# Patient Record
Sex: FEMALE | Race: WHITE | NOT HISPANIC OR LATINO | Employment: FULL TIME | URBAN - METROPOLITAN AREA
[De-identification: names, ages, dates, MRNs, and addresses within clinical notes are randomized per-mention and may not be internally consistent; named-entity substitution may affect disease eponyms.]

---

## 2019-08-13 PROBLEM — I10 HTN (HYPERTENSION): Status: ACTIVE | Noted: 2019-08-13

## 2022-03-31 ENCOUNTER — TELEPHONE (OUTPATIENT)
Dept: ENDOSCOPY | Facility: HOSPITAL | Age: 62
End: 2022-03-31
Payer: COMMERCIAL

## 2022-03-31 DIAGNOSIS — Z12.11 SCREEN FOR COLON CANCER: Primary | ICD-10-CM

## 2022-06-30 ENCOUNTER — OFFICE VISIT (OUTPATIENT)
Dept: OTOLARYNGOLOGY | Facility: CLINIC | Age: 62
End: 2022-06-30
Payer: COMMERCIAL

## 2022-06-30 VITALS — BODY MASS INDEX: 19.6 KG/M2 | TEMPERATURE: 98 F | WEIGHT: 107.13 LBS

## 2022-06-30 DIAGNOSIS — J38.1 REINKE'S EDEMA OF VOCAL FOLDS: ICD-10-CM

## 2022-06-30 DIAGNOSIS — R13.10 DYSPHAGIA, UNSPECIFIED TYPE: Primary | ICD-10-CM

## 2022-06-30 DIAGNOSIS — R49.0 DYSPHONIA: ICD-10-CM

## 2022-06-30 PROCEDURE — 1159F MED LIST DOCD IN RCRD: CPT | Mod: CPTII,S$GLB,, | Performed by: OTOLARYNGOLOGY

## 2022-06-30 PROCEDURE — 31575 PR LARYNGOSCOPY, FLEXIBLE; DIAGNOSTIC: ICD-10-PCS | Mod: S$GLB,,, | Performed by: OTOLARYNGOLOGY

## 2022-06-30 PROCEDURE — 3008F BODY MASS INDEX DOCD: CPT | Mod: CPTII,S$GLB,, | Performed by: OTOLARYNGOLOGY

## 2022-06-30 PROCEDURE — 4010F PR ACE/ARB THEARPY RXD/TAKEN: ICD-10-PCS | Mod: CPTII,S$GLB,, | Performed by: OTOLARYNGOLOGY

## 2022-06-30 PROCEDURE — 99203 PR OFFICE/OUTPT VISIT, NEW, LEVL III, 30-44 MIN: ICD-10-PCS | Mod: 25,S$GLB,, | Performed by: OTOLARYNGOLOGY

## 2022-06-30 PROCEDURE — 1159F PR MEDICATION LIST DOCUMENTED IN MEDICAL RECORD: ICD-10-PCS | Mod: CPTII,S$GLB,, | Performed by: OTOLARYNGOLOGY

## 2022-06-30 PROCEDURE — 3008F PR BODY MASS INDEX (BMI) DOCUMENTED: ICD-10-PCS | Mod: CPTII,S$GLB,, | Performed by: OTOLARYNGOLOGY

## 2022-06-30 PROCEDURE — 99999 PR PBB SHADOW E&M-EST. PATIENT-LVL III: ICD-10-PCS | Mod: PBBFAC,,, | Performed by: OTOLARYNGOLOGY

## 2022-06-30 PROCEDURE — 99999 PR PBB SHADOW E&M-EST. PATIENT-LVL III: CPT | Mod: PBBFAC,,, | Performed by: OTOLARYNGOLOGY

## 2022-06-30 PROCEDURE — 99203 OFFICE O/P NEW LOW 30 MIN: CPT | Mod: 25,S$GLB,, | Performed by: OTOLARYNGOLOGY

## 2022-06-30 PROCEDURE — 4010F ACE/ARB THERAPY RXD/TAKEN: CPT | Mod: CPTII,S$GLB,, | Performed by: OTOLARYNGOLOGY

## 2022-06-30 PROCEDURE — 31575 DIAGNOSTIC LARYNGOSCOPY: CPT | Mod: S$GLB,,, | Performed by: OTOLARYNGOLOGY

## 2022-06-30 NOTE — PROGRESS NOTES
Referring Provider:    Willrefmary Self  No address on file  Subjective:   Patient: Lakisha Vance [id number], :1960   Visit date:2022 10:46 AM    Chief Complaint:  Dysphagia    HPI:    Prior notes reviewed by myself.  Clinical documentation obtained by nursing staff reviewed.     62-year-old female with 40 pack year smoking history presents with complaints of dysphagia.  She reports dysphagia to certain solid foods, mostly meat and bread.  She feels as if these frequently become stuck after she starts her swallow and she has to regurgitate in order to clear her throat again.  She denies any issues with liquids.  She denies any aspiration symptoms.  She denies any night sweats, new lumps or bumps, unintentional weight loss.  She has not had any swallowing testing and she has not seen GI.      Objective:     Physical Exam:  Vitals:  Temp 98.1 °F (36.7 °C) (Temporal)   Wt 48.6 kg (107 lb 2.3 oz)   BMI 19.60 kg/m²   General appearance:  Well developed, well nourished    Ears:  Otoscopy of external auditory canals and tympanic membranes was normal, clinical speech reception thresholds grossly intact, no mass/lesion of auricle.    Nose:  No masses/lesions of external nose, nasal mucosa, septum, and turbinates were within normal limits.    Mouth:  No mass/lesion of lips, teeth, gums, hard/soft palate, tongue, tonsils, or oropharynx.    Neck & Lymphatics:  No cervical lymphadenopathy, no neck mass/crepitus/ asymmetry, trachea is midline, no thyroid enlargement/tenderness/mass.        [x]  Data Reviewed:    Lab Results   Component Value Date    WBC 12.6 (H) 2019    HGB 14.0 2019    HCT 41.6 2019    MCV 96 2019    EOSINOPHIL 3 2019         [x]  Independent interpretation of test:  Due to indication in patient's history, presentation or risk factors,  a fiber optic exam was performed.    SEPARATE PROCEDURE NOTE:    ANESTHESIA:  Topical xylocaine with eric-synephrine  FINDINGS:  Normal  exam      PROCEDURE:  After verbal consent was obtained, the flexible scope was passed through the patient's nasal cavity without difficulty.  The nasopharynx (adenoid pad) and eustachian tube orifices were first visualized and were found to be normal, without masses or irregularity.  The posterior pharyngeal wall and base of tongue were then examined and no mass or irregular tissue was seen.  The scope was then advanced to the larynx, and the epiglottis, valleculae, and piriform sinuses were normal, without masses or mucosal irregularity.  The false vocal folds and true vocal folds were then examined and were found to have normal mobility (full abduction and adduction) and no masses or mucosal irregularity was seen.  The arytenoids and the interarytenoid area were normal.            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  She does not have any other obvious pathology that would explain her swallowing difficulties.  I recommended a GI referral for EGD and further evaluation.  We discussed the option of reflux medication both prescription and OTC, but she adamantly denies any reflux symptoms whatsoever.  Therefore, we agreed to hold off on that for now.

## 2022-07-01 DIAGNOSIS — Z12.11 COLON CANCER SCREENING: Primary | ICD-10-CM

## 2023-05-24 DIAGNOSIS — Z12.31 OTHER SCREENING MAMMOGRAM: ICD-10-CM

## 2023-07-06 PROBLEM — Z66 DNR (DO NOT RESUSCITATE): Status: ACTIVE | Noted: 2023-06-10

## 2023-07-06 PROBLEM — F17.210 MODERATE CIGARETTE SMOKER (10-19 PER DAY): Status: ACTIVE | Noted: 2022-03-29

## 2023-07-06 PROBLEM — R60.0 BILATERAL LOWER EXTREMITY EDEMA: Status: ACTIVE | Noted: 2023-06-10

## 2023-07-06 PROBLEM — D53.9 MACROCYTIC ANEMIA: Status: ACTIVE | Noted: 2023-06-10

## 2023-07-06 PROBLEM — F10.10 ALCOHOL ABUSE: Status: ACTIVE | Noted: 2019-11-17

## 2023-07-10 PROBLEM — K80.20 GALLSTONES: Status: ACTIVE | Noted: 2023-07-10

## 2023-07-26 PROBLEM — M10.9 GOUT: Status: ACTIVE | Noted: 2023-07-26

## 2023-08-21 ENCOUNTER — APPOINTMENT (OUTPATIENT)
Dept: RADIOLOGY | Facility: HOSPITAL | Age: 63
End: 2023-08-21
Attending: NURSE PRACTITIONER
Payer: COMMERCIAL

## 2023-08-21 DIAGNOSIS — R10.13 EPIGASTRIC PAIN: ICD-10-CM

## 2023-08-21 DIAGNOSIS — K80.20 GALLSTONES: ICD-10-CM

## 2023-08-21 PROCEDURE — 76700 US EXAM ABDOM COMPLETE: CPT | Mod: TC,PO

## 2023-08-21 PROCEDURE — 76700 US ABDOMEN COMPLETE: ICD-10-PCS | Mod: 26,,, | Performed by: STUDENT IN AN ORGANIZED HEALTH CARE EDUCATION/TRAINING PROGRAM

## 2023-08-21 PROCEDURE — 76700 US EXAM ABDOM COMPLETE: CPT | Mod: 26,,, | Performed by: STUDENT IN AN ORGANIZED HEALTH CARE EDUCATION/TRAINING PROGRAM

## 2023-08-23 ENCOUNTER — OFFICE VISIT (OUTPATIENT)
Dept: HEPATOLOGY | Facility: CLINIC | Age: 63
End: 2023-08-23
Payer: COMMERCIAL

## 2023-08-23 VITALS
HEIGHT: 62 IN | DIASTOLIC BLOOD PRESSURE: 78 MMHG | HEART RATE: 80 BPM | WEIGHT: 112.75 LBS | BODY MASS INDEX: 20.75 KG/M2 | SYSTOLIC BLOOD PRESSURE: 132 MMHG

## 2023-08-23 DIAGNOSIS — K80.20 GALLSTONES: ICD-10-CM

## 2023-08-23 DIAGNOSIS — R18.8 OTHER ASCITES: ICD-10-CM

## 2023-08-23 DIAGNOSIS — K76.0 FATTY LIVER: ICD-10-CM

## 2023-08-23 DIAGNOSIS — R10.10 PAIN OF UPPER ABDOMEN: ICD-10-CM

## 2023-08-23 DIAGNOSIS — R18.8 ASCITES OF LIVER: ICD-10-CM

## 2023-08-23 DIAGNOSIS — R74.8 ELEVATED LIVER ENZYMES: ICD-10-CM

## 2023-08-23 DIAGNOSIS — K74.00 LIVER FIBROSIS: ICD-10-CM

## 2023-08-23 DIAGNOSIS — F10.10 ALCOHOL ABUSE: ICD-10-CM

## 2023-08-23 DIAGNOSIS — K76.9 HEPATIC DISEASE: Primary | ICD-10-CM

## 2023-08-23 PROCEDURE — 1159F PR MEDICATION LIST DOCUMENTED IN MEDICAL RECORD: ICD-10-PCS | Mod: CPTII,S$GLB,, | Performed by: INTERNAL MEDICINE

## 2023-08-23 PROCEDURE — 99999 PR PBB SHADOW E&M-EST. PATIENT-LVL V: ICD-10-PCS | Mod: PBBFAC,,, | Performed by: INTERNAL MEDICINE

## 2023-08-23 PROCEDURE — 4010F ACE/ARB THERAPY RXD/TAKEN: CPT | Mod: CPTII,S$GLB,, | Performed by: INTERNAL MEDICINE

## 2023-08-23 PROCEDURE — 1160F PR REVIEW ALL MEDS BY PRESCRIBER/CLIN PHARMACIST DOCUMENTED: ICD-10-PCS | Mod: CPTII,S$GLB,, | Performed by: INTERNAL MEDICINE

## 2023-08-23 PROCEDURE — 1159F MED LIST DOCD IN RCRD: CPT | Mod: CPTII,S$GLB,, | Performed by: INTERNAL MEDICINE

## 2023-08-23 PROCEDURE — 4010F PR ACE/ARB THEARPY RXD/TAKEN: ICD-10-PCS | Mod: CPTII,S$GLB,, | Performed by: INTERNAL MEDICINE

## 2023-08-23 PROCEDURE — 3008F BODY MASS INDEX DOCD: CPT | Mod: CPTII,S$GLB,, | Performed by: INTERNAL MEDICINE

## 2023-08-23 PROCEDURE — 3008F PR BODY MASS INDEX (BMI) DOCUMENTED: ICD-10-PCS | Mod: CPTII,S$GLB,, | Performed by: INTERNAL MEDICINE

## 2023-08-23 PROCEDURE — 3075F SYST BP GE 130 - 139MM HG: CPT | Mod: CPTII,S$GLB,, | Performed by: INTERNAL MEDICINE

## 2023-08-23 PROCEDURE — 1160F RVW MEDS BY RX/DR IN RCRD: CPT | Mod: CPTII,S$GLB,, | Performed by: INTERNAL MEDICINE

## 2023-08-23 PROCEDURE — 99205 OFFICE O/P NEW HI 60 MIN: CPT | Mod: S$GLB,,, | Performed by: INTERNAL MEDICINE

## 2023-08-23 PROCEDURE — 3078F PR MOST RECENT DIASTOLIC BLOOD PRESSURE < 80 MM HG: ICD-10-PCS | Mod: CPTII,S$GLB,, | Performed by: INTERNAL MEDICINE

## 2023-08-23 PROCEDURE — 99999 PR PBB SHADOW E&M-EST. PATIENT-LVL V: CPT | Mod: PBBFAC,,, | Performed by: INTERNAL MEDICINE

## 2023-08-23 PROCEDURE — 3075F PR MOST RECENT SYSTOLIC BLOOD PRESS GE 130-139MM HG: ICD-10-PCS | Mod: CPTII,S$GLB,, | Performed by: INTERNAL MEDICINE

## 2023-08-23 PROCEDURE — 99205 PR OFFICE/OUTPT VISIT, NEW, LEVL V, 60-74 MIN: ICD-10-PCS | Mod: S$GLB,,, | Performed by: INTERNAL MEDICINE

## 2023-08-23 PROCEDURE — 3078F DIAST BP <80 MM HG: CPT | Mod: CPTII,S$GLB,, | Performed by: INTERNAL MEDICINE

## 2023-08-23 RX ORDER — PANTOPRAZOLE SODIUM 40 MG/1
40 TABLET, DELAYED RELEASE ORAL DAILY
Qty: 30 TABLET | Refills: 11 | Status: ON HOLD | OUTPATIENT
Start: 2023-08-23 | End: 2023-09-01 | Stop reason: SDUPTHER

## 2023-08-23 RX ORDER — SPIRONOLACTONE 50 MG/1
50 TABLET, FILM COATED ORAL DAILY
Qty: 30 TABLET | Refills: 11 | Status: SHIPPED | OUTPATIENT
Start: 2023-08-23 | End: 2023-10-11 | Stop reason: SDUPTHER

## 2023-08-23 NOTE — H&P (VIEW-ONLY)
Hepatology Note    PATIENT: Lakisha Vance  MRN: [id number]  DATE: 8/23/2023    Provider: Hepatologist - Dr Weaver  Urgency of review: non-urgent  Referring provider: Idalia Fonseca    Diagnosis:   1. Hepatic disease    2. Gallstones    3. Fatty liver    4. Liver fibrosis    5. Other ascites    6. Alcohol abuse    7. Ascites of liver    8. Elevated liver enzymes    9. Pain of upper abdomen        Chief complaint:   Chief Complaint   Patient presents with    Ascites     Of liver     Alcohol Intoxication    Fatty Liver    Elevated Hepatic Enzymes       Subjective:    Initial History: Lakisha Vance is a 63 y.o. female who was referred to Hepatology Clinic for consultation of chronic liver disease with fluid overload      Patient had US for evaluation of abdominal pain which showed mild ascites with ? Scarring of liver though imaging showed fatty liver. There is also GB stone without acute cholecystitis. Patient has leg swelling since 3 months. She tried diuretics with some benefit but stopped due to electrolyte imbalance  Also complains of intermittent nausea . Abdominal pain in epigastric, aggravated with food but poor appetite. Patient has gained weight likely from fluid.     Patient does not have any new complains from liver standpoint. Liver enzymes are elevated. Patient has significant history of alcohol abuse    She denies recent hematemesis, coffee ground emesis, melena, hematochezia, jaundice, confusion, LE edema, abdominal distension.      Prior Relevant History:    She  denies hepatotoxic medication but taking NSAIDS for pain    Review of systems:  A review of 12+ systems was conducted with pertinent positive and negative findings documented in HPI with all other systems reviewed and negative.      PFSH:  Past medical, family, and social history reviewed as documented in chart with pertinent positive medical, family, and social history detailed in HPI.    Past Medical History:   Past Medical History:    Diagnosis Date    Hypertension        Past Surgical HIstory:   Past Surgical History:   Procedure Laterality Date    HYSTERECTOMY         Family History: History reviewed. No pertinent family history.  She has no known family history of liver disease.     Social History:  reports that she has been smoking cigarettes. She has never used smokeless tobacco. She reports that she does not currently use alcohol.    She has significant history of Alcohol     She denies history of IV drug use/Tatto  She  denies high-risk sexual contacts, no raw seafood, no sick contacts      Allergies:  Review of patient's allergies indicates:   Allergen Reactions    Baclofen Other (See Comments)     Vomiting, confusion, shaking       Medications:  Current Outpatient Medications   Medication Sig Dispense Refill    folic acid (FOLVITE) 1 MG tablet Take 1 tablet by mouth once daily.      indomethacin (INDOCIN) 50 MG capsule       ondansetron (ZOFRAN-ODT) 4 MG TbDL Take 1 tablet (4 mg total) by mouth every 8 (eight) hours as needed (nausea/vomiting). 12 tablet 1    vitamin D (VITAMIN D3) 1000 units Tab Take 1 tablet by mouth every morning.      cyanocobalamin (VITAMIN B-12) 100 MCG tablet Take 100 mcg by mouth once daily.      EScitalopram oxalate (LEXAPRO) 10 MG tablet Take 1 tablet (10 mg total) by mouth once daily. 30 tablet 0    furosemide (LASIX) 20 MG tablet TAKE 1 TABLET(20 MG) BY MOUTH DAILY AS NEEDED FOR SWELLING (Patient not taking: Reported on 8/15/2023) 30 tablet 1    pantoprazole (PROTONIX) 40 MG tablet Take 1 tablet (40 mg total) by mouth once daily. 30 tablet 11    spironolactone (ALDACTONE) 50 MG tablet Take 1 tablet (50 mg total) by mouth once daily. 30 tablet 11     No current facility-administered medications for this visit.       Review of Systems   Constitutional:  Negative for fatigue, fever and unexpected weight change.   HENT:  Negative for ear pain, nosebleeds and trouble swallowing.    Eyes:  Negative for discharge  "and redness.   Respiratory:  Negative for cough and shortness of breath.    Cardiovascular:  Negative for palpitations and leg swelling.   Gastrointestinal:  Negative for abdominal distention, abdominal pain, diarrhea and vomiting.   Endocrine: Negative for cold intolerance and polyuria.   Genitourinary:  Negative for flank pain and hematuria.   Musculoskeletal:  Negative for back pain.   Skin:  Negative for pallor.   Neurological:  Negative for seizures and headaches.   Hematological:  Does not bruise/bleed easily.   Psychiatric/Behavioral:  Negative for confusion and hallucinations.            Objective:      Vitals:   Vitals:    08/23/23 1019   BP: 132/78   Pulse: 80   Weight: 51.1 kg (112 lb 12.2 oz)   Height: 5' 2" (1.575 m)       Physical Exam  Constitutional:       Appearance: Normal appearance.   HENT:      Head: Normocephalic and atraumatic.      Right Ear: External ear normal.      Left Ear: External ear normal.      Mouth/Throat:      Mouth: Mucous membranes are moist.   Eyes:      Extraocular Movements: Extraocular movements intact.      Pupils: Pupils are equal, round, and reactive to light.   Cardiovascular:      Rate and Rhythm: Normal rate and regular rhythm.      Pulses: Normal pulses.      Heart sounds: Normal heart sounds.   Pulmonary:      Effort: Pulmonary effort is normal.      Breath sounds: Normal breath sounds.   Abdominal:      General: Bowel sounds are normal. There is no distension.      Palpations: Abdomen is soft. There is no mass.      Tenderness: There is abdominal tenderness.   Musculoskeletal:         General: Normal range of motion.      Cervical back: Normal range of motion and neck supple.   Neurological:      General: No focal deficit present.      Mental Status: She is alert and oriented to person, place, and time.         Laboratory Data:  No visits with results within 1 Week(s) from this visit.   Latest known visit with results is:   Office Visit on 07/06/2023   Component " Date Value Ref Range Status    ALFREDA Direct 07/07/2023 Negative  Negative Final    Sed Rate 07/07/2023 25  0 - 40 mm/hr Final    CRP 07/07/2023 43 (H)  0 - 10 mg/L Final    WBC 07/07/2023 10.6  3.4 - 10.8 x10E3/uL Final    RBC 07/07/2023 3.08 (L)  3.77 - 5.28 x10E6/uL Final    Hemoglobin 07/07/2023 10.7 (L)  11.1 - 15.9 g/dL Final    Hematocrit 07/07/2023 31.9 (L)  34.0 - 46.6 % Final    MCV 07/07/2023 104 (H)  79 - 97 fL Final    MCH 07/07/2023 34.7 (H)  26.6 - 33.0 pg Final    MCHC 07/07/2023 33.5  31.5 - 35.7 g/dL Final    RDW 07/07/2023 13.2  11.7 - 15.4 % Final    Platelets 07/07/2023 279  150 - 450 x10E3/uL Final    Neutrophils 07/07/2023 77  Not Estab. % Final    Lymphs 07/07/2023 17  Not Estab. % Final    Monocytes 07/07/2023 6  Not Estab. % Final    Eos 07/07/2023 0  Not Estab. % Final    Basos 07/07/2023 0  Not Estab. % Final    Neutrophils (Absolute) 07/07/2023 8.1 (H)  1.4 - 7.0 x10E3/uL Final    Lymphs (Absolute) 07/07/2023 1.8  0.7 - 3.1 x10E3/uL Final    Monocytes(Absolute) 07/07/2023 0.6  0.1 - 0.9 x10E3/uL Final    Eos (Absolute) 07/07/2023 0.0  0.0 - 0.4 x10E3/uL Final    Baso (Absolute) 07/07/2023 0.0  0.0 - 0.2 x10E3/uL Final    Immature Granulocytes 07/07/2023 0  Not Estab. % Final    Immature Grans (Abs) 07/07/2023 0.0  0.0 - 0.1 x10E3/uL Final    Glucose 07/07/2023 106 (H)  70 - 99 mg/dL Final    BUN 07/07/2023 10  8 - 27 mg/dL Final    Creatinine 07/07/2023 0.64  0.57 - 1.00 mg/dL Final    eGFR 07/07/2023 99  >59 mL/min/1.73 Final    BUN/Creatinine Ratio 07/07/2023 16  12 - 28 Final    Sodium 07/07/2023 140  134 - 144 mmol/L Final    Potassium 07/07/2023 4.0  3.5 - 5.2 mmol/L Final    Chloride 07/07/2023 102  96 - 106 mmol/L Final    CO2 07/07/2023 24  20 - 29 mmol/L Final    Calcium 07/07/2023 8.5 (L)  8.7 - 10.3 mg/dL Final    Protein, Total 07/07/2023 6.8  6.0 - 8.5 g/dL Final    Albumin 07/07/2023 2.6 (L)  3.8 - 4.8 g/dL Final    Comment:                **Effective July 10, 2023 Albumin  "reference interval**                   will be changing to:                              Age                  Male          Female                             0 -   7 days       3.6 - 4.9      3.6 - 4.9                             8 -  30 days       3.5 - 4.6      3.5 - 4.6                             1 -   6 months     3.7 - 4.8      3.7 - 4.8                      7 months -   2 years      4.0 - 5.0      4.0 - 5.0                             3 -   5 years      4.1 - 5.0      4.1 - 5.0                             6 -  12 years      4.2 - 5.0      4.2 - 5.0                            13 -  30 years      4.3 - 5.2      4.0 - 5.0                            31 -  50 years      4.1 - 5.1      3.9 - 4.9                            51 -  60 years      3.8 - 4.9      3.8 - 4.9                            61 -  70 years      3.9 - 4.9      3.9 - 4.9                            71 -  80 years      3.8 - 4.8      3.8 - 4.8                            8                           1 -  89 years      3.7 - 4.7      3.7 - 4.7                            90 - 199 years      3.6 - 4.6      3.6 - 4.6      Globulin, Total 07/07/2023 4.2  1.5 - 4.5 g/dL Final    Albumin/Globulin Ratio 07/07/2023 0.6 (L)  1.2 - 2.2 Final    Total Bilirubin 07/07/2023 0.4  0.0 - 1.2 mg/dL Final    Alkaline Phosphatase 07/07/2023 226 (H)  44 - 121 IU/L Final    AST 07/07/2023 77 (H)  0 - 40 IU/L Final    ALT 07/07/2023 14  0 - 32 IU/L Final       Lab Results   Component Value Date    INR 1.1 03/15/2010       No results found for: "SMOOTHMUSCAB", "MITOAB"  Lab Results   Component Value Date    FERRITIN 533 (H) 07/08/2022     No results found for: "HAV", "HEPAIGM", "HEPBIGM", "HEPBCAB", "HBEAG", "HEPCAB"  Lab Results   Component Value Date    TSH 1.264 06/10/2023     No results found for: "ALFREDA"    No results found for: "ABORH"        Lab Results   Component Value Date    HGBA1C 4.8 04/08/2022     Lab Results   Component Value Date    CHOL 127 06/10/2023    CHOL " "108 05/25/2023    CHOL 135 04/08/2022     Lab Results   Component Value Date    HDL 24 (L) 06/10/2023    HDL 32 (L) 05/25/2023    HDL 45 04/08/2022     Lab Results   Component Value Date    LDLCALC 69 06/10/2023    LDLCALC 56 05/25/2023    LDLCALC 71 04/08/2022     Lab Results   Component Value Date    TRIG 170 (H) 06/10/2023    TRIG 107 05/25/2023    TRIG 103 04/08/2022     No results found for: "CHOLHDL"      I personally reviewed imaging studies and outside records..      Assessment:       1. Hepatic disease    2. Gallstones    3. Fatty liver    4. Liver fibrosis    5. Other ascites    6. Alcohol abuse    7. Ascites of liver    8. Elevated liver enzymes    9. Pain of upper abdomen                 Plan:     Lakisha was seen today for ascites, alcohol intoxication, fatty liver and elevated hepatic enzymes.    Diagnoses and all orders for this visit:    Hepatic disease  -     AFP Tumor Marker; Future; Expected date: 08/23/2023  -     Alpha-1-Antitrypsin; Future; Expected date: 08/23/2023  -     ALFREDA Screen w/Reflex; Future; Expected date: 08/23/2023  -     Antimitochondrial Antibody; Future; Expected date: 08/23/2023  -     Anti-Smooth Muscle Antibody; Future; Expected date: 08/23/2023  -     CBC Auto Differential; Future; Expected date: 08/23/2023  -     Ceruloplasmin; Future; Expected date: 08/23/2023  -     Comprehensive Metabolic Panel; Future; Expected date: 08/23/2023  -     Phosphatidylethanol (PETH); Future; Expected date: 08/23/2023  -     Protime-INR; Future; Expected date: 08/23/2023  -     Tissue Transglutaminase, IgA; Future; Expected date: 08/23/2023  -     Hepatitis B Surface Antigen; Future; Expected date: 08/23/2023  -     Hepatitis B Core Antibody, Total; Future; Expected date: 08/23/2023  -     Hepatitis C Antibody; Future; Expected date: 08/23/2023  -     Hepatitis A antibody, IgG; Future; Expected date: 08/23/2023  -     IgG; Future; Expected date: 08/23/2023    Gallstones    Fatty liver  -     " Ambulatory referral/consult to Hepatology    Liver fibrosis    Other ascites  -     pantoprazole (PROTONIX) 40 MG tablet; Take 1 tablet (40 mg total) by mouth once daily.  -     spironolactone (ALDACTONE) 50 MG tablet; Take 1 tablet (50 mg total) by mouth once daily.  -     Ambulatory referral/consult to Endo Procedure ; Future; Expected date: 08/24/2023  -     US Lower Extremity Veins Bilateral; Future; Expected date: 08/23/2023    Alcohol abuse  -     Ambulatory referral/consult to Hepatology    Ascites of liver  -     Ambulatory referral/consult to Hepatology    Elevated liver enzymes  -     Ambulatory referral/consult to Hepatology    Pain of upper abdomen      Abdominal Pain  PPI  Avoid NSAIDS  Plan EGD    Chronic liver disease  --Plan checking serologies to rule out other causes of chronic liver diseases for the sake of thoroughness in work up.       Alcohol related liver disease  Sobriety  Fibro scan    Ascites  Aldactone  started. Check labs if stable can add low dose lasix with close  Salt restriction  If fluid overload persist need Echo       Fluid overload  Doppler LL  Diuretics  Follow up PCP      Alcohol abuse  Education and counseling  AA    Nutritional failure  Consult dietician      Return to clinic in 6 months.    I have sent communication to the referring physician and/or primary care provider.      Time Statement  A total time spent includes time preparing to see patient, reviewing  diagnostic studies and records, direct face-to-face visit, completing orders, medications , reconciliation, prescription management, and care coordination    We discussed in depth the nature of the patient's disease, the management plan in details. I have provided the patient with an opportunity to ask questions and have all questions answered to his satisfaction.     Discussed with patient that it is likely that she will see results before Myself or my nurse are able to view them and report results due to the  Cures Act passed 4/1/21. Results will be sent immediately to the patient who are enrolled in the patient portal. If results come through after business hours or on weekend, we will not see them until the next business day that we are in the office. If resulted during the business day, we will likely not be able to review them until after completing all patient visits in office that day.   Patient was seen in the liver transplant department at [facility] Mack Weaver MD  Transplant Hepatologist and Gastroenterologist  [facility] [place]

## 2023-08-23 NOTE — PROGRESS NOTES
Hepatology Note    PATIENT: Lakisha Vance  MRN: [id number]  DATE: 8/23/2023    Provider: Hepatologist - Dr Weaver  Urgency of review: non-urgent  Referring provider: Idalia Fonseca    Diagnosis:   1. Hepatic disease    2. Gallstones    3. Fatty liver    4. Liver fibrosis    5. Other ascites    6. Alcohol abuse    7. Ascites of liver    8. Elevated liver enzymes    9. Pain of upper abdomen        Chief complaint:   Chief Complaint   Patient presents with    Ascites     Of liver     Alcohol Intoxication    Fatty Liver    Elevated Hepatic Enzymes       Subjective:    Initial History: Lakisha Vance is a 63 y.o. female who was referred to Hepatology Clinic for consultation of chronic liver disease with fluid overload      Patient had US for evaluation of abdominal pain which showed mild ascites with ? Scarring of liver though imaging showed fatty liver. There is also GB stone without acute cholecystitis. Patient has leg swelling since 3 months. She tried diuretics with some benefit but stopped due to electrolyte imbalance  Also complains of intermittent nausea . Abdominal pain in epigastric, aggravated with food but poor appetite. Patient has gained weight likely from fluid.     Patient does not have any new complains from liver standpoint. Liver enzymes are elevated. Patient has significant history of alcohol abuse    She denies recent hematemesis, coffee ground emesis, melena, hematochezia, jaundice, confusion, LE edema, abdominal distension.      Prior Relevant History:    She  denies hepatotoxic medication but taking NSAIDS for pain    Review of systems:  A review of 12+ systems was conducted with pertinent positive and negative findings documented in HPI with all other systems reviewed and negative.      PFSH:  Past medical, family, and social history reviewed as documented in chart with pertinent positive medical, family, and social history detailed in HPI.    Past Medical History:   Past Medical History:    Diagnosis Date    Hypertension        Past Surgical HIstory:   Past Surgical History:   Procedure Laterality Date    HYSTERECTOMY         Family History: History reviewed. No pertinent family history.  She has no known family history of liver disease.     Social History:  reports that she has been smoking cigarettes. She has never used smokeless tobacco. She reports that she does not currently use alcohol.    She has significant history of Alcohol     She denies history of IV drug use/Tatto  She  denies high-risk sexual contacts, no raw seafood, no sick contacts      Allergies:  Review of patient's allergies indicates:   Allergen Reactions    Baclofen Other (See Comments)     Vomiting, confusion, shaking       Medications:  Current Outpatient Medications   Medication Sig Dispense Refill    folic acid (FOLVITE) 1 MG tablet Take 1 tablet by mouth once daily.      indomethacin (INDOCIN) 50 MG capsule       ondansetron (ZOFRAN-ODT) 4 MG TbDL Take 1 tablet (4 mg total) by mouth every 8 (eight) hours as needed (nausea/vomiting). 12 tablet 1    vitamin D (VITAMIN D3) 1000 units Tab Take 1 tablet by mouth every morning.      cyanocobalamin (VITAMIN B-12) 100 MCG tablet Take 100 mcg by mouth once daily.      EScitalopram oxalate (LEXAPRO) 10 MG tablet Take 1 tablet (10 mg total) by mouth once daily. 30 tablet 0    furosemide (LASIX) 20 MG tablet TAKE 1 TABLET(20 MG) BY MOUTH DAILY AS NEEDED FOR SWELLING (Patient not taking: Reported on 8/15/2023) 30 tablet 1    pantoprazole (PROTONIX) 40 MG tablet Take 1 tablet (40 mg total) by mouth once daily. 30 tablet 11    spironolactone (ALDACTONE) 50 MG tablet Take 1 tablet (50 mg total) by mouth once daily. 30 tablet 11     No current facility-administered medications for this visit.       Review of Systems   Constitutional:  Negative for fatigue, fever and unexpected weight change.   HENT:  Negative for ear pain, nosebleeds and trouble swallowing.    Eyes:  Negative for discharge  "and redness.   Respiratory:  Negative for cough and shortness of breath.    Cardiovascular:  Negative for palpitations and leg swelling.   Gastrointestinal:  Negative for abdominal distention, abdominal pain, diarrhea and vomiting.   Endocrine: Negative for cold intolerance and polyuria.   Genitourinary:  Negative for flank pain and hematuria.   Musculoskeletal:  Negative for back pain.   Skin:  Negative for pallor.   Neurological:  Negative for seizures and headaches.   Hematological:  Does not bruise/bleed easily.   Psychiatric/Behavioral:  Negative for confusion and hallucinations.            Objective:      Vitals:   Vitals:    08/23/23 1019   BP: 132/78   Pulse: 80   Weight: 51.1 kg (112 lb 12.2 oz)   Height: 5' 2" (1.575 m)       Physical Exam  Constitutional:       Appearance: Normal appearance.   HENT:      Head: Normocephalic and atraumatic.      Right Ear: External ear normal.      Left Ear: External ear normal.      Mouth/Throat:      Mouth: Mucous membranes are moist.   Eyes:      Extraocular Movements: Extraocular movements intact.      Pupils: Pupils are equal, round, and reactive to light.   Cardiovascular:      Rate and Rhythm: Normal rate and regular rhythm.      Pulses: Normal pulses.      Heart sounds: Normal heart sounds.   Pulmonary:      Effort: Pulmonary effort is normal.      Breath sounds: Normal breath sounds.   Abdominal:      General: Bowel sounds are normal. There is no distension.      Palpations: Abdomen is soft. There is no mass.      Tenderness: There is abdominal tenderness.   Musculoskeletal:         General: Normal range of motion.      Cervical back: Normal range of motion and neck supple.   Neurological:      General: No focal deficit present.      Mental Status: She is alert and oriented to person, place, and time.         Laboratory Data:  No visits with results within 1 Week(s) from this visit.   Latest known visit with results is:   Office Visit on 07/06/2023   Component " Date Value Ref Range Status    ALFREDA Direct 07/07/2023 Negative  Negative Final    Sed Rate 07/07/2023 25  0 - 40 mm/hr Final    CRP 07/07/2023 43 (H)  0 - 10 mg/L Final    WBC 07/07/2023 10.6  3.4 - 10.8 x10E3/uL Final    RBC 07/07/2023 3.08 (L)  3.77 - 5.28 x10E6/uL Final    Hemoglobin 07/07/2023 10.7 (L)  11.1 - 15.9 g/dL Final    Hematocrit 07/07/2023 31.9 (L)  34.0 - 46.6 % Final    MCV 07/07/2023 104 (H)  79 - 97 fL Final    MCH 07/07/2023 34.7 (H)  26.6 - 33.0 pg Final    MCHC 07/07/2023 33.5  31.5 - 35.7 g/dL Final    RDW 07/07/2023 13.2  11.7 - 15.4 % Final    Platelets 07/07/2023 279  150 - 450 x10E3/uL Final    Neutrophils 07/07/2023 77  Not Estab. % Final    Lymphs 07/07/2023 17  Not Estab. % Final    Monocytes 07/07/2023 6  Not Estab. % Final    Eos 07/07/2023 0  Not Estab. % Final    Basos 07/07/2023 0  Not Estab. % Final    Neutrophils (Absolute) 07/07/2023 8.1 (H)  1.4 - 7.0 x10E3/uL Final    Lymphs (Absolute) 07/07/2023 1.8  0.7 - 3.1 x10E3/uL Final    Monocytes(Absolute) 07/07/2023 0.6  0.1 - 0.9 x10E3/uL Final    Eos (Absolute) 07/07/2023 0.0  0.0 - 0.4 x10E3/uL Final    Baso (Absolute) 07/07/2023 0.0  0.0 - 0.2 x10E3/uL Final    Immature Granulocytes 07/07/2023 0  Not Estab. % Final    Immature Grans (Abs) 07/07/2023 0.0  0.0 - 0.1 x10E3/uL Final    Glucose 07/07/2023 106 (H)  70 - 99 mg/dL Final    BUN 07/07/2023 10  8 - 27 mg/dL Final    Creatinine 07/07/2023 0.64  0.57 - 1.00 mg/dL Final    eGFR 07/07/2023 99  >59 mL/min/1.73 Final    BUN/Creatinine Ratio 07/07/2023 16  12 - 28 Final    Sodium 07/07/2023 140  134 - 144 mmol/L Final    Potassium 07/07/2023 4.0  3.5 - 5.2 mmol/L Final    Chloride 07/07/2023 102  96 - 106 mmol/L Final    CO2 07/07/2023 24  20 - 29 mmol/L Final    Calcium 07/07/2023 8.5 (L)  8.7 - 10.3 mg/dL Final    Protein, Total 07/07/2023 6.8  6.0 - 8.5 g/dL Final    Albumin 07/07/2023 2.6 (L)  3.8 - 4.8 g/dL Final    Comment:                **Effective July 10, 2023 Albumin  "reference interval**                   will be changing to:                              Age                  Male          Female                             0 -   7 days       3.6 - 4.9      3.6 - 4.9                             8 -  30 days       3.5 - 4.6      3.5 - 4.6                             1 -   6 months     3.7 - 4.8      3.7 - 4.8                      7 months -   2 years      4.0 - 5.0      4.0 - 5.0                             3 -   5 years      4.1 - 5.0      4.1 - 5.0                             6 -  12 years      4.2 - 5.0      4.2 - 5.0                            13 -  30 years      4.3 - 5.2      4.0 - 5.0                            31 -  50 years      4.1 - 5.1      3.9 - 4.9                            51 -  60 years      3.8 - 4.9      3.8 - 4.9                            61 -  70 years      3.9 - 4.9      3.9 - 4.9                            71 -  80 years      3.8 - 4.8      3.8 - 4.8                            8                           1 -  89 years      3.7 - 4.7      3.7 - 4.7                            90 - 199 years      3.6 - 4.6      3.6 - 4.6      Globulin, Total 07/07/2023 4.2  1.5 - 4.5 g/dL Final    Albumin/Globulin Ratio 07/07/2023 0.6 (L)  1.2 - 2.2 Final    Total Bilirubin 07/07/2023 0.4  0.0 - 1.2 mg/dL Final    Alkaline Phosphatase 07/07/2023 226 (H)  44 - 121 IU/L Final    AST 07/07/2023 77 (H)  0 - 40 IU/L Final    ALT 07/07/2023 14  0 - 32 IU/L Final       Lab Results   Component Value Date    INR 1.1 03/15/2010       No results found for: "SMOOTHMUSCAB", "MITOAB"  Lab Results   Component Value Date    FERRITIN 533 (H) 07/08/2022     No results found for: "HAV", "HEPAIGM", "HEPBIGM", "HEPBCAB", "HBEAG", "HEPCAB"  Lab Results   Component Value Date    TSH 1.264 06/10/2023     No results found for: "ALFREDA"    No results found for: "ABORH"        Lab Results   Component Value Date    HGBA1C 4.8 04/08/2022     Lab Results   Component Value Date    CHOL 127 06/10/2023    CHOL " "108 05/25/2023    CHOL 135 04/08/2022     Lab Results   Component Value Date    HDL 24 (L) 06/10/2023    HDL 32 (L) 05/25/2023    HDL 45 04/08/2022     Lab Results   Component Value Date    LDLCALC 69 06/10/2023    LDLCALC 56 05/25/2023    LDLCALC 71 04/08/2022     Lab Results   Component Value Date    TRIG 170 (H) 06/10/2023    TRIG 107 05/25/2023    TRIG 103 04/08/2022     No results found for: "CHOLHDL"      I personally reviewed imaging studies and outside records..      Assessment:       1. Hepatic disease    2. Gallstones    3. Fatty liver    4. Liver fibrosis    5. Other ascites    6. Alcohol abuse    7. Ascites of liver    8. Elevated liver enzymes    9. Pain of upper abdomen                 Plan:     Lakisha was seen today for ascites, alcohol intoxication, fatty liver and elevated hepatic enzymes.    Diagnoses and all orders for this visit:    Hepatic disease  -     AFP Tumor Marker; Future; Expected date: 08/23/2023  -     Alpha-1-Antitrypsin; Future; Expected date: 08/23/2023  -     ALFREDA Screen w/Reflex; Future; Expected date: 08/23/2023  -     Antimitochondrial Antibody; Future; Expected date: 08/23/2023  -     Anti-Smooth Muscle Antibody; Future; Expected date: 08/23/2023  -     CBC Auto Differential; Future; Expected date: 08/23/2023  -     Ceruloplasmin; Future; Expected date: 08/23/2023  -     Comprehensive Metabolic Panel; Future; Expected date: 08/23/2023  -     Phosphatidylethanol (PETH); Future; Expected date: 08/23/2023  -     Protime-INR; Future; Expected date: 08/23/2023  -     Tissue Transglutaminase, IgA; Future; Expected date: 08/23/2023  -     Hepatitis B Surface Antigen; Future; Expected date: 08/23/2023  -     Hepatitis B Core Antibody, Total; Future; Expected date: 08/23/2023  -     Hepatitis C Antibody; Future; Expected date: 08/23/2023  -     Hepatitis A antibody, IgG; Future; Expected date: 08/23/2023  -     IgG; Future; Expected date: 08/23/2023    Gallstones    Fatty liver  -     " Ambulatory referral/consult to Hepatology    Liver fibrosis    Other ascites  -     pantoprazole (PROTONIX) 40 MG tablet; Take 1 tablet (40 mg total) by mouth once daily.  -     spironolactone (ALDACTONE) 50 MG tablet; Take 1 tablet (50 mg total) by mouth once daily.  -     Ambulatory referral/consult to Endo Procedure ; Future; Expected date: 08/24/2023  -     US Lower Extremity Veins Bilateral; Future; Expected date: 08/23/2023    Alcohol abuse  -     Ambulatory referral/consult to Hepatology    Ascites of liver  -     Ambulatory referral/consult to Hepatology    Elevated liver enzymes  -     Ambulatory referral/consult to Hepatology    Pain of upper abdomen      Abdominal Pain  PPI  Avoid NSAIDS  Plan EGD    Chronic liver disease  --Plan checking serologies to rule out other causes of chronic liver diseases for the sake of thoroughness in work up.       Alcohol related liver disease  Sobriety  Fibro scan    Ascites  Aldactone  started. Check labs if stable can add low dose lasix with close  Salt restriction  If fluid overload persist need Echo       Fluid overload  Doppler LL  Diuretics  Follow up PCP      Alcohol abuse  Education and counseling  AA    Nutritional failure  Consult dietician      Return to clinic in 6 months.    I have sent communication to the referring physician and/or primary care provider.      Time Statement  A total time spent includes time preparing to see patient, reviewing  diagnostic studies and records, direct face-to-face visit, completing orders, medications , reconciliation, prescription management, and care coordination    We discussed in depth the nature of the patient's disease, the management plan in details. I have provided the patient with an opportunity to ask questions and have all questions answered to his satisfaction.     Discussed with patient that it is likely that she will see results before Myself or my nurse are able to view them and report results due to the  Cures Act passed 4/1/21. Results will be sent immediately to the patient who are enrolled in the patient portal. If results come through after business hours or on weekend, we will not see them until the next business day that we are in the office. If resulted during the business day, we will likely not be able to review them until after completing all patient visits in office that day.   Patient was seen in the liver transplant department at [facility] Mack Weaver MD  Transplant Hepatologist and Gastroenterologist  [facility] [place]

## 2023-08-24 ENCOUNTER — HOSPITAL ENCOUNTER (OUTPATIENT)
Dept: PREADMISSION TESTING | Facility: HOSPITAL | Age: 63
Discharge: HOME | End: 2023-08-24
Attending: INTERNAL MEDICINE
Payer: COMMERCIAL

## 2023-08-24 DIAGNOSIS — R18.8 OTHER ASCITES: ICD-10-CM

## 2023-08-28 ENCOUNTER — HOSPITAL ENCOUNTER (OUTPATIENT)
Dept: PREADMISSION TESTING | Facility: HOSPITAL | Age: 63
Discharge: HOME | End: 2023-08-28
Attending: INTERNAL MEDICINE
Payer: COMMERCIAL

## 2023-08-31 PROBLEM — R13.14 PHARYNGOESOPHAGEAL DYSPHAGIA: Status: ACTIVE | Noted: 2023-08-31

## 2023-09-01 ENCOUNTER — HOSPITAL ENCOUNTER (OUTPATIENT)
Facility: HOSPITAL | Age: 63
Discharge: HOME | End: 2023-09-01
Attending: INTERNAL MEDICINE | Admitting: INTERNAL MEDICINE
Payer: COMMERCIAL

## 2023-09-01 ENCOUNTER — ANESTHESIA EVENT (OUTPATIENT)
Dept: ENDOSCOPY | Facility: HOSPITAL | Age: 63
End: 2023-09-01
Payer: COMMERCIAL

## 2023-09-01 ENCOUNTER — ANESTHESIA (OUTPATIENT)
Dept: ENDOSCOPY | Facility: HOSPITAL | Age: 63
End: 2023-09-01
Payer: COMMERCIAL

## 2023-09-01 VITALS
HEIGHT: 62 IN | TEMPERATURE: 98 F | BODY MASS INDEX: 19.32 KG/M2 | HEART RATE: 86 BPM | SYSTOLIC BLOOD PRESSURE: 99 MMHG | RESPIRATION RATE: 16 BRPM | DIASTOLIC BLOOD PRESSURE: 62 MMHG | WEIGHT: 105 LBS | OXYGEN SATURATION: 94 %

## 2023-09-01 DIAGNOSIS — R13.14 PHARYNGOESOPHAGEAL DYSPHAGIA: ICD-10-CM

## 2023-09-01 DIAGNOSIS — R18.8 OTHER ASCITES: ICD-10-CM

## 2023-09-01 LAB
ALBUMIN SERPL BCP-MCNC: 1.4 G/DL (ref 3.5–5.2)
ALP SERPL-CCNC: 203 U/L (ref 55–135)
ALT SERPL W/O P-5'-P-CCNC: 13 U/L (ref 10–44)
ANION GAP SERPL CALC-SCNC: 12 MMOL/L (ref 8–16)
AST SERPL-CCNC: 45 U/L (ref 10–40)
BASOPHILS # BLD AUTO: 0.09 K/UL (ref 0–0.2)
BASOPHILS NFR BLD: 0.9 % (ref 0–1.9)
BILIRUB SERPL-MCNC: 0.9 MG/DL (ref 0.1–1)
BUN SERPL-MCNC: 8 MG/DL (ref 8–23)
CALCIUM SERPL-MCNC: 7.7 MG/DL (ref 8.7–10.5)
CHLORIDE SERPL-SCNC: 106 MMOL/L (ref 95–110)
CO2 SERPL-SCNC: 21 MMOL/L (ref 23–29)
CREAT SERPL-MCNC: 0.7 MG/DL (ref 0.5–1.4)
DIFFERENTIAL METHOD: ABNORMAL
EOSINOPHIL # BLD AUTO: 0 K/UL (ref 0–0.5)
EOSINOPHIL NFR BLD: 0.4 % (ref 0–8)
ERYTHROCYTE [DISTWIDTH] IN BLOOD BY AUTOMATED COUNT: 14.1 % (ref 11.5–14.5)
EST. GFR  (NO RACE VARIABLE): >60 ML/MIN/1.73 M^2
GLUCOSE SERPL-MCNC: 79 MG/DL (ref 70–110)
HCT VFR BLD AUTO: 31 % (ref 37–48.5)
HGB BLD-MCNC: 9.9 G/DL (ref 12–16)
IMM GRANULOCYTES # BLD AUTO: 0.06 K/UL (ref 0–0.04)
IMM GRANULOCYTES NFR BLD AUTO: 0.6 % (ref 0–0.5)
INR PPP: 1.2 (ref 0.8–1.2)
LYMPHOCYTES # BLD AUTO: 1.9 K/UL (ref 1–4.8)
LYMPHOCYTES NFR BLD: 19.6 % (ref 18–48)
MCH RBC QN AUTO: 31 PG (ref 27–31)
MCHC RBC AUTO-ENTMCNC: 31.9 G/DL (ref 32–36)
MCV RBC AUTO: 97 FL (ref 82–98)
MONOCYTES # BLD AUTO: 0.7 K/UL (ref 0.3–1)
MONOCYTES NFR BLD: 7.5 % (ref 4–15)
NEUTROPHILS # BLD AUTO: 6.8 K/UL (ref 1.8–7.7)
NEUTROPHILS NFR BLD: 71 % (ref 38–73)
NRBC BLD-RTO: 0 /100 WBC
PLATELET # BLD AUTO: 374 K/UL (ref 150–450)
PMV BLD AUTO: 9.4 FL (ref 9.2–12.9)
POTASSIUM SERPL-SCNC: 3.2 MMOL/L (ref 3.5–5.1)
PROT SERPL-MCNC: 6.1 G/DL (ref 6–8.4)
PROTHROMBIN TIME: 12.8 SEC (ref 9–12.5)
RBC # BLD AUTO: 3.19 M/UL (ref 4–5.4)
SODIUM SERPL-SCNC: 139 MMOL/L (ref 136–145)
WBC # BLD AUTO: 9.63 K/UL (ref 3.9–12.7)

## 2023-09-01 PROCEDURE — 37000009 HC ANESTHESIA EA ADD 15 MINS: Performed by: INTERNAL MEDICINE

## 2023-09-01 PROCEDURE — 88305 TISSUE EXAM BY PATHOLOGIST: CPT | Mod: 26,,, | Performed by: PATHOLOGY

## 2023-09-01 PROCEDURE — 88305 TISSUE EXAM BY PATHOLOGIST: CPT | Performed by: PATHOLOGY

## 2023-09-01 PROCEDURE — 88342 IMHCHEM/IMCYTCHM 1ST ANTB: CPT | Mod: 26,,, | Performed by: PATHOLOGY

## 2023-09-01 PROCEDURE — 43239 EGD BIOPSY SINGLE/MULTIPLE: CPT | Mod: ,,, | Performed by: INTERNAL MEDICINE

## 2023-09-01 PROCEDURE — 88342 CHG IMMUNOCYTOCHEMISTRY: ICD-10-PCS | Mod: 26,,, | Performed by: PATHOLOGY

## 2023-09-01 PROCEDURE — 27201012 HC FORCEPS, HOT/COLD, DISP: Performed by: INTERNAL MEDICINE

## 2023-09-01 PROCEDURE — 88305 TISSUE EXAM BY PATHOLOGIST: ICD-10-PCS | Mod: 26,,, | Performed by: PATHOLOGY

## 2023-09-01 PROCEDURE — 85610 PROTHROMBIN TIME: CPT | Performed by: INTERNAL MEDICINE

## 2023-09-01 PROCEDURE — 88342 IMHCHEM/IMCYTCHM 1ST ANTB: CPT | Performed by: PATHOLOGY

## 2023-09-01 PROCEDURE — 25000242 PHARM REV CODE 250 ALT 637 W/ HCPCS: Performed by: NURSE ANESTHETIST, CERTIFIED REGISTERED

## 2023-09-01 PROCEDURE — 25000003 PHARM REV CODE 250: Performed by: NURSE ANESTHETIST, CERTIFIED REGISTERED

## 2023-09-01 PROCEDURE — 63600175 PHARM REV CODE 636 W HCPCS: Performed by: NURSE ANESTHETIST, CERTIFIED REGISTERED

## 2023-09-01 PROCEDURE — 85025 COMPLETE CBC W/AUTO DIFF WBC: CPT | Performed by: INTERNAL MEDICINE

## 2023-09-01 PROCEDURE — 43239 PR EGD, FLEX, W/BIOPSY, SGL/MULTI: ICD-10-PCS | Mod: ,,, | Performed by: INTERNAL MEDICINE

## 2023-09-01 PROCEDURE — 43239 EGD BIOPSY SINGLE/MULTIPLE: CPT | Performed by: INTERNAL MEDICINE

## 2023-09-01 PROCEDURE — 80053 COMPREHEN METABOLIC PANEL: CPT | Performed by: INTERNAL MEDICINE

## 2023-09-01 PROCEDURE — 37000008 HC ANESTHESIA 1ST 15 MINUTES: Performed by: INTERNAL MEDICINE

## 2023-09-01 RX ORDER — PANTOPRAZOLE SODIUM 40 MG/1
40 TABLET, DELAYED RELEASE ORAL 2 TIMES DAILY
Qty: 60 TABLET | Refills: 11 | Status: SHIPPED | OUTPATIENT
Start: 2023-09-01 | End: 2023-10-11 | Stop reason: SDUPTHER

## 2023-09-01 RX ORDER — SODIUM CHLORIDE, SODIUM LACTATE, POTASSIUM CHLORIDE, CALCIUM CHLORIDE 600; 310; 30; 20 MG/100ML; MG/100ML; MG/100ML; MG/100ML
INJECTION, SOLUTION INTRAVENOUS CONTINUOUS PRN
Status: DISCONTINUED | OUTPATIENT
Start: 2023-09-01 | End: 2023-09-01

## 2023-09-01 RX ORDER — PROPOFOL 10 MG/ML
VIAL (ML) INTRAVENOUS
Status: DISCONTINUED | OUTPATIENT
Start: 2023-09-01 | End: 2023-09-01

## 2023-09-01 RX ORDER — ALBUTEROL SULFATE 90 UG/1
AEROSOL, METERED RESPIRATORY (INHALATION)
Status: DISCONTINUED | OUTPATIENT
Start: 2023-09-01 | End: 2023-09-01

## 2023-09-01 RX ORDER — LIDOCAINE HYDROCHLORIDE 10 MG/ML
INJECTION, SOLUTION EPIDURAL; INFILTRATION; INTRACAUDAL; PERINEURAL
Status: DISCONTINUED | OUTPATIENT
Start: 2023-09-01 | End: 2023-09-01

## 2023-09-01 RX ADMIN — LIDOCAINE HYDROCHLORIDE 30 MG: 10 SOLUTION INTRAVENOUS at 09:09

## 2023-09-01 RX ADMIN — ALBUTEROL SULFATE 2 PUFF: 90 AEROSOL, METERED RESPIRATORY (INHALATION) at 09:09

## 2023-09-01 RX ADMIN — SODIUM CHLORIDE, SODIUM LACTATE, POTASSIUM CHLORIDE, AND CALCIUM CHLORIDE: .6; .31; .03; .02 INJECTION, SOLUTION INTRAVENOUS at 09:09

## 2023-09-01 RX ADMIN — PROPOFOL 100 MG: 10 INJECTION, EMULSION INTRAVENOUS at 09:09

## 2023-09-01 RX ADMIN — LIDOCAINE HYDROCHLORIDE 50 MG: 10 SOLUTION INTRAVENOUS at 09:09

## 2023-09-01 RX ADMIN — PROPOFOL 50 MG: 10 INJECTION, EMULSION INTRAVENOUS at 09:09

## 2023-09-01 NOTE — ANESTHESIA POSTPROCEDURE EVALUATION
Anesthesia Post Evaluation    Patient: Lakisha Vance    Procedure(s) Performed: Procedure(s) (LRB):  ESOPHAGOGASTRODUODENOSCOPY (EGD) (N/A)    Final Anesthesia Type: MAC      Patient location during evaluation: GI PACU  Patient participation: Yes- Able to Participate  Level of consciousness: awake and alert  Post-procedure vital signs: reviewed and stable  Pain management: adequate  Airway patency: patent    PONV status at discharge: No PONV  Anesthetic complications: no      Cardiovascular status: blood pressure returned to baseline and hemodynamically stable  Respiratory status: unassisted, spontaneous ventilation and room air  Hydration status: euvolemic  Follow-up not needed.          Vitals Value Taken Time   BP 99/62 09/01/23 1030   Temp 36.4 °C (97.6 °F) 09/01/23 1030   Pulse 86 09/01/23 1030   Resp 16 09/01/23 1030   SpO2 94 % 09/01/23 1030         Event Time   Out of Recovery 10:42:00         Pain/Ata Score: Ata Score: 10 (9/1/2023 10:30 AM)

## 2023-09-01 NOTE — TRANSFER OF CARE
"Anesthesia Transfer of Care Note    Patient: Lakisha Vance    Procedure(s) Performed: Procedure(s) (LRB):  ESOPHAGOGASTRODUODENOSCOPY (EGD) (N/A)    Patient location: GI    Anesthesia Type: MAC    Transport from OR: Transported from OR on room air with adequate spontaneous ventilation    Post pain: adequate analgesia    Post assessment: no apparent anesthetic complications    Post vital signs: stable    Level of consciousness: awake    Nausea/Vomiting: no nausea/vomiting    Complications: none    Transfer of care protocol was followed      Last vitals:   Visit Vitals  /85 (BP Location: Left arm, Patient Position: Lying)   Pulse 85   Temp 36.8 °C (98.2 °F) (Temporal)   Resp 18   Ht 5' 2" (1.575 m)   Wt 47.6 kg (105 lb)   SpO2 100%   Breastfeeding No   BMI 19.20 kg/m²     "

## 2023-09-01 NOTE — PROVATION PATIENT INSTRUCTIONS
Discharge Summary/Instructions after an Endoscopic Procedure  Patient Name: Lakisha Vance  Patient MRN: [id number]  Patient YOB: 1960  Friday, September 1, 2023 Nerissa Ash MD  Dear patient,  As a result of recent federal legislation (The Federal Cures Act), you may   receive lab or pathology results from your procedure in your [facility]   account before your physician is able to contact you. Your physician or   their representative will relay the results to you with their   recommendations at their soonest availability.  Thank you,  RESTRICTIONS:  During your procedure today, you received medications for sedation.  These   medications may affect your judgment, balance and coordination.  Therefore,   for 24 hours, you have the following restrictions:   - DO NOT drive a car, operate machinery, make legal/financial decisions,   sign important papers or drink alcohol.    ACTIVITY:  Today: no heavy lifting, straining or running due to procedural   sedation/anesthesia.  The following day: return to full activity including work.  DIET:  Eat and drink normally unless instructed otherwise.     TREATMENT FOR COMMON SIDE EFFECTS:  - Mild abdominal pain, nausea, belching, bloating or excessive gas:  rest,   eat lightly and use a heating pad.  - Sore Throat: treat with throat lozenges and/or gargle with warm salt   water.  - Because air was used during the procedure, expelling large amounts of air   from your rectum or belching is normal.  - If a bowel prep was taken, you may not have a bowel movement for 1-3 days.    This is normal.  SYMPTOMS TO WATCH FOR AND REPORT TO YOUR PHYSICIAN:  1. Abdominal pain or bloating, other than gas cramps.  2. Chest pain.  3. Back pain.  4. Signs of infection such as: chills or fever occurring within 24 hours   after the procedure.  5. Rectal bleeding, which would show as bright red, maroon, or black stools.   (A tablespoon of blood from the rectum is not serious, especially if    hemorrhoids are present.)  6. Vomiting.  7. Weakness or dizziness.  GO DIRECTLY TO THE NEAREST EMERGENCY ROOM IF YOU HAVE ANY OF THE FOLLOWING:      Difficulty breathing              Chills and/or fever over 101 F   Persistent vomiting and/or vomiting blood   Severe abdominal pain   Severe chest pain   Black, tarry stools   Bleeding- more than one tablespoon   Any other symptom or condition that you feel may need urgent attention  Your doctor recommends these additional instructions:  If any biopsies were taken, your doctors clinic will contact you in 1 to 2   weeks with any results.  - Discharge patient to home (with escort).   - Low sodium diet.   - Use Protonix (pantoprazole) 40 mg PO BID.   - Alcohol cessation.  - Tobacco cessation.  - Await pathology results.   - Return to liver clinic with Dr. Weaver of Gastroenterology and   Hepatology.  - Perform a colonoscopy. You will be contacted to schedule this study by our   GI department.  For questions, problems or results please call your physician Nerissa Ash MD at Work:  [phone]  If you have any questions about the above instructions, call the GI   department at [phone] or call the endoscopy unit at [phone]   from 7am until 3 pm.  [facility], EMERGENCY ROOM PHONE NUMBER:   [phone]  IF A COMPLICATION OR EMERGENCY SITUATION ARISES AND YOU ARE UNABLE TO REACH   YOUR PHYSICIAN - GO DIRECTLY TO THE EMERGENCY ROOM.  I have read or have had read to me these discharge instructions for my   procedure and have received a written copy.  I understand these   instructions and will follow-up with my physician if I have any questions.     __________________________________       _____________________________________  Nurse Signature                                          Patient/Designated   Responsible Party Signature  MD Nerissa Tierney MD  9/1/2023 10:14:14 AM  This report has been verified and signed  electronically.  Dear patient,  As a result of recent federal legislation (The Federal Cures Act), you may   receive lab or pathology results from your procedure in your [facility]   account before your physician is able to contact you. Your physician or   their representative will relay the results to you with their   recommendations at their soonest availability.  Thank you,  PROVATION

## 2023-09-01 NOTE — ANESTHESIA PREPROCEDURE EVALUATION
09/01/2023  Lakisha Vance is a 63 y.o., female.      Pre-op Assessment    I have reviewed the Patient Summary Reports.     I have reviewed the Nursing Notes. I have reviewed the NPO Status.   I have reviewed the Medications.     Review of Systems  Anesthesia Hx:  No problems with previous Anesthesia  Denies Family Hx of Anesthesia complications.   Denies Personal Hx of Anesthesia complications.   Social:  Alcohol Use, Smoker    Hematology/Oncology:         -- Anemia:   Cardiovascular:   Hypertension ECG has been reviewed. LE edema on lasix  Denies heart problems, denies BISWAS   Pulmonary:   Denies lung disease or SOB   Hepatic/GI:   Dysphagia  Weight loss  Elevated liver enzymes  Enlarged liver  Mild ascites  gallstones       Physical Exam  General: Cooperative, Alert and Oriented    Airway:  Mallampati: I   Mouth Opening: Normal  Tongue: Normal  Neck ROM: Normal ROM    Dental:Missing teeth, denies loose, dentures removed  Chest/Lungs:  Clear to auscultation, Normal Respiratory Rate    Heart:  Rate: Normal  Rhythm: Regular Rhythm    Abdomen:  Distention, Ascites        Anesthesia Plan  Type of Anesthesia, risks & benefits discussed:    Anesthesia Type: MAC  Intra-op Monitoring Plan: Standard ASA Monitors  Induction:  IV  Informed Consent: Informed consent signed with the Patient and all parties understand the risks and agree with anesthesia plan.  All questions answered.   ASA Score: 3  Day of Surgery Review of History & Physical: H&P Update referred to the surgeon/provider.I have interviewed and examined the patient. I have reviewed the patient's H&P dated: There are no significant changes.   Anesthesia Plan Notes: Dr. Ash requesting previously ordered labs be completed prior to procedure    Ready For Surgery From Anesthesia Perspective.     .

## 2023-09-01 NOTE — INTERVAL H&P NOTE
The patient has been examined and the H&P has been reviewed:    I concur with the findings and changes have been noted since the H&P was written: last drank Saturday. Drank Teodoro. Saw Dr. Weaver for etoh liver disease. Started on Protonix and Aldactone. Recent US shows mild ascites. Complains of abdominal pain. INR 1..2, plts 374. No previous EGD. Denies hematemesis.     Anesthesia/Surgery risks, benefits and alternative options discussed and understood by patient/family.    The risks, benefits and alternatives of the procedure were discussed with the patient in detail. This discussion was had in the presence of anesthesia CRNA. The risks include, risks of adverse reaction to sedation requiring the use of reversal agents, bleeding requiring blood transfusion, perforation requiring surgical intervention and technical failure. Other risks include aspiration leading to respiratory distress and respiratory failure resulting in endotracheal intubation and mechanical ventilation including death. If anesthesia is being utilized for this procedure, it is up to the anesthesiologist to determine airway safety including elective endotracheal intubation. Questions were answered, they agree to proceed. There was no language barriers.        Active Hospital Problems    Diagnosis  POA    *Pharyngoesophageal dysphagia [R13.14]  Yes      Resolved Hospital Problems   No resolved problems to display.

## 2023-09-06 ENCOUNTER — LAB VISIT (OUTPATIENT)
Dept: LAB | Facility: HOSPITAL | Age: 63
End: 2023-09-06
Attending: INTERNAL MEDICINE
Payer: COMMERCIAL

## 2023-09-06 DIAGNOSIS — K76.9 HEPATIC DISEASE: ICD-10-CM

## 2023-09-06 LAB
ALBUMIN SERPL BCP-MCNC: 1.3 G/DL (ref 3.5–5.2)
ALP SERPL-CCNC: 177 U/L (ref 55–135)
ALT SERPL W/O P-5'-P-CCNC: 12 U/L (ref 10–44)
ANION GAP SERPL CALC-SCNC: 10 MMOL/L (ref 8–16)
AST SERPL-CCNC: 44 U/L (ref 10–40)
BASOPHILS # BLD AUTO: 0.11 K/UL (ref 0–0.2)
BASOPHILS NFR BLD: 0.9 % (ref 0–1.9)
BILIRUB SERPL-MCNC: 0.5 MG/DL (ref 0.1–1)
BUN SERPL-MCNC: 12 MG/DL (ref 8–23)
CALCIUM SERPL-MCNC: 7.3 MG/DL (ref 8.7–10.5)
CHLORIDE SERPL-SCNC: 106 MMOL/L (ref 95–110)
CO2 SERPL-SCNC: 22 MMOL/L (ref 23–29)
CREAT SERPL-MCNC: 0.7 MG/DL (ref 0.5–1.4)
DIFFERENTIAL METHOD: ABNORMAL
EOSINOPHIL # BLD AUTO: 0.1 K/UL (ref 0–0.5)
EOSINOPHIL NFR BLD: 1 % (ref 0–8)
ERYTHROCYTE [DISTWIDTH] IN BLOOD BY AUTOMATED COUNT: 13.7 % (ref 11.5–14.5)
EST. GFR  (NO RACE VARIABLE): >60 ML/MIN/1.73 M^2
GLUCOSE SERPL-MCNC: 74 MG/DL (ref 70–110)
HCT VFR BLD AUTO: 26.6 % (ref 37–48.5)
HGB BLD-MCNC: 8.1 G/DL (ref 12–16)
IGG SERPL-MCNC: 2059 MG/DL (ref 650–1600)
IMM GRANULOCYTES # BLD AUTO: 0.05 K/UL (ref 0–0.04)
IMM GRANULOCYTES NFR BLD AUTO: 0.4 % (ref 0–0.5)
INR PPP: 1.2 (ref 0.8–1.2)
LYMPHOCYTES # BLD AUTO: 4 K/UL (ref 1–4.8)
LYMPHOCYTES NFR BLD: 31.7 % (ref 18–48)
MCH RBC QN AUTO: 30.1 PG (ref 27–31)
MCHC RBC AUTO-ENTMCNC: 30.5 G/DL (ref 32–36)
MCV RBC AUTO: 99 FL (ref 82–98)
MONOCYTES # BLD AUTO: 0.7 K/UL (ref 0.3–1)
MONOCYTES NFR BLD: 5.8 % (ref 4–15)
NEUTROPHILS # BLD AUTO: 7.5 K/UL (ref 1.8–7.7)
NEUTROPHILS NFR BLD: 60.2 % (ref 38–73)
NRBC BLD-RTO: 0 /100 WBC
PLATELET # BLD AUTO: 348 K/UL (ref 150–450)
PMV BLD AUTO: 11.1 FL (ref 9.2–12.9)
POTASSIUM SERPL-SCNC: 3.3 MMOL/L (ref 3.5–5.1)
PROT SERPL-MCNC: 5.5 G/DL (ref 6–8.4)
PROTHROMBIN TIME: 12.8 SEC (ref 9–12.5)
RBC # BLD AUTO: 2.69 M/UL (ref 4–5.4)
SODIUM SERPL-SCNC: 138 MMOL/L (ref 136–145)
WBC # BLD AUTO: 12.46 K/UL (ref 3.9–12.7)

## 2023-09-06 PROCEDURE — 80321 ALCOHOLS BIOMARKERS 1OR 2: CPT | Performed by: INTERNAL MEDICINE

## 2023-09-06 PROCEDURE — 82390 ASSAY OF CERULOPLASMIN: CPT | Performed by: INTERNAL MEDICINE

## 2023-09-06 PROCEDURE — 86381 MITOCHONDRIAL ANTIBODY EACH: CPT | Performed by: INTERNAL MEDICINE

## 2023-09-06 PROCEDURE — 86015 ACTIN ANTIBODY EACH: CPT | Performed by: INTERNAL MEDICINE

## 2023-09-06 PROCEDURE — 87340 HEPATITIS B SURFACE AG IA: CPT | Performed by: INTERNAL MEDICINE

## 2023-09-06 PROCEDURE — 86803 HEPATITIS C AB TEST: CPT | Performed by: INTERNAL MEDICINE

## 2023-09-06 PROCEDURE — 82105 ALPHA-FETOPROTEIN SERUM: CPT | Performed by: INTERNAL MEDICINE

## 2023-09-06 PROCEDURE — 86704 HEP B CORE ANTIBODY TOTAL: CPT | Performed by: INTERNAL MEDICINE

## 2023-09-06 PROCEDURE — 80053 COMPREHEN METABOLIC PANEL: CPT | Performed by: INTERNAL MEDICINE

## 2023-09-06 PROCEDURE — 85025 COMPLETE CBC W/AUTO DIFF WBC: CPT | Performed by: INTERNAL MEDICINE

## 2023-09-06 PROCEDURE — 82103 ALPHA-1-ANTITRYPSIN TOTAL: CPT | Performed by: INTERNAL MEDICINE

## 2023-09-06 PROCEDURE — 86038 ANTINUCLEAR ANTIBODIES: CPT | Performed by: INTERNAL MEDICINE

## 2023-09-06 PROCEDURE — 86790 VIRUS ANTIBODY NOS: CPT | Performed by: INTERNAL MEDICINE

## 2023-09-06 PROCEDURE — 85610 PROTHROMBIN TIME: CPT | Performed by: INTERNAL MEDICINE

## 2023-09-06 PROCEDURE — 86364 TISS TRNSGLTMNASE EA IG CLAS: CPT | Performed by: INTERNAL MEDICINE

## 2023-09-06 PROCEDURE — 36415 COLL VENOUS BLD VENIPUNCTURE: CPT | Mod: PO | Performed by: INTERNAL MEDICINE

## 2023-09-06 PROCEDURE — 82784 ASSAY IGA/IGD/IGG/IGM EACH: CPT | Performed by: INTERNAL MEDICINE

## 2023-09-07 LAB
A1AT SERPL-MCNC: 169 MG/DL (ref 100–190)
AFP SERPL-MCNC: 2.6 NG/ML (ref 0–8.4)
ANA SER QL IF: NORMAL
CERULOPLASMIN SERPL-MCNC: 20 MG/DL (ref 15–45)
HAV IGG SER QL IA: NORMAL
HBV CORE AB SERPL QL IA: NORMAL
HBV SURFACE AG SERPL QL IA: NORMAL
HCV AB SERPL QL IA: NORMAL

## 2023-09-08 LAB
FINAL PATHOLOGIC DIAGNOSIS: NORMAL
GROSS: NORMAL
Lab: NORMAL
MICROSCOPIC EXAM: NORMAL
MITOCHONDRIA AB TITR SER IF: NORMAL {TITER}

## 2023-09-09 NOTE — PROGRESS NOTES
AN staff: please contact patient since she does not have patient portal. Inform of the following. The biopsies of your stomach show no signs of infection. The gastritis (inflammation) seen during your procedure is likely related to the alcohol you are ingesting. Please take the Protonix (acid blocker) and avoid tobacco and alcohol. You are overdue for your colonoscopy. A colonoscopy is to screen for colon cancer. Our office will assist you in scheduling this procedure.

## 2023-09-11 ENCOUNTER — TELEPHONE (OUTPATIENT)
Dept: HEPATOLOGY | Facility: CLINIC | Age: 63
End: 2023-09-11
Payer: COMMERCIAL

## 2023-09-11 NOTE — TELEPHONE ENCOUNTER
----- Message from Joshua Gaffney sent at 9/11/2023  1:28 PM CDT -----  Contact: Lakisha  Patient is calling to speak with the nurse needing results from lab that was done on 09/06. Please  give patient a call at .[phone].

## 2023-09-11 NOTE — TELEPHONE ENCOUNTER
Spoke with patient and made her aware that some of her results are still pending/in process.     Informed her that once all the labs are back, we will reach out to discuss.     Patient verbalized understanding with no concerns at this time.

## 2023-09-12 LAB
CLINICAL BIOCHEMIST REVIEW: NORMAL
PLPETH BLD-MCNC: 165 NG/ML
POPETH BLD-MCNC: 291 NG/ML
SMOOTH MUSCLE AB TITR SER IF: ABNORMAL {TITER}
TTG IGA SER-ACNC: 1.9 U/ML

## 2023-09-18 ENCOUNTER — TELEPHONE (OUTPATIENT)
Dept: HEPATOLOGY | Facility: CLINIC | Age: 63
End: 2023-09-18
Payer: COMMERCIAL

## 2023-09-18 NOTE — TELEPHONE ENCOUNTER
----- Message from Kacey Rawls MA sent at 9/18/2023  3:47 PM CDT -----  Contact: [phone]  Patient called              In regards to speak with provider to discuss blood work results.              Call back [phone]

## 2023-09-19 ENCOUNTER — TELEPHONE (OUTPATIENT)
Dept: HEPATOLOGY | Facility: CLINIC | Age: 63
End: 2023-09-19
Payer: COMMERCIAL

## 2023-09-19 NOTE — TELEPHONE ENCOUNTER
----- Message from Tanja Alicia sent at 9/19/2023  3:16 PM CDT -----  Name of Who is Calling:Patient           What is the request in detail:Patient requesting a call regarding blood work and a question for nurse.           Can the clinic reply by [facility]:no           What Number to Call Back if not in [facility]: [phone]

## 2023-09-20 DIAGNOSIS — K76.9 HEPATIC DISEASE: Primary | ICD-10-CM

## 2023-09-26 ENCOUNTER — TELEPHONE (OUTPATIENT)
Dept: HEPATOLOGY | Facility: CLINIC | Age: 63
End: 2023-09-26
Payer: COMMERCIAL

## 2023-09-26 NOTE — TELEPHONE ENCOUNTER
Spoke with patient to inform of MELD score. Patient voices understanding.     ----- Message from Lew Weaver MD sent at 9/25/2023  8:11 AM CDT -----  We have reviewed her results and I have told the staff to inform about her MELD as 12 last week  Please confirm      ----- Message -----  From: Saba Bundy MA  Sent: 9/22/2023  12:57 PM CDT  To: Lew Weaver MD    Good afternoon, this patient is calling states she had been waiting for 16 days for results from her labs completed and now she is anxious because she did not hear from anyone yet. Please let me know what the results are so I can relay the message to the patient.    ----- Message -----  From: Yasmeen Louie  Sent: 9/22/2023  11:39 AM CDT  To: Meg Hackett V Staff    Who Called: Pt    What is the request in detail: Requesting call back to discuss results from recent blood work. Please advise    Can the clinic reply by [facility]? No    Best Call Back Number: [phone]      Additional Information:

## 2023-09-26 NOTE — TELEPHONE ENCOUNTER
Patient call back due to Fluid concern  Patient otherwise felling better  Increase Aldactone  Nutrition to continue

## 2023-10-11 DIAGNOSIS — R18.8 OTHER ASCITES: ICD-10-CM

## 2023-10-11 NOTE — TELEPHONE ENCOUNTER
----- Message from Mahnaz Preciado sent at 10/11/2023  3:33 PM CDT -----  Contact: Lakisha Banuelos would like a call back at [phone] in regards to needing to speak with nurse about her medication and procedure she needs.  Thanks   Am

## 2023-10-11 NOTE — TELEPHONE ENCOUNTER
Returned patient's call, she's requesting a refill on aldactone and pantoprazole to which I sent it to Dr. Weaver for approval.

## 2023-10-12 RX ORDER — SPIRONOLACTONE 50 MG/1
50 TABLET, FILM COATED ORAL 2 TIMES DAILY
Qty: 60 TABLET | Refills: 11 | Status: SHIPPED | OUTPATIENT
Start: 2023-10-12 | End: 2023-11-20

## 2023-10-12 RX ORDER — PANTOPRAZOLE SODIUM 40 MG/1
40 TABLET, DELAYED RELEASE ORAL 2 TIMES DAILY
Qty: 60 TABLET | Refills: 11 | Status: SHIPPED | OUTPATIENT
Start: 2023-10-12 | End: 2023-11-15

## 2023-10-16 ENCOUNTER — TELEPHONE (OUTPATIENT)
Dept: GASTROENTEROLOGY | Facility: CLINIC | Age: 63
End: 2023-10-16
Payer: COMMERCIAL

## 2023-10-16 NOTE — TELEPHONE ENCOUNTER
----- Message from China Hi sent at 10/16/2023  3:56 PM CDT -----  Contact: Lakisha Antony is calling in regards to the status of the paperwork that the pharmacy sent over for pantoprazole (PROTONIX) 40 MG tablet.Due to them having trouble feeling over 90 tablets a year.please call back at .[phone]           [id number]   [address] [address]  Phone: [phone] Fax: [phone]      Thanks  SEVERIANO

## 2023-10-26 ENCOUNTER — HOSPITAL ENCOUNTER (INPATIENT)
Facility: HOSPITAL | Age: 63
LOS: 3 days | Discharge: HOME | DRG: 417 | End: 2023-11-02
Attending: FAMILY MEDICINE | Admitting: FAMILY MEDICINE
Payer: COMMERCIAL

## 2023-10-26 DIAGNOSIS — R07.9 CHEST PAIN: ICD-10-CM

## 2023-10-26 DIAGNOSIS — K81.0 ACUTE GANGRENOUS CHOLECYSTITIS: ICD-10-CM

## 2023-10-26 DIAGNOSIS — R10.9 ABDOMINAL PAIN: ICD-10-CM

## 2023-10-26 DIAGNOSIS — K81.0 ACUTE CHOLECYSTITIS: Primary | ICD-10-CM

## 2023-10-26 DIAGNOSIS — K80.00 CALCULUS OF GALLBLADDER WITH ACUTE CHOLECYSTITIS WITHOUT OBSTRUCTION: ICD-10-CM

## 2023-10-26 DIAGNOSIS — R11.0 NAUSEA: ICD-10-CM

## 2023-10-26 PROBLEM — K70.31 ALCOHOLIC CIRRHOSIS OF LIVER WITH ASCITES: Status: ACTIVE | Noted: 2023-10-26

## 2023-10-26 PROBLEM — R10.10 PAIN OF UPPER ABDOMEN: Status: ACTIVE | Noted: 2023-10-26

## 2023-10-26 PROCEDURE — 25000003 PHARM REV CODE 250: Performed by: EMERGENCY MEDICINE

## 2023-10-26 PROCEDURE — G0378 HOSPITAL OBSERVATION PER HR: HCPCS

## 2023-10-26 PROCEDURE — 96372 THER/PROPH/DIAG INJ SC/IM: CPT | Performed by: EMERGENCY MEDICINE

## 2023-10-26 PROCEDURE — C9113 INJ PANTOPRAZOLE SODIUM, VIA: HCPCS | Performed by: EMERGENCY MEDICINE

## 2023-10-26 PROCEDURE — 63600175 PHARM REV CODE 636 W HCPCS: Performed by: EMERGENCY MEDICINE

## 2023-10-26 PROCEDURE — G0379 DIRECT REFER HOSPITAL OBSERV: HCPCS

## 2023-10-26 PROCEDURE — A4216 STERILE WATER/SALINE, 10 ML: HCPCS | Performed by: EMERGENCY MEDICINE

## 2023-10-26 RX ORDER — NALOXONE HCL 0.4 MG/ML
0.02 VIAL (ML) INJECTION
Status: DISCONTINUED | OUTPATIENT
Start: 2023-10-26 | End: 2023-11-02 | Stop reason: HOSPADM

## 2023-10-26 RX ORDER — SODIUM CHLORIDE, SODIUM LACTATE, POTASSIUM CHLORIDE, CALCIUM CHLORIDE 600; 310; 30; 20 MG/100ML; MG/100ML; MG/100ML; MG/100ML
INJECTION, SOLUTION INTRAVENOUS CONTINUOUS
Status: DISCONTINUED | OUTPATIENT
Start: 2023-10-26 | End: 2023-10-28

## 2023-10-26 RX ORDER — AMOXICILLIN 250 MG
1 CAPSULE ORAL 2 TIMES DAILY PRN
Status: DISCONTINUED | OUTPATIENT
Start: 2023-10-26 | End: 2023-11-01

## 2023-10-26 RX ORDER — MORPHINE SULFATE 2 MG/ML
2 INJECTION, SOLUTION INTRAMUSCULAR; INTRAVENOUS EVERY 4 HOURS PRN
Status: DISCONTINUED | OUTPATIENT
Start: 2023-10-26 | End: 2023-11-02 | Stop reason: HOSPADM

## 2023-10-26 RX ORDER — ENOXAPARIN SODIUM 100 MG/ML
30 INJECTION SUBCUTANEOUS EVERY 24 HOURS
Status: DISCONTINUED | OUTPATIENT
Start: 2023-10-26 | End: 2023-11-02 | Stop reason: HOSPADM

## 2023-10-26 RX ORDER — TALC
6 POWDER (GRAM) TOPICAL NIGHTLY PRN
Status: DISCONTINUED | OUTPATIENT
Start: 2023-10-26 | End: 2023-11-02 | Stop reason: HOSPADM

## 2023-10-26 RX ORDER — POLYETHYLENE GLYCOL 3350 17 G/17G
17 POWDER, FOR SOLUTION ORAL DAILY PRN
Status: DISCONTINUED | OUTPATIENT
Start: 2023-10-26 | End: 2023-11-02 | Stop reason: HOSPADM

## 2023-10-26 RX ORDER — PANTOPRAZOLE SODIUM 40 MG/10ML
40 INJECTION, POWDER, LYOPHILIZED, FOR SOLUTION INTRAVENOUS DAILY
Status: DISCONTINUED | OUTPATIENT
Start: 2023-10-26 | End: 2023-10-31

## 2023-10-26 RX ORDER — ENOXAPARIN SODIUM 100 MG/ML
40 INJECTION SUBCUTANEOUS EVERY 24 HOURS
Status: DISCONTINUED | OUTPATIENT
Start: 2023-10-26 | End: 2023-10-26 | Stop reason: DRUGHIGH

## 2023-10-26 RX ORDER — SODIUM CHLORIDE 0.9 % (FLUSH) 0.9 %
10 SYRINGE (ML) INJECTION EVERY 8 HOURS
Status: DISCONTINUED | OUTPATIENT
Start: 2023-10-26 | End: 2023-11-02 | Stop reason: HOSPADM

## 2023-10-26 RX ORDER — ONDANSETRON 2 MG/ML
4 INJECTION INTRAMUSCULAR; INTRAVENOUS EVERY 8 HOURS PRN
Status: DISCONTINUED | OUTPATIENT
Start: 2023-10-26 | End: 2023-11-02 | Stop reason: HOSPADM

## 2023-10-26 RX ADMIN — Medication 10 ML: at 10:10

## 2023-10-26 RX ADMIN — PANTOPRAZOLE SODIUM 40 MG: 40 INJECTION, POWDER, FOR SOLUTION INTRAVENOUS at 10:10

## 2023-10-26 RX ADMIN — SODIUM CHLORIDE, POTASSIUM CHLORIDE, SODIUM LACTATE AND CALCIUM CHLORIDE: 600; 310; 30; 20 INJECTION, SOLUTION INTRAVENOUS at 05:10

## 2023-10-26 RX ADMIN — MORPHINE SULFATE 2 MG: 2 INJECTION, SOLUTION INTRAMUSCULAR; INTRAVENOUS at 05:10

## 2023-10-26 RX ADMIN — ONDANSETRON 4 MG: 2 INJECTION INTRAMUSCULAR; INTRAVENOUS at 05:10

## 2023-10-26 RX ADMIN — ENOXAPARIN SODIUM 30 MG: 30 INJECTION SUBCUTANEOUS at 05:10

## 2023-10-26 RX ADMIN — PIPERACILLIN SODIUM AND TAZOBACTAM SODIUM 4.5 G: 4; .5 INJECTION, POWDER, FOR SOLUTION INTRAVENOUS at 10:10

## 2023-10-26 NOTE — PROVIDER TRANSFER
(Physician in Lead of Transfers)  Outside Transfer Acceptance Note / Regional Referral Center      Upon patient arrival, please contact Hospital Medicine on call.    [facility]   Referring provider: KAMALJIT COLES  Accepting facility: [facility]  Accepting provider: MARQUES UPENTE  Admitting provider: LATASHA WALLIS  Reason for transfer: Established Provider   Transfer diagnosis: abdominal pain  Transfer specialty requested: Gastroenterology/General Surgery  Transfer specialty notified: Yes  Transfer level: NUMBER 1-5: 2  Bed type requested: med-surg  Isolation status: No active isolations   Admission class or status: OP- Observation      Narrative     63-year-old female with a history of hypertension, headaches, chronic liver disease, alcohol use, ascites, EGD in September 2023 with reflux esophagitis/gastritis/portal hypertensive gastropathy, and cholelithiasis presented to [facility] Emergency Department on October 26 with epigastric abdominal pain that began the night prior to presentation.  She had emesis that no diarrhea.  No fever and no hematemesis noted.  No recent alcohol use.  She was placed on Protonix and September but is not taking it due to insurance coverage issues.  Findings were concerning for cholecystitis.  Requesting transfer to Hospital Medicine at [facility] for General Surgery and Gastroenterology evaluation. In the emergency department she received famotidine, morphine, Zofran, and Zosyn.    White blood cells 17.4, hemoglobin 12, hematocrit 35.4, platelets 423, lipase 13, sodium 136, potassium 4.5, chloride 104, CO2 24, BUN 11, creatinine 0.61, glucose 129, total bilirubin 0.6, AST 30, ALT 11    CT abdomen and pelvis showed distended gallbladder with sludge.  Mild ascites.  Edematous gastric wall.  Gastritis should be considered.  Mild hiatal hernia.  Appears to be wall thickening of the  ascending colon suspicious for colitis.  Prominent diverticulosis of the lower colon with no strong evidence for diverticulitis.    Abdominal ultrasound showed hepatomegaly and mildly cirrhotic liver configuration.  Very small perihepatic ascites.  Gallbladder sludge and cholelithiasis.  Hydropic gallbladder with mild wall thickening/edema.    Objective     Vitals:  Temperature 97.5°, pulse 99, respirations 17, blood pressure 148/96, O2 sats 96%    Instructions    Admit to Hospital Medicine    JAZLYN Fisher MD  Hospital Medicine Staff  Cell: [phone]

## 2023-10-26 NOTE — PLAN OF CARE
POC reviewed with pt. Pt verbalizes understanding of POC. No questions at this time.  AAOx4. NADN.  No tele monitor orders.   Pt remains free of falls. Standby assist with ambulation.   IVF infusing at 25mL/hr.   PRN pain and nausea meds given.   No complaints at this time.  Safety measures in place. Will continue to monitor.  Informed pt to call for assistance before getting up. Pt verbalizes understanding.   Hourly rounding and chart check complete.

## 2023-10-27 PROBLEM — K81.0 ACUTE CHOLECYSTITIS: Status: ACTIVE | Noted: 2023-07-10

## 2023-10-27 PROBLEM — K80.00 CALCULUS OF GALLBLADDER WITH ACUTE CHOLECYSTITIS WITHOUT OBSTRUCTION: Status: ACTIVE | Noted: 2023-10-26

## 2023-10-27 LAB
ABO + RH BLD: NORMAL
ALBUMIN SERPL BCP-MCNC: 1.8 G/DL (ref 3.5–5.2)
ALP SERPL-CCNC: 104 U/L (ref 55–135)
ALT SERPL W/O P-5'-P-CCNC: 10 U/L (ref 10–44)
ANION GAP SERPL CALC-SCNC: 9 MMOL/L (ref 8–16)
AST SERPL-CCNC: 14 U/L (ref 10–40)
BASOPHILS # BLD AUTO: 0.08 K/UL (ref 0–0.2)
BASOPHILS NFR BLD: 0.4 % (ref 0–1.9)
BILIRUB SERPL-MCNC: 0.5 MG/DL (ref 0.1–1)
BLD GP AB SCN CELLS X3 SERPL QL: NORMAL
BUN SERPL-MCNC: 13 MG/DL (ref 8–23)
CALCIUM SERPL-MCNC: 8.3 MG/DL (ref 8.7–10.5)
CHLORIDE SERPL-SCNC: 103 MMOL/L (ref 95–110)
CO2 SERPL-SCNC: 23 MMOL/L (ref 23–29)
CREAT SERPL-MCNC: 0.7 MG/DL (ref 0.5–1.4)
DIFFERENTIAL METHOD: ABNORMAL
EOSINOPHIL # BLD AUTO: 0 K/UL (ref 0–0.5)
EOSINOPHIL NFR BLD: 0.2 % (ref 0–8)
ERYTHROCYTE [DISTWIDTH] IN BLOOD BY AUTOMATED COUNT: 13.7 % (ref 11.5–14.5)
EST. GFR  (NO RACE VARIABLE): >60 ML/MIN/1.73 M^2
GLUCOSE SERPL-MCNC: 102 MG/DL (ref 70–110)
HCT VFR BLD AUTO: 34.1 % (ref 37–48.5)
HGB BLD-MCNC: 10.9 G/DL (ref 12–16)
IMM GRANULOCYTES # BLD AUTO: 0.17 K/UL (ref 0–0.04)
IMM GRANULOCYTES NFR BLD AUTO: 0.8 % (ref 0–0.5)
INR PPP: 1.2 (ref 0.8–1.2)
LYMPHOCYTES # BLD AUTO: 1.8 K/UL (ref 1–4.8)
LYMPHOCYTES NFR BLD: 8.1 % (ref 18–48)
MAGNESIUM SERPL-MCNC: 1.6 MG/DL (ref 1.6–2.6)
MCH RBC QN AUTO: 29.7 PG (ref 27–31)
MCHC RBC AUTO-ENTMCNC: 32 G/DL (ref 32–36)
MCV RBC AUTO: 93 FL (ref 82–98)
MONOCYTES # BLD AUTO: 1.7 K/UL (ref 0.3–1)
MONOCYTES NFR BLD: 7.9 % (ref 4–15)
NEUTROPHILS # BLD AUTO: 18.2 K/UL (ref 1.8–7.7)
NEUTROPHILS NFR BLD: 82.6 % (ref 38–73)
NRBC BLD-RTO: 0 /100 WBC
PHOSPHATE SERPL-MCNC: 4.7 MG/DL (ref 2.7–4.5)
PLATELET # BLD AUTO: 399 K/UL (ref 150–450)
PMV BLD AUTO: 9.5 FL (ref 9.2–12.9)
POTASSIUM SERPL-SCNC: 4.5 MMOL/L (ref 3.5–5.1)
PROT SERPL-MCNC: 6.8 G/DL (ref 6–8.4)
PROTHROMBIN TIME: 12.5 SEC (ref 9–12.5)
RBC # BLD AUTO: 3.67 M/UL (ref 4–5.4)
SODIUM SERPL-SCNC: 135 MMOL/L (ref 136–145)
SPECIMEN OUTDATE: NORMAL
WBC # BLD AUTO: 22.02 K/UL (ref 3.9–12.7)

## 2023-10-27 PROCEDURE — 25000003 PHARM REV CODE 250: Performed by: EMERGENCY MEDICINE

## 2023-10-27 PROCEDURE — 96372 THER/PROPH/DIAG INJ SC/IM: CPT | Performed by: EMERGENCY MEDICINE

## 2023-10-27 PROCEDURE — C9113 INJ PANTOPRAZOLE SODIUM, VIA: HCPCS | Performed by: EMERGENCY MEDICINE

## 2023-10-27 PROCEDURE — 97162 PT EVAL MOD COMPLEX 30 MIN: CPT

## 2023-10-27 PROCEDURE — 99223 PR INITIAL HOSPITAL CARE,LEVL III: ICD-10-PCS | Mod: ,,, | Performed by: INTERNAL MEDICINE

## 2023-10-27 PROCEDURE — 99222 PR INITIAL HOSPITAL CARE,LEVL II: ICD-10-PCS | Mod: ,,, | Performed by: INTERNAL MEDICINE

## 2023-10-27 PROCEDURE — 83735 ASSAY OF MAGNESIUM: CPT | Performed by: EMERGENCY MEDICINE

## 2023-10-27 PROCEDURE — 63600175 PHARM REV CODE 636 W HCPCS: Performed by: EMERGENCY MEDICINE

## 2023-10-27 PROCEDURE — A4216 STERILE WATER/SALINE, 10 ML: HCPCS | Performed by: EMERGENCY MEDICINE

## 2023-10-27 PROCEDURE — 99223 1ST HOSP IP/OBS HIGH 75: CPT | Mod: ,,, | Performed by: INTERNAL MEDICINE

## 2023-10-27 PROCEDURE — 99222 1ST HOSP IP/OBS MODERATE 55: CPT | Mod: ,,, | Performed by: INTERNAL MEDICINE

## 2023-10-27 PROCEDURE — 84100 ASSAY OF PHOSPHORUS: CPT | Performed by: EMERGENCY MEDICINE

## 2023-10-27 PROCEDURE — 36415 COLL VENOUS BLD VENIPUNCTURE: CPT | Performed by: EMERGENCY MEDICINE

## 2023-10-27 PROCEDURE — 97165 OT EVAL LOW COMPLEX 30 MIN: CPT

## 2023-10-27 PROCEDURE — G0378 HOSPITAL OBSERVATION PER HR: HCPCS

## 2023-10-27 PROCEDURE — 99223 PR INITIAL HOSPITAL CARE,LEVL III: ICD-10-PCS | Mod: ,,, | Performed by: SURGERY

## 2023-10-27 PROCEDURE — 85025 COMPLETE CBC W/AUTO DIFF WBC: CPT | Performed by: EMERGENCY MEDICINE

## 2023-10-27 PROCEDURE — 85610 PROTHROMBIN TIME: CPT | Performed by: INTERNAL MEDICINE

## 2023-10-27 PROCEDURE — 86850 RBC ANTIBODY SCREEN: CPT | Performed by: EMERGENCY MEDICINE

## 2023-10-27 PROCEDURE — 80053 COMPREHEN METABOLIC PANEL: CPT | Performed by: EMERGENCY MEDICINE

## 2023-10-27 PROCEDURE — 99223 1ST HOSP IP/OBS HIGH 75: CPT | Mod: ,,, | Performed by: SURGERY

## 2023-10-27 RX ORDER — LEVALBUTEROL INHALATION SOLUTION 0.63 MG/3ML
1.25 SOLUTION RESPIRATORY (INHALATION) EVERY 8 HOURS
Status: DISCONTINUED | OUTPATIENT
Start: 2023-10-27 | End: 2023-11-02 | Stop reason: HOSPADM

## 2023-10-27 RX ORDER — THIAMINE HCL 100 MG
100 TABLET ORAL DAILY
Status: DISCONTINUED | OUTPATIENT
Start: 2023-10-27 | End: 2023-11-02 | Stop reason: HOSPADM

## 2023-10-27 RX ORDER — CYANOCOBALAMIN 1000 UG/ML
1000 INJECTION, SOLUTION INTRAMUSCULAR; SUBCUTANEOUS DAILY
Status: COMPLETED | OUTPATIENT
Start: 2023-10-27 | End: 2023-10-28

## 2023-10-27 RX ADMIN — PIPERACILLIN SODIUM AND TAZOBACTAM SODIUM 4.5 G: 4; .5 INJECTION, POWDER, FOR SOLUTION INTRAVENOUS at 10:10

## 2023-10-27 RX ADMIN — PANTOPRAZOLE SODIUM 40 MG: 40 INJECTION, POWDER, FOR SOLUTION INTRAVENOUS at 08:10

## 2023-10-27 RX ADMIN — THERA TABS 1 TABLET: TAB at 06:10

## 2023-10-27 RX ADMIN — MORPHINE SULFATE 2 MG: 2 INJECTION, SOLUTION INTRAMUSCULAR; INTRAVENOUS at 01:10

## 2023-10-27 RX ADMIN — ONDANSETRON 4 MG: 2 INJECTION INTRAMUSCULAR; INTRAVENOUS at 01:10

## 2023-10-27 RX ADMIN — Medication 10 ML: at 05:10

## 2023-10-27 RX ADMIN — Medication 10 ML: at 02:10

## 2023-10-27 RX ADMIN — MORPHINE SULFATE 2 MG: 2 INJECTION, SOLUTION INTRAMUSCULAR; INTRAVENOUS at 04:10

## 2023-10-27 RX ADMIN — ONDANSETRON 4 MG: 2 INJECTION INTRAMUSCULAR; INTRAVENOUS at 12:10

## 2023-10-27 RX ADMIN — Medication 1 TABLET: at 06:10

## 2023-10-27 RX ADMIN — PIPERACILLIN SODIUM AND TAZOBACTAM SODIUM 4.5 G: 4; .5 INJECTION, POWDER, FOR SOLUTION INTRAVENOUS at 02:10

## 2023-10-27 RX ADMIN — PIPERACILLIN SODIUM AND TAZOBACTAM SODIUM 4.5 G: 4; .5 INJECTION, POWDER, FOR SOLUTION INTRAVENOUS at 05:10

## 2023-10-27 RX ADMIN — THIAMINE HCL TAB 100 MG 100 MG: 100 TAB at 06:10

## 2023-10-27 RX ADMIN — MORPHINE SULFATE 2 MG: 2 INJECTION, SOLUTION INTRAMUSCULAR; INTRAVENOUS at 12:10

## 2023-10-27 RX ADMIN — MORPHINE SULFATE 2 MG: 2 INJECTION, SOLUTION INTRAMUSCULAR; INTRAVENOUS at 06:10

## 2023-10-27 RX ADMIN — CYANOCOBALAMIN 1000 MCG: 1000 INJECTION INTRAMUSCULAR; SUBCUTANEOUS at 06:10

## 2023-10-27 RX ADMIN — Medication 10 ML: at 10:10

## 2023-10-27 NOTE — PROGRESS NOTES
[facility] Hospital Medicine  Progress Note    Patient Name: Lakisha Vance  MRN: [id number]  Patient Class: OP- Observation   Admission Date: 10/26/2023  Length of Stay: 0 days  Attending Physician: Sandeep Martinez MD  Primary Care Provider: Pranav Gonzalez MD        Subjective:     Principal Problem:Calculus of gallbladder with acute cholecystitis without obstruction        HPI:  63-year-old female with hx  of HTN, headaches, chronic liver disease, alcohol use, ascites, EGD in September 2023 with reflux esophagitis/gastritis/portal hypertensive gastropathy, and cholelithiasis presented to [facility] ER on October 26 with epigastric abdominal pain that began the night prior to presentation associated with vomiting but no diarrhea, no fever and no hematemesis and no recent alcohol use. She was placed on Protonix in September but is not taking it due to insurance coverage issues. Pt has been worked up in our GI Dept for Alcoholic Liver disease vs Cirrhosis recently. Last Alcohol use about a month ago.    In the ER , WBC 17.4, H/H 12/ 35, platelets 423, Lipase 13, CMP normal. US Abdomin showed hepatomegaly and mildly cirrhotic liver configuration. Very small perihepatic ascites. Gallbladder sludge and cholelithiasis. CT abdomen and pelvis showed distended gallbladder with sludge.  Mild ascites. Edematous gastric wall.  Gastritis should be considered.  Mild hiatal hernia.  Appears to be wall thickening of the ascending colon suspicious for colitis. Prominent diverticulosis of the lower colon with no strong evidence for diverticulitis.     In the ER, pt had received famotidine, morphine, Zofran, and Zosyn.      ER consulted Dr. Jennings and Dr. Randhawa thru the Transfer Center for possible Cholecystitis vs Colitis and she was accepted. She is being placed in Obs under Hosp Med for General Surgery and Gastroenterology evaluation.               Overview/Hospital Course:  Appreciate GI and Gen Surgery  input- Feels a little better, abd pain a little better, controlled with pain meds. Getting IVF and Zosyn. NV much better. WBC 22k. H/H stable. Hida scan positive for Cholecystitis. Plan for Robotic Yari tomorrow.       Interval History: Appreciate GI and Gen Surgery input- Feels a little better, abd pain a little better, controlled with pain meds. Getting IVF and Zosyn. NV much better. WBC 22k. H/H stable. Hida scan positive for Cholecystitis. Plan for Robotic Yari tomorrow.     Review of Systems   Constitutional:  Positive for activity change and appetite change. Negative for fatigue, fever and unexpected weight change.   HENT: Negative.     Eyes: Negative.  Negative for visual disturbance.   Respiratory: Negative.  Negative for cough, shortness of breath and wheezing.    Cardiovascular: Negative.    Gastrointestinal:  Positive for abdominal pain. Negative for abdominal distention, anal bleeding, blood in stool, constipation, diarrhea, nausea, rectal pain and vomiting.   Endocrine: Negative for polyuria.   Genitourinary: Negative.    Musculoskeletal:  Negative for arthralgias and gait problem.   Skin: Negative.    Allergic/Immunologic: Negative.    Neurological: Negative.    Hematological: Negative.    Psychiatric/Behavioral: Negative.       Objective:     Vital Signs (Most Recent):  Temp: 98.4 °F (36.9 °C) (10/27/23 1526)  Pulse: 87 (10/27/23 1526)  Resp: 18 (10/27/23 1645)  BP: 137/72 (10/27/23 1526)  SpO2: (!) 93 % (10/27/23 1526) Vital Signs (24h Range):  Temp:  [98 °F (36.7 °C)-98.9 °F (37.2 °C)] 98.4 °F (36.9 °C)  Pulse:  [] 87  Resp:  [16-19] 18  SpO2:  [93 %-97 %] 93 %  BP: (121-137)/(66-84) 137/72     Weight: 46.7 kg (103 lb)  Body mass index is 18.84 kg/m².    Intake/Output Summary (Last 24 hours) at 10/27/2023 1717  Last data filed at 10/27/2023 1502  Gross per 24 hour   Intake 27.98 ml   Output --   Net 27.98 ml         Physical Exam  Vitals and nursing note reviewed. Exam conducted with a  chaperone present.   Constitutional:       Appearance: She is underweight. She is ill-appearing.   Cardiovascular:      Rate and Rhythm: Normal rate and regular rhythm.   Abdominal:      General: Abdomen is protuberant. Bowel sounds are normal.      Tenderness: There is abdominal tenderness in the right upper quadrant.       Musculoskeletal:      Right lower leg: No edema.      Left lower leg: No edema.   Skin:     General: Skin is warm and dry.      Capillary Refill: Capillary refill takes less than 2 seconds.   Neurological:      General: No focal deficit present.      Mental Status: She is alert and oriented to person, place, and time. Mental status is at baseline.             Significant Labs: All pertinent labs within the past 24 hours have been reviewed.  BMP:   Recent Labs   Lab 10/27/23  0443      *   K 4.5      CO2 23   BUN 13   CREATININE 0.7   CALCIUM 8.3*   MG 1.6     CBC:   Recent Labs   Lab 10/27/23  0443   WBC 22.02*   HGB 10.9*   HCT 34.1*        CMP:   Recent Labs   Lab 10/27/23  0443   *   K 4.5      CO2 23      BUN 13   CREATININE 0.7   CALCIUM 8.3*   PROT 6.8   ALBUMIN 1.8*   BILITOT 0.5   ALKPHOS 104   AST 14   ALT 10   ANIONGAP 9     Coagulation:   Recent Labs   Lab 10/27/23  1545   INR 1.2     Magnesium:   Recent Labs   Lab 10/27/23  0443   MG 1.6        Significant Imaging: I have reviewed all pertinent imaging results/findings within the past 24 hours.      Assessment/Plan:      * Calculus of gallbladder with acute cholecystitis without obstruction  Etiology unclear, pt with Alcoholic Cirrhosis  D/D includes Gastritis vs Cholecystitis vs Diverticulitis  Await GI and Surgery input  Hida Scan ordered to r/o Cholecystitis in am  Pt was empirically started on Zosyn in the ER, will continue  IV Protonix bid, Gentle Rehydration  Use MS prn     HID pos  Surgery planned for tmrw  Keep NPO after MN      Alcoholic cirrhosis of liver with ascites  As seen on  US and CT Abdomen and recent EGD with Portal Hypertensive Gastropathy  Await further input from GI    Appears compensated at present        Acute cholecystitis  US abd showed sludge and gallstones  Await Hida scan in am  Also consider Gastritis vs Diverticulitis  Cont Zosyn      Moderate cigarette smoker (10-19 per day)  Must quit, pt counseled > 5 mins      HTN (hypertension)  Chronic, controlled. Latest blood pressure and vitals reviewed-     Temp:  [98 °F (36.7 °C)-98.9 °F (37.2 °C)]   Pulse:  []   Resp:  [16-19]   BP: (121-137)/(66-84)   SpO2:  [93 %-97 %] .   Home meds for hypertension were reviewed and noted below.   Hypertension Medications             furosemide (LASIX) 20 MG tablet Take 20 mg by mouth.    spironolactone (ALDACTONE) 50 MG tablet Take 1 tablet (50 mg total) by mouth 2 (two) times daily.          While in the hospital, will manage blood pressure as follows; Continue home antihypertensive regimen    Will utilize p.r.n. blood pressure medication only if patient's blood pressure greater than 140/90 and she develops symptoms such as worsening chest pain or shortness of breath.      VTE Risk Mitigation (From admission, onward)         Ordered     enoxaparin injection 30 mg  Daily         10/26/23 1714     IP VTE HIGH RISK PATIENT  Once         10/26/23 1711     Place sequential compression device  Until discontinued         10/26/23 1711                Discharge Planning   ANDREAS:      Code Status: Full Code   Is the patient medically ready for discharge?:     Reason for patient still in hospital (select all that apply): Patient trending condition, Laboratory test, Treatment, Imaging and Consult recommendations  Discharge Plan A: Home, Home with family            Sandeep Martinez MD  Department of Hospital Medicine   [facility] ([facility])

## 2023-10-27 NOTE — ASSESSMENT & PLAN NOTE
As seen on US and CT Abdomen and recent EGD with Portal Hypertensive Gastropathy  Await further input from GI

## 2023-10-27 NOTE — SUBJECTIVE & OBJECTIVE
Interval History: Appreciate GI and Gen Surgery input- Feels a little better, abd pain a little better, controlled with pain meds. Getting IVF and Zosyn. NV much better. WBC 22k. H/H stable. Hida scan positive for Cholecystitis. Plan for Robotic Yari tomorrow.     Review of Systems   Constitutional:  Positive for activity change and appetite change. Negative for fatigue, fever and unexpected weight change.   HENT: Negative.     Eyes: Negative.  Negative for visual disturbance.   Respiratory: Negative.  Negative for cough, shortness of breath and wheezing.    Cardiovascular: Negative.    Gastrointestinal:  Positive for abdominal pain. Negative for abdominal distention, anal bleeding, blood in stool, constipation, diarrhea, nausea, rectal pain and vomiting.   Endocrine: Negative for polyuria.   Genitourinary: Negative.    Musculoskeletal:  Negative for arthralgias and gait problem.   Skin: Negative.    Allergic/Immunologic: Negative.    Neurological: Negative.    Hematological: Negative.    Psychiatric/Behavioral: Negative.       Objective:     Vital Signs (Most Recent):  Temp: 98.4 °F (36.9 °C) (10/27/23 1526)  Pulse: 87 (10/27/23 1526)  Resp: 18 (10/27/23 1645)  BP: 137/72 (10/27/23 1526)  SpO2: (!) 93 % (10/27/23 1526) Vital Signs (24h Range):  Temp:  [98 °F (36.7 °C)-98.9 °F (37.2 °C)] 98.4 °F (36.9 °C)  Pulse:  [] 87  Resp:  [16-19] 18  SpO2:  [93 %-97 %] 93 %  BP: (121-137)/(66-84) 137/72     Weight: 46.7 kg (103 lb)  Body mass index is 18.84 kg/m².    Intake/Output Summary (Last 24 hours) at 10/27/2023 1717  Last data filed at 10/27/2023 1502  Gross per 24 hour   Intake 27.98 ml   Output --   Net 27.98 ml         Physical Exam  Vitals and nursing note reviewed. Exam conducted with a chaperone present.   Constitutional:       Appearance: She is underweight. She is ill-appearing.   Cardiovascular:      Rate and Rhythm: Normal rate and regular rhythm.   Abdominal:      General: Abdomen is protuberant. Bowel  sounds are normal.      Tenderness: There is abdominal tenderness in the right upper quadrant.       Musculoskeletal:      Right lower leg: No edema.      Left lower leg: No edema.   Skin:     General: Skin is warm and dry.      Capillary Refill: Capillary refill takes less than 2 seconds.   Neurological:      General: No focal deficit present.      Mental Status: She is alert and oriented to person, place, and time. Mental status is at baseline.             Significant Labs: All pertinent labs within the past 24 hours have been reviewed.  BMP:   Recent Labs   Lab 10/27/23  0443      *   K 4.5      CO2 23   BUN 13   CREATININE 0.7   CALCIUM 8.3*   MG 1.6     CBC:   Recent Labs   Lab 10/27/23  0443   WBC 22.02*   HGB 10.9*   HCT 34.1*        CMP:   Recent Labs   Lab 10/27/23  0443   *   K 4.5      CO2 23      BUN 13   CREATININE 0.7   CALCIUM 8.3*   PROT 6.8   ALBUMIN 1.8*   BILITOT 0.5   ALKPHOS 104   AST 14   ALT 10   ANIONGAP 9     Coagulation:   Recent Labs   Lab 10/27/23  1545   INR 1.2     Magnesium:   Recent Labs   Lab 10/27/23  0443   MG 1.6        Significant Imaging: I have reviewed all pertinent imaging results/findings within the past 24 hours.

## 2023-10-27 NOTE — ASSESSMENT & PLAN NOTE
As seen on US and CT Abdomen and recent EGD with Portal Hypertensive Gastropathy  Await further input from GI    Appears compensated at present

## 2023-10-27 NOTE — NURSING
Assumed care of patient. Introduced to patient and family at bedside. No c/o at the moment, patient found resting in bed.

## 2023-10-27 NOTE — H&P (VIEW-ONLY)
[facility] Tele[facility] ([facility])  General Surgery  Consult Note    Inpatient consult to General Surgery  Consult performed by: Valentina De La Rosa MD  Consult ordered by: Sandeep Martinez MD  Reason for consult: Acute cholecystitis        Subjective:     Chief Complaint/Reason for Admission: Abdominal pain    History of Present Illness:   Lakisha Vance is a 63 y.o. female w/ a hx EtOH abuse (sober 63 days), cirrhosis (MELD-Na= 8), and COPD (current every day smoker) who presented to [facility] yesterday ER with epigastric and RUQ pain.  She states the pain started 2 days ago and is exacerbated by eating and palpation.  RUQ US in ER showed a nodular liver, with a hydropic gallbladder with sludge and small stones.  CBD was normal.  CT Scan showed distended gallbladder with sludge, mild ascites, and an edematous gastric wall, with some inflammation of the ascending colon concerning for colitis.  She was transferred to [facility] for a higher level of care.      Upon arrival here labs showed elevated white count of 22k, hemoglobin of 10.9 consistent with her chronic anemia, and normal platelets.  Her BMP was notable for hyponatremia with a sodium of 135, and albumin of 1.8.  Her bilirubin in her LFTs are all within normal limits.  She had a HIDA scan was performed today which shows appropriate liver clearance, with normal activity in the common bile duct and small bowel.  However the gallbladder is not seen consistent with cystic duct occlusion and acute cholecystitis.    This afternoon she complains of persistent right upper quadrant and epigastric pain, improved with morphine and not eating.  Of note she did have an EGD last month for epigastric pain which did show grade a esophagitis, gastritis, and portal gastropathy.  She was prescribed Protonix however had been unable to take it due to insurance issues.  She denies any previous episodes similar to this pain that she is having currently.  She was also seen in August  by hepatology in [place] and is on spironolactone.    No current facility-administered medications on file prior to encounter.     Current Outpatient Medications on File Prior to Encounter   Medication Sig    cyanocobalamin (VITAMIN B-12) 100 MCG tablet Take 100 mcg by mouth once daily.    folic acid (FOLVITE) 1 MG tablet Take 1 tablet by mouth once daily.    furosemide (LASIX) 20 MG tablet Take 20 mg by mouth.    ondansetron (ZOFRAN-ODT) 4 MG TbDL Take 1 tablet (4 mg total) by mouth every 8 (eight) hours as needed (nausea/vomiting).    pantoprazole (PROTONIX) 40 MG tablet Take 1 tablet (40 mg total) by mouth 2 (two) times daily.    spironolactone (ALDACTONE) 50 MG tablet Take 1 tablet (50 mg total) by mouth 2 (two) times daily.       Review of patient's allergies indicates:   Allergen Reactions    Baclofen Other (See Comments)     Vomiting, confusion, shaking       Past Medical History:   Diagnosis Date    Hypertension      Past Surgical History:   Procedure Laterality Date    ESOPHAGOGASTRODUODENOSCOPY N/A 9/1/2023    Procedure: ESOPHAGOGASTRODUODENOSCOPY (EGD);  Surgeon: Nerissa Ash MD;  Location: [facility];  Service: Endoscopy;  Laterality: N/A;    HYSTERECTOMY       Family History    None       Tobacco Use    Smoking status: Every Day     Current packs/day: 0.50     Types: Cigarettes    Smokeless tobacco: Never   Substance and Sexual Activity    Alcohol use: Yes     Comment: social    Drug use: Not on file    Sexual activity: Not on file     Review of Systems   Constitutional:  Positive for activity change, appetite change and fatigue. Negative for chills and fever.   Respiratory:  Negative for cough, chest tightness and shortness of breath.    Cardiovascular:  Positive for leg swelling. Negative for chest pain and palpitations.   Gastrointestinal:  Positive for abdominal pain, nausea and vomiting. Negative for abdominal distention and diarrhea.   Psychiatric/Behavioral:  The patient is  nervous/anxious.    All other systems reviewed and are negative.    Objective:     Vital Signs (Most Recent):  Temp: 98 °F (36.7 °C) (10/27/23 1209)  Pulse: 92 (10/27/23 1209)  Resp: 19 (10/27/23 1209)  BP: 130/84 (10/27/23 1209)  SpO2: 95 % (10/27/23 1209) Vital Signs (24h Range):  Temp:  [98 °F (36.7 °C)-98.9 °F (37.2 °C)] 98 °F (36.7 °C)  Pulse:  [] 92  Resp:  [18-19] 19  SpO2:  [94 %-97 %] 95 %  BP: (121-132)/(66-84) 130/84     Weight: 46.7 kg (103 lb)  Body mass index is 18.84 kg/m².      Intake/Output Summary (Last 24 hours) at 10/27/2023 1518  Last data filed at 10/27/2023 1502  Gross per 24 hour   Intake 27.98 ml   Output --   Net 27.98 ml       Physical Exam  Vitals reviewed.   Constitutional:       Comments: Thin and chronically ill appearing   HENT:      Head: Normocephalic and atraumatic.      Mouth/Throat:      Mouth: Mucous membranes are moist.   Eyes:      Extraocular Movements: Extraocular movements intact.      Conjunctiva/sclera: Conjunctivae normal.   Cardiovascular:      Rate and Rhythm: Normal rate and regular rhythm.   Pulmonary:      Effort: Pulmonary effort is normal.   Abdominal:      General: Abdomen is flat. There is no distension.      Palpations: Abdomen is soft.      Tenderness: There is abdominal tenderness. There is guarding.   Skin:     General: Skin is warm and dry.      Coloration: Skin is pale. Skin is not jaundiced.   Neurological:      General: No focal deficit present.      Mental Status: She is alert. Mental status is at baseline.   Psychiatric:      Comments: Anxious          Significant Labs:  CBC:   Recent Labs   Lab 10/27/23  0443   WBC 22.02*   RBC 3.67*   HGB 10.9*   HCT 34.1*      MCV 93   MCH 29.7   MCHC 32.0     CMP:   Recent Labs   Lab 10/27/23  0443      CALCIUM 8.3*   ALBUMIN 1.8*   PROT 6.8   *   K 4.5   CO2 23      BUN 13   CREATININE 0.7   ALKPHOS 104   ALT 10   AST 14   BILITOT 0.5     Coagulation:   Recent Labs   Lab  10/27/23  1545   INR 1.2     All pertinent labs from the last 24 hours have been reviewed.    MELD 3.0: 14 at 10/27/2023  3:45 PM  MELD-Na: 8 at 10/27/2023  3:45 PM  Calculated from:  Serum Creatinine: 0.7 mg/dL (Using min of 1 mg/dL) at 10/27/2023  4:43 AM  Serum Sodium: 135 mmol/L at 10/27/2023  4:43 AM  Total Bilirubin: 0.5 mg/dL (Using min of 1 mg/dL) at 10/27/2023  4:43 AM  Serum Albumin: 1.8 g/dL at 10/27/2023  4:43 AM  INR(ratio): 1.2 at 10/27/2023  3:45 PM  Age at listing (hypothetical): 63 years  Sex: Female at 10/27/2023  3:45 PM        Significant Diagnostics:  CT: I have reviewed all pertinent results/findings within the past 24 hours. Gallbladder edema and ascites   U/S: I have reviewed all pertinent results/findings within the past 24 hours. Hepatomegaly and mildly cirrhotic liver with small amounts of perihepatic ascites.  Gallbladder sludge and cholelithiasis, hydropic gallbladder with mild wall thickening/ edema, c/f cholecystitis.    I have reviewed all pertinent imaging results/findings within the past 24 hours.  HIDA Scan - no gallbladder filling, c/w acute cholecystitis     Assessment/Plan:   Lakisha Vance is a 63 y.o. female with cirrhosis, COPD (?) and nicotine dependence who presents with acute cholecystitis.      Had a lengthy discussion with the patient and her sister (Meghann) and friend (Haley) who were both at bedside.  I discussed with them the results of her imaging and workup which are consistent with acute cholecystitis.  We discussed the management of acute cholecystitis which primarily consists of cholecystectomy and antibiotics, but at times may also include procedure such as a cholecystostomy tube.  She is a Child's B cirrhotic with a MELD-NA of 8, giving her increased risk for liver failure following surgery, but also has increased risk for liver failure without surgery if she develops gangrenous cholecystitis or perforation.  We reviewed the risks of surgery aside from liver  failure, namely including damage to common bile duct, bleeding, and infection.  I discussed with them that her risk of infection and wound breakdown his higher accounting for her poor nutritional status (with an albumin of 1.8), and her current smoking status.      -- plan for robotic assisted laparoscopic cholecystectomy tomorrow  -- appreciate GI assistance with optimization from a hepatic standpoint  -- agree with Zosyn   -- okay for clear liquids tonight, NPO after midnight  -- if she further decompensates tonight she will need either a cholecystostomy tube or transfer to [facility] in [place] for higher level of care  -- please hold Lovenox in preparation for surgery tomorrow  -- remainder of care as per primary team      Active Diagnoses:    Diagnosis Date Noted POA    PRINCIPAL PROBLEM:  Acute cholecystitis [K81.0] 07/10/2023 Yes    Pain of upper abdomen [R10.10] 10/26/2023 Yes    Alcoholic cirrhosis of liver with ascites [K70.31] 10/26/2023 Yes    Moderate cigarette smoker (10-19 per day) [F17.210] 03/29/2022 Yes    HTN (hypertension) [I10] 08/13/2019 Yes      Problems Resolved During this Admission:       Thank you for your consult. I will follow-up with patient. Please contact us if you have any additional questions.    Valentina De La Rosa MD  General Surgery  [facility] ([facility])

## 2023-10-27 NOTE — ASSESSMENT & PLAN NOTE
US abd showed sludge and gallstones  Await Hida scan in am  Also consider Gastritis vs Diverticulitis  Cont Zosyn

## 2023-10-27 NOTE — H&P
[facility] Medicine  History & Physical    Patient Name: Lakisha Vance  MRN: [id number]  Patient Class: OP- Observation  Admission Date: 10/26/2023  Attending Physician: Sandeep Martinez MD   Primary Care Provider: Pranav Gonzalez MD         Patient information was obtained from patient, relative(s), past medical records, ER records and primary team.     Subjective:     Principal Problem:Pain of upper abdomen    Chief Complaint:   Chief Complaint   Patient presents with    Abdominal Pain        HPI: 63-year-old female with hx  of HTN, headaches, chronic liver disease, alcohol use, ascites, EGD in September 2023 with reflux esophagitis/gastritis/portal hypertensive gastropathy, and cholelithiasis presented to [facility] ER on October 26 with epigastric abdominal pain that began the night prior, associated with vomiting but no diarrhea, no fever and no hematemesis and no recent alcohol use. She was placed on Protonix in September but is not taking it due to insurance coverage issues. Pt has been worked up in our GI Dept for Alcoholic Liver disease vs Cirrhosis recently. Last Alcohol use about a month ago.    In the ER , WBC 17.4, H/H 12/ 35, platelets 423, Lipase 13, CMP normal. US Abdomin showed hepatomegaly and mildly cirrhotic liver configuration. Very small perihepatic ascites. Gallbladder sludge and cholelithiasis. CT abdomen and pelvis showed distended gallbladder with sludge.  Mild ascites. Edematous gastric wall.  Gastritis should be considered.  Mild hiatal hernia.  Appears to be wall thickening of the ascending colon suspicious for colitis. Prominent diverticulosis of the lower colon with no strong evidence for diverticulitis.     In the ER, pt had received famotidine, morphine, Zofran, and Zosyn.      ER consulted Dr. Jennings and Dr. Randhawa thru [facility] for possible Cholecystitis vs Colitis and she was accepted. She is being placed in Obs under [facility]osp [facility] for General  Surgery and Gastroenterology evaluation.               Past Medical History:   Diagnosis Date    Hypertension        Past Surgical History:   Procedure Laterality Date    ESOPHAGOGASTRODUODENOSCOPY N/A 9/1/2023    Procedure: ESOPHAGOGASTRODUODENOSCOPY (EGD);  Surgeon: Nerissa Ash MD;  Location: [facility];  Service: Endoscopy;  Laterality: N/A;    HYSTERECTOMY         Review of patient's allergies indicates:   Allergen Reactions    Baclofen Other (See Comments)     Vomiting, confusion, shaking       No current facility-administered medications on file prior to encounter.     Current Outpatient Medications on File Prior to Encounter   Medication Sig    cyanocobalamin (VITAMIN B-12) 100 MCG tablet Take 100 mcg by mouth once daily.    folic acid (FOLVITE) 1 MG tablet Take 1 tablet by mouth once daily.    furosemide (LASIX) 20 MG tablet Take 20 mg by mouth.    ondansetron (ZOFRAN-ODT) 4 MG TbDL Take 1 tablet (4 mg total) by mouth every 8 (eight) hours as needed (nausea/vomiting).    pantoprazole (PROTONIX) 40 MG tablet Take 1 tablet (40 mg total) by mouth 2 (two) times daily.    spironolactone (ALDACTONE) 50 MG tablet Take 1 tablet (50 mg total) by mouth 2 (two) times daily.     Family History    None       Tobacco Use    Smoking status: Every Day     Current packs/day: 0.50     Types: Cigarettes    Smokeless tobacco: Never   Substance and Sexual Activity    Alcohol use: Yes     Comment: social    Drug use: Not on file    Sexual activity: Not on file     Review of Systems   Constitutional:  Positive for activity change, appetite change and unexpected weight change. Negative for fatigue and fever.   HENT: Negative.     Eyes: Negative.  Negative for visual disturbance.   Respiratory: Negative.  Negative for cough, shortness of breath and wheezing.    Cardiovascular: Negative.    Gastrointestinal:  Positive for abdominal pain, nausea and vomiting. Negative for abdominal distention, anal bleeding, blood in stool,  constipation, diarrhea and rectal pain.   Endocrine: Positive for polyuria.   Genitourinary: Negative.    Musculoskeletal:  Negative for arthralgias and gait problem.   Skin: Negative.    Allergic/Immunologic: Negative.    Neurological: Negative.    Hematological: Negative.    Psychiatric/Behavioral: Negative.       Objective:     Vital Signs (Most Recent):  Temp: 98.9 °F (37.2 °C) (10/26/23 1952)  Pulse: 106 (10/26/23 1952)  Resp: 18 (10/26/23 1952)  BP: 132/78 (10/26/23 1952)  SpO2: (!) 94 % (10/26/23 1952) Vital Signs (24h Range):  Temp:  [98.9 °F (37.2 °C)-99.4 °F (37.4 °C)] 98.9 °F (37.2 °C)  Pulse:  [] 106  Resp:  [18-19] 18  SpO2:  [94 %-98 %] 94 %  BP: (132-135)/(76-78) 132/78     Weight: 46.7 kg (103 lb)  Body mass index is 18.84 kg/m².     Physical Exam  Vitals and nursing note reviewed.   Constitutional:       General: She is not in acute distress.     Appearance: Normal appearance. She is obese. She is ill-appearing. She is not toxic-appearing.   HENT:      Head: Normocephalic and atraumatic.      Right Ear: External ear normal.      Left Ear: External ear normal.      Nose: Nose normal.      Mouth/Throat:      Mouth: Mucous membranes are moist.      Pharynx: Oropharynx is clear.   Eyes:      General: No scleral icterus.     Extraocular Movements: Extraocular movements intact.      Conjunctiva/sclera: Conjunctivae normal.      Pupils: Pupils are equal, round, and reactive to light.   Cardiovascular:      Rate and Rhythm: Normal rate and regular rhythm.      Pulses: Normal pulses.      Heart sounds: Normal heart sounds. No murmur heard.     No friction rub.   Pulmonary:      Effort: Pulmonary effort is normal. No respiratory distress.      Breath sounds: Normal breath sounds. No wheezing.   Abdominal:      General: Abdomen is flat. Bowel sounds are normal. There is no distension.      Palpations: Abdomen is soft. There is no mass.      Tenderness: There is abdominal tenderness. There is no right CVA  tenderness, left CVA tenderness, guarding or rebound.      Hernia: No hernia is present.   Musculoskeletal:         General: No swelling, tenderness, deformity or signs of injury. Normal range of motion.      Cervical back: Normal range of motion and neck supple. No rigidity or tenderness.      Right lower leg: No edema.      Left lower leg: No edema.   Skin:     General: Skin is warm and dry.      Capillary Refill: Capillary refill takes less than 2 seconds.      Coloration: Skin is not pale.      Findings: No erythema or rash.   Neurological:      General: No focal deficit present.      Mental Status: She is alert and oriented to person, place, and time.   Psychiatric:         Mood and Affect: Mood normal.         Behavior: Behavior normal.          CRANIAL NERVES     CN III, IV, VI   Pupils are equal, round, and reactive to light.     Significant Labs: All pertinent labs within the past 24 hours have been reviewed.    White blood cells 17.4, hemoglobin 12, hematocrit 35.4, platelets 423, lipase 13, sodium 136, potassium 4.5, chloride 104, CO2 24, BUN 11, creatinine 0.61, glucose 129, total bilirubin 0.6, AST 30, ALT 11     CT abdomen and pelvis showed distended gallbladder with sludge.  Mild ascites.  Edematous gastric wall.  Gastritis should be considered.  Mild hiatal hernia.  Appears to be wall thickening of the ascending colon suspicious for colitis.  Prominent diverticulosis of the lower colon with no strong evidence for diverticulitis.     Abdominal ultrasound showed hepatomegaly and mildly cirrhotic liver configuration.  Very small perihepatic ascites.  Gallbladder sludge and cholelithiasis.  Hydropic gallbladder with mild wall thickening/edema.    Significant Imaging: as reported from [facility] ER.    Assessment/Plan:     * Pain of upper abdomen  Etiology unclear, pt with Alcoholic Cirrhosis  D/D includes Gastritis vs Cholecystitis vs Diverticulitis  Await GI and Surgery input  Hida Scan ordered to r/o  Cholecystitis in am  Pt was empirically started on Zosyn in the ER, will continue  IV Protonix bid, Gentle Rehydration  Use MS prn       Alcoholic cirrhosis of liver with ascites  As seen on US and CT Abdomen and recent EGD with Portal Hypertensive Gastropathy  Await further input from GI        Gallstones  US abd showed sludge and gallstones  Await Hida scan in am  Also consider Gastritis vs Diverticulitis  Cont Zosyn        VTE Risk Mitigation (From admission, onward)           Ordered     enoxaparin injection 30 mg  Daily         10/26/23 1714     IP VTE HIGH RISK PATIENT  Once         10/26/23 1711     Place sequential compression device  Until discontinued         10/26/23 1711                       On 10/26/2023, patient should be placed in hospital observation services under my care.            Sandeep Martinez MD  Department of Hospital Medicine  [facility] - Telemetry ([facility])    N/A  Family history non-contributory to current problem   Pertinent information:

## 2023-10-27 NOTE — ASSESSMENT & PLAN NOTE
Chronic, controlled. Latest blood pressure and vitals reviewed-     Temp:  [98 °F (36.7 °C)-98.9 °F (37.2 °C)]   Pulse:  []   Resp:  [16-19]   BP: (121-137)/(66-84)   SpO2:  [93 %-97 %] .   Home meds for hypertension were reviewed and noted below.   Hypertension Medications             furosemide (LASIX) 20 MG tablet Take 20 mg by mouth.    spironolactone (ALDACTONE) 50 MG tablet Take 1 tablet (50 mg total) by mouth 2 (two) times daily.          While in the hospital, will manage blood pressure as follows; Continue home antihypertensive regimen    Will utilize p.r.n. blood pressure medication only if patient's blood pressure greater than 140/90 and she develops symptoms such as worsening chest pain or shortness of breath.

## 2023-10-27 NOTE — NURSING
"Patient noted with c/o pain morphine 2 mg given. Patient sister requested phenergan with pain medication was informed patient needed third IV. Patient refused IV and stated "if she become nauseated after taken medication she will get third IV.  "

## 2023-10-27 NOTE — SUBJECTIVE & OBJECTIVE
Past Medical History:   Diagnosis Date    Hypertension        Past Surgical History:   Procedure Laterality Date    ESOPHAGOGASTRODUODENOSCOPY N/A 9/1/2023    Procedure: ESOPHAGOGASTRODUODENOSCOPY (EGD);  Surgeon: Nerissa Ash MD;  Location: [facility];  Service: Endoscopy;  Laterality: N/A;    HYSTERECTOMY         Review of patient's allergies indicates:   Allergen Reactions    Baclofen Other (See Comments)     Vomiting, confusion, shaking     Family History    None       Tobacco Use    Smoking status: Every Day     Current packs/day: 0.50     Types: Cigarettes    Smokeless tobacco: Never   Substance and Sexual Activity    Alcohol use: Yes     Comment: social    Drug use: Not on file    Sexual activity: Not on file     Review of Systems   Gastrointestinal:  Positive for abdominal pain.   All other systems reviewed and are negative.    Objective:     Vital Signs (Most Recent):  Temp: 98 °F (36.7 °C) (10/27/23 1209)  Pulse: 92 (10/27/23 1209)  Resp: 19 (10/27/23 1209)  BP: 130/84 (10/27/23 1209)  SpO2: 95 % (10/27/23 1209) Vital Signs (24h Range):  Temp:  [98 °F (36.7 °C)-99.4 °F (37.4 °C)] 98 °F (36.7 °C)  Pulse:  [] 92  Resp:  [18-19] 19  SpO2:  [94 %-98 %] 95 %  BP: (121-135)/(66-84) 130/84     Weight: 46.7 kg (103 lb) (10/26/23 1513)  Body mass index is 18.84 kg/m².      Intake/Output Summary (Last 24 hours) at 10/27/2023 1506  Last data filed at 10/27/2023 1502  Gross per 24 hour   Intake 27.98 ml   Output --   Net 27.98 ml       Lines/Drains/Airways       Peripheral Intravenous Line  Duration                  Peripheral IV - Single Lumen 10/26/23 1721 22 G Anterior;Left;Proximal Forearm <1 day         Peripheral IV - Single Lumen 10/27/23 0500 20 G Anterior;Left Forearm <1 day                     Physical Exam  Vitals and nursing note reviewed. Exam conducted with a chaperone present.   Constitutional:       Appearance: She is underweight. She is ill-appearing.   Cardiovascular:      Rate and  "Rhythm: Normal rate and regular rhythm.   Abdominal:      General: Abdomen is protuberant. Bowel sounds are normal.      Tenderness: There is abdominal tenderness in the right upper quadrant.       Musculoskeletal:      Right lower leg: No edema.      Left lower leg: No edema.   Skin:     General: Skin is warm and dry.   Neurological:      General: No focal deficit present.      Mental Status: She is alert and oriented to person, place, and time. Mental status is at baseline.          Significant Labs:  CBC:   Recent Labs   Lab 10/27/23  0443   WBC 22.02*   HGB 10.9*   HCT 34.1*        CMP:   Recent Labs   Lab 10/27/23  0443      CALCIUM 8.3*   ALBUMIN 1.8*   PROT 6.8   *   K 4.5   CO2 23      BUN 13   CREATININE 0.7   ALKPHOS 104   ALT 10   AST 14   BILITOT 0.5     Coagulation: No results for input(s): "PT", "INR", "APTT" in the last 48 hours.    Significant Imaging:  Imaging results within the past 24 hours have been reviewed.  "

## 2023-10-27 NOTE — PLAN OF CARE
Patient and family updated on plan of care. Assumed care of patient, I agree with the previous nurses assessment. Patient educated on clear liquid today and NPO at midnight for planned procedure 10/28. Pain and nausea addressed with PRN medications. IV antibiotics as ordered, lines CDI. No cardiac monitor orders. Safety and fall precautions in place.     Problem: Adult Inpatient Plan of Care  Goal: Plan of Care Review  Outcome: Ongoing, Progressing

## 2023-10-27 NOTE — PLAN OF CARE
Problem: Adult Inpatient Plan of Care  Goal: Plan of Care Review  Outcome: Ongoing, Progressing  Goal: Patient-Specific Goal (Individualized)  Outcome: Ongoing, Progressing  Goal: Absence of Hospital-Acquired Illness or Injury  Outcome: Ongoing, Progressing  Goal: Optimal Comfort and Wellbeing  Outcome: Ongoing, Progressing  Goal: Readiness for Transition of Care  Outcome: Ongoing, Progressing     Problem: Nausea and Vomiting  Goal: Fluid and Electrolyte Balance  Outcome: Adequate for Care Transition

## 2023-10-27 NOTE — PLAN OF CARE
PT EVAL complete. Required SBA for STS, ambulated 10ft SBA, no AD. Recommending low intensity PT upon d/c.

## 2023-10-27 NOTE — ASSESSMENT & PLAN NOTE
Etiology unclear, pt with Alcoholic Cirrhosis  D/D includes Gastritis vs Cholecystitis vs Diverticulitis  Await GI and Surgery input  Hida Scan ordered to r/o Cholecystitis in am  Pt was empirically started on Zosyn in the ER, will continue  IV Protonix bid, Gentle Rehydration  Use MS prn

## 2023-10-27 NOTE — ASSESSMENT & PLAN NOTE
Etiology unclear, pt with Alcoholic Cirrhosis  D/D includes Gastritis vs Cholecystitis vs Diverticulitis  Await GI and Surgery input  Hida Scan ordered to r/o Cholecystitis in am  Pt was empirically started on Zosyn in the ER, will continue  IV Protonix bid, Gentle Rehydration  Use MS prn     HID pos  Surgery planned for tmrw  Keep NPO after MN

## 2023-10-27 NOTE — HOSPITAL COURSE
Appreciate GI and Gen Surgery input- Feels a little better, abd pain a little better, controlled with pain meds. Getting IVF and Zosyn. NV much better. WBC 22k. H/H stable. Hida scan positive for Cholecystitis. Plan for Robotic Yari tomorrow.     10/28- Appreciate Dr. De La Rosa, pt seen pre and post op. K was low- started on NS w KCl pre op. Post op a little groggy and in mild to mod discomfort/pain. Operative Notes reviewed- Gangrenous gallbladder with lateral wall perforation with pus and bile in the abdomen.  Liver was cirrhotic and nodular.  Extensive friable and inflamed tissues. Cont IVF, IV Abx and pain meds per surgery. Check labs in am.      10/29- POD 1, looks and feels much better, skin color and turgor have improved, appears more relaxed, has been to the BR 3 times without any dizziness or weakness. VSS, Afeb, serosanguinous drain output, about 740 cc so far. Getting Zosyn, WBC down to 13.7, H/H 10/30, electrolytes better. LFTs normal, Albumin 1.3. pt tolerated CLD well. Ok to adv as tolerated.     10/30- looks better, POD 2, pain under control, VSS, Afeb, HUMAIRA drain present, she walked in the hallways and ate a little plus drank a protein smoothie. Labs improving. LFTs normal Surgery and hepatology following. Check labs in am. Cont Abx, IS, ambulation.     10/31- gradually improving, lying in bed but walked in the halls earlier. Still has large HUMAIRA drain, about 650 cc. Getting Zosyn. New Picc line placed. CXR clear. WBC normal. Alb 1.2- dietary consulted, getting boost plus Jose David w each meal, pt encouraged to eat, also started on Remeron to improve appetite.     11/1- looks and feels well, sitting up eating lunch, had walked in the hallways twice earlier. Abd pain much better control. Still has significant serous drainage from the HUMAIRA drain, about 1100 cc yesterday- likely Ascitic Fluid plus sec to severe Hypoalbuminemia- her albumin is 1.2, LFTs normal. Dr. Weaver recommended starting Lasix and Aldactone. Pt  also encouraged to eat more. Change diet to low salt with fluid restriction. May switch to oral Abx soon.     11/2- appears very well, walking around on RA, abd pain minimal. Dr. De La Rosa took out her Drain and placed a stitch over the area as she was draining mostly Ascitic fluid. Pt will be continued on Lasix and Aldactone as per Hepatology. Both Surg and hepatology have cleared her for discharge. She will be on oral Abx for another few days and will f/u with surgery next week. She is eating drinking well, walking around well. She was seen and examined and deemed stable for discharge home today.

## 2023-10-27 NOTE — ASSESSMENT & PLAN NOTE
MELD 3.0: 13 at 9/6/2023  3:00 PM  MELD-Na: 8 at 9/6/2023  3:00 PM  Calculated from:  Serum Creatinine: 0.7 mg/dL (Using min of 1 mg/dL) at 9/6/2023  3:00 PM  Serum Sodium: 138 mmol/L (Using max of 137 mmol/L) at 9/6/2023  3:00 PM  Total Bilirubin: 0.5 mg/dL (Using min of 1 mg/dL) at 9/6/2023  3:00 PM  Serum Albumin: 1.3 g/dL (Using min of 1.5 g/dL) at 9/6/2023  3:00 PM  INR(ratio): 1.2 at 9/6/2023  3:00 PM  Age at listing (hypothetical): 63 years  Sex: Female at 9/6/2023  3:00 PM    Awaiting INR today to calculate new MELD  Sober 63 days.

## 2023-10-27 NOTE — PT/OT/SLP EVAL
Occupational Therapy   Evaluation    Name: Lakisha Vance  MRN: [id number]  Admitting Diagnosis: Pain of upper abdomen  Recent Surgery: * No surgery found *      Recommendations:     Discharge Recommendations: Low Intensity Therapy  Discharge Equipment Recommendations:  none  Barriers to discharge:       Assessment:     Lakisha Vance is a 63 y.o. female with a medical diagnosis of Pain of upper abdomen.   Performance deficits affecting function: weakness, gait instability, decreased upper extremity function, impaired endurance, impaired balance, decreased lower extremity function, decreased safety awareness, pain, impaired self care skills, impaired functional mobility.      Rehab Prognosis: Good; patient would benefit from acute skilled OT services to address these deficits and reach maximum level of function.       Plan:     Patient to be seen   to address the above listed problems via self-care/home management, therapeutic activities, therapeutic exercises  Plan of Care Expires: 11/10/23  Plan of Care Reviewed with: patient, family    Subjective     Chief Complaint: Abdominal pain  Patient/Family Comments/goals: return to PLOF    Occupational Profile:  Living Environment: Lives with family in one story home, 2 steps to enter  Previous level of function: Independent with ADLs and functional mobility  Roles and Routines: Drives, works FT as  at a school  Equipment Used at Home: none  Assistance upon Discharge: family    Pain/Comfort:  Pain Rating 1: 4/10  Location - Side 1: Bilateral  Location - Orientation 1: upper  Location 1: abdomen  Pain Addressed 1: Reposition, Distraction, Cessation of Activity  Pain Rating Post-Intervention 1: 4/10    Patients cultural, spiritual, Muslim conflicts given the current situation:      Objective:     Communicated with: Nurse prior to session.  Patient found supine with peripheral IV, telemetry upon OT entry to room.    General Precautions: Standard, fall  Orthopedic  Precautions: N/A  Braces: N/A  Respiratory Status: Room air    Occupational Performance:    Bed Mobility:    Patient completed Rolling/Turning to Left with  contact guard assistance  Patient completed Rolling/Turning to Right with contact guard assistance  Patient completed Scooting/Bridging with contact guard assistance  Patient completed Supine to Sit with contact guard assistance    Functional Mobility/Transfers:  Patient completed Sit <> Stand Transfer with contact guard assistance  with  no assistive device   Patient completed Bed <> Chair Transfer using Stand Pivot technique with contact guard assistance with no assistive device  Functional Mobility: x10 feet with no AD CGA    Cognitive/Visual Perceptual:  Cognitive/Psychosocial Skills:     -       Oriented to: Person, Place, and Situation   -       Follows Commands/attention:Easily distracted  -       Memory: No Deficits noted  -       Safety awareness/insight to disability: impaired     Physical Exam:  Balance:    -       good-  Upper Extremity Range of Motion:     -       Right Upper Extremity: WFL  -       Left Upper Extremity: WFL  Upper Extremity Strength:    -       Right Upper Extremity: grossly 3+/5  -       Left Upper Extremity: grossly 3+/5    AMPAC 6 Click ADL:  AMPAC Total Score: 21    Treatment & Education:  Pt participated in initial OT evaluation to assess current level of function. Pt will benefit from skilled OT services to increase functional independence and safety.    Patient left  with staff member in transport  with all lines intact and staff member present    GOALS:   Multidisciplinary Problems       Occupational Therapy Goals          Problem: Occupational Therapy    Goal Priority Disciplines Outcome Interventions   Occupational Therapy Goal     OT, PT/OT     Description: Goals to be met by: 11/10/23     Patient will increase functional independence with ADLs by performing:    Toileting from toilet with Supervision for hygiene and  clothing management.   Toilet transfer to toilet with Supervision.  Upper extremity exercise program x10 reps per handout, with supervision.                         History:     Past Medical History:   Diagnosis Date    Hypertension          Past Surgical History:   Procedure Laterality Date    ESOPHAGOGASTRODUODENOSCOPY N/A 9/1/2023    Procedure: ESOPHAGOGASTRODUODENOSCOPY (EGD);  Surgeon: Nerissa Ash MD;  Location: [facility];  Service: Endoscopy;  Laterality: N/A;    HYSTERECTOMY         Time Tracking:     OT Date of Treatment: 10/27/23  OT Start Time: 1035  OT Stop Time: 1050  OT Total Time (min): 15 min    Billable Minutes:Evaluation 15    JIGNESH Banks  10/27/2023

## 2023-10-27 NOTE — HPI
63-year-old female with hx  of HTN, headaches, chronic liver disease, alcohol use, ascites, EGD in September 2023 with reflux esophagitis/gastritis/portal hypertensive gastropathy, and cholelithiasis presented to [facility] ER on October 26 with epigastric abdominal pain that began the night prior to presentation associated with vomiting but no diarrhea, no fever and no hematemesis and no recent alcohol use. She was placed on Protonix in September but is not taking it due to insurance coverage issues. Pt has been worked up in our GI Dept for Alcoholic Liver disease vs Cirrhosis recently. Last Alcohol use about a month ago.    In the ER , WBC 17.4, H/H 12/ 35, platelets 423, Lipase 13, CMP normal. US Abdomin showed hepatomegaly and mildly cirrhotic liver configuration. Very small perihepatic ascites. Gallbladder sludge and cholelithiasis. CT abdomen and pelvis showed distended gallbladder with sludge.  Mild ascites. Edematous gastric wall.  Gastritis should be considered.  Mild hiatal hernia.  Appears to be wall thickening of the ascending colon suspicious for colitis. Prominent diverticulosis of the lower colon with no strong evidence for diverticulitis.     In the ER, pt had received famotidine, morphine, Zofran, and Zosyn.      ER consulted Dr. Jennings and Dr. Randhawa thru the [facility] for possible Cholecystitis vs Colitis and she was accepted. She is being placed in Obs under [facility] for General Surgery and Gastroenterology evaluation.

## 2023-10-27 NOTE — PT/OT/SLP EVAL
Physical Therapy Evaluation and Treatment    Patient Name: Lakisha Vance   MRN: [id number]  Recent Surgery: * No surgery found *      Recommendations:     Discharge Recommendations: Low Intensity Therapy   Discharge Equipment Recommendations: none   Barriers to discharge: None    Assessment:     Lakisha Vance is a 63 y.o. female admitted with a medical diagnosis of Pain of upper abdomen. She presents with the following impairments/functional limitations: weakness, impaired endurance, impaired functional mobility, gait instability, impaired balance, pain, decreased safety awareness, decreased lower extremity function, decreased coordination.    Rehab Prognosis: Good; patient would benefit from acute PT services to address these deficits and reach maximum level of function.    Plan:     During this hospitalization, patient to be seen 3 x/week to address the above listed problems via gait training, therapeutic activities, therapeutic exercises    Plan of Care Expires: 11/10/23    Subjective     Chief Complaint: Pt is motivated to participate  Patient Comments/Goals: none stated  Pain/Comfort:  Pain Rating 1: 4/10  Location - Side 1: Bilateral  Location - Orientation 1: upper  Location 1: abdomen  Pain Addressed 1: Reposition, Distraction, Cessation of Activity, Pre-medicate for activity  Pain Rating Post-Intervention 1: 4/10    Social History:  Hx obtained from family in room.  Living Environment: Patient lives with their family  in a single story home with number of outside stair(s): 2  Prior Level of Function: Prior to admission, patient was independent, driving and working as a  at a school, and ambulated household and community distances using no AD  Equipment Used at Home: none  DME owned (not currently used): none  Assistance Upon Discharge: family    Objective:     Communicated with nurse and epic chart review prior to session. Patient found sitting edge of bed with peripheral IV, telemetry upon PT entry  "to room.    General Precautions: Standard, fall   Orthopedic Precautions: N/A   Braces: N/A    Respiratory Status: Room air    Exams:  Cognition: Patient is oriented to Person, Place, Time, Situation  RLE ROM: WFL  RLE Strength:  Grossly 3+/5  LLE ROM: WFL  LLE Strength:  Grossly 3+/5  Sensation:    -       Intact  Skin Integrity/Edema:     -       Skin integrity: Visible skin intact    Functional Mobility:  Bed Mobility  Seated in chair at start of session and returned to chair  Transfers  Sit to Stand: stand by assistance with no AD  Bed to Chair: stand by assistance with no AD using Step Transfer  Gait  Patient ambulated 10ft with no AD and stand by assistance. Patient demonstrates occasional unsteady gait. No c/o dizziness or SOB, no gross LOB. All lines remained intact throughout ambulation trail.  Balance  Sitting: stand by assistance  Standing: stand by assistance  Assessment limited due to pt getting ready to go for imaging    Therapeutic Activities and Exercises:   Pt educated on role of PT in acute care and POC. Educated on importance of OOB activities, activity pacing, and HEP (marching/hip flex, hip abd, heel slides/LAQ, quad sets, ankle pumps) in order to maintain/regain strength. Encouraged to sit up in chair for all meals. Educated on proper use of RW for safety and to reduce risk of falling. Educated on "call don't fall" policy and increased risk of falling due to weakness, instructed to utilize call bell for assistance with all transfers. Pt agreeable to all requests.    AM-PAC 6 CLICK MOBILITY  Total Score:12    Patient left up in chair with all lines intact, family present, pt with staff getting going down for imaging.    GOALS:   Multidisciplinary Problems       Physical Therapy Goals          Problem: Physical Therapy    Goal Priority Disciplines Outcome Goal Variances Interventions   Physical Therapy Goal     PT, PT/OT      Description: Goals to be met by 11/10/23.  1. Pt will complete bed " mobility MOD I.  2. Pt will complete sit to stand MOD I.  3. Pt will ambulate 200ft MOD I.  4. Pt will increase AMPAC score by 2 points to progress functional mobility.                       History:     Past Medical History:   Diagnosis Date    Hypertension        Past Surgical History:   Procedure Laterality Date    ESOPHAGOGASTRODUODENOSCOPY N/A 9/1/2023    Procedure: ESOPHAGOGASTRODUODENOSCOPY (EGD);  Surgeon: Nerissa Ash MD;  Location: [facility];  Service: Endoscopy;  Laterality: N/A;    HYSTERECTOMY         Time Tracking:     PT Received On: 10/27/23  PT Start Time: 1046  PT Stop Time: 1101  PT Total Time (min): 15 min     Billable Minutes: Evaluation 15min    10/27/2023

## 2023-10-27 NOTE — PLAN OF CARE
Pt ambulated from EOB to transport chair for procedure. Ambulated x10 feet with SBA   Rec low intensity therapy at [facility]

## 2023-10-27 NOTE — NURSING
Returned to patient room to reassess patient for N/V with pain medication. Patient had no C/O N/V.

## 2023-10-27 NOTE — HPI
63 y.o female with alcohol liver cirrhosi sober 63 days  was transferred from [facility] for abdominal pain. Patient reports symptoms began 2 days ago. Pain is localized in the RUQ and non-radiating. There was associated nausea and emesis but that has since resolved with anti-emetics and being NPO. Denies hematemesis. In the ED, US performed showed nodular liver, gallbladder is hydropic with sludge and small dependent echogenic calculi. The gallbladder wall is mildly thickened and edematous. No sonographic Robledo sign. The common bile duct measured 3.4 mm, normal. She was transferred to [facility] for higher level of care.    Patient was admitted overnight and pain managed with analgesics. Ct scan showed distended gallbladder and mild ascites. She is seen this afternoon with sister Meghann and friend Haley at bedside. States pain is still present. HIDA scan performed today shows gallbladder is not visualized consistent with cystic duct occlusion which is consistent with acute cholecystitis. Patient has NPO since admission. Previous EGD last month for epigastric pain showed grade A esophagitis, gastritis and portal gastropathy. Biopsies were negative for H. Pylori but did show focal active gastritis. She was re-prescribed Protonix 40mg BID by Dr. Weaver earlier this month.

## 2023-10-27 NOTE — CONSULTS
[facility] ([facility])  General Surgery  Consult Note    Inpatient consult to General Surgery  Consult performed by: Valentina De La Rosa MD  Consult ordered by: Sandeep Martinez MD  Reason for consult: Acute cholecystitis        Subjective:     Chief Complaint/Reason for Admission: Abdominal pain    History of Present Illness:   Lakisha Vance is a 63 y.o. female w/ a hx EtOH abuse (sober 63 days), cirrhosis (MELD-Na= 8), and COPD (current every day smoker) who presented to [facility] yesterday ER with epigastric and RUQ pain.  She states the pain started 2 days ago and is exacerbated by eating and palpation.  RUQ US in ER showed a nodular liver, with a hydropic gallbladder with sludge and small stones.  CBD was normal.  CT Scan showed distended gallbladder with sludge, mild ascites, and an edematous gastric wall, with some inflammation of the ascending colon concerning for colitis.  She was transferred to [facility] for a higher level of care.      Upon arrival here labs showed elevated white count of 22k, hemoglobin of 10.9 consistent with her chronic anemia, and normal platelets.  Her BMP was notable for hyponatremia with a sodium of 135, and albumin of 1.8.  Her bilirubin in her LFTs are all within normal limits.  She had a HIDA scan was performed today which shows appropriate liver clearance, with normal activity in the common bile duct and small bowel.  However the gallbladder is not seen consistent with cystic duct occlusion and acute cholecystitis.    This afternoon she complains of persistent right upper quadrant and epigastric pain, improved with morphine and not eating.  Of note she did have an EGD last month for epigastric pain which did show grade a esophagitis, gastritis, and portal gastropathy.  She was prescribed Protonix however had been unable to take it due to insurance issues.  She denies any previous episodes similar to this pain that she is having currently.  She was also seen in August  by hepatology in [place] and is on spironolactone.    No current facility-administered medications on file prior to encounter.     Current Outpatient Medications on File Prior to Encounter   Medication Sig    cyanocobalamin (VITAMIN B-12) 100 MCG tablet Take 100 mcg by mouth once daily.    folic acid (FOLVITE) 1 MG tablet Take 1 tablet by mouth once daily.    furosemide (LASIX) 20 MG tablet Take 20 mg by mouth.    ondansetron (ZOFRAN-ODT) 4 MG TbDL Take 1 tablet (4 mg total) by mouth every 8 (eight) hours as needed (nausea/vomiting).    pantoprazole (PROTONIX) 40 MG tablet Take 1 tablet (40 mg total) by mouth 2 (two) times daily.    spironolactone (ALDACTONE) 50 MG tablet Take 1 tablet (50 mg total) by mouth 2 (two) times daily.       Review of patient's allergies indicates:   Allergen Reactions    Baclofen Other (See Comments)     Vomiting, confusion, shaking       Past Medical History:   Diagnosis Date    Hypertension      Past Surgical History:   Procedure Laterality Date    ESOPHAGOGASTRODUODENOSCOPY N/A 9/1/2023    Procedure: ESOPHAGOGASTRODUODENOSCOPY (EGD);  Surgeon: Nerissa Ash MD;  Location: [facility];  Service: Endoscopy;  Laterality: N/A;    HYSTERECTOMY       Family History    None       Tobacco Use    Smoking status: Every Day     Current packs/day: 0.50     Types: Cigarettes    Smokeless tobacco: Never   Substance and Sexual Activity    Alcohol use: Yes     Comment: social    Drug use: Not on file    Sexual activity: Not on file     Review of Systems   Constitutional:  Positive for activity change, appetite change and fatigue. Negative for chills and fever.   Respiratory:  Negative for cough, chest tightness and shortness of breath.    Cardiovascular:  Positive for leg swelling. Negative for chest pain and palpitations.   Gastrointestinal:  Positive for abdominal pain, nausea and vomiting. Negative for abdominal distention and diarrhea.   Psychiatric/Behavioral:  The patient is  nervous/anxious.    All other systems reviewed and are negative.    Objective:     Vital Signs (Most Recent):  Temp: 98 °F (36.7 °C) (10/27/23 1209)  Pulse: 92 (10/27/23 1209)  Resp: 19 (10/27/23 1209)  BP: 130/84 (10/27/23 1209)  SpO2: 95 % (10/27/23 1209) Vital Signs (24h Range):  Temp:  [98 °F (36.7 °C)-98.9 °F (37.2 °C)] 98 °F (36.7 °C)  Pulse:  [] 92  Resp:  [18-19] 19  SpO2:  [94 %-97 %] 95 %  BP: (121-132)/(66-84) 130/84     Weight: 46.7 kg (103 lb)  Body mass index is 18.84 kg/m².      Intake/Output Summary (Last 24 hours) at 10/27/2023 1518  Last data filed at 10/27/2023 1502  Gross per 24 hour   Intake 27.98 ml   Output --   Net 27.98 ml       Physical Exam  Vitals reviewed.   Constitutional:       Comments: Thin and chronically ill appearing   HENT:      Head: Normocephalic and atraumatic.      Mouth/Throat:      Mouth: Mucous membranes are moist.   Eyes:      Extraocular Movements: Extraocular movements intact.      Conjunctiva/sclera: Conjunctivae normal.   Cardiovascular:      Rate and Rhythm: Normal rate and regular rhythm.   Pulmonary:      Effort: Pulmonary effort is normal.   Abdominal:      General: Abdomen is flat. There is no distension.      Palpations: Abdomen is soft.      Tenderness: There is abdominal tenderness. There is guarding.   Skin:     General: Skin is warm and dry.      Coloration: Skin is pale. Skin is not jaundiced.   Neurological:      General: No focal deficit present.      Mental Status: She is alert. Mental status is at baseline.   Psychiatric:      Comments: Anxious          Significant Labs:  CBC:   Recent Labs   Lab 10/27/23  0443   WBC 22.02*   RBC 3.67*   HGB 10.9*   HCT 34.1*      MCV 93   MCH 29.7   MCHC 32.0     CMP:   Recent Labs   Lab 10/27/23  0443      CALCIUM 8.3*   ALBUMIN 1.8*   PROT 6.8   *   K 4.5   CO2 23      BUN 13   CREATININE 0.7   ALKPHOS 104   ALT 10   AST 14   BILITOT 0.5     Coagulation:   Recent Labs   Lab  10/27/23  1545   INR 1.2     All pertinent labs from the last 24 hours have been reviewed.    MELD 3.0: 14 at 10/27/2023  3:45 PM  MELD-Na: 8 at 10/27/2023  3:45 PM  Calculated from:  Serum Creatinine: 0.7 mg/dL (Using min of 1 mg/dL) at 10/27/2023  4:43 AM  Serum Sodium: 135 mmol/L at 10/27/2023  4:43 AM  Total Bilirubin: 0.5 mg/dL (Using min of 1 mg/dL) at 10/27/2023  4:43 AM  Serum Albumin: 1.8 g/dL at 10/27/2023  4:43 AM  INR(ratio): 1.2 at 10/27/2023  3:45 PM  Age at listing (hypothetical): 63 years  Sex: Female at 10/27/2023  3:45 PM        Significant Diagnostics:  CT: I have reviewed all pertinent results/findings within the past 24 hours. Gallbladder edema and ascites   U/S: I have reviewed all pertinent results/findings within the past 24 hours. Hepatomegaly and mildly cirrhotic liver with small amounts of perihepatic ascites.  Gallbladder sludge and cholelithiasis, hydropic gallbladder with mild wall thickening/ edema, c/f cholecystitis.    I have reviewed all pertinent imaging results/findings within the past 24 hours.  HIDA Scan - no gallbladder filling, c/w acute cholecystitis     Assessment/Plan:   Lakisha Vance is a 63 y.o. female with cirrhosis, COPD (?) and nicotine dependence who presents with acute cholecystitis.      Had a lengthy discussion with the patient and her sister (Meghann) and friend (Haley) who were both at bedside.  I discussed with them the results of her imaging and workup which are consistent with acute cholecystitis.  We discussed the management of acute cholecystitis which primarily consists of cholecystectomy and antibiotics, but at times may also include procedure such as a cholecystostomy tube.  She is a Child's B cirrhotic with a MELD-NA of 8, giving her increased risk for liver failure following surgery, but also has increased risk for liver failure without surgery if she develops gangrenous cholecystitis or perforation.  We reviewed the risks of surgery aside from liver  failure, namely including damage to common bile duct, bleeding, and infection.  I discussed with them that her risk of infection and wound breakdown his higher accounting for her poor nutritional status (with an albumin of 1.8), and her current smoking status.      -- plan for robotic assisted laparoscopic cholecystectomy tomorrow  -- appreciate GI assistance with optimization from a hepatic standpoint  -- agree with Zosyn   -- okay for clear liquids tonight, NPO after midnight  -- if she further decompensates tonight she will need either a cholecystostomy tube or transfer to [facility] in [place] for higher level of care  -- please hold Lovenox in preparation for surgery tomorrow  -- remainder of care as per primary team      Active Diagnoses:    Diagnosis Date Noted POA    PRINCIPAL PROBLEM:  Acute cholecystitis [K81.0] 07/10/2023 Yes    Pain of upper abdomen [R10.10] 10/26/2023 Yes    Alcoholic cirrhosis of liver with ascites [K70.31] 10/26/2023 Yes    Moderate cigarette smoker (10-19 per day) [F17.210] 03/29/2022 Yes    HTN (hypertension) [I10] 08/13/2019 Yes      Problems Resolved During this Admission:       Thank you for your consult. I will follow-up with patient. Please contact us if you have any additional questions.    Valentina De La Rosa MD  General Surgery  [facility] ([facility])

## 2023-10-27 NOTE — SUBJECTIVE & OBJECTIVE
Past Medical History:   Diagnosis Date    Hypertension        Past Surgical History:   Procedure Laterality Date    ESOPHAGOGASTRODUODENOSCOPY N/A 9/1/2023    Procedure: ESOPHAGOGASTRODUODENOSCOPY (EGD);  Surgeon: Nerissa Ash MD;  Location: [facility];  Service: Endoscopy;  Laterality: N/A;    HYSTERECTOMY         Review of patient's allergies indicates:   Allergen Reactions    Baclofen Other (See Comments)     Vomiting, confusion, shaking       No current facility-administered medications on file prior to encounter.     Current Outpatient Medications on File Prior to Encounter   Medication Sig    cyanocobalamin (VITAMIN B-12) 100 MCG tablet Take 100 mcg by mouth once daily.    folic acid (FOLVITE) 1 MG tablet Take 1 tablet by mouth once daily.    furosemide (LASIX) 20 MG tablet Take 20 mg by mouth.    ondansetron (ZOFRAN-ODT) 4 MG TbDL Take 1 tablet (4 mg total) by mouth every 8 (eight) hours as needed (nausea/vomiting).    pantoprazole (PROTONIX) 40 MG tablet Take 1 tablet (40 mg total) by mouth 2 (two) times daily.    spironolactone (ALDACTONE) 50 MG tablet Take 1 tablet (50 mg total) by mouth 2 (two) times daily.     Family History    None       Tobacco Use    Smoking status: Every Day     Current packs/day: 0.50     Types: Cigarettes    Smokeless tobacco: Never   Substance and Sexual Activity    Alcohol use: Yes     Comment: social    Drug use: Not on file    Sexual activity: Not on file     Review of Systems   Constitutional:  Positive for activity change, appetite change and unexpected weight change. Negative for fatigue and fever.   HENT: Negative.     Eyes: Negative.  Negative for visual disturbance.   Respiratory: Negative.  Negative for cough, shortness of breath and wheezing.    Cardiovascular: Negative.    Gastrointestinal:  Positive for abdominal pain, nausea and vomiting. Negative for abdominal distention, anal bleeding, blood in stool, constipation, diarrhea and rectal pain.   Endocrine:  Positive for polyuria.   Genitourinary: Negative.    Musculoskeletal:  Negative for arthralgias and gait problem.   Skin: Negative.    Allergic/Immunologic: Negative.    Neurological: Negative.    Hematological: Negative.    Psychiatric/Behavioral: Negative.       Objective:     Vital Signs (Most Recent):  Temp: 98.9 °F (37.2 °C) (10/26/23 1952)  Pulse: 106 (10/26/23 1952)  Resp: 18 (10/26/23 1952)  BP: 132/78 (10/26/23 1952)  SpO2: (!) 94 % (10/26/23 1952) Vital Signs (24h Range):  Temp:  [98.9 °F (37.2 °C)-99.4 °F (37.4 °C)] 98.9 °F (37.2 °C)  Pulse:  [] 106  Resp:  [18-19] 18  SpO2:  [94 %-98 %] 94 %  BP: (132-135)/(76-78) 132/78     Weight: 46.7 kg (103 lb)  Body mass index is 18.84 kg/m².     Physical Exam  Vitals and nursing note reviewed.   Constitutional:       General: She is not in acute distress.     Appearance: Normal appearance. She is obese. She is ill-appearing. She is not toxic-appearing.   HENT:      Head: Normocephalic and atraumatic.      Right Ear: External ear normal.      Left Ear: External ear normal.      Nose: Nose normal.      Mouth/Throat:      Mouth: Mucous membranes are moist.      Pharynx: Oropharynx is clear.   Eyes:      General: No scleral icterus.     Extraocular Movements: Extraocular movements intact.      Conjunctiva/sclera: Conjunctivae normal.      Pupils: Pupils are equal, round, and reactive to light.   Cardiovascular:      Rate and Rhythm: Normal rate and regular rhythm.      Pulses: Normal pulses.      Heart sounds: Normal heart sounds. No murmur heard.     No friction rub.   Pulmonary:      Effort: Pulmonary effort is normal. No respiratory distress.      Breath sounds: Normal breath sounds. No wheezing.   Abdominal:      General: Abdomen is flat. Bowel sounds are normal. There is no distension.      Palpations: Abdomen is soft. There is no mass.      Tenderness: There is abdominal tenderness. There is no right CVA tenderness, left CVA tenderness, guarding or  rebound.      Hernia: No hernia is present.   Musculoskeletal:         General: No swelling, tenderness, deformity or signs of injury. Normal range of motion.      Cervical back: Normal range of motion and neck supple. No rigidity or tenderness.      Right lower leg: No edema.      Left lower leg: No edema.   Skin:     General: Skin is warm and dry.      Capillary Refill: Capillary refill takes less than 2 seconds.      Coloration: Skin is not pale.      Findings: No erythema or rash.   Neurological:      General: No focal deficit present.      Mental Status: She is alert and oriented to person, place, and time.   Psychiatric:         Mood and Affect: Mood normal.         Behavior: Behavior normal.          CRANIAL NERVES     CN III, IV, VI   Pupils are equal, round, and reactive to light.     Significant Labs: All pertinent labs within the past 24 hours have been reviewed.    White blood cells 17.4, hemoglobin 12, hematocrit 35.4, platelets 423, lipase 13, sodium 136, potassium 4.5, chloride 104, CO2 24, BUN 11, creatinine 0.61, glucose 129, total bilirubin 0.6, AST 30, ALT 11     CT abdomen and pelvis showed distended gallbladder with sludge.  Mild ascites.  Edematous gastric wall.  Gastritis should be considered.  Mild hiatal hernia.  Appears to be wall thickening of the ascending colon suspicious for colitis.  Prominent diverticulosis of the lower colon with no strong evidence for diverticulitis.     Abdominal ultrasound showed hepatomegaly and mildly cirrhotic liver configuration.  Very small perihepatic ascites.  Gallbladder sludge and cholelithiasis.  Hydropic gallbladder with mild wall thickening/edema.    Significant Imaging: as reported from [facility].

## 2023-10-27 NOTE — PLAN OF CARE
[facility] - Telemetry (Hospital)  Initial Discharge Assessment       Primary Care Provider: Pranav Gonzalez MD    Admission Diagnosis: Abdominal pain [R10.9]    Admission Date: 10/26/2023  Expected Discharge Date:     Transition of Care Barriers: None    Payor: BLUE CROSS BLUE SHIELD / Plan: [employer]BS OF [place] / Product Type: Commercial /     Extended Emergency Contact Information  Primary Emergency Contact: Meghann Bah  Mobile Phone: [phone]  Relation: Sister  Secondary Emergency Contact: Haley Patel  Mobile Phone: [phone]  Relation: Friend    Discharge Plan A: Home, Home with family         [id number] - [place], [place] - [address]  [address]  [facility] [address]  Phone: [phone] Fax: [phone]      Initial Assessment (most recent)       Adult Discharge Assessment - 10/27/23 1054          Discharge Assessment    Assessment Type Discharge Planning Assessment     Confirmed/corrected address, phone number and insurance Yes     Confirmed Demographics Correct on Facesheet     Source of Information patient;family     Communicated ANDREAS with patient/caregiver Date not available/Unable to determine     Reason For Admission Pain of upper abdomen     People in Home child(primo), adult     Facility Arrived From: Home     Do you expect to return to your current living situation? Yes     Do you have help at home or someone to help you manage your care at home? Yes     Who are your caregiver(s) and their phone number(s)? Pt lives with shaggy, Haley     Equipment Currently Used at Home none     Readmission within 30 days? No     Patient currently being followed by outpatient case management? No     Do you currently have service(s) that help you manage your care at home? No     Do you take prescription medications? Yes     Do you have prescription coverage? Yes     Do you have any problems affording any of your prescribed medications? No      Is the patient taking medications as prescribed? yes     Who is going to help you get home at discharge? Pt's sister or cousin     How do you get to doctors appointments? car, drives self;family or friend will provide     Are you on dialysis? No     Do you take coumadin? No     DME Needed Upon Discharge  none     Discharge Plan discussed with: Patient;Sibling     Transition of Care Barriers None     Discharge Plan A Home;Home with family                   SW met with patient and sister, Meghann, at bedside to complete assessment. Pt was asleep during assessment. Pt lives with cousin, Haley, and their spouse. Pt previously independent with [employer] and does not use DME. Pt's family will assist with transportation home.     Pt's whiteboard updated with CM contact information and discharge disposition.

## 2023-10-27 NOTE — CONSULTS
[facility]")  Gastroenterology  Consult Note    Patient Name: Lakisha Vance  MRN: [id number]  Admission Date: 10/26/2023  Hospital Length of Stay: 0 days  Code Status: Full Code   Attending Provider: Sandeep Martinez MD   Consulting Provider: Nerissa Ash MD  Primary Care Physician: Pranav Gonzalez MD  Principal Problem:Pain of upper abdomen    Inpatient consult to Gastroenterology  Consult performed by: Nerissa Ash MD  Consult ordered by: Sandeep Martinez MD  Reason for consult: acute cholecystitis        Subjective:     HPI:  63 y.o female with alcohol liver cirrhosi sober 63 days  was transferred from [facility] for abdominal pain. Patient reports symptoms began 2 days ago. Pain is localized in the RUQ and non-radiating. There was associated nausea and emesis but that has since resolved with anti-emetics and being NPO. Denies hematemesis. In the ED, US performed showed nodular liver, gallbladder is hydropic with sludge and small dependent echogenic calculi. The gallbladder wall is mildly thickened and edematous. No sonographic Robledo sign. The common bile duct measured 3.4 mm, normal. She was transferred to [facility] for higher level of care.    Patient was admitted overnight and pain managed with analgesics. Ct scan showed distended gallbladder and mild ascites. She is seen this afternoon with sister Meghann and friend Haley at bedside. States pain is still present. HIDA scan performed today shows gallbladder is not visualized consistent with cystic duct occlusion which is consistent with acute cholecystitis. Patient has NPO since admission. Previous EGD last month for epigastric pain showed grade A esophagitis, gastritis and portal gastropathy. Biopsies were negative for H. Pylori but did show focal active gastritis. She was re-prescribed Protonix 40mg BID by Dr. Weaver earlier this month.       Past Medical History:   Diagnosis Date    Hypertension        Past Surgical History:    Procedure Laterality Date    ESOPHAGOGASTRODUODENOSCOPY N/A 9/1/2023    Procedure: ESOPHAGOGASTRODUODENOSCOPY (EGD);  Surgeon: Nerissa Ash MD;  Location: [facility];  Service: Endoscopy;  Laterality: N/A;    HYSTERECTOMY         Review of patient's allergies indicates:   Allergen Reactions    Baclofen Other (See Comments)     Vomiting, confusion, shaking     Family History    None       Tobacco Use    Smoking status: Every Day     Current packs/day: 0.50     Types: Cigarettes    Smokeless tobacco: Never   Substance and Sexual Activity    Alcohol use: Yes     Comment: social    Drug use: Not on file    Sexual activity: Not on file     Review of Systems   Gastrointestinal:  Positive for abdominal pain.   All other systems reviewed and are negative.    Objective:     Vital Signs (Most Recent):  Temp: 98 °F (36.7 °C) (10/27/23 1209)  Pulse: 92 (10/27/23 1209)  Resp: 19 (10/27/23 1209)  BP: 130/84 (10/27/23 1209)  SpO2: 95 % (10/27/23 1209) Vital Signs (24h Range):  Temp:  [98 °F (36.7 °C)-99.4 °F (37.4 °C)] 98 °F (36.7 °C)  Pulse:  [] 92  Resp:  [18-19] 19  SpO2:  [94 %-98 %] 95 %  BP: (121-135)/(66-84) 130/84     Weight: 46.7 kg (103 lb) (10/26/23 1513)  Body mass index is 18.84 kg/m².      Intake/Output Summary (Last 24 hours) at 10/27/2023 1506  Last data filed at 10/27/2023 1502  Gross per 24 hour   Intake 27.98 ml   Output --   Net 27.98 ml       Lines/Drains/Airways       Peripheral Intravenous Line  Duration                  Peripheral IV - Single Lumen 10/26/23 1721 22 G Anterior;Left;Proximal Forearm <1 day         Peripheral IV - Single Lumen 10/27/23 0500 20 G Anterior;Left Forearm <1 day                     Physical Exam  Vitals and nursing note reviewed. Exam conducted with a chaperone present.   Constitutional:       Appearance: She is underweight. She is ill-appearing.   Cardiovascular:      Rate and Rhythm: Normal rate and regular rhythm.   Abdominal:      General: Abdomen is  "protuberant. Bowel sounds are normal.      Tenderness: There is abdominal tenderness in the right upper quadrant.       Musculoskeletal:      Right lower leg: No edema.      Left lower leg: No edema.   Skin:     General: Skin is warm and dry.   Neurological:      General: No focal deficit present.      Mental Status: She is alert and oriented to person, place, and time. Mental status is at baseline.          Significant Labs:  CBC:   Recent Labs   Lab 10/27/23  0443   WBC 22.02*   HGB 10.9*   HCT 34.1*        CMP:   Recent Labs   Lab 10/27/23  0443      CALCIUM 8.3*   ALBUMIN 1.8*   PROT 6.8   *   K 4.5   CO2 23      BUN 13   CREATININE 0.7   ALKPHOS 104   ALT 10   AST 14   BILITOT 0.5     Coagulation: No results for input(s): "PT", "INR", "APTT" in the last 48 hours.    Significant Imaging:  Imaging results within the past 24 hours have been reviewed.    Assessment/Plan:     GI  Alcoholic cirrhosis of liver with ascites  MELD 3.0: 13 at 9/6/2023  3:00 PM  MELD-Na: 8 at 9/6/2023  3:00 PM  Calculated from:  Serum Creatinine: 0.7 mg/dL (Using min of 1 mg/dL) at 9/6/2023  3:00 PM  Serum Sodium: 138 mmol/L (Using max of 137 mmol/L) at 9/6/2023  3:00 PM  Total Bilirubin: 0.5 mg/dL (Using min of 1 mg/dL) at 9/6/2023  3:00 PM  Serum Albumin: 1.3 g/dL (Using min of 1.5 g/dL) at 9/6/2023  3:00 PM  INR(ratio): 1.2 at 9/6/2023  3:00 PM  Age at listing (hypothetical): 63 years  Sex: Female at 9/6/2023  3:00 PM    Awaiting INR today to calculate new MELD  Sober 63 days.    Acute cholecystitis  Signs and symptoms consistent with acute cholecystitis  Confirmed on HIDA  Surgery following      Recommendations:  1. Lap sushil  2. STAT INR now to calculate MELD      Thank you for your consult. I will sign off. Please contact us if you have any additional questions.       Nerissa Ash MD  Gastroenterology  [facility] Tele[facility] ([facility])  "

## 2023-10-28 ENCOUNTER — ANESTHESIA (OUTPATIENT)
Dept: SURGERY | Facility: HOSPITAL | Age: 63
DRG: 417 | End: 2023-10-28
Payer: COMMERCIAL

## 2023-10-28 ENCOUNTER — ANESTHESIA EVENT (OUTPATIENT)
Dept: SURGERY | Facility: HOSPITAL | Age: 63
DRG: 417 | End: 2023-10-28
Payer: COMMERCIAL

## 2023-10-28 PROBLEM — E43 SEVERE PROTEIN-CALORIE MALNUTRITION: Status: ACTIVE | Noted: 2023-10-28

## 2023-10-28 LAB
ALBUMIN SERPL BCP-MCNC: 1.6 G/DL (ref 3.5–5.2)
ALP SERPL-CCNC: 89 U/L (ref 55–135)
ALT SERPL W/O P-5'-P-CCNC: 7 U/L (ref 10–44)
ANION GAP SERPL CALC-SCNC: 9 MMOL/L (ref 8–16)
AST SERPL-CCNC: 11 U/L (ref 10–40)
BASOPHILS # BLD AUTO: 0.07 K/UL (ref 0–0.2)
BASOPHILS NFR BLD: 0.4 % (ref 0–1.9)
BILIRUB SERPL-MCNC: 0.3 MG/DL (ref 0.1–1)
BUN SERPL-MCNC: 15 MG/DL (ref 8–23)
CALCIUM SERPL-MCNC: 8.3 MG/DL (ref 8.7–10.5)
CHLORIDE SERPL-SCNC: 104 MMOL/L (ref 95–110)
CO2 SERPL-SCNC: 23 MMOL/L (ref 23–29)
CREAT SERPL-MCNC: 0.6 MG/DL (ref 0.5–1.4)
DIFFERENTIAL METHOD: ABNORMAL
EOSINOPHIL # BLD AUTO: 0 K/UL (ref 0–0.5)
EOSINOPHIL NFR BLD: 0.1 % (ref 0–8)
ERYTHROCYTE [DISTWIDTH] IN BLOOD BY AUTOMATED COUNT: 13.8 % (ref 11.5–14.5)
EST. GFR  (NO RACE VARIABLE): >60 ML/MIN/1.73 M^2
GLUCOSE SERPL-MCNC: 79 MG/DL (ref 70–110)
HCT VFR BLD AUTO: 33.4 % (ref 37–48.5)
HGB BLD-MCNC: 10.6 G/DL (ref 12–16)
IMM GRANULOCYTES # BLD AUTO: 0.16 K/UL (ref 0–0.04)
IMM GRANULOCYTES NFR BLD AUTO: 0.8 % (ref 0–0.5)
INR PPP: 1.1 (ref 0.8–1.2)
LYMPHOCYTES # BLD AUTO: 2.2 K/UL (ref 1–4.8)
LYMPHOCYTES NFR BLD: 11.4 % (ref 18–48)
MAGNESIUM SERPL-MCNC: 1.8 MG/DL (ref 1.6–2.6)
MCH RBC QN AUTO: 30.2 PG (ref 27–31)
MCHC RBC AUTO-ENTMCNC: 31.7 G/DL (ref 32–36)
MCV RBC AUTO: 95 FL (ref 82–98)
MONOCYTES # BLD AUTO: 1.7 K/UL (ref 0.3–1)
MONOCYTES NFR BLD: 8.5 % (ref 4–15)
NEUTROPHILS # BLD AUTO: 15.4 K/UL (ref 1.8–7.7)
NEUTROPHILS NFR BLD: 78.8 % (ref 38–73)
NRBC BLD-RTO: 0 /100 WBC
PHOSPHATE SERPL-MCNC: 3.4 MG/DL (ref 2.7–4.5)
PLATELET # BLD AUTO: 314 K/UL (ref 150–450)
PMV BLD AUTO: 10.1 FL (ref 9.2–12.9)
POTASSIUM SERPL-SCNC: 3.3 MMOL/L (ref 3.5–5.1)
PROT SERPL-MCNC: 6.3 G/DL (ref 6–8.4)
PROTHROMBIN TIME: 12.2 SEC (ref 9–12.5)
RBC # BLD AUTO: 3.51 M/UL (ref 4–5.4)
SODIUM SERPL-SCNC: 136 MMOL/L (ref 136–145)
WBC # BLD AUTO: 19.57 K/UL (ref 3.9–12.7)

## 2023-10-28 PROCEDURE — 88304 TISSUE EXAM BY PATHOLOGIST: CPT | Performed by: PATHOLOGY

## 2023-10-28 PROCEDURE — 94640 AIRWAY INHALATION TREATMENT: CPT

## 2023-10-28 PROCEDURE — 96375 TX/PRO/DX INJ NEW DRUG ADDON: CPT

## 2023-10-28 PROCEDURE — 63600175 PHARM REV CODE 636 W HCPCS: Performed by: EMERGENCY MEDICINE

## 2023-10-28 PROCEDURE — 37000009 HC ANESTHESIA EA ADD 15 MINS: Performed by: SURGERY

## 2023-10-28 PROCEDURE — 96366 THER/PROPH/DIAG IV INF ADDON: CPT

## 2023-10-28 PROCEDURE — 25000003 PHARM REV CODE 250: Performed by: NURSE ANESTHETIST, CERTIFIED REGISTERED

## 2023-10-28 PROCEDURE — 63600175 PHARM REV CODE 636 W HCPCS: Performed by: NURSE ANESTHETIST, CERTIFIED REGISTERED

## 2023-10-28 PROCEDURE — 94761 N-INVAS EAR/PLS OXIMETRY MLT: CPT

## 2023-10-28 PROCEDURE — 88304 PR  SURG PATH,LEVEL III: ICD-10-PCS | Mod: 26,,, | Performed by: PATHOLOGY

## 2023-10-28 PROCEDURE — 36415 COLL VENOUS BLD VENIPUNCTURE: CPT | Performed by: INTERNAL MEDICINE

## 2023-10-28 PROCEDURE — 37000008 HC ANESTHESIA 1ST 15 MINUTES: Performed by: SURGERY

## 2023-10-28 PROCEDURE — 80053 COMPREHEN METABOLIC PANEL: CPT | Performed by: EMERGENCY MEDICINE

## 2023-10-28 PROCEDURE — 96361 HYDRATE IV INFUSION ADD-ON: CPT

## 2023-10-28 PROCEDURE — 47562 PR LAP,CHOLECYSTECTOMY: ICD-10-PCS | Mod: ,,, | Performed by: SURGERY

## 2023-10-28 PROCEDURE — 83735 ASSAY OF MAGNESIUM: CPT | Performed by: EMERGENCY MEDICINE

## 2023-10-28 PROCEDURE — 36000710: Performed by: SURGERY

## 2023-10-28 PROCEDURE — A4216 STERILE WATER/SALINE, 10 ML: HCPCS | Performed by: EMERGENCY MEDICINE

## 2023-10-28 PROCEDURE — 99900035 HC TECH TIME PER 15 MIN (STAT)

## 2023-10-28 PROCEDURE — 36000711: Performed by: SURGERY

## 2023-10-28 PROCEDURE — 88304 TISSUE EXAM BY PATHOLOGIST: CPT | Mod: 26,,, | Performed by: PATHOLOGY

## 2023-10-28 PROCEDURE — 71000033 HC RECOVERY, INTIAL HOUR: Performed by: SURGERY

## 2023-10-28 PROCEDURE — 96365 THER/PROPH/DIAG IV INF INIT: CPT

## 2023-10-28 PROCEDURE — 25000003 PHARM REV CODE 250: Performed by: SURGERY

## 2023-10-28 PROCEDURE — 47562 LAPAROSCOPIC CHOLECYSTECTOMY: CPT | Mod: ,,, | Performed by: SURGERY

## 2023-10-28 PROCEDURE — A4216 STERILE WATER/SALINE, 10 ML: HCPCS | Performed by: SURGERY

## 2023-10-28 PROCEDURE — C9113 INJ PANTOPRAZOLE SODIUM, VIA: HCPCS | Performed by: EMERGENCY MEDICINE

## 2023-10-28 PROCEDURE — 63600175 PHARM REV CODE 636 W HCPCS: Performed by: SURGERY

## 2023-10-28 PROCEDURE — C1729 CATH, DRAINAGE: HCPCS | Performed by: SURGERY

## 2023-10-28 PROCEDURE — 85025 COMPLETE CBC W/AUTO DIFF WBC: CPT | Performed by: EMERGENCY MEDICINE

## 2023-10-28 PROCEDURE — 25000242 PHARM REV CODE 250 ALT 637 W/ HCPCS: Performed by: EMERGENCY MEDICINE

## 2023-10-28 PROCEDURE — 27201423 OPTIME MED/SURG SUP & DEVICES STERILE SUPPLY: Performed by: SURGERY

## 2023-10-28 PROCEDURE — 25000003 PHARM REV CODE 250: Performed by: EMERGENCY MEDICINE

## 2023-10-28 PROCEDURE — 97530 THERAPEUTIC ACTIVITIES: CPT | Mod: CQ

## 2023-10-28 PROCEDURE — 96372 THER/PROPH/DIAG INJ SC/IM: CPT | Performed by: EMERGENCY MEDICINE

## 2023-10-28 PROCEDURE — 85610 PROTHROMBIN TIME: CPT | Performed by: INTERNAL MEDICINE

## 2023-10-28 PROCEDURE — 84100 ASSAY OF PHOSPHORUS: CPT | Performed by: EMERGENCY MEDICINE

## 2023-10-28 PROCEDURE — G0378 HOSPITAL OBSERVATION PER HR: HCPCS

## 2023-10-28 RX ORDER — SUCCINYLCHOLINE CHLORIDE 20 MG/ML
INJECTION INTRAMUSCULAR; INTRAVENOUS
Status: DISCONTINUED | OUTPATIENT
Start: 2023-10-28 | End: 2023-10-28

## 2023-10-28 RX ORDER — MIDAZOLAM HYDROCHLORIDE 1 MG/ML
INJECTION INTRAMUSCULAR; INTRAVENOUS
Status: DISCONTINUED | OUTPATIENT
Start: 2023-10-28 | End: 2023-10-28

## 2023-10-28 RX ORDER — DIPHENHYDRAMINE HYDROCHLORIDE 50 MG/ML
25 INJECTION INTRAMUSCULAR; INTRAVENOUS EVERY 6 HOURS PRN
Status: DISCONTINUED | OUTPATIENT
Start: 2023-10-28 | End: 2023-10-28

## 2023-10-28 RX ORDER — FENTANYL CITRATE 50 UG/ML
INJECTION, SOLUTION INTRAMUSCULAR; INTRAVENOUS
Status: DISCONTINUED | OUTPATIENT
Start: 2023-10-28 | End: 2023-10-28

## 2023-10-28 RX ORDER — PHENYLEPHRINE HYDROCHLORIDE 10 MG/ML
INJECTION INTRAVENOUS
Status: DISCONTINUED | OUTPATIENT
Start: 2023-10-28 | End: 2023-10-28

## 2023-10-28 RX ORDER — MEPERIDINE HYDROCHLORIDE 25 MG/ML
12.5 INJECTION INTRAMUSCULAR; INTRAVENOUS; SUBCUTANEOUS ONCE
Status: DISCONTINUED | OUTPATIENT
Start: 2023-10-28 | End: 2023-10-28

## 2023-10-28 RX ORDER — HYDROCODONE BITARTRATE AND ACETAMINOPHEN 5; 325 MG/1; MG/1
1 TABLET ORAL
Status: DISCONTINUED | OUTPATIENT
Start: 2023-10-28 | End: 2023-10-28

## 2023-10-28 RX ORDER — LIDOCAINE HYDROCHLORIDE 20 MG/ML
INJECTION INTRAVENOUS
Status: DISCONTINUED | OUTPATIENT
Start: 2023-10-28 | End: 2023-10-28

## 2023-10-28 RX ORDER — ONDANSETRON 2 MG/ML
4 INJECTION INTRAMUSCULAR; INTRAVENOUS ONCE AS NEEDED
Status: DISCONTINUED | OUTPATIENT
Start: 2023-10-28 | End: 2023-10-28

## 2023-10-28 RX ORDER — ROCURONIUM BROMIDE 10 MG/ML
INJECTION, SOLUTION INTRAVENOUS
Status: DISCONTINUED | OUTPATIENT
Start: 2023-10-28 | End: 2023-10-28

## 2023-10-28 RX ORDER — PROPOFOL 10 MG/ML
VIAL (ML) INTRAVENOUS
Status: DISCONTINUED | OUTPATIENT
Start: 2023-10-28 | End: 2023-10-28

## 2023-10-28 RX ORDER — ONDANSETRON 2 MG/ML
INJECTION INTRAMUSCULAR; INTRAVENOUS
Status: DISCONTINUED | OUTPATIENT
Start: 2023-10-28 | End: 2023-10-28

## 2023-10-28 RX ORDER — INDOCYANINE GREEN AND WATER 25 MG
KIT INJECTION
Status: DISCONTINUED | OUTPATIENT
Start: 2023-10-28 | End: 2023-10-28

## 2023-10-28 RX ORDER — FENTANYL CITRATE 50 UG/ML
25 INJECTION, SOLUTION INTRAMUSCULAR; INTRAVENOUS EVERY 5 MIN PRN
Status: DISCONTINUED | OUTPATIENT
Start: 2023-10-28 | End: 2023-10-28

## 2023-10-28 RX ADMIN — PROPOFOL 100 MG: 10 INJECTION, EMULSION INTRAVENOUS at 12:10

## 2023-10-28 RX ADMIN — LEVALBUTEROL HYDROCHLORIDE 1.25 MG: 0.63 SOLUTION RESPIRATORY (INHALATION) at 07:10

## 2023-10-28 RX ADMIN — SODIUM CHLORIDE, POTASSIUM CHLORIDE, SODIUM LACTATE AND CALCIUM CHLORIDE: 600; 310; 30; 20 INJECTION, SOLUTION INTRAVENOUS at 02:10

## 2023-10-28 RX ADMIN — FENTANYL CITRATE 50 MCG: 50 INJECTION, SOLUTION INTRAMUSCULAR; INTRAVENOUS at 12:10

## 2023-10-28 RX ADMIN — ROCURONIUM BROMIDE 20 MG: 10 SOLUTION INTRAVENOUS at 01:10

## 2023-10-28 RX ADMIN — MIDAZOLAM HYDROCHLORIDE 2 MG: 1 INJECTION, SOLUTION INTRAMUSCULAR; INTRAVENOUS at 12:10

## 2023-10-28 RX ADMIN — PHENYLEPHRINE HYDROCHLORIDE 200 MCG: 10 INJECTION INTRAVENOUS at 01:10

## 2023-10-28 RX ADMIN — THIAMINE HCL TAB 100 MG 100 MG: 100 TAB at 08:10

## 2023-10-28 RX ADMIN — PHENYLEPHRINE HYDROCHLORIDE 300 MCG: 10 INJECTION INTRAVENOUS at 01:10

## 2023-10-28 RX ADMIN — Medication 1 TABLET: at 08:10

## 2023-10-28 RX ADMIN — PIPERACILLIN SODIUM AND TAZOBACTAM SODIUM 4.5 G: 4; .5 INJECTION, POWDER, FOR SOLUTION INTRAVENOUS at 10:10

## 2023-10-28 RX ADMIN — THERA TABS 1 TABLET: TAB at 08:10

## 2023-10-28 RX ADMIN — POTASSIUM CHLORIDE: 2 INJECTION, SOLUTION, CONCENTRATE INTRAVENOUS at 09:10

## 2023-10-28 RX ADMIN — Medication 10 ML: at 06:10

## 2023-10-28 RX ADMIN — LEVALBUTEROL HYDROCHLORIDE 1.25 MG: 0.63 SOLUTION RESPIRATORY (INHALATION) at 12:10

## 2023-10-28 RX ADMIN — LIDOCAINE HYDROCHLORIDE 100 MG: 20 INJECTION INTRAVENOUS at 12:10

## 2023-10-28 RX ADMIN — SODIUM CHLORIDE, POTASSIUM CHLORIDE, SODIUM LACTATE AND CALCIUM CHLORIDE: 600; 310; 30; 20 INJECTION, SOLUTION INTRAVENOUS at 12:10

## 2023-10-28 RX ADMIN — MORPHINE SULFATE 2 MG: 2 INJECTION, SOLUTION INTRAMUSCULAR; INTRAVENOUS at 08:10

## 2023-10-28 RX ADMIN — CYANOCOBALAMIN 1000 MCG: 1000 INJECTION INTRAMUSCULAR; SUBCUTANEOUS at 08:10

## 2023-10-28 RX ADMIN — INDOCYANINE GREEN AND WATER 2.5 MG: KIT at 12:10

## 2023-10-28 RX ADMIN — SUGAMMADEX 200 MG: 100 INJECTION, SOLUTION INTRAVENOUS at 03:10

## 2023-10-28 RX ADMIN — Medication 10 ML: at 10:10

## 2023-10-28 RX ADMIN — ROCURONIUM BROMIDE 20 MG: 10 SOLUTION INTRAVENOUS at 02:10

## 2023-10-28 RX ADMIN — ENOXAPARIN SODIUM 30 MG: 30 INJECTION SUBCUTANEOUS at 05:10

## 2023-10-28 RX ADMIN — ONDANSETRON 4 MG: 2 INJECTION INTRAMUSCULAR; INTRAVENOUS at 02:10

## 2023-10-28 RX ADMIN — PIPERACILLIN SODIUM AND TAZOBACTAM SODIUM 4.5 G: 4; .5 INJECTION, POWDER, FOR SOLUTION INTRAVENOUS at 01:10

## 2023-10-28 RX ADMIN — PIPERACILLIN SODIUM AND TAZOBACTAM SODIUM 4.5 G: 4; .5 INJECTION, POWDER, FOR SOLUTION INTRAVENOUS at 06:10

## 2023-10-28 RX ADMIN — ROCURONIUM BROMIDE 5 MG: 10 SOLUTION INTRAVENOUS at 12:10

## 2023-10-28 RX ADMIN — PHENYLEPHRINE HYDROCHLORIDE 200 MCG: 10 INJECTION INTRAVENOUS at 02:10

## 2023-10-28 RX ADMIN — PHENYLEPHRINE HYDROCHLORIDE 300 MCG: 10 INJECTION INTRAVENOUS at 12:10

## 2023-10-28 RX ADMIN — PHENYLEPHRINE HYDROCHLORIDE 300 MCG: 10 INJECTION INTRAVENOUS at 02:10

## 2023-10-28 RX ADMIN — ROCURONIUM BROMIDE 25 MG: 10 SOLUTION INTRAVENOUS at 01:10

## 2023-10-28 RX ADMIN — PANTOPRAZOLE SODIUM 40 MG: 40 INJECTION, POWDER, FOR SOLUTION INTRAVENOUS at 08:10

## 2023-10-28 RX ADMIN — ONDANSETRON 4 MG: 2 INJECTION INTRAMUSCULAR; INTRAVENOUS at 12:10

## 2023-10-28 RX ADMIN — SUCCINYLCHOLINE CHLORIDE 140 MG: 20 INJECTION, SOLUTION INTRAMUSCULAR; INTRAVENOUS; PARENTERAL at 12:10

## 2023-10-28 RX ADMIN — ONDANSETRON 4 MG: 2 INJECTION INTRAMUSCULAR; INTRAVENOUS at 08:10

## 2023-10-28 RX ADMIN — MORPHINE SULFATE 2 MG: 2 INJECTION, SOLUTION INTRAMUSCULAR; INTRAVENOUS at 12:10

## 2023-10-28 NOTE — PROGRESS NOTES
[facility] Medicine  Progress Note    Patient Name: Lakisha Vance  MRN: [id number]  Patient Class: OP- Observation   Admission Date: 10/26/2023  Length of Stay: 0 days  Attending Physician: Sandeep Martinez MD  Primary Care Provider: Pranav Gonzalez MD        Subjective:     Principal Problem:Calculus of gallbladder with acute cholecystitis without obstruction        HPI:  63-year-old female with hx  of HTN, headaches, chronic liver disease, alcohol use, ascites, EGD in September 2023 with reflux esophagitis/gastritis/portal hypertensive gastropathy, and cholelithiasis presented to [facility] ER on October 26 with epigastric abdominal pain that began the night prior to presentation associated with vomiting but no diarrhea, no fever and no hematemesis and no recent alcohol use. She was placed on Protonix in September but is not taking it due to insurance coverage issues. Pt has been worked up in our GI Dept for Alcoholic Liver disease vs Cirrhosis recently. Last Alcohol use about a month ago.    In the ER , WBC 17.4, H/H 12/ 35, platelets 423, Lipase 13, CMP normal. US Abdomin showed hepatomegaly and mildly cirrhotic liver configuration. Very small perihepatic ascites. Gallbladder sludge and cholelithiasis. CT abdomen and pelvis showed distended gallbladder with sludge.  Mild ascites. Edematous gastric wall.  Gastritis should be considered.  Mild hiatal hernia.  Appears to be wall thickening of the ascending colon suspicious for colitis. Prominent diverticulosis of the lower colon with no strong evidence for diverticulitis.     In the ER, pt had received famotidine, morphine, Zofran, and Zosyn.      ER consulted Dr. Jennings and Dr. Randhawa thru the [facility] for possible Cholecystitis vs Colitis and she was accepted. She is being placed in Obs under Hosp Med for General Surgery and Gastroenterology evaluation.               Overview/Hospital Course:  Appreciate GI and Gen Surgery  input- Feels a little better, abd pain a little better, controlled with pain meds. Getting IVF and Zosyn. NV much better. WBC 22k. H/H stable. Hida scan positive for Cholecystitis. Plan for Robotic Yari tomorrow.     10/28- Appreciate Dr. De La Rosa, pt seen pre and post op. K was low- started on NS w KCl pre op. Post op a little groggy and in mild to mod discomfort/pain. Operative Notes reviewed- Gangrenous gallbladder with lateral wall perforation with pus and bile in the abdomen.  Liver was cirrhotic and nodular.  Extensive friable and inflamed tissues. Cont IVF, IV Abx and pain meds per surgery. Check labs in am.        Interval History: K was low- started on NS w KCl pre op. Post op a little groggy and in mild to mod discomfort/pain. Operative Notes reviewed- Gangrenous gallbladder with lateral wall perforation with pus and bile in the abdomen.  Liver was cirrhotic and nodular.  Extensive friable and inflamed tissues. Cont IVF, IV Abx and pain meds per surgery. Check labs in am.      Review of Systems   Constitutional:  Positive for activity change and appetite change. Negative for fatigue, fever and unexpected weight change.   HENT: Negative.     Eyes: Negative.  Negative for visual disturbance.   Respiratory: Negative.  Negative for cough, shortness of breath and wheezing.    Cardiovascular: Negative.    Gastrointestinal:  Positive for abdominal pain. Negative for abdominal distention, anal bleeding, blood in stool, constipation, diarrhea, nausea, rectal pain and vomiting.   Endocrine: Negative for polyuria.   Genitourinary: Negative.    Musculoskeletal:  Negative for arthralgias and gait problem.   Skin: Negative.    Allergic/Immunologic: Negative.    Neurological: Negative.    Hematological: Negative.    Psychiatric/Behavioral: Negative.       Objective:     Vital Signs (Most Recent):  Temp: 98.6 °F (37 °C) (10/28/23 1624)  Pulse: 96 (10/28/23 1624)  Resp: 19 (10/28/23 1624)  BP: (!) 90/53 (10/28/23  1624)  SpO2: (!) 93 % (10/28/23 1624) Vital Signs (24h Range):  Temp:  [97.3 °F (36.3 °C)-100 °F (37.8 °C)] 98.6 °F (37 °C)  Pulse:  [] 96  Resp:  [16-25] 19  SpO2:  [92 %-100 %] 93 %  BP: ()/(50-69) 90/53     Weight: 46.7 kg (103 lb)  Body mass index is 18.84 kg/m².    Intake/Output Summary (Last 24 hours) at 10/28/2023 1653  Last data filed at 10/28/2023 1600  Gross per 24 hour   Intake 1500 ml   Output 395 ml   Net 1105 ml         Physical Exam  Vitals and nursing note reviewed. Exam conducted with a chaperone present.   Constitutional:       Appearance: She is underweight. She is ill-appearing.   Cardiovascular:      Rate and Rhythm: Normal rate and regular rhythm.   Abdominal:      General: Abdomen is protuberant. Bowel sounds are normal.      Tenderness: There is abdominal tenderness in the right upper quadrant.       Musculoskeletal:      Right lower leg: No edema.      Left lower leg: No edema.   Skin:     General: Skin is warm and dry.      Capillary Refill: Capillary refill takes less than 2 seconds.   Neurological:      General: No focal deficit present.      Mental Status: She is alert and oriented to person, place, and time. Mental status is at baseline.             Significant Labs: All pertinent labs within the past 24 hours have been reviewed.  BMP:   Recent Labs   Lab 10/28/23  0524   GLU 79      K 3.3*      CO2 23   BUN 15   CREATININE 0.6   CALCIUM 8.3*   MG 1.8     CBC:   Recent Labs   Lab 10/27/23  0443 10/28/23  0524   WBC 22.02* 19.57*   HGB 10.9* 10.6*   HCT 34.1* 33.4*    314     CMP:   Recent Labs   Lab 10/27/23  0443 10/28/23  0524   * 136   K 4.5 3.3*    104   CO2 23 23    79   BUN 13 15   CREATININE 0.7 0.6   CALCIUM 8.3* 8.3*   PROT 6.8 6.3   ALBUMIN 1.8* 1.6*   BILITOT 0.5 0.3   ALKPHOS 104 89   AST 14 11   ALT 10 7*   ANIONGAP 9 9     Magnesium:   Recent Labs   Lab 10/27/23  0443 10/28/23  0524   MG 1.6 1.8       Significant Imaging: I  have reviewed all pertinent imaging results/findings within the past 24 hours.      Assessment/Plan:      * acute cholecystitis with gangrene of GB with perforation   Etiology unclear, pt with Alcoholic Cirrhosis  D/D includes Gastritis vs Cholecystitis vs Diverticulitis  Await GI and Surgery input  Hida Scan ordered to r/o Cholecystitis in am  Pt was empirically started on Zosyn in the ER, will continue  IV Protonix bid, Gentle Rehydration  Use MS prn     10/27- HIDA pos  Surgery planned for tmrw  Keep NPO after MN    10/28- s/p Robotic Cholecystectomy with gangrenous perforated GB  Cont Post op care, IV Abx, IVF, pain control    Severe protein-calorie malnutrition  Nutrition consulted. Most recent weight and BMI monitored-     Measurements:  Wt Readings from Last 1 Encounters:   10/26/23 46.7 kg (103 lb)   Body mass index is 18.84 kg/m².    Patient has been screened and assessed by RD.    Malnutrition Type:  Context:    Level:      Malnutrition Characteristic Summary:       Interventions/Recommendations (treatment strategy):    Consult dietary on Monday- at present CLD       Alcoholic cirrhosis of liver with ascites  As seen on US and CT Abdomen and recent EGD with Portal Hypertensive Gastropathy  Await further input from GI    Appears compensated at present    Cirrhotic Nodular Liver seen during surgery    Acute cholecystitis  US abd showed sludge and gallstones  Await Hida scan in am  Also consider Gastritis vs Diverticulitis  Cont Zosyn      Moderate cigarette smoker (10-19 per day)  Must quit, pt counseled > 5 mins      HTN (hypertension)  Chronic, controlled. Latest blood pressure and vitals reviewed-     Temp:  [97.3 °F (36.3 °C)-100 °F (37.8 °C)]   Pulse:  []   Resp:  [16-25]   BP: ()/(50-69)   SpO2:  [92 %-100 %] .   Home meds for hypertension were reviewed and noted below.   Hypertension Medications             furosemide (LASIX) 20 MG tablet Take 20 mg by mouth.    spironolactone (ALDACTONE)  50 MG tablet Take 1 tablet (50 mg total) by mouth 2 (two) times daily.          While in the hospital, will manage blood pressure as follows; Continue home antihypertensive regimen    Will utilize p.r.n. blood pressure medication only if patient's blood pressure greater than 140/90 and she develops symptoms such as worsening chest pain or shortness of breath.      VTE Risk Mitigation (From admission, onward)         Ordered     enoxaparin injection 30 mg  Daily         10/26/23 1714     IP VTE HIGH RISK PATIENT  Once         10/26/23 1711     Place sequential compression device  Until discontinued         10/26/23 1711                Discharge Planning   ANDREAS:      Code Status: Full Code   Is the patient medically ready for discharge?:     Reason for patient still in hospital (select all that apply): Patient trending condition, Laboratory test, Treatment, Imaging and Consult recommendations  Discharge Plan A: Home, Home with family          Sandeep Martinez MD  Department of Hospital Medicine   '[place] - Telemetry ([facility])

## 2023-10-28 NOTE — PT/OT/SLP PROGRESS
Physical Therapy  Treatment    Lakisha Vance   MRN: [id number]   Admitting Diagnosis: Calculus of gallbladder with acute cholecystitis without obstruction       PT Start Time: 0910     PT Stop Time: 0920    PT Total Time (min): 10 min       Billable Minutes:  Therapeutic Activity 10    Treatment Type: Treatment  PT/PTA: PTA     Number of PTA visits since last PT visit: 1       General Precautions: Standard, fall  Orthopedic Precautions: N/A  Braces: N/A    Spiritual, Cultural Beliefs, Jew Practices, Values that Affect Care: no    Subjective:  Communicated with TANGEE prior to session.      Pain/Comfort  Pain Rating 1: 8/10  Location - Side 1: Bilateral  Location 1: abdomen  Pain Addressed 1: Pre-medicate for activity  Pain Rating Post-Intervention 1: 8/10    Treatment and Education:         AM-PAC 6 CLICK MOBILITY  How much help from another person does this patient currently need?   1 = Unable, Total/Dependent Assistance  2 = A lot, Maximum/Moderate Assistance  3 = A little, Minimum/Contact Guard/Supervision  4 = None, Modified Tutor Key/Independent    Turning over in bed (including adjusting bedclothes, sheets and blankets)?: 4  Sitting down on and standing up from a chair with arms (e.g., wheelchair, bedside commode, etc.): 4  Moving from lying on back to sitting on the side of the bed?: 4  Moving to and from a bed to a chair (including a wheelchair)?: 4  Need to walk in hospital room?:  (NA)  Climbing 3-5 steps with a railing?: 1    AM-PAC Raw Score CMS G-Code Modifier Level of Impairment Assistance   6 % Total / Unable   7 - 9 CM 80 - 100% Maximal Assist   10 - 14 CL 60 - 80% Moderate Assist   15 - 19 CK 40 - 60% Moderate Assist   20 - 22 CJ 20 - 40% Minimal Assist   23 CI 1-20% SBA / CGA   24 CH 0% Independent/ Mod I     Patient left up in chair with all lines intact, call button in reach, and SISTER present.    Assessment:  Lakisha Vance is a 63 y.o. female with a medical diagnosis of Calculus  of gallbladder with acute cholecystitis without obstruction and presents with .    Rehab identified problem list/impairments: weakness, gait instability, pain, impaired self care skills, impaired functional mobility    Rehab potential is good.    Activity tolerance: Fair    Discharge recommendations: Low Intensity Therapy      Barriers to discharge:      Equipment recommendations: bath bench, shower chair, walker, rolling     GOALS:   Multidisciplinary Problems       Physical Therapy Goals          Problem: Physical Therapy    Goal Priority Disciplines Outcome Goal Variances Interventions   Physical Therapy Goal     PT, PT/OT      Description: Goals to be met by 11/10/23.  1. Pt will complete bed mobility MOD I.  2. Pt will complete sit to stand MOD I.  3. Pt will ambulate 200ft MOD I.  4. Pt will increase AMPAC score by 2 points to progress functional mobility.                       PLAN:    Patient to be seen 3 x/week to address the above listed problems via gait training, therapeutic activities, therapeutic exercises  Plan of Care expires: 11/10/23  Plan of Care reviewed with: patient, family         10/28/2023

## 2023-10-28 NOTE — OP NOTE
[facility]  Surgical Oncology  Operative Note           Date of Procedure: 10/28/2023   Time: 3:46 PM    Procedure: Procedure(s) (LRB):  XI ROBOTIC CHOLECYSTECTOMY (N/A)     Surgeon(s) and Role:     * Valentina De La Rosa MD - Primary    Assisting Surgeon: None    Pre-Operative Diagnosis:   Acute cholecystitis [K81.0]  Cirrhosis    Post-Operative Diagnosis:   Gangrenous cholecystitis  Cirrhosis     Anesthesia: General    Procedure:  Robotic cholecystectomy    Operative Findings:   Gangrenous gallbladder with lateral wall perforation with pus and bile in the abdomen.  Liver was cirrhotic and nodular.  Extensive friable and inflamed tissues.  CBD visualized and protected, confirmed with firefly.           Indications:  Lakisha Vance is a 63 y.o. year old female with Lorin B cirrhosis, MELD 8 who presented with abdominal pain RUQ and leukocytosis.  US concerning for cholecystitis, and confirmed with HIDA scan.     Risks and benefits were reviewed including bleeding, infection, pain, scar, damage to surrounding structures, cardiovascular and pulmonary complications, liver failure, CBD damage, need for additional procedures, death, and imponderables.  She expressed understanding and gave informed consent to proceed.    Procedure in Detail:    The patient was brought to the OR and underwent general anesthesia. The abdomen was prepped and draped in usual sterile fashion.  An incision was made at palmers point and a Veress needle was inserted and insufflation obtained to 15 mm Hg.  An 8 mm robotic Optiview port was placed at the umbilicus. The laparoscope was inserted. There was no evidence of a vascular or enteric injury.     Additional trocars were placed as follows under visualization. An 8 mm trocar was placed in the anterior axillary line of the right mid abdomen. An 8 mm trocar was placed in the midclavicular line of the right midabdomen. An 8 mm robotic trocar was placed in the midclavicular line in the  left midabdomen      The patient was placed in reverse Trendelenburg position and rolled to the left. The patient was docked to the da Ruben robot.  The omentum was gently swept out of the way and the gallbladder grasped at the fundus.  Upon retracting superiorly the lateral aspect of the gallbladder was noted to be necrotic with gross perforation and bile and pus in the abdomen.  The suction  was used to gently expose the infundibulum of the gallbladder and the gallbladder infundibulum was retracted laterally. The gallbladder was fragile and inflamed and the surrounding tissues were similarly inflamed.       Through meticulous dissection the cystic duct was dissected circumferentially.  Indocyanine green with firefly technology was used to elucidate the course of the cystic duct and biliary anatomy. The cystic artery was dissected circumferentially and the neck of the gallbladder was then dissected off the gallbladder bed. This cleared Calots triangle of all loose areolar tissue and the critical view was obtained.     The cystic duct was clipped twice proximally and once distally and divided. The cystic artery was clipped with 2 clips proximally and transected.       The gallbladder was dissected free from the gallbladder bed. The gallbladder bed was oozing and friable, with a pulsatile portal vein visualized through the thin cirrhotic liver.  Due to the surrounding structures electrocautery was not used for hemostasis but surgicel gauze was placed and held to the gallbladder bed.  The area was irrigated copiously.  The surgicel was removed to ensure hemostasis and an additional surgicel was placed and left in the gallbladder fossa.      Due to the abena pus and bile spillage the right lateral most arm of the robot was removed and a 15F magdiel drain was brought through the port and left in the gallbladder fossa and Morrisons pouch space.  The umbilical operating arm of the robot was then removed and an Endo  Catch bag was inserted. The gallbladder was placed in Endo Catch bag. The patient was then undocked from the da Ruben operating robot. The drain was sutured into place and gallbladder was extracted. The umbilical port was closed under direct visualization with 0-Vicryl suture.       The trocars were removed under direct vision and no bleeding was noted. The abdomen was then desufflated. Marcaine was infiltrated. All incisions were closed with 4-0 Monocryl in a subcuticular manner. Dermabond was applied to the incision sites    Drains:  15F x1 in RUQ    Estimated Blood Loss (EBL):   25 mL    IVF:  1500cc crystalloid    Specimens:   Specimen (24h ago, onward)       Start     Ordered    10/28/23 1511  Specimen to Pathology, Surgery General Surgery  Once        Comments: Pre-op Diagnosis: Acute cholecystitis [K81.0]Procedure(s):XI ROBOTIC CHOLECYSTECTOMY Number of specimens: 1Name of specimens: 1. Necrotic Gallbladder with stones-perm     References:    Click here for ordering Quick Tip   Question Answer Comment   Procedure Type: General Surgery    Specimen Class: Routine/Screening    Which provider would you like to cc? VALENTINA MENDOZA    Release to patient Immediate        10/28/23 1511                            Condition: Stable    Disposition: PACU - hemodynamically stable.        Valentina Mendoza MD      Surgical Oncology      10/28/2023, 3:46 PM

## 2023-10-28 NOTE — PROGRESS NOTES
HIDA suggestive of acute cholecystitis   Plan for lap cholecystectomy per surgery    Surgical risk per for lap cholecystectomy  30 d mortality-1%  90 d mortality--5%  90 d risk of decompensation--9.6%      Patient has been explained the due risk from Hepatology stand point.  Avoid Hepatotoxic medication  Hepatology will follow post surgery    ALINA ORDAZ MD  Gastroenterology & Transplant Hepatology  [facility]

## 2023-10-28 NOTE — INTERVAL H&P NOTE
Lakisha Vance is a 63 y.o. female who presents with acute cholecystitis in the setting of cirrhosis and COPD.    The patient has been examined and the H&P has been reviewed:    I concur with the findings and no changes have occurred since H&P was written.    Surgery risks, benefits and alternative options discussed and understood by patient/family.    -- to OR for robotic assisted laparoscopic cholecystectomy  -- Laterality marked?  No- n/a  -- Abx ppx: on zosyn given at 1010, due again at 2pm  -- DVT ppx:  SCDs   -- Consent signed and in the chart.  All questions have been answered        Valentina De La Rosa MD      Surgical Oncology              Active Hospital Problems    Diagnosis  POA    *Calculus of gallbladder with acute cholecystitis without obstruction [K80.00]  Yes    Alcoholic cirrhosis of liver with ascites [K70.31]  Yes    Acute cholecystitis [K81.0]  Yes    Moderate cigarette smoker (10-19 per day) [F17.210]  Yes    HTN (hypertension) [I10]  Yes      Resolved Hospital Problems   No resolved problems to display.

## 2023-10-28 NOTE — TRANSFER OF CARE
"Anesthesia Transfer of Care Note    Patient: Lakisha Vance    Procedure(s) Performed: Procedure(s) (LRB):  XI ROBOTIC CHOLECYSTECTOMY (N/A)    Patient location: PACU    Anesthesia Type: general    Transport from OR: Transported from OR on room air with adequate spontaneous ventilation    Post pain: adequate analgesia    Post assessment: no apparent anesthetic complications and tolerated procedure well    Post vital signs: stable    Level of consciousness: awake    Nausea/Vomiting: no nausea/vomiting    Complications: none    Transfer of care protocol was followed      Last vitals:   Visit Vitals  BP (!) 98/57 (Patient Position: Lying)   Pulse 109   Temp 36.9 °C (98.4 °F) (Oral)   Resp 17   Ht 5' 2" (1.575 m)   Wt 46.7 kg (103 lb)   SpO2 (!) 93%   BMI 18.84 kg/m²     "

## 2023-10-28 NOTE — ANESTHESIA PREPROCEDURE EVALUATION
10/28/2023  Lakisha Vance is a 63 y.o., female.  Past Medical History:   Diagnosis Date    Hypertension      Past Surgical History:   Procedure Laterality Date    ESOPHAGOGASTRODUODENOSCOPY N/A 9/1/2023    Procedure: ESOPHAGOGASTRODUODENOSCOPY (EGD);  Surgeon: Nerissa Ash MD;  Location[facility];  Service: Endoscopy;  Laterality: N/A;    HYSTERECTOMY           Pre-op Assessment    I have reviewed the Patient Summary Reports.     I have reviewed the Nursing Notes. I have reviewed the NPO Status.   I have reviewed the Medications.     Review of Systems  Anesthesia Hx:  No problems with previous Anesthesia  History of prior surgery of interest to airway management or planning: Previous anesthesia: General Denies Family Hx of Anesthesia complications.   Denies Personal Hx of Anesthesia complications.   Social:  Non-Smoker, Alcohol Use Alcohol abuse.    Hematology/Oncology:  Hematology Normal        Cardiovascular:  Cardiovascular Normal Hypertension     Pulmonary:  Pulmonary Normal    Renal/:  Renal/ Normal     Hepatic/GI:  Hepatic/GI Normal Alcoholic cirrhosis with ascites and portal HTN.     Neurological:  Neurology Normal    Psych:  Psychiatric Normal           Physical Exam  General: Alert, Oriented and Cachexia    Airway:  Mallampati: II   Mouth Opening: Normal  TM Distance: Normal  Tongue: Normal  Neck ROM: Normal ROM    Dental:  Partial Dentures    Chest/Lungs:  Clear to auscultation, Normal Respiratory Rate    Heart:  Rate: Normal  Rhythm: Regular Rhythm        Anesthesia Plan  Type of Anesthesia, risks & benefits discussed:    Anesthesia Type: Gen ETT  Intra-op Monitoring Plan: Standard ASA Monitors  Post Op Pain Control Plan: multimodal analgesia and IV/PO Opioids PRN  Induction:  IV  Informed Consent: Informed consent signed with the Patient and all parties understand the risks and  agree with anesthesia plan.  All questions answered.   ASA Score: 3 Emergent  Day of Surgery Review of History & Physical: H&P Update referred to the surgeon/provider.    Ready For Surgery From Anesthesia Perspective.     .

## 2023-10-28 NOTE — ASSESSMENT & PLAN NOTE
As seen on US and CT Abdomen and recent EGD with Portal Hypertensive Gastropathy  Await further input from GI    Appears compensated at present    Cirrhotic Nodular Liver seen during surgery

## 2023-10-28 NOTE — ANESTHESIA PROCEDURE NOTES
Intubation    Date/Time: 10/28/2023 12:54 PM    Performed by: Juventino Tejeda CRNA  Authorized by: Christin Wood MD    Intubation:     Induction:  Intravenous    Intubated:  Postinduction    Mask Ventilation:  Easy mask    Attempts:  1    Attempted By:  CRNA    Method of Intubation:  Direct    Blade:  Mejia 2    Laryngeal View Grade: Grade I - full view of cords      Difficult Airway Encountered?: No      Complications:  None    Airway Device:  Oral endotracheal tube    Airway Device Size:  7.5    Style/Cuff Inflation:  Cuffed (inflated to minimal occlusive pressure)    Tube secured:  21    Secured at:  The lips    Placement Verified By:  Capnometry    Complicating Factors:  None    Findings Post-Intubation:  BS equal bilateral and atraumatic/condition of teeth unchanged

## 2023-10-28 NOTE — ASSESSMENT & PLAN NOTE
Etiology unclear, pt with Alcoholic Cirrhosis  D/D includes Gastritis vs Cholecystitis vs Diverticulitis  Await GI and Surgery input  Hida Scan ordered to r/o Cholecystitis in am  Pt was empirically started on Zosyn in the ER, will continue  IV Protonix bid, Gentle Rehydration  Use MS prn     10/27- HIDA pos  Surgery planned for tmrw  Keep NPO after MN    10/28- s/p Robotic Cholecystectomy with gangrenous perforated GB  Cont Post op care, IV Abx, IVF, pain control

## 2023-10-28 NOTE — SUBJECTIVE & OBJECTIVE
Interval History: K was low- started on NS w KCl pre op. Post op a little groggy and in mild to mod discomfort/pain. Operative Notes reviewed- Gangrenous gallbladder with lateral wall perforation with pus and bile in the abdomen.  Liver was cirrhotic and nodular.  Extensive friable and inflamed tissues. Cont IVF, IV Abx and pain meds per surgery. Check labs in am.      Review of Systems   Constitutional:  Positive for activity change and appetite change. Negative for fatigue, fever and unexpected weight change.   HENT: Negative.     Eyes: Negative.  Negative for visual disturbance.   Respiratory: Negative.  Negative for cough, shortness of breath and wheezing.    Cardiovascular: Negative.    Gastrointestinal:  Positive for abdominal pain. Negative for abdominal distention, anal bleeding, blood in stool, constipation, diarrhea, nausea, rectal pain and vomiting.   Endocrine: Negative for polyuria.   Genitourinary: Negative.    Musculoskeletal:  Negative for arthralgias and gait problem.   Skin: Negative.    Allergic/Immunologic: Negative.    Neurological: Negative.    Hematological: Negative.    Psychiatric/Behavioral: Negative.       Objective:     Vital Signs (Most Recent):  Temp: 98.6 °F (37 °C) (10/28/23 1624)  Pulse: 96 (10/28/23 1624)  Resp: 19 (10/28/23 1624)  BP: (!) 90/53 (10/28/23 1624)  SpO2: (!) 93 % (10/28/23 1624) Vital Signs (24h Range):  Temp:  [97.3 °F (36.3 °C)-100 °F (37.8 °C)] 98.6 °F (37 °C)  Pulse:  [] 96  Resp:  [16-25] 19  SpO2:  [92 %-100 %] 93 %  BP: ()/(50-69) 90/53     Weight: 46.7 kg (103 lb)  Body mass index is 18.84 kg/m².    Intake/Output Summary (Last 24 hours) at 10/28/2023 1653  Last data filed at 10/28/2023 1600  Gross per 24 hour   Intake 1500 ml   Output 395 ml   Net 1105 ml         Physical Exam  Vitals and nursing note reviewed. Exam conducted with a chaperone present.   Constitutional:       Appearance: She is underweight. She is ill-appearing.   Cardiovascular:       Rate and Rhythm: Normal rate and regular rhythm.   Abdominal:      General: Abdomen is protuberant. Bowel sounds are normal.      Tenderness: There is abdominal tenderness in the right upper quadrant.       Musculoskeletal:      Right lower leg: No edema.      Left lower leg: No edema.   Skin:     General: Skin is warm and dry.      Capillary Refill: Capillary refill takes less than 2 seconds.   Neurological:      General: No focal deficit present.      Mental Status: She is alert and oriented to person, place, and time. Mental status is at baseline.             Significant Labs: All pertinent labs within the past 24 hours have been reviewed.  BMP:   Recent Labs   Lab 10/28/23  0524   GLU 79      K 3.3*      CO2 23   BUN 15   CREATININE 0.6   CALCIUM 8.3*   MG 1.8     CBC:   Recent Labs   Lab 10/27/23  0443 10/28/23  0524   WBC 22.02* 19.57*   HGB 10.9* 10.6*   HCT 34.1* 33.4*    314     CMP:   Recent Labs   Lab 10/27/23  0443 10/28/23  0524   * 136   K 4.5 3.3*    104   CO2 23 23    79   BUN 13 15   CREATININE 0.7 0.6   CALCIUM 8.3* 8.3*   PROT 6.8 6.3   ALBUMIN 1.8* 1.6*   BILITOT 0.5 0.3   ALKPHOS 104 89   AST 14 11   ALT 10 7*   ANIONGAP 9 9     Magnesium:   Recent Labs   Lab 10/27/23  0443 10/28/23  0524   MG 1.6 1.8       Significant Imaging: I have reviewed all pertinent imaging results/findings within the past 24 hours.

## 2023-10-28 NOTE — ASSESSMENT & PLAN NOTE
Chronic, controlled. Latest blood pressure and vitals reviewed-     Temp:  [97.3 °F (36.3 °C)-100 °F (37.8 °C)]   Pulse:  []   Resp:  [16-25]   BP: ()/(50-69)   SpO2:  [92 %-100 %] .   Home meds for hypertension were reviewed and noted below.   Hypertension Medications             furosemide (LASIX) 20 MG tablet Take 20 mg by mouth.    spironolactone (ALDACTONE) 50 MG tablet Take 1 tablet (50 mg total) by mouth 2 (two) times daily.          While in the hospital, will manage blood pressure as follows; Continue home antihypertensive regimen    Will utilize p.r.n. blood pressure medication only if patient's blood pressure greater than 140/90 and she develops symptoms such as worsening chest pain or shortness of breath.

## 2023-10-28 NOTE — ASSESSMENT & PLAN NOTE
Nutrition consulted. Most recent weight and BMI monitored-     Measurements:  Wt Readings from Last 1 Encounters:   10/26/23 46.7 kg (103 lb)   Body mass index is 18.84 kg/m².    Patient has been screened and assessed by RD.    Malnutrition Type:  Context:    Level:      Malnutrition Characteristic Summary:       Interventions/Recommendations (treatment strategy):    Consult dietary on Monday- at present CLD

## 2023-10-28 NOTE — CONSULTS
[facility] ([facility])  Hepatology  Consult Note    Patient Name: Lakisha Vance  MRN: [id number]  Admission Date: 10/26/2023  Hospital Length of Stay: 0 days  Attending Provider: Sandeep Martinez MD   Primary Care Physician: Pranav Gonzalez MD  Principal Problem:Calculus of gallbladder with acute cholecystitis without obstruction    Consults  Subjective:     Transplant status: No    HPI: 63 y.o female with alcohol liver cirrhosis  was transferred from [facility] for abdominal pain. Patient was seen by me in clinic few weeks before for elevated liver enzymes with alcohol related liver disease. Hepatology was consulted for the same    Patient reports Abdominal Pain in the RUQ, Dull aching associated nausea and emesis. Denies hematemesis, fluid overload, confusion, oliguria, fever. Pain improved with symptomatic treatment and No vomiting since admission    US showed nodular liver, gallbladder is hydropic with sludge and small dependent echogenic calculi. The gallbladder wall is mildly thickened and edematous. CT scan showed distended gallbladder and mild ascites. No CBD dilatation. Patient just back for HIDA scan    She is seen via telemedicine  with sister Meghann. Previous EGD last month for epigastric pain showed grade A esophagitis, gastritis and portal gastropathy.      Review of Systems  Constitutional:  Positive for activity change and appetite change. Negative for fatigue, fever and unexpected weight change.   HENT: Negative.     Eyes: Negative.  Negative for visual disturbance.   Respiratory: Negative.  Negative for cough, shortness of breath and wheezing.    Cardiovascular: Negative.    Gastrointestinal:  Positive for abdominal pain. Negative for abdominal distention, anal bleeding, blood in stool, constipation, diarrhea, nausea, rectal pain and vomiting.   Endocrine: Negative for polyuria.   Genitourinary: Negative.    Musculoskeletal:  Negative for arthralgias and gait problem.   Skin: Negative.     Allergic/Immunologic: Negative.    Neurological: Negative.    Hematological: Negative.    Psychiatric/Behavioral: Negative.        Past Medical History:   Diagnosis Date    Hypertension        Past Surgical History:   Procedure Laterality Date    ESOPHAGOGASTRODUODENOSCOPY N/A 9/1/2023    Procedure: ESOPHAGOGASTRODUODENOSCOPY (EGD);  Surgeon: Nerissa Ash MD;  Location: [facility];  Service: Endoscopy;  Laterality: N/A;    HYSTERECTOMY         Family history of liver disease: No    Review of patient's allergies indicates:   Allergen Reactions    Baclofen Other (See Comments)     Vomiting, confusion, shaking         Tobacco Use    Smoking status: Every Day     Current packs/day: 0.50     Types: Cigarettes    Smokeless tobacco: Never   Substance and Sexual Activity    Alcohol use: Yes     Comment: social    Drug use: Not on file    Sexual activity: Not on file       Medications Prior to Admission   Medication Sig Dispense Refill Last Dose    cyanocobalamin (VITAMIN B-12) 100 MCG tablet Take 100 mcg by mouth once daily.       folic acid (FOLVITE) 1 MG tablet Take 1 tablet by mouth once daily.       furosemide (LASIX) 20 MG tablet Take 20 mg by mouth.       ondansetron (ZOFRAN-ODT) 4 MG TbDL Take 1 tablet (4 mg total) by mouth every 8 (eight) hours as needed (nausea/vomiting). 12 tablet 1     pantoprazole (PROTONIX) 40 MG tablet Take 1 tablet (40 mg total) by mouth 2 (two) times daily. 60 tablet 11     spironolactone (ALDACTONE) 50 MG tablet Take 1 tablet (50 mg total) by mouth 2 (two) times daily. 60 tablet 11        Objective:     Vital Signs (Most Recent):  Temp: 97.9 °F (36.6 °C) (10/27/23 2003)  Pulse: 85 (10/27/23 2005)  Resp: 17 (10/27/23 2005)  BP: 130/68 (10/27/23 2005)  SpO2: (!) 93 % (10/27/23 1526) Vital Signs (24h Range):  Temp:  [97.9 °F (36.6 °C)-98.7 °F (37.1 °C)] 97.9 °F (36.6 °C)  Pulse:  [85-99] 85  Resp:  [16-19] 17  SpO2:  [93 %-97 %] 93 %  BP: (121-137)/(66-84) 130/68     Weight: 46.7 kg  (103 lb) (10/26/23 1513)  Body mass index is 18.84 kg/m².    Physical Exam  Vitals and nursing note reviewed. Exam conducted with a chaperone present.   Constitutional:       Appearance: She is underweight. She is ill-appearing.   Cardiovascular:      Rate and Rhythm: Normal rate and regular rhythm.   Abdominal:      General: Abdomen is protuberant. Bowel sounds are normal.      Tenderness: There is abdominal tenderness in the right upper quadrant.        Musculoskeletal:      Right lower leg: No edema.      Left lower leg: No edema.   Skin:     General: Skin is warm and dry.      Capillary Refill: Capillary refill takes less than 2 seconds.   Neurological:      General: No focal deficit present.      Mental Status: She is alert and oriented to person, place, and time. Mental status is at baseline.         Significant Labs:  CBC:   Recent Labs   Lab 10/27/23  0443   WBC 22.02*   RBC 3.67*   HGB 10.9*   HCT 34.1*        BMP:   Recent Labs   Lab 10/27/23  0443      *   K 4.5      CO2 23   BUN 13   CREATININE 0.7   CALCIUM 8.3*     Coagulation:   Recent Labs   Lab 10/27/23  1545   INR 1.2       Significant Imaging:  Labs: Reviewed  MELD 3.0: 14 at 10/27/2023  3:45 PM  MELD-Na: 8 at 10/27/2023  3:45 PM  Calculated from:  Serum Creatinine: 0.7 mg/dL (Using min of 1 mg/dL) at 10/27/2023  4:43 AM  Serum Sodium: 135 mmol/L at 10/27/2023  4:43 AM  Total Bilirubin: 0.5 mg/dL (Using min of 1 mg/dL) at 10/27/2023  4:43 AM  Serum Albumin: 1.8 g/dL at 10/27/2023  4:43 AM  INR(ratio): 1.2 at 10/27/2023  3:45 PM  Age at listing (hypothetical): 63 years  Sex: Female at 10/27/2023  3:45 PM       Assessment/Plan:     Active Diagnoses:    Diagnosis Date Noted POA    PRINCIPAL PROBLEM:  Calculus of gallbladder with acute cholecystitis without obstruction [K80.00] 10/26/2023 Yes    Alcoholic cirrhosis of liver with ascites [K70.31] 10/26/2023 Yes    Acute cholecystitis [K81.0] 07/10/2023 Yes    Moderate  cigarette smoker (10-19 per day) [F17.210] 03/29/2022 Yes    HTN (hypertension) [I10] 08/13/2019 Yes      Problems Resolved During this Admission:       Chronic liver disease/ Early cirrhosis  -Alcohol related  -No Clinically significant portal hypertension as Normal Platelets and no splenomegaly  -Sober > 2 months  -Mild ascites otherwise well compensated  -MELD 8      Abdominal Pain  -HIDA awaited  -CT suggestive of GB pathology  -Need to R/o cholecystitis   -Surgery on board    Rest Mx as Primary team      Thank you for your consult. I will follow-up with patient. Please contact us if you have any additional questions.    Lew Weaver MD  Hepatology  [facility] ([facility])

## 2023-10-28 NOTE — PLAN OF CARE
PATIENT AGREES TO THERAPY BUT IS RELUCTANT TO PARTICIPATE DUE TO HER HAVING SX LATER TODAY.     BED MOB WITH SLOW PROGRESSION AND WITH VC FOR LOWER EXTREMITY MANAGEMENT    BED-->CHAIR CG NO ASSISTIVE DEVICE WITH STAND PIVOT TECHNIQUE CG VC FOR UPPER EXTREMITY PLACEMENT    SITTING STATIC BALANCE SBA    SHE PARTICIPATED WITH THERAPY WITH GUARDED MOVEMENTS TO AVOID INCREASE PAIN.

## 2023-10-29 PROBLEM — K81.0 ACUTE CHOLECYSTITIS: Status: RESOLVED | Noted: 2023-07-10 | Resolved: 2023-10-29

## 2023-10-29 LAB
ALBUMIN SERPL BCP-MCNC: 1.3 G/DL (ref 3.5–5.2)
ALP SERPL-CCNC: 66 U/L (ref 55–135)
ALT SERPL W/O P-5'-P-CCNC: 9 U/L (ref 10–44)
ANION GAP SERPL CALC-SCNC: 8 MMOL/L (ref 8–16)
AST SERPL-CCNC: 20 U/L (ref 10–40)
BASOPHILS # BLD AUTO: 0.05 K/UL (ref 0–0.2)
BASOPHILS NFR BLD: 0.4 % (ref 0–1.9)
BILIRUB SERPL-MCNC: 0.3 MG/DL (ref 0.1–1)
BUN SERPL-MCNC: 19 MG/DL (ref 8–23)
CALCIUM SERPL-MCNC: 8.1 MG/DL (ref 8.7–10.5)
CHLORIDE SERPL-SCNC: 107 MMOL/L (ref 95–110)
CO2 SERPL-SCNC: 23 MMOL/L (ref 23–29)
CREAT SERPL-MCNC: 0.6 MG/DL (ref 0.5–1.4)
DIFFERENTIAL METHOD: ABNORMAL
EOSINOPHIL # BLD AUTO: 0 K/UL (ref 0–0.5)
EOSINOPHIL NFR BLD: 0.1 % (ref 0–8)
ERYTHROCYTE [DISTWIDTH] IN BLOOD BY AUTOMATED COUNT: 13.3 % (ref 11.5–14.5)
EST. GFR  (NO RACE VARIABLE): >60 ML/MIN/1.73 M^2
GLUCOSE SERPL-MCNC: 70 MG/DL (ref 70–110)
HCT VFR BLD AUTO: 30.2 % (ref 37–48.5)
HGB BLD-MCNC: 9.7 G/DL (ref 12–16)
IMM GRANULOCYTES # BLD AUTO: 0.06 K/UL (ref 0–0.04)
IMM GRANULOCYTES NFR BLD AUTO: 0.4 % (ref 0–0.5)
LYMPHOCYTES # BLD AUTO: 3 K/UL (ref 1–4.8)
LYMPHOCYTES NFR BLD: 21.8 % (ref 18–48)
MAGNESIUM SERPL-MCNC: 1.8 MG/DL (ref 1.6–2.6)
MCH RBC QN AUTO: 29.8 PG (ref 27–31)
MCHC RBC AUTO-ENTMCNC: 32.1 G/DL (ref 32–36)
MCV RBC AUTO: 93 FL (ref 82–98)
MONOCYTES # BLD AUTO: 1.3 K/UL (ref 0.3–1)
MONOCYTES NFR BLD: 9.3 % (ref 4–15)
NEUTROPHILS # BLD AUTO: 9.3 K/UL (ref 1.8–7.7)
NEUTROPHILS NFR BLD: 68 % (ref 38–73)
NRBC BLD-RTO: 0 /100 WBC
PHOSPHATE SERPL-MCNC: 4 MG/DL (ref 2.7–4.5)
PLATELET # BLD AUTO: 393 K/UL (ref 150–450)
PMV BLD AUTO: 9.3 FL (ref 9.2–12.9)
POTASSIUM SERPL-SCNC: 4.5 MMOL/L (ref 3.5–5.1)
PROT SERPL-MCNC: 5.6 G/DL (ref 6–8.4)
RBC # BLD AUTO: 3.25 M/UL (ref 4–5.4)
SODIUM SERPL-SCNC: 138 MMOL/L (ref 136–145)
WBC # BLD AUTO: 13.72 K/UL (ref 3.9–12.7)

## 2023-10-29 PROCEDURE — 25000242 PHARM REV CODE 250 ALT 637 W/ HCPCS: Performed by: SURGERY

## 2023-10-29 PROCEDURE — 25000003 PHARM REV CODE 250: Performed by: SURGERY

## 2023-10-29 PROCEDURE — 94640 AIRWAY INHALATION TREATMENT: CPT

## 2023-10-29 PROCEDURE — 80053 COMPREHEN METABOLIC PANEL: CPT | Performed by: SURGERY

## 2023-10-29 PROCEDURE — 96376 TX/PRO/DX INJ SAME DRUG ADON: CPT

## 2023-10-29 PROCEDURE — 36415 COLL VENOUS BLD VENIPUNCTURE: CPT | Performed by: SURGERY

## 2023-10-29 PROCEDURE — C9113 INJ PANTOPRAZOLE SODIUM, VIA: HCPCS | Performed by: SURGERY

## 2023-10-29 PROCEDURE — 96375 TX/PRO/DX INJ NEW DRUG ADDON: CPT

## 2023-10-29 PROCEDURE — 96372 THER/PROPH/DIAG INJ SC/IM: CPT | Performed by: SURGERY

## 2023-10-29 PROCEDURE — 96366 THER/PROPH/DIAG IV INF ADDON: CPT

## 2023-10-29 PROCEDURE — 94761 N-INVAS EAR/PLS OXIMETRY MLT: CPT

## 2023-10-29 PROCEDURE — 99024 PR POST-OP FOLLOW-UP VISIT: ICD-10-PCS | Mod: ,,, | Performed by: SURGERY

## 2023-10-29 PROCEDURE — G0378 HOSPITAL OBSERVATION PER HR: HCPCS

## 2023-10-29 PROCEDURE — A4216 STERILE WATER/SALINE, 10 ML: HCPCS | Performed by: SURGERY

## 2023-10-29 PROCEDURE — 84100 ASSAY OF PHOSPHORUS: CPT | Performed by: SURGERY

## 2023-10-29 PROCEDURE — 63600175 PHARM REV CODE 636 W HCPCS: Performed by: SURGERY

## 2023-10-29 PROCEDURE — 99024 POSTOP FOLLOW-UP VISIT: CPT | Mod: ,,, | Performed by: SURGERY

## 2023-10-29 PROCEDURE — 85025 COMPLETE CBC W/AUTO DIFF WBC: CPT | Performed by: SURGERY

## 2023-10-29 PROCEDURE — 96361 HYDRATE IV INFUSION ADD-ON: CPT

## 2023-10-29 PROCEDURE — 83735 ASSAY OF MAGNESIUM: CPT | Performed by: SURGERY

## 2023-10-29 RX ADMIN — MORPHINE SULFATE 2 MG: 2 INJECTION, SOLUTION INTRAMUSCULAR; INTRAVENOUS at 12:10

## 2023-10-29 RX ADMIN — PIPERACILLIN SODIUM AND TAZOBACTAM SODIUM 4.5 G: 4; .5 INJECTION, POWDER, FOR SOLUTION INTRAVENOUS at 01:10

## 2023-10-29 RX ADMIN — MORPHINE SULFATE 2 MG: 2 INJECTION, SOLUTION INTRAMUSCULAR; INTRAVENOUS at 01:10

## 2023-10-29 RX ADMIN — POTASSIUM CHLORIDE: 2 INJECTION, SOLUTION, CONCENTRATE INTRAVENOUS at 04:10

## 2023-10-29 RX ADMIN — MORPHINE SULFATE 2 MG: 2 INJECTION, SOLUTION INTRAMUSCULAR; INTRAVENOUS at 05:10

## 2023-10-29 RX ADMIN — THIAMINE HCL TAB 100 MG 100 MG: 100 TAB at 09:10

## 2023-10-29 RX ADMIN — Medication 10 ML: at 01:10

## 2023-10-29 RX ADMIN — ENOXAPARIN SODIUM 30 MG: 30 INJECTION SUBCUTANEOUS at 05:10

## 2023-10-29 RX ADMIN — PIPERACILLIN SODIUM AND TAZOBACTAM SODIUM 4.5 G: 4; .5 INJECTION, POWDER, FOR SOLUTION INTRAVENOUS at 10:10

## 2023-10-29 RX ADMIN — PANTOPRAZOLE SODIUM 40 MG: 40 INJECTION, POWDER, FOR SOLUTION INTRAVENOUS at 09:10

## 2023-10-29 RX ADMIN — ONDANSETRON 4 MG: 2 INJECTION INTRAMUSCULAR; INTRAVENOUS at 05:10

## 2023-10-29 RX ADMIN — LEVALBUTEROL HYDROCHLORIDE 1.25 MG: 0.63 SOLUTION RESPIRATORY (INHALATION) at 04:10

## 2023-10-29 RX ADMIN — Medication 6 MG: at 10:10

## 2023-10-29 RX ADMIN — LEVALBUTEROL HYDROCHLORIDE 1.25 MG: 0.63 SOLUTION RESPIRATORY (INHALATION) at 07:10

## 2023-10-29 RX ADMIN — Medication 10 ML: at 10:10

## 2023-10-29 RX ADMIN — Medication 1 TABLET: at 09:10

## 2023-10-29 RX ADMIN — PIPERACILLIN SODIUM AND TAZOBACTAM SODIUM 4.5 G: 4; .5 INJECTION, POWDER, FOR SOLUTION INTRAVENOUS at 06:10

## 2023-10-29 RX ADMIN — MORPHINE SULFATE 2 MG: 2 INJECTION, SOLUTION INTRAMUSCULAR; INTRAVENOUS at 09:10

## 2023-10-29 RX ADMIN — MORPHINE SULFATE 2 MG: 2 INJECTION, SOLUTION INTRAMUSCULAR; INTRAVENOUS at 04:10

## 2023-10-29 RX ADMIN — Medication 10 ML: at 06:10

## 2023-10-29 RX ADMIN — ONDANSETRON 4 MG: 2 INJECTION INTRAMUSCULAR; INTRAVENOUS at 09:10

## 2023-10-29 RX ADMIN — THERA TABS 1 TABLET: TAB at 09:10

## 2023-10-29 NOTE — ASSESSMENT & PLAN NOTE
Nutrition consulted. Most recent weight and BMI monitored-     Measurements:  Wt Readings from Last 1 Encounters:   10/26/23 46.7 kg (103 lb)   Body mass index is 18.84 kg/m².    Patient has been screened and assessed by RD.    Management as per primary team and RD

## 2023-10-29 NOTE — PROGRESS NOTES
[facility] ([facility])  General Surgery  Progress Note    Subjective:     History of Present Illness:  Lakisha Vance is a 63 y.o. female who presents with acute cholecystitis in the setting of cirrhosis and COPD.      Post-Op Info:  Procedure(s) (LRB):  XI ROBOTIC CHOLECYSTECTOMY (N/A)   1 Day Post-Op     Interval History: AFVSS- one time tachycardia yesterday and some soft Bps post op, but VSS this AM. Doing well.  Pain moderately controlled.  No nausea/ vomiting.  HUMAIRA drain with 740cc serous output.    Medications:  Continuous Infusions:   sodium chloride 0.9% 1,000 mL with potassium chloride 40 mEq infusion 50 mL/hr at 10/28/23 0958     Scheduled Meds:   enoxparin  30 mg Subcutaneous Daily    folic acid-vit B6-vit B12 2.5-25-2 mg  1 tablet Oral Daily    levalbuterol  1.2495 mg Nebulization Q8H    multivitamin  1 tablet Oral Daily    pantoprazole  40 mg Intravenous Daily    piperacillin-tazobactam (Zosyn) IV (PEDS and ADULTS) (extended infusion is not appropriate)  4.5 g Intravenous Q8H    sodium chloride 0.9%  10 mL Intravenous Q8H    thiamine  100 mg Oral Daily     PRN Meds:melatonin, morphine, naloxone, ondansetron, polyethylene glycol, promethazine (PHENERGAN) 12.5 mg in dextrose 5 % (D5W) 50 mL IVPB, senna-docusate 8.6-50 mg     Review of patient's allergies indicates:   Allergen Reactions    Baclofen Other (See Comments)     Vomiting, confusion, shaking     Objective:     Vital Signs (Most Recent):  Temp: 98.1 °F (36.7 °C) (10/29/23 1213)  Pulse: 86 (10/29/23 1213)  Resp: 17 (10/29/23 1213)  BP: 111/66 (10/29/23 1213)  SpO2: 97 % (10/29/23 1213) Vital Signs (24h Range):  Temp:  [97.9 °F (36.6 °C)-100 °F (37.8 °C)] 98.1 °F (36.7 °C)  Pulse:  [] 86  Resp:  [17-25] 17  SpO2:  [92 %-100 %] 97 %  BP: ()/(50-66) 111/66     Weight: 46.7 kg (103 lb)  Body mass index is 18.84 kg/m².    Intake/Output - Last 3 Shifts         10/27 0700  10/28 0659 10/28 0700  10/29 0659 10/29 0700  10/30  0659    I.V. (mL/kg) 0 (0)      IV Piggyback 10.4 1500     Total Intake(mL/kg) 10.4 (0.2) 1500 (32.1)     Drains  740 30    Blood  25     Total Output  765 30    Net +10.4 +735 -30           Urine Occurrence 2 x               Physical Exam  Vitals reviewed.   Constitutional:       Appearance: She is not toxic-appearing.      Comments: Thin and frail appearing   HENT:      Head: Normocephalic and atraumatic.      Mouth/Throat:      Mouth: Mucous membranes are dry.   Eyes:      Extraocular Movements: Extraocular movements intact.      Conjunctiva/sclera: Conjunctivae normal.   Cardiovascular:      Rate and Rhythm: Normal rate.   Pulmonary:      Effort: Pulmonary effort is normal.   Abdominal:      Palpations: Abdomen is soft.      Comments: Appropriately TTP, incisions c/d/I, HUMAIRA drain in RUQ with serous fluid    Skin:     General: Skin is warm and dry.   Neurological:      General: No focal deficit present.      Mental Status: She is alert.   Psychiatric:         Mood and Affect: Mood normal.         Thought Content: Thought content normal.          Significant Labs:  I have reviewed all pertinent lab results within the past 24 hours.  CBC:   Recent Labs   Lab 10/29/23  0446   WBC 13.72*   RBC 3.25*   HGB 9.7*   HCT 30.2*      MCV 93   MCH 29.8   MCHC 32.1     BMP:   Recent Labs   Lab 10/29/23  0446   GLU 70      K 4.5      CO2 23   BUN 19   CREATININE 0.6   CALCIUM 8.1*   MG 1.8     CMP:   Recent Labs   Lab 10/29/23  0446   GLU 70   CALCIUM 8.1*   ALBUMIN 1.3*   PROT 5.6*      K 4.5   CO2 23      BUN 19   CREATININE 0.6   ALKPHOS 66   ALT 9*   AST 20   BILITOT 0.3     LFTs:   Recent Labs   Lab 10/29/23  0446   ALT 9*   AST 20   ALKPHOS 66   BILITOT 0.3   PROT 5.6*   ALBUMIN 1.3*     Coagulation:   Recent Labs   Lab 10/28/23  0524   LABPROT 12.2   INR 1.1     Specimen (24h ago, onward)       Start     Ordered    10/28/23 1511  Specimen to Pathology, Surgery General Surgery  Once         Comments: Pre-op Diagnosis: Acute cholecystitis [K81.0]Procedure(s):XI ROBOTIC CHOLECYSTECTOMY Number of specimens: 1Name of specimens: 1. Necrotic Gallbladder with stones-perm     References:    Click here for ordering Quick Tip   Question Answer Comment   Procedure Type: General Surgery    Specimen Class: Routine/Screening    Which provider would you like to cc? VALENTINA MENDOZA    Release to patient Immediate        10/28/23 2907                    Significant Diagnostics:  I have reviewed all pertinent imaging results/findings within the past 24 hours.    Assessment/Plan:     * acute cholecystitis with gangrene of GB with perforation   POD #1- s/p robotic cholecystectomy.  Found to have gangrenous and perforated cholecystitis, liver cirrhotic in appearance.  Doing well overall.  LFTs remaining WNL, no evidence of decompensation at this time    -- CLD, ok to advance as tolerated  -- pain control as per primary team   -- Recommend several days abx for pus and bile spillage from the perforation   -- HUMAIRA drain to remain for a few days, still a little turbid.  Will have to discuss removal and management of her ascites that it will drain   -- ok to shower   -- PT/OT  -- encourage IS and ambulation     Severe protein-calorie malnutrition  Nutrition consulted. Most recent weight and BMI monitored-     Measurements:  Wt Readings from Last 1 Encounters:   10/26/23 46.7 kg (103 lb)   Body mass index is 18.84 kg/m².    Patient has been screened and assessed by RD.    Management as per primary team and RD    Alcoholic cirrhosis of liver with ascites  Management as per primary team and GI    Moderate cigarette smoker (10-19 per day)  Management as per primary     HTN (hypertension)  Management as per primary       Valentina Mendoza MD  General Surgery  [facility] ([facility])

## 2023-10-29 NOTE — PLAN OF CARE
Problem: Adult Inpatient Plan of Care  Goal: Plan of Care Review  Outcome: Ongoing, Progressing  Goal: Patient-Specific Goal (Individualized)  Outcome: Ongoing, Progressing  Goal: Absence of Hospital-Acquired Illness or Injury  Outcome: Ongoing, Progressing  Goal: Optimal Comfort and Wellbeing  Outcome: Ongoing, Progressing  Goal: Readiness for Transition of Care  Outcome: Ongoing, Progressing     Problem: Nausea and Vomiting  Goal: Fluid and Electrolyte Balance  Outcome: Ongoing, Progressing     Problem: Infection  Goal: Absence of Infection Signs and Symptoms  Outcome: Ongoing, Progressing

## 2023-10-29 NOTE — SUBJECTIVE & OBJECTIVE
Interval History: POD 1, looks and feels much better, skin color and turgor have improved, appears more relaxed, has been to the BR 3 times without any dizziness or weakness. VSS, Afeb, serosanguinous drain output, about 740 cc so far. Getting Zosyn, WBC down to 13.7, H/H 10/30, electrolytes better. LFTs normal, Albumin 1.3. pt tolerated CLD well. Ok to adv as tolerated.     Review of Systems   Constitutional:  Positive for activity change and appetite change. Negative for fatigue, fever and unexpected weight change.   HENT: Negative.     Eyes: Negative.  Negative for visual disturbance.   Respiratory: Negative.  Negative for cough, shortness of breath and wheezing.    Cardiovascular: Negative.    Gastrointestinal:  Positive for abdominal distention and abdominal pain. Negative for anal bleeding, blood in stool, constipation, diarrhea, nausea, rectal pain and vomiting.   Endocrine: Negative for polyuria.   Genitourinary: Negative.    Musculoskeletal:  Negative for arthralgias and gait problem.   Skin: Negative.    Allergic/Immunologic: Negative.    Neurological:  Positive for weakness.   Hematological: Negative.    Psychiatric/Behavioral: Negative.       Objective:     Vital Signs (Most Recent):  Temp: 98.1 °F (36.7 °C) (10/29/23 1213)  Pulse: 86 (10/29/23 1213)  Resp: 17 (10/29/23 1213)  BP: 111/66 (10/29/23 1213)  SpO2: 97 % (10/29/23 1213) Vital Signs (24h Range):  Temp:  [97.9 °F (36.6 °C)-100 °F (37.8 °C)] 98.1 °F (36.7 °C)  Pulse:  [] 86  Resp:  [17-25] 17  SpO2:  [92 %-100 %] 97 %  BP: ()/(50-66) 111/66     Weight: 46.7 kg (103 lb)  Body mass index is 18.84 kg/m².    Intake/Output Summary (Last 24 hours) at 10/29/2023 1332  Last data filed at 10/29/2023 0916  Gross per 24 hour   Intake 1500 ml   Output 795 ml   Net 705 ml         Physical Exam  Vitals reviewed.   Constitutional:       Appearance: She is not toxic-appearing.      Comments: Thin and frail appearing   HENT:      Head: Normocephalic  and atraumatic.      Mouth/Throat:      Mouth: Mucous membranes are dry.   Eyes:      Extraocular Movements: Extraocular movements intact.      Conjunctiva/sclera: Conjunctivae normal.   Cardiovascular:      Rate and Rhythm: Normal rate.   Pulmonary:      Effort: Pulmonary effort is normal.   Abdominal:      Palpations: Abdomen is soft.      Comments: Appropriately TTP, incisions c/d/I, HUMAIRA drain in RUQ with serous fluid    Skin:     General: Skin is warm and dry.   Neurological:      General: No focal deficit present.      Mental Status: She is alert.   Psychiatric:         Mood and Affect: Mood normal.         Thought Content: Thought content normal.             Significant Labs: All pertinent labs within the past 24 hours have been reviewed.  CBC:   Recent Labs   Lab 10/28/23  0524 10/29/23  0446   WBC 19.57* 13.72*   HGB 10.6* 9.7*   HCT 33.4* 30.2*    393     CMP:   Recent Labs   Lab 10/28/23  0524 10/29/23  0446    138   K 3.3* 4.5    107   CO2 23 23   GLU 79 70   BUN 15 19   CREATININE 0.6 0.6   CALCIUM 8.3* 8.1*   PROT 6.3 5.6*   ALBUMIN 1.6* 1.3*   BILITOT 0.3 0.3   ALKPHOS 89 66   AST 11 20   ALT 7* 9*   ANIONGAP 9 8       Significant Imaging: I have reviewed all pertinent imaging results/findings within the past 24 hours.

## 2023-10-29 NOTE — HPI
Lakisha Vance is a 63 y.o. female who presents with acute cholecystitis in the setting of cirrhosis and COPD.

## 2023-10-29 NOTE — ASSESSMENT & PLAN NOTE
POD #1- s/p robotic cholecystectomy.  Found to have gangrenous and perforated cholecystitis, liver cirrhotic in appearance.  Doing well overall.  LFTs remaining WNL, no evidence of decompensation at this time    -- CLD, ok to advance as tolerated  -- pain control as per primary team   -- Recommend several days abx for pus and bile spillage from the perforation   -- HUMAIRA drain to remain for a few days, still a little turbid.  Will have to discuss removal and management of her ascites that it will drain   -- ok to shower   -- PT/OT  -- encourage IS and ambulation

## 2023-10-29 NOTE — SUBJECTIVE & OBJECTIVE
Interval History: AFVSS- one time tachycardia yesterday and some soft Bps post op, but VSS this AM. Doing well.  Pain moderately controlled.  No nausea/ vomiting.  HUMAIRA drain with 740cc serous output.    Medications:  Continuous Infusions:   sodium chloride 0.9% 1,000 mL with potassium chloride 40 mEq infusion 50 mL/hr at 10/28/23 0958     Scheduled Meds:   enoxparin  30 mg Subcutaneous Daily    folic acid-vit B6-vit B12 2.5-25-2 mg  1 tablet Oral Daily    levalbuterol  1.2495 mg Nebulization Q8H    multivitamin  1 tablet Oral Daily    pantoprazole  40 mg Intravenous Daily    piperacillin-tazobactam (Zosyn) IV (PEDS and ADULTS) (extended infusion is not appropriate)  4.5 g Intravenous Q8H    sodium chloride 0.9%  10 mL Intravenous Q8H    thiamine  100 mg Oral Daily     PRN Meds:melatonin, morphine, naloxone, ondansetron, polyethylene glycol, promethazine (PHENERGAN) 12.5 mg in dextrose 5 % (D5W) 50 mL IVPB, senna-docusate 8.6-50 mg     Review of patient's allergies indicates:   Allergen Reactions    Baclofen Other (See Comments)     Vomiting, confusion, shaking     Objective:     Vital Signs (Most Recent):  Temp: 98.1 °F (36.7 °C) (10/29/23 1213)  Pulse: 86 (10/29/23 1213)  Resp: 17 (10/29/23 1213)  BP: 111/66 (10/29/23 1213)  SpO2: 97 % (10/29/23 1213) Vital Signs (24h Range):  Temp:  [97.9 °F (36.6 °C)-100 °F (37.8 °C)] 98.1 °F (36.7 °C)  Pulse:  [] 86  Resp:  [17-25] 17  SpO2:  [92 %-100 %] 97 %  BP: ()/(50-66) 111/66     Weight: 46.7 kg (103 lb)  Body mass index is 18.84 kg/m².    Intake/Output - Last 3 Shifts         10/27 0700  10/28 0659 10/28 0700  10/29 0659 10/29 0700  10/30 0659    I.V. (mL/kg) 0 (0)      IV Piggyback 10.4 1500     Total Intake(mL/kg) 10.4 (0.2) 1500 (32.1)     Drains  740 30    Blood  25     Total Output  765 30    Net +10.4 +735 -30           Urine Occurrence 2 x               Physical Exam  Vitals reviewed.   Constitutional:       Appearance: She is not toxic-appearing.       Comments: Thin and frail appearing   HENT:      Head: Normocephalic and atraumatic.      Mouth/Throat:      Mouth: Mucous membranes are dry.   Eyes:      Extraocular Movements: Extraocular movements intact.      Conjunctiva/sclera: Conjunctivae normal.   Cardiovascular:      Rate and Rhythm: Normal rate.   Pulmonary:      Effort: Pulmonary effort is normal.   Abdominal:      Palpations: Abdomen is soft.      Comments: Appropriately TTP, incisions c/d/I, HUMAIRA drain in RUQ with serous fluid    Skin:     General: Skin is warm and dry.   Neurological:      General: No focal deficit present.      Mental Status: She is alert.   Psychiatric:         Mood and Affect: Mood normal.         Thought Content: Thought content normal.          Significant Labs:  I have reviewed all pertinent lab results within the past 24 hours.  CBC:   Recent Labs   Lab 10/29/23  0446   WBC 13.72*   RBC 3.25*   HGB 9.7*   HCT 30.2*      MCV 93   MCH 29.8   MCHC 32.1     BMP:   Recent Labs   Lab 10/29/23  0446   GLU 70      K 4.5      CO2 23   BUN 19   CREATININE 0.6   CALCIUM 8.1*   MG 1.8     CMP:   Recent Labs   Lab 10/29/23  0446   GLU 70   CALCIUM 8.1*   ALBUMIN 1.3*   PROT 5.6*      K 4.5   CO2 23      BUN 19   CREATININE 0.6   ALKPHOS 66   ALT 9*   AST 20   BILITOT 0.3     LFTs:   Recent Labs   Lab 10/29/23  0446   ALT 9*   AST 20   ALKPHOS 66   BILITOT 0.3   PROT 5.6*   ALBUMIN 1.3*     Coagulation:   Recent Labs   Lab 10/28/23  0524   LABPROT 12.2   INR 1.1     Specimen (24h ago, onward)       Start     Ordered    10/28/23 1511  Specimen to Pathology, Surgery General Surgery  Once        Comments: Pre-op Diagnosis: Acute cholecystitis [K81.0]Procedure(s):XI ROBOTIC CHOLECYSTECTOMY Number of specimens: 1Name of specimens: 1. Necrotic Gallbladder with stones-perm     References:    Click here for ordering Quick Tip   Question Answer Comment   Procedure Type: General Surgery    Specimen Class: Routine/Screening     Which provider would you like to cc? MAISHA MENDOZA    Release to patient Immediate        10/28/23 0464                    Significant Diagnostics:  I have reviewed all pertinent imaging results/findings within the past 24 hours.

## 2023-10-30 ENCOUNTER — CLINICAL SUPPORT (OUTPATIENT)
Dept: SMOKING CESSATION | Facility: CLINIC | Age: 63
End: 2023-10-30
Payer: COMMERCIAL

## 2023-10-30 DIAGNOSIS — F17.200 NICOTINE DEPENDENCE: Primary | ICD-10-CM

## 2023-10-30 PROCEDURE — 94761 N-INVAS EAR/PLS OXIMETRY MLT: CPT

## 2023-10-30 PROCEDURE — 97530 THERAPEUTIC ACTIVITIES: CPT

## 2023-10-30 PROCEDURE — 99900035 HC TECH TIME PER 15 MIN (STAT)

## 2023-10-30 PROCEDURE — 99024 POSTOP FOLLOW-UP VISIT: CPT | Mod: ,,, | Performed by: SURGERY

## 2023-10-30 PROCEDURE — 99232 PR SUBSEQUENT HOSPITAL CARE,LEVL II: ICD-10-PCS | Mod: ,,, | Performed by: INTERNAL MEDICINE

## 2023-10-30 PROCEDURE — 96366 THER/PROPH/DIAG IV INF ADDON: CPT

## 2023-10-30 PROCEDURE — 99024 PR POST-OP FOLLOW-UP VISIT: ICD-10-PCS | Mod: ,,, | Performed by: SURGERY

## 2023-10-30 PROCEDURE — C9113 INJ PANTOPRAZOLE SODIUM, VIA: HCPCS | Performed by: SURGERY

## 2023-10-30 PROCEDURE — 21400001 HC TELEMETRY ROOM

## 2023-10-30 PROCEDURE — 99406 PT REFUSED TOBACCO CESSATION: ICD-10-PCS | Mod: S$GLB,,,

## 2023-10-30 PROCEDURE — 25000003 PHARM REV CODE 250: Performed by: SURGERY

## 2023-10-30 PROCEDURE — 94640 AIRWAY INHALATION TREATMENT: CPT

## 2023-10-30 PROCEDURE — 25000003 PHARM REV CODE 250: Performed by: EMERGENCY MEDICINE

## 2023-10-30 PROCEDURE — 96376 TX/PRO/DX INJ SAME DRUG ADON: CPT

## 2023-10-30 PROCEDURE — 97116 GAIT TRAINING THERAPY: CPT

## 2023-10-30 PROCEDURE — 63600175 PHARM REV CODE 636 W HCPCS: Performed by: SURGERY

## 2023-10-30 PROCEDURE — 99232 SBSQ HOSP IP/OBS MODERATE 35: CPT | Mod: ,,, | Performed by: INTERNAL MEDICINE

## 2023-10-30 PROCEDURE — 99406 BEHAV CHNG SMOKING 3-10 MIN: CPT | Mod: S$GLB,,,

## 2023-10-30 PROCEDURE — 25000242 PHARM REV CODE 250 ALT 637 W/ HCPCS: Performed by: SURGERY

## 2023-10-30 PROCEDURE — 96361 HYDRATE IV INFUSION ADD-ON: CPT

## 2023-10-30 PROCEDURE — A4216 STERILE WATER/SALINE, 10 ML: HCPCS | Performed by: SURGERY

## 2023-10-30 RX ORDER — MIRTAZAPINE 15 MG/1
15 TABLET, FILM COATED ORAL NIGHTLY
Status: DISCONTINUED | OUTPATIENT
Start: 2023-10-30 | End: 2023-11-02 | Stop reason: HOSPADM

## 2023-10-30 RX ADMIN — MORPHINE SULFATE 2 MG: 2 INJECTION, SOLUTION INTRAMUSCULAR; INTRAVENOUS at 06:10

## 2023-10-30 RX ADMIN — MORPHINE SULFATE 2 MG: 2 INJECTION, SOLUTION INTRAMUSCULAR; INTRAVENOUS at 02:10

## 2023-10-30 RX ADMIN — MIRTAZAPINE 15 MG: 15 TABLET, FILM COATED ORAL at 08:10

## 2023-10-30 RX ADMIN — LEVALBUTEROL HYDROCHLORIDE 1.25 MG: 0.63 SOLUTION RESPIRATORY (INHALATION) at 12:10

## 2023-10-30 RX ADMIN — ONDANSETRON 4 MG: 2 INJECTION INTRAMUSCULAR; INTRAVENOUS at 02:10

## 2023-10-30 RX ADMIN — ONDANSETRON 4 MG: 2 INJECTION INTRAMUSCULAR; INTRAVENOUS at 06:10

## 2023-10-30 RX ADMIN — Medication 1 TABLET: at 08:10

## 2023-10-30 RX ADMIN — THIAMINE HCL TAB 100 MG 100 MG: 100 TAB at 08:10

## 2023-10-30 RX ADMIN — Medication 10 ML: at 06:10

## 2023-10-30 RX ADMIN — ENOXAPARIN SODIUM 30 MG: 30 INJECTION SUBCUTANEOUS at 05:10

## 2023-10-30 RX ADMIN — ONDANSETRON 4 MG: 2 INJECTION INTRAMUSCULAR; INTRAVENOUS at 10:10

## 2023-10-30 RX ADMIN — MORPHINE SULFATE 2 MG: 2 INJECTION, SOLUTION INTRAMUSCULAR; INTRAVENOUS at 11:10

## 2023-10-30 RX ADMIN — PIPERACILLIN SODIUM AND TAZOBACTAM SODIUM 4.5 G: 4; .5 INJECTION, POWDER, FOR SOLUTION INTRAVENOUS at 09:10

## 2023-10-30 RX ADMIN — THERA TABS 1 TABLET: TAB at 08:10

## 2023-10-30 RX ADMIN — Medication 10 ML: at 02:10

## 2023-10-30 RX ADMIN — PIPERACILLIN SODIUM AND TAZOBACTAM SODIUM 4.5 G: 4; .5 INJECTION, POWDER, FOR SOLUTION INTRAVENOUS at 06:10

## 2023-10-30 RX ADMIN — PIPERACILLIN SODIUM AND TAZOBACTAM SODIUM 4.5 G: 4; .5 INJECTION, POWDER, FOR SOLUTION INTRAVENOUS at 01:10

## 2023-10-30 RX ADMIN — PANTOPRAZOLE SODIUM 40 MG: 40 INJECTION, POWDER, FOR SOLUTION INTRAVENOUS at 08:10

## 2023-10-30 RX ADMIN — MORPHINE SULFATE 2 MG: 2 INJECTION, SOLUTION INTRAMUSCULAR; INTRAVENOUS at 10:10

## 2023-10-30 NOTE — ASSESSMENT & PLAN NOTE
Nutrition consulted. Most recent weight and BMI monitored-     Measurements:  Wt Readings from Last 1 Encounters:   10/30/23 45.8 kg (101 lb 0.9 oz)   Body mass index is 18.48 kg/m².    Patient has been screened and assessed by RD.    Management as per primary team and RD

## 2023-10-30 NOTE — ASSESSMENT & PLAN NOTE
Chronic, controlled. Latest blood pressure and vitals reviewed-     Temp:  [97.3 °F (36.3 °C)-99.4 °F (37.4 °C)]   Pulse:  [80-86]   Resp:  [16-18]   BP: (106-139)/(61-70)   SpO2:  [93 %-97 %] .   Home meds for hypertension were reviewed and noted below.   Hypertension Medications             furosemide (LASIX) 20 MG tablet Take 20 mg by mouth.    spironolactone (ALDACTONE) 50 MG tablet Take 1 tablet (50 mg total) by mouth 2 (two) times daily.          While in the hospital, will manage blood pressure as follows; Continue home antihypertensive regimen    Will utilize p.r.n. blood pressure medication only if patient's blood pressure greater than 140/90 and she develops symptoms such as worsening chest pain or shortness of breath.

## 2023-10-30 NOTE — PLAN OF CARE
Problem: Adult Inpatient Plan of Care  Goal: Plan of Care Review  Outcome: Ongoing, Progressing  Goal: Patient-Specific Goal (Individualized)  Outcome: Ongoing, Progressing  Goal: Absence of Hospital-Acquired Illness or Injury  Outcome: Ongoing, Progressing  Goal: Optimal Comfort and Wellbeing  Outcome: Ongoing, Progressing  Goal: Readiness for Transition of Care  Outcome: Ongoing, Progressing     Problem: Nausea and Vomiting  Goal: Fluid and Electrolyte Balance  Outcome: Ongoing, Progressing     Problem: Infection  Goal: Absence of Infection Signs and Symptoms  Outcome: Ongoing, Progressing     Problem: Fall Injury Risk  Goal: Absence of Fall and Fall-Related Injury  Outcome: Ongoing, Progressing

## 2023-10-30 NOTE — ASSESSMENT & PLAN NOTE
POD #2- s/p robotic cholecystectomy.  Found to have gangrenous and perforated cholecystitis, liver cirrhotic in appearance.  Doing well overall.  LFTs remaining WNL, no evidence of decompensation at this time    -- regular diet as tolerated  -- pain control as per primary team   -- Recommend several days abx for pus and bile spillage from the perforation   -- HUMAIRA drain to remain for a few days, still a little turbid.  Will have to discuss removal and management of her ascites that it will drain   -- ok to shower   -- PT/OT  -- encourage IS and ambulation

## 2023-10-30 NOTE — ASSESSMENT & PLAN NOTE
Nutrition consulted. Most recent weight and BMI monitored-     Measurements:  Wt Readings from Last 1 Encounters:   10/30/23 45.8 kg (100 lb 15.5 oz)   Body mass index is 18.47 kg/m².    Patient has been screened and assessed by RD.    Malnutrition Type:  Context:    Level:      Malnutrition Characteristic Summary:       Interventions/Recommendations (treatment strategy):    Consult dietary on Monday- at present CLD      Encouraged to eat more    Ad Remeron to stimulate appetite

## 2023-10-30 NOTE — ASSESSMENT & PLAN NOTE
US abd showed sludge and gallstones  Await Hida scan in am  Also consider Gastritis vs Diverticulitis  Cont Zosyn    S/p Yari- doing better, abd pain improving  POD 2- getting better

## 2023-10-30 NOTE — ASSESSMENT & PLAN NOTE
Etiology unclear, pt with Alcoholic Cirrhosis  D/D includes Gastritis vs Cholecystitis vs Diverticulitis  Await GI and Surgery input  Hida Scan ordered to r/o Cholecystitis in am  Pt was empirically started on Zosyn in the ER, will continue  IV Protonix bid, Gentle Rehydration  Use MS prn     10/27- HIDA pos  Surgery planned for tmrw  Keep NPO after MN    10/28- s/p Robotic Cholecystectomy with gangrenous perforated GB  Cont Post op care, IV Abx, IVF, pain control    10/29- POD 2- doing better, comfortable, pain under control tolerated some liquid diet, got OOB and took a few steps

## 2023-10-30 NOTE — PT/OT/SLP PROGRESS
"Occupational Therapy   Treatment    Name: Lakisha Vance  MRN: [id number]  Admitting Diagnosis:  Calculus of gallbladder with acute cholecystitis without obstruction  2 Days Post-Op    Recommendations:     Discharge Recommendations: Low Intensity Therapy  Discharge Equipment Recommendations:  walker, rolling, shower chair, bedside commode  Barriers to discharge:  None    Assessment:     Lakisha Vance is a 63 y.o. female with a medical diagnosis of Calculus of gallbladder with acute cholecystitis without obstruction.  She presents with self-care debility. Performance deficits affecting function are weakness, impaired endurance, impaired self care skills, impaired functional mobility, pain, decreased upper extremity function, decreased lower extremity function.     Rehab Prognosis:  Good; patient would benefit from acute skilled OT services to address these deficits and reach maximum level of function.       Plan:     Patient to be seen 2 x/week to address the above listed problems via self-care/home management, therapeutic activities, therapeutic exercises  Plan of Care Expires: 11/10/23  Plan of Care Reviewed with: patient, friend    Subjective     Chief Complaint: None  Patient/Family Comments/goals: Pt stated, "I want to see how far I can fall"  Pain/Comfort:  Pain Rating 1: 5/10  Location - Side 1: Bilateral  Location - Orientation 1: generalized  Location 1: abdomen  Pain Addressed 1: Distraction, Reposition (activity pacing)  Pain Rating Post-Intervention 1: 5/10    Objective:     Communicated with: pt's nurse, Angeli, and completed a chart review prior to session.  Patient found supine with HUMAIRA drain, peripheral IV upon OT entry to room. Pt's best friend in the room upon entry.    General Precautions: Standard, fall    Orthopedic Precautions:N/A  Braces: N/A  Respiratory Status: Room air     Occupational Performance:   Bed Mobility:    Patient completed Rolling/Turning to Left with  stand by assistance  Patient " completed Supine to Sit with stand by assistance   Patient required extended time to complete Bed Mobility due to pain management.    Functional Mobility/Transfers:  Patient completed Sit <> Stand Transfer with stand by assistance  with  rolling walker   Functional Mobility: Pt ambulated 70  ft x 2 trials with SBA with RW for increase activity tolerance with ADLs and desired occupations.    Activities of Daily Living:  Grooming: Pt completed simple grooming of face wash with wash cloth with set up assistance while seated in bedside chair.         [facility] 6 Click ADL: 17    Treatment & Education:  Pt tolerated session well. Pt s/p cholecystectomy 10/28. Pt's goals still appropriate.  Pt educated on role of OT in acute care and benefits to participation. Pt educated on benefits of OOB activities and a benefits to recovery. Pt encouraged to remain OOB and upright in chair for 2-3 consecutive hours throughout the day and complete all meals sitting up. Pt in agreement with POC. Pt educated on BUE ROM therex to promote and maintain ROM and strength for functional independence with desired  occupation. Encouraged  to complete BUE therex  throughout the day as tolerated. Pt educated on call don't fall policy. Encouraged use of call button to meet needs.    Patient left up in chair with all lines intact, call button in reach, chair alarm on, and best friend present    GOALS:   Multidisciplinary Problems       Occupational Therapy Goals          Problem: Occupational Therapy    Goal Priority Disciplines Outcome Interventions   Occupational Therapy Goal     OT, PT/OT Ongoing, Progressing    Description: Goals to be met by: 11/10/23     Patient will increase functional independence with ADLs by performing:    Toileting from toilet with Supervision for hygiene and clothing management.   Toilet transfer to toilet with Supervision.  Upper extremity exercise program x10 reps per handout, with supervision.                         Time  Tracking:     OT Date of Treatment: 10/30/23  OT Start Time: 0930  OT Stop Time: 0955  OT Total Time (min): 25 min    Billable Minutes:Therapeutic Activity 25    OT/KUSUM: OT      RIVER Vicente  10/30/2023

## 2023-10-30 NOTE — ASSESSMENT & PLAN NOTE
As seen on US and CT Abdomen and recent EGD with Portal Hypertensive Gastropathy  Await further input from GI    Appears compensated at present    Cirrhotic Nodular Liver seen during surgery    Doing well, no decompensation

## 2023-10-30 NOTE — ASSESSMENT & PLAN NOTE
US abd showed sludge and gallstones  Await Hida scan in am  Also consider Gastritis vs Diverticulitis  Cont Zosyn    S/p Yari- doing better, abd pain improving

## 2023-10-30 NOTE — PLAN OF CARE
Nutrition Recs: 10/30  1. Recommend pt diet order be modified to [place]; Low sodium diet.   2. Recommend pt receives boost plus TID.   3. Recommend pt receives PO intake encouragement during all meals.   4. Recommend pt receive Jose David BID   5. Weigh daily.    Goals:   1. Pt diet will be modified within 24 hr.   2. Pt will consume >50% of EEN/EPN prior to RD follow up.  3. Pt will receive and consume > 75% of Jose David prior to RD follow up  Nutrition Goal Status: new  Communication of RD Recs: other (comment) (POC: Sticky Note)   Harry Carlos, Registration Eligible, Provisional LDN

## 2023-10-30 NOTE — SUBJECTIVE & OBJECTIVE
Interval History: AFVSS.  Minimal PO intake.  Pain moderately well controlled. Walked today.  HUMAIRA drain output increased after walk     Medications:  Continuous Infusions:  Scheduled Meds:   enoxparin  30 mg Subcutaneous Daily    folic acid-vit B6-vit B12 2.5-25-2 mg  1 tablet Oral Daily    levalbuterol  1.2495 mg Nebulization Q8H    mirtazapine  15 mg Oral QHS    multivitamin  1 tablet Oral Daily    pantoprazole  40 mg Intravenous Daily    piperacillin-tazobactam (Zosyn) IV (PEDS and ADULTS) (extended infusion is not appropriate)  4.5 g Intravenous Q8H    sodium chloride 0.9%  10 mL Intravenous Q8H    thiamine  100 mg Oral Daily     PRN Meds:melatonin, morphine, naloxone, ondansetron, polyethylene glycol, promethazine (PHENERGAN) 12.5 mg in dextrose 5 % (D5W) 50 mL IVPB, senna-docusate 8.6-50 mg     Review of patient's allergies indicates:   Allergen Reactions    Baclofen Other (See Comments)     Vomiting, confusion, shaking     Objective:     Vital Signs (Most Recent):  Temp: 99.4 °F (37.4 °C) (10/30/23 1544)  Pulse: 81 (10/30/23 1544)  Resp: 18 (10/30/23 1544)  BP: 110/65 (10/30/23 1544)  SpO2: 97 % (10/30/23 1544) Vital Signs (24h Range):  Temp:  [97.3 °F (36.3 °C)-99.4 °F (37.4 °C)] 99.4 °F (37.4 °C)  Pulse:  [80-86] 81  Resp:  [16-18] 18  SpO2:  [93 %-97 %] 97 %  BP: (106-139)/(61-70) 110/65     Weight: 45.8 kg (101 lb 0.9 oz)  Body mass index is 18.48 kg/m².    Intake/Output - Last 3 Shifts         10/28 0700  10/29 0659 10/29 0700  10/30 0659 10/30 0700  10/31 0659    P.O.   480    I.V. (mL/kg)  10 (0.2)     IV Piggyback 1500      Total Intake(mL/kg) 1500 (32.1) 10 (0.2) 480 (10.5)    Urine (mL/kg/hr)  0 (0) 200 (0.4)    Drains 740 120 670    Other  60     Blood 25      Total Output 765 180 870    Net +735 -170 -390           Urine Occurrence  1 x 2 x             Physical Exam  Vitals reviewed.   Constitutional:       Comments: Chronically ill appearing   Eyes:      Conjunctiva/sclera: Conjunctivae normal.    Cardiovascular:      Rate and Rhythm: Normal rate.   Pulmonary:      Effort: Pulmonary effort is normal.   Abdominal:      Palpations: Abdomen is soft.      Comments: Appropriately TTP, incisions c/d/I, HUMAIRA drain serous    Neurological:      General: No focal deficit present.      Mental Status: She is alert.   Psychiatric:         Mood and Affect: Mood normal.          Significant Labs:  I have reviewed all pertinent lab results within the past 24 hours.  CBC:   Recent Labs   Lab 10/29/23  0446   WBC 13.72*   RBC 3.25*   HGB 9.7*   HCT 30.2*      MCV 93   MCH 29.8   MCHC 32.1     CMP:   Recent Labs   Lab 10/29/23  0446   GLU 70   CALCIUM 8.1*   ALBUMIN 1.3*   PROT 5.6*      K 4.5   CO2 23      BUN 19   CREATININE 0.6   ALKPHOS 66   ALT 9*   AST 20   BILITOT 0.3       Significant Diagnostics:  I have reviewed all pertinent imaging results/findings within the past 24 hours.

## 2023-10-30 NOTE — ANESTHESIA POSTPROCEDURE EVALUATION
Anesthesia Post Evaluation    Patient: Lakisha Vance    Procedure(s) Performed: Procedure(s) (LRB):  XI ROBOTIC CHOLECYSTECTOMY (N/A)    Final Anesthesia Type: general      Patient location during evaluation: PACU  Patient participation: Yes- Able to Participate  Level of consciousness: awake and alert and oriented  Post-procedure vital signs: reviewed and stable  Pain management: adequate  Airway patency: patent    PONV status at discharge: No PONV  Anesthetic complications: no      Cardiovascular status: blood pressure returned to baseline, stable and hemodynamically stable  Respiratory status: unassisted  Hydration status: euvolemic  Follow-up not needed.          Vitals Value Taken Time   /62 10/30/23 0722   Temp 36.9 °C (98.4 °F) 10/30/23 0722   Pulse 80 10/30/23 0741   Resp 18 10/30/23 0741   SpO2 95 % 10/30/23 0741         Event Time   Out of Recovery 10/28/2023 16:05:00         Pain/Ata Score: Pain Rating Prior to Med Admin: 7 (10/30/2023  6:08 AM)  Pain Rating Post Med Admin: 6 (10/30/2023  2:42 AM)

## 2023-10-30 NOTE — ASSESSMENT & PLAN NOTE
Malnutrition Type:  Context: acute illness or injury  Level: severe    Related to (etiology):   Alteration in GI tract function (acute cholecystitis with gangrene of GB with perforation, Alcoholic cirrhosis of liver with ascites)    Signs and Symptoms (as evidenced by):   Unable/Unwilling to eat sufficient energy/ protein to maintain a healthy weight, x10 days <25% PO intake    Malnutrition Characteristic Summary:  Weight Loss (Malnutrition): 7.5% in 3 months (10% 2 months)  Energy Intake (Malnutrition): less than or equal to 50% for greater than or equal to 5 days  Subcutaneous Fat (Malnutrition): moderate depletion  Muscle Mass (Malnutrition): moderate depletion      Interventions/Recommendations (treatment strategy):  1. [place], Sodium modified diet  2. Commercial Beverage  3. Mineral supplement therapy for surgical incision healing  4. Collaboration with other providers.      Nutrition Diagnosis Status:   New

## 2023-10-30 NOTE — PLAN OF CARE
Pt tolerated session well. Pt Bed mobility,transfers, STS all SBA. Pt ambulated 70 ft x 2 with SBA with RW.     ADL: pt completed simple grooming with washing face with washcloth with set up assistance while seated in bedside chair.    D/C Rec: Low Intensity Therapy

## 2023-10-30 NOTE — SUBJECTIVE & OBJECTIVE
Interval History: 10/30- looks better, POD 2, pain under control, VSS, Afeb, HUMAIRA drain present, she walked in the hallways and ate a little plus drank a protein smoothie. Labs improving. LFTs normal Surgery and hepatology following. Check labs in am. Cont Abx, IS, ambulation.     Review of Systems   Constitutional:  Positive for activity change and appetite change. Negative for fatigue, fever and unexpected weight change.   HENT: Negative.     Eyes: Negative.  Negative for visual disturbance.   Respiratory: Negative.  Negative for cough, shortness of breath and wheezing.    Cardiovascular: Negative.    Gastrointestinal:  Positive for abdominal distention. Negative for abdominal pain, anal bleeding, blood in stool, constipation, diarrhea, nausea, rectal pain and vomiting.   Endocrine: Negative for polyuria.   Genitourinary: Negative.    Musculoskeletal:  Negative for arthralgias and gait problem.   Skin: Negative.    Allergic/Immunologic: Negative.    Neurological:  Positive for weakness.   Hematological: Negative.    Psychiatric/Behavioral: Negative.       Objective:     Vital Signs (Most Recent):  Temp: 99.4 °F (37.4 °C) (10/30/23 1544)  Pulse: 81 (10/30/23 1544)  Resp: 18 (10/30/23 1544)  BP: 110/65 (10/30/23 1544)  SpO2: 97 % (10/30/23 1544) Vital Signs (24h Range):  Temp:  [97.3 °F (36.3 °C)-99.4 °F (37.4 °C)] 99.4 °F (37.4 °C)  Pulse:  [80-86] 81  Resp:  [16-18] 18  SpO2:  [93 %-97 %] 97 %  BP: (106-139)/(61-70) 110/65     Weight: 45.8 kg (100 lb 15.5 oz)  Body mass index is 18.47 kg/m².    Intake/Output Summary (Last 24 hours) at 10/30/2023 1808  Last data filed at 10/30/2023 1545  Gross per 24 hour   Intake 490 ml   Output 930 ml   Net -440 ml         Physical Exam  Vitals reviewed.   Constitutional:       Comments: Chronically ill appearing   Eyes:      Conjunctiva/sclera: Conjunctivae normal.   Cardiovascular:      Rate and Rhythm: Normal rate.   Pulmonary:      Effort: Pulmonary effort is normal.    Abdominal:      Palpations: Abdomen is soft.      Comments: Appropriately TTP, incisions c/d/I, HUMAIRA drain serous    Neurological:      General: No focal deficit present.      Mental Status: She is alert.   Psychiatric:         Mood and Affect: Mood normal.             Significant Labs: All pertinent labs within the past 24 hours have been reviewed.  CBC:   Recent Labs   Lab 10/29/23  0446   WBC 13.72*   HGB 9.7*   HCT 30.2*        CMP:   Recent Labs   Lab 10/29/23  0446      K 4.5      CO2 23   GLU 70   BUN 19   CREATININE 0.6   CALCIUM 8.1*   PROT 5.6*   ALBUMIN 1.3*   BILITOT 0.3   ALKPHOS 66   AST 20   ALT 9*   ANIONGAP 8       Significant Imaging: I have reviewed all pertinent imaging results/findings within the past 24 hours.

## 2023-10-30 NOTE — PROGRESS NOTES
[facility] - Telemetry ([facility])  Hepatology  Consult Note    Patient Name: Lakisha Vance  MRN: [id number]  Admission Date: 10/26/2023  Hospital Length of Stay: 0 days  Attending Provider: Sandeep Martinez MD   Primary Care Physician: Pranav Gonzalez MD  Principal Problem:Calculus of gallbladder with acute cholecystitis without obstruction    Consults  Subjective:     Transplant status: No    HPI: 63 y.o female with alcohol liver cirrhosis  was transferred from [facility] for abdominal pain. Patient was seen by me in clinic few weeks before for elevated liver enzymes with alcohol related liver disease. Hepatology was consulted for the same    Patient reports Abdominal Pain in the RUQ, Dull aching associated nausea and emesis. Denies hematemesis, fluid overload, confusion, oliguria, fever. Pain improved with symptomatic treatment and No vomiting since admission    US showed nodular liver, gallbladder is hydropic with sludge and small dependent echogenic calculi. The gallbladder wall is mildly thickened and edematous. CT scan showed distended gallbladder and mild ascites. No CBD dilatation. Patient just back for HIDA scan      Interval History: s/p robotic cholecystectomy.  Found to have gangrenous and perforated cholecystitis, liver cirrhotic in appearance.  Doing well overall.  LFTs remaining WNL    Review of Systems  Constitutional:  Positive for activity change and appetite change. Negative for fatigue, fever and unexpected weight change.   HENT: Negative.     Eyes: Negative.  Negative for visual disturbance.   Respiratory: Negative.  Negative for cough, shortness of breath and wheezing.    Cardiovascular: Negative.    Gastrointestinal:  Positive for abdominal pain. Negative for abdominal distention, anal bleeding, blood in stool, constipation, diarrhea, nausea, rectal pain and vomiting.   Endocrine: Negative for polyuria.   Genitourinary: Negative.    Musculoskeletal:  Negative for arthralgias and gait problem.    Skin: Negative.    Allergic/Immunologic: Negative.    Neurological: Negative.    Hematological: Negative.    Psychiatric/Behavioral: Negative.        Past Medical History:   Diagnosis Date    Hypertension        Past Surgical History:   Procedure Laterality Date    ESOPHAGOGASTRODUODENOSCOPY N/A 9/1/2023    Procedure: ESOPHAGOGASTRODUODENOSCOPY (EGD);  Surgeon: Nerissa Ash MD;  Location: [facility] ENDO;  Service: Endoscopy;  Laterality: N/A;    HYSTERECTOMY      ROBOT-ASSISTED CHOLECYSTECTOMY USING DA BJ XI N/A 10/28/2023    Procedure: XI ROBOTIC CHOLECYSTECTOMY;  Surgeon: Valentina De La Rosa MD;  Location: [facility] OR;  Service: General;  Laterality: N/A;       Family history of liver disease: No    Review of patient's allergies indicates:   Allergen Reactions    Baclofen Other (See Comments)     Vomiting, confusion, shaking         Tobacco Use    Smoking status: Every Day     Current packs/day: 0.50     Types: Cigarettes    Smokeless tobacco: Never   Substance and Sexual Activity    Alcohol use: Yes     Comment: social    Drug use: Not on file    Sexual activity: Not on file       Medications Prior to Admission   Medication Sig Dispense Refill Last Dose    cyanocobalamin (VITAMIN B-12) 100 MCG tablet Take 100 mcg by mouth once daily.       folic acid (FOLVITE) 1 MG tablet Take 1 tablet by mouth once daily.       furosemide (LASIX) 20 MG tablet Take 20 mg by mouth.       ondansetron (ZOFRAN-ODT) 4 MG TbDL Take 1 tablet (4 mg total) by mouth every 8 (eight) hours as needed (nausea/vomiting). 12 tablet 1     pantoprazole (PROTONIX) 40 MG tablet Take 1 tablet (40 mg total) by mouth 2 (two) times daily. 60 tablet 11     spironolactone (ALDACTONE) 50 MG tablet Take 1 tablet (50 mg total) by mouth 2 (two) times daily. 60 tablet 11        Objective:     Vital Signs (Most Recent):  Temp: 97.7 °F (36.5 °C) (10/30/23 1150)  Pulse: 86 (10/30/23 1150)  Resp: 18 (10/30/23 1150)  BP: 139/70 (10/30/23 1150)  SpO2: 96 %  (10/30/23 1150) Vital Signs (24h Range):  Temp:  [97.3 °F (36.3 °C)-98.4 °F (36.9 °C)] 97.7 °F (36.5 °C)  Pulse:  [79-91] 86  Resp:  [16-20] 18  SpO2:  [93 %-96 %] 96 %  BP: (106-139)/(61-70) 139/70     Weight: 45.8 kg (101 lb 0.9 oz) (10/30/23 0600)  Body mass index is 18.48 kg/m².    Physical Exam  Vitals and nursing note reviewed. Exam conducted with a chaperone present.   Constitutional:       Appearance: She is underweight. She is ill-appearing.   Cardiovascular:      Rate and Rhythm: Normal rate and regular rhythm.   Abdominal:      General: Abdomen is protuberant. Bowel sounds are normal.      Tenderness: There is abdominal tenderness in the right upper quadrant.        Musculoskeletal:      Right lower leg: No edema.      Left lower leg: No edema.   Skin:     General: Skin is warm and dry.      Capillary Refill: Capillary refill takes less than 2 seconds.   Neurological:      General: No focal deficit present.      Mental Status: She is alert and oriented to person, place, and time. Mental status is at baseline.         Significant Labs:  CBC:   Recent Labs   Lab 10/29/23  0446   WBC 13.72*   RBC 3.25*   HGB 9.7*   HCT 30.2*          BMP:   Recent Labs   Lab 10/29/23  0446   GLU 70      K 4.5      CO2 23   BUN 19   CREATININE 0.6   CALCIUM 8.1*       Coagulation:   Recent Labs   Lab 10/28/23  0524   INR 1.1         Significant Imaging:  Labs: Reviewed  MELD 3.0: 12 at 10/29/2023  4:46 AM  MELD-Na: 7 at 10/29/2023  4:46 AM  Calculated from:  Serum Creatinine: 0.6 mg/dL (Using min of 1 mg/dL) at 10/29/2023  4:46 AM  Serum Sodium: 138 mmol/L (Using max of 137 mmol/L) at 10/29/2023  4:46 AM  Total Bilirubin: 0.3 mg/dL (Using min of 1 mg/dL) at 10/29/2023  4:46 AM  Serum Albumin: 1.3 g/dL (Using min of 1.5 g/dL) at 10/29/2023  4:46 AM  INR(ratio): 1.1 at 10/28/2023  5:24 AM  Age at listing (hypothetical): 63 years  Sex: Female at 10/29/2023  4:46 AM       Assessment/Plan:     Active Diagnoses:     Diagnosis Date Noted POA    PRINCIPAL PROBLEM:  acute cholecystitis with gangrene of GB with perforation  [K80.00] 10/26/2023 Yes    Severe protein-calorie malnutrition [E43] 10/28/2023 Yes    Alcoholic cirrhosis of liver with ascites [K70.31] 10/26/2023 Yes    Acute gangrenous cholecystitis [K81.0] 07/10/2023 Yes    Moderate cigarette smoker (10-19 per day) [F17.210] 03/29/2022 Yes    HTN (hypertension) [I10] 08/13/2019 Yes      Problems Resolved During this Admission:       Cirrhosis   -No decompensation s/p Lap cholecystectomy  -Alcohol related  -No Clinically significant portal hypertension as Normal Platelets and no splenomegaly  -Sober > 2 months  -Follow up Outpatient in Hepatology after discharge    [facility] as Primary team      Thank you for your consult. I will follow-up with patient. Please contact us if you have any additional questions.    Lew Weaver MD  Hepatology  [facility] - Telemetry ([facility])

## 2023-10-30 NOTE — PROGRESS NOTES
[facility]  Progress Note    Patient Name: Lakisha Vance  MRN: [id number]  Patient Class: IP- Inpatient   Admission Date: 10/26/2023  Length of Stay: 0 days  Attending Physician: Sandeep Martinez MD  Primary Care Provider: Pranav Gonzalez MD        Subjective:     Principal Problem:Calculus of gallbladder with acute cholecystitis without obstruction        HPI:  63-year-old female with hx  of HTN, headaches, chronic liver disease, alcohol use, ascites, EGD in September 2023 with reflux esophagitis/gastritis/portal hypertensive gastropathy, and cholelithiasis presented to [facility] ER on October 26 with epigastric abdominal pain that began the night prior to presentation associated with vomiting but no diarrhea, no fever and no hematemesis and no recent alcohol use. She was placed on Protonix in September but is not taking it due to insurance coverage issues. Pt has been worked up in our GI Dept for Alcoholic Liver disease vs Cirrhosis recently. Last Alcohol use about a month ago.    In the ER , WBC 17.4, H/H 12/ 35, platelets 423, Lipase 13, CMP normal. US Abdomin showed hepatomegaly and mildly cirrhotic liver configuration. Very small perihepatic ascites. Gallbladder sludge and cholelithiasis. CT abdomen and pelvis showed distended gallbladder with sludge.  Mild ascites. Edematous gastric wall.  Gastritis should be considered.  Mild hiatal hernia.  Appears to be wall thickening of the ascending colon suspicious for colitis. Prominent diverticulosis of the lower colon with no strong evidence for diverticulitis.     In the ER, pt had received famotidine, morphine, Zofran, and Zosyn.      ER consulted Dr. Jennings and Dr. Randhawa thru the [facility] for possible Cholecystitis vs Colitis and she was accepted. She is being placed in Obs under [facility] for General Surgery and Gastroenterology evaluation.               Overview/Hospital Course:  Appreciate GI and Gen Surgery  input- Feels a little better, abd pain a little better, controlled with pain meds. Getting IVF and Zosyn. NV much better. WBC 22k. H/H stable. Hida scan positive for Cholecystitis. Plan for Robotic Yari tomorrow.     10/28- Appreciate Dr. De La Rosa, pt seen pre and post op. K was low- started on NS w KCl pre op. Post op a little groggy and in mild to mod discomfort/pain. Operative Notes reviewed- Gangrenous gallbladder with lateral wall perforation with pus and bile in the abdomen.  Liver was cirrhotic and nodular.  Extensive friable and inflamed tissues. Cont IVF, IV Abx and pain meds per surgery. Check labs in am.      10/29- POD 1, looks and feels much better, skin color and turgor have improved, appears more relaxed, has been to the BR 3 times without any dizziness or weakness. VSS, Afeb, serosanguinous drain output, about 740 cc so far. Getting Zosyn, WBC down to 13.7, H/H 10/30, electrolytes better. LFTs normal, Albumin 1.3. pt tolerated CLD well. Ok to adv as tolerated.           Interval History: POD 1, looks and feels much better, skin color and turgor have improved, appears more relaxed, has been to the BR 3 times without any dizziness or weakness. VSS, Afeb, serosanguinous drain output, about 740 cc so far. Getting Zosyn, WBC down to 13.7, H/H 10/30, electrolytes better. LFTs normal, Albumin 1.3. pt tolerated CLD well. Ok to adv as tolerated.     Review of Systems   Constitutional:  Positive for activity change and appetite change. Negative for fatigue, fever and unexpected weight change.   HENT: Negative.     Eyes: Negative.  Negative for visual disturbance.   Respiratory: Negative.  Negative for cough, shortness of breath and wheezing.    Cardiovascular: Negative.    Gastrointestinal:  Positive for abdominal distention and abdominal pain. Negative for anal bleeding, blood in stool, constipation, diarrhea, nausea, rectal pain and vomiting.   Endocrine: Negative for polyuria.   Genitourinary: Negative.     Musculoskeletal:  Negative for arthralgias and gait problem.   Skin: Negative.    Allergic/Immunologic: Negative.    Neurological:  Positive for weakness.   Hematological: Negative.    Psychiatric/Behavioral: Negative.       Objective:     Vital Signs (Most Recent):  Temp: 98.1 °F (36.7 °C) (10/29/23 1213)  Pulse: 86 (10/29/23 1213)  Resp: 17 (10/29/23 1213)  BP: 111/66 (10/29/23 1213)  SpO2: 97 % (10/29/23 1213) Vital Signs (24h Range):  Temp:  [97.9 °F (36.6 °C)-100 °F (37.8 °C)] 98.1 °F (36.7 °C)  Pulse:  [] 86  Resp:  [17-25] 17  SpO2:  [92 %-100 %] 97 %  BP: ()/(50-66) 111/66     Weight: 46.7 kg (103 lb)  Body mass index is 18.84 kg/m².    Intake/Output Summary (Last 24 hours) at 10/29/2023 1332  Last data filed at 10/29/2023 0916  Gross per 24 hour   Intake 1500 ml   Output 795 ml   Net 705 ml         Physical Exam  Vitals reviewed.   Constitutional:       Appearance: She is not toxic-appearing.      Comments: Thin and frail appearing   HENT:      Head: Normocephalic and atraumatic.      Mouth/Throat:      Mouth: Mucous membranes are dry.   Eyes:      Extraocular Movements: Extraocular movements intact.      Conjunctiva/sclera: Conjunctivae normal.   Cardiovascular:      Rate and Rhythm: Normal rate.   Pulmonary:      Effort: Pulmonary effort is normal.   Abdominal:      Palpations: Abdomen is soft.      Comments: Appropriately TTP, incisions c/d/I, HUMAIRA drain in RUQ with serous fluid    Skin:     General: Skin is warm and dry.   Neurological:      General: No focal deficit present.      Mental Status: She is alert.   Psychiatric:         Mood and Affect: Mood normal.         Thought Content: Thought content normal.             Significant Labs: All pertinent labs within the past 24 hours have been reviewed.  CBC:   Recent Labs   Lab 10/28/23  0524 10/29/23  0446   WBC 19.57* 13.72*   HGB 10.6* 9.7*   HCT 33.4* 30.2*    393     CMP:   Recent Labs   Lab 10/28/23  0524 10/29/23  0446     138   K 3.3* 4.5    107   CO2 23 23   GLU 79 70   BUN 15 19   CREATININE 0.6 0.6   CALCIUM 8.3* 8.1*   PROT 6.3 5.6*   ALBUMIN 1.6* 1.3*   BILITOT 0.3 0.3   ALKPHOS 89 66   AST 11 20   ALT 7* 9*   ANIONGAP 9 8       Significant Imaging: I have reviewed all pertinent imaging results/findings within the past 24 hours.      Assessment/Plan:      * acute cholecystitis with gangrene of GB with perforation   Etiology unclear, pt with Alcoholic Cirrhosis  D/D includes Gastritis vs Cholecystitis vs Diverticulitis  Await GI and Surgery input  Hida Scan ordered to r/o Cholecystitis in am  Pt was empirically started on Zosyn in the ER, will continue  IV Protonix bid, Gentle Rehydration  Use MS prn     10/27- HIDA pos  Surgery planned for tmrw  Keep NPO after MN    10/28- s/p Robotic Cholecystectomy with gangrenous perforated GB  Cont Post op care, IV Abx, IVF, pain control    10/29- POD 2- doing better, comfortable, pain under control tolerated some liquid diet, got OOB and took a few steps    Severe protein-calorie malnutrition  Nutrition consulted. Most recent weight and BMI monitored-     Measurements:  Wt Readings from Last 1 Encounters:   10/30/23 45.8 kg (100 lb 15.5 oz)   Body mass index is 18.47 kg/m².    Patient has been screened and assessed by RD.    Malnutrition Type:  Context:    Level:      Malnutrition Characteristic Summary:       Interventions/Recommendations (treatment strategy):    Consult dietary on Monday- at present CLD      Encouraged to eat more    Alcoholic cirrhosis of liver with ascites  As seen on US and CT Abdomen and recent EGD with Portal Hypertensive Gastropathy  Await further input from GI    Appears compensated at present    Cirrhotic Nodular Liver seen during surgery    Doing well, no decompensation    Acute gangrenous cholecystitis  US abd showed sludge and gallstones  Await Hida scan in am  Also consider Gastritis vs Diverticulitis  Cont Zosyn    S/p Yari- doing better, abd pain  improving    Moderate cigarette smoker (10-19 per day)  Must quit, pt counseled > 5 mins      HTN (hypertension)  Chronic, controlled. Latest blood pressure and vitals reviewed-     Temp:  [97.3 °F (36.3 °C)-99.4 °F (37.4 °C)]   Pulse:  [80-86]   Resp:  [16-18]   BP: (106-139)/(61-70)   SpO2:  [93 %-97 %] .   Home meds for hypertension were reviewed and noted below.   Hypertension Medications             furosemide (LASIX) 20 MG tablet Take 20 mg by mouth.    spironolactone (ALDACTONE) 50 MG tablet Take 1 tablet (50 mg total) by mouth 2 (two) times daily.          While in the hospital, will manage blood pressure as follows; Continue home antihypertensive regimen    Will utilize p.r.n. blood pressure medication only if patient's blood pressure greater than 140/90 and she develops symptoms such as worsening chest pain or shortness of breath.      VTE Risk Mitigation (From admission, onward)         Ordered     enoxaparin injection 30 mg  Daily         10/26/23 1714     IP VTE HIGH RISK PATIENT  Once         10/26/23 1711     Place sequential compression device  Until discontinued         10/26/23 1711                Discharge Planning   ANDREAS:      Code Status: Full Code   Is the patient medically ready for discharge?:     Reason for patient still in hospital (select all that apply): Patient trending condition, Laboratory test, Treatment, Imaging, Consult recommendations and PT / OT recommendations  Discharge Plan A: Home, Home with family        Sandeep Martinez MD  Department of Hospital Medicine   [facility] - Telemetry ([facility])

## 2023-10-30 NOTE — ASSESSMENT & PLAN NOTE
Etiology unclear, pt with Alcoholic Cirrhosis  D/D includes Gastritis vs Cholecystitis vs Diverticulitis  Await GI and Surgery input  Hida Scan ordered to r/o Cholecystitis in am  Pt was empirically started on Zosyn in the ER, will continue  IV Protonix bid, Gentle Rehydration  Use MS prn     10/27- HIDA pos  Surgery planned for tmrw  Keep NPO after MN    10/28- s/p Robotic Cholecystectomy with gangrenous perforated GB  Cont Post op care, IV Abx, IVF, pain control    10/29- POD 1- doing better, comfortable, pain under control tolerated some liquid diet, got OOB and took a few steps    10/3-  POD 2- doing better. Cont IV abx, PT/OT  HUMAIRA drain working well  Ambulate

## 2023-10-30 NOTE — PROGRESS NOTES
[facility] - Telemetry ([facility])  General Surgery  Progress Note    Subjective:     History of Present Illness:  Lakisha Vance is a 63 y.o. female who presents with acute cholecystitis in the setting of cirrhosis and COPD.      Post-Op Info:  Procedure(s) (LRB):  XI ROBOTIC CHOLECYSTECTOMY (N/A)   2 Days Post-Op     Interval History: AFVSS.  Minimal PO intake.  Pain moderately well controlled. Walked today.  HUMAIRA drain output increased after walk     Medications:  Continuous Infusions:  Scheduled Meds:   enoxparin  30 mg Subcutaneous Daily    folic acid-vit B6-vit B12 2.5-25-2 mg  1 tablet Oral Daily    levalbuterol  1.2495 mg Nebulization Q8H    mirtazapine  15 mg Oral QHS    multivitamin  1 tablet Oral Daily    pantoprazole  40 mg Intravenous Daily    piperacillin-tazobactam (Zosyn) IV (PEDS and ADULTS) (extended infusion is not appropriate)  4.5 g Intravenous Q8H    sodium chloride 0.9%  10 mL Intravenous Q8H    thiamine  100 mg Oral Daily     PRN Meds:melatonin, morphine, naloxone, ondansetron, polyethylene glycol, promethazine (PHENERGAN) 12.5 mg in dextrose 5 % (D5W) 50 mL IVPB, senna-docusate 8.6-50 mg     Review of patient's allergies indicates:   Allergen Reactions    Baclofen Other (See Comments)     Vomiting, confusion, shaking     Objective:     Vital Signs (Most Recent):  Temp: 99.4 °F (37.4 °C) (10/30/23 1544)  Pulse: 81 (10/30/23 1544)  Resp: 18 (10/30/23 1544)  BP: 110/65 (10/30/23 1544)  SpO2: 97 % (10/30/23 1544) Vital Signs (24h Range):  Temp:  [97.3 °F (36.3 °C)-99.4 °F (37.4 °C)] 99.4 °F (37.4 °C)  Pulse:  [80-86] 81  Resp:  [16-18] 18  SpO2:  [93 %-97 %] 97 %  BP: (106-139)/(61-70) 110/65     Weight: 45.8 kg (101 lb 0.9 oz)  Body mass index is 18.48 kg/m².    Intake/Output - Last 3 Shifts         10/28 0700  10/29 0659 10/29 0700  10/30 0659 10/30 0700  10/31 0659    P.O.   480    I.V. (mL/kg)  10 (0.2)     IV Piggyback 1500      Total Intake(mL/kg) 1500 (32.1) 10 (0.2) 480 (10.5)     Urine (mL/kg/hr)  0 (0) 200 (0.4)    Drains 740 120 670    Other  60     Blood 25      Total Output 765 180 870    Net +735 -170 -390           Urine Occurrence  1 x 2 x             Physical Exam  Vitals reviewed.   Constitutional:       Comments: Chronically ill appearing   Eyes:      Conjunctiva/sclera: Conjunctivae normal.   Cardiovascular:      Rate and Rhythm: Normal rate.   Pulmonary:      Effort: Pulmonary effort is normal.   Abdominal:      Palpations: Abdomen is soft.      Comments: Appropriately TTP, incisions c/d/I, HUMAIRA drain serous    Neurological:      General: No focal deficit present.      Mental Status: She is alert.   Psychiatric:         Mood and Affect: Mood normal.          Significant Labs:  I have reviewed all pertinent lab results within the past 24 hours.  CBC:   Recent Labs   Lab 10/29/23  0446   WBC 13.72*   RBC 3.25*   HGB 9.7*   HCT 30.2*      MCV 93   MCH 29.8   MCHC 32.1     CMP:   Recent Labs   Lab 10/29/23  0446   GLU 70   CALCIUM 8.1*   ALBUMIN 1.3*   PROT 5.6*      K 4.5   CO2 23      BUN 19   CREATININE 0.6   ALKPHOS 66   ALT 9*   AST 20   BILITOT 0.3       Significant Diagnostics:  I have reviewed all pertinent imaging results/findings within the past 24 hours.    Assessment/Plan:     * acute cholecystitis with gangrene of GB with perforation   POD #2- s/p robotic cholecystectomy.  Found to have gangrenous and perforated cholecystitis, liver cirrhotic in appearance.  Doing well overall.  LFTs remaining WNL, no evidence of decompensation at this time    -- regular diet as tolerated  -- pain control as per primary team   -- Recommend several days abx for pus and bile spillage from the perforation   -- HUMAIRA drain to remain for a few days, still a little turbid.  Will have to discuss removal and management of her ascites that it will drain   -- ok to shower   -- PT/OT  -- encourage IS and ambulation     Severe protein-calorie malnutrition  Nutrition consulted. Most  recent weight and BMI monitored-     Measurements:  Wt Readings from Last 1 Encounters:   10/30/23 45.8 kg (101 lb 0.9 oz)   Body mass index is 18.48 kg/m².    Patient has been screened and assessed by RD.    Management as per primary team and RD    Alcoholic cirrhosis of liver with ascites  Management as per primary team and GI    Moderate cigarette smoker (10-19 per day)  Management as per primary     HTN (hypertension)  Management as per primary         Valentina De La Rosa MD  General Surgery  [facility] - Telemetry ([facility])

## 2023-10-30 NOTE — CONSULTS
[facility] - Telemetry ([facility])  Adult Nutrition  Consult Note    SUMMARY     Recommendations  1. Recommend pt diet order be modified to [place]; Low sodium diet.   2. Recommend pt receives boost plus TID.   3. Recommend pt receives PO intake encouragement during all meals.   4. Recommend pt receive Jose David BID   5. Weigh daily.    Goals:   1. Pt diet will be modified within 24 hr.   2. Pt will consume >50% of EEN/EPN prior to RD follow up.  3. Pt will receive and consume > 75% of Jose David prior to RD follow up  Nutrition Goal Status: new  Communication of RD Recs: other (comment) (POC: Sticky Note)    Assessment and Plan    Endocrine  Severe protein-calorie malnutrition  Malnutrition Type:  Context: acute illness or injury  Level: severe    Related to (etiology):   Alteration in GI tract function (acute cholecystitis with gangrene of GB with perforation, Alcoholic cirrhosis of liver with ascites)    Signs and Symptoms (as evidenced by):   Unable/Unwilling to eat sufficient energy/ protein to maintain a healthy weight, x10 days <25% PO intake    Malnutrition Characteristic Summary:  Weight Loss (Malnutrition): 7.5% in 3 months (10% 2 months)  Energy Intake (Malnutrition): less than or equal to 50% for greater than or equal to 5 days  Subcutaneous Fat (Malnutrition): moderate depletion  Muscle Mass (Malnutrition): moderate depletion      Interventions/Recommendations (treatment strategy):  1. [place], Sodium modified diet  2. Commercial Beverage  3. Mineral supplement therapy for surgical incision healing  4. Collaboration with other providers.      Nutrition Diagnosis Status:   New         Malnutrition Assessment  Malnutrition Context: acute illness or injury  Malnutrition Level: severe  Skin (Micronutrient): dry (Incision; Parveen = 20)       Weight Loss (Malnutrition): 7.5% in 3 months (10% 2 months)  Energy Intake (Malnutrition): less than or equal to 50% for greater than or equal to 5 days  Subcutaneous Fat  (Malnutrition): moderate depletion  Muscle Mass (Malnutrition): moderate depletion   Orbital Region (Subcutaneous Fat Loss): moderate depletion (Hollowing)   [place] Region (Muscle Loss): moderate depletion (Slight depression)                 Reason for Assessment    Reason For Assessment: consult  Diagnosis:  (acute cholecystitis with gangrene of GB with perforation)  Relevant Medical History: HTN, Moderate cigarette smoker (10-19 per day), Acute gangrenous cholecystitis, Alcoholic cirrhosis of liver with ascites  Interdisciplinary Rounds: did not attend  General Information Comments:  10/30: 63 y.o female admitted with active principal problem of acute cholecystitis with gangrene of GB with perforation. Pt currently has decreased appetite with PO intake of <25%. Pt states PO intake has been decreased for >10 days. NFPE not performed. Pt refuses, stating she is too tired to participate d/t recent PT/OT activities. Visual wasting noticed within pt temporal and orbital regions. Pt alerted that NFPE will be performed during RD follow up d/t concerns of decreased appetite and significant weight loss. Pt charted to have had unintentional weight loss of 11 lb wt loss within past 2 month, -10% wt change. The pt is currently charted to weigh 101 lb 0.9 oz, BMI 18.48 (Protein calorie malnutrition). Pt the pt denies any current an/v/d, swallowing and chewing difficulties. Pt currently on room air. Pt LB: 10/25, currently on PRN bowel regimen. Pt currently receiving multivitamin and Thiamine replacement. RD will continue to monitor.  Nutrition Discharge Planning: Low sodium    Wt Readings from Last 20 Encounters:   10/30/23 45.8 kg (100 lb 15.5 oz)   10/10/23 46.8 kg (103 lb 3.2 oz)   09/01/23 47.6 kg (105 lb)   08/23/23 51.1 kg (112 lb 12.2 oz)   08/15/23 49.4 kg (108 lb 12.8 oz)   07/26/23 47.5 kg (104 lb 12.8 oz)   07/06/23 48 kg (105 lb 12.8 oz)   06/15/23 47.6 kg (105 lb)   05/24/23 48.1 kg (106 lb)   06/30/22 48.6 kg  "(107 lb 2.3 oz)   12/02/20 55.9 kg (123 lb 3.2 oz)   08/27/19 54.9 kg (121 lb)   08/13/19 54.9 kg (121 lb)   ]    Nutrition Risk Screen    Nutrition Risk Screen: reduced oral intake over the last month, unintentional loss of 10 lbs or more in the past 2 months    Nutrition/Diet History    Spiritual, Cultural Beliefs, Pent[employer]alism Practices, Values that Affect Care: no  Food Allergies: NKFA  Factors Affecting Nutritional Intake: abdominal distension, abdominal pain, decreased appetite, excessive alcohol intake, constipation    Anthropometrics    Temp: 99.4 °F (37.4 °C)  Height Method: Stated  Height: 5' 2" (157.5 cm)  Height (inches): 62 in  Weight Method: Bed Scale  Weight: 45.8 kg (100 lb 15.5 oz)  Weight (lb): 100.97 lb  Ideal Body Weight (IBW), Female: 110 lb  % Ideal Body Weight, Female (lb): 91.79 %  BMI (Calculated): 18.5  BMI Grade: 17 - 18.4 protein-energy malnutrition grade I       Lab/Procedures/Meds  BMP  Lab Results   Component Value Date     10/29/2023    K 4.5 10/29/2023     10/29/2023    CO2 23 10/29/2023    BUN 19 10/29/2023    CREATININE 0.6 10/29/2023    CALCIUM 8.1 (L) 10/29/2023    ANIONGAP 8 10/29/2023    EGFRNORACEVR >60 10/29/2023     Lab Results   Component Value Date    CALCIUM 8.1 (L) 10/29/2023    PHOS 4.0 10/29/2023     No results for input(s): "POCTGLUCOSE" in the last 24 hours.  Lab Results   Component Value Date    ALT 9 (L) 10/29/2023    AST 20 10/29/2023    ALKPHOS 66 10/29/2023    BILITOT 0.3 10/29/2023       Pertinent Labs Reviewed: reviewed  Pertinent Medications Reviewed: reviewed  Scheduled Meds:   enoxparin  30 mg Subcutaneous Daily    folic acid-vit B6-vit B12 2.5-25-2 mg  1 tablet Oral Daily    levalbuterol  1.2495 mg Nebulization Q8H    mirtazapine  15 mg Oral QHS    multivitamin  1 tablet Oral Daily    pantoprazole  40 mg Intravenous Daily    piperacillin-tazobactam (Zosyn) IV (PEDS and ADULTS) (extended infusion is not appropriate)  4.5 g Intravenous Q8H    sodium " chloride 0.9%  10 mL Intravenous Q8H    thiamine  100 mg Oral Daily     Continuous Infusions:  PRN Meds:.melatonin, morphine, naloxone, ondansetron, polyethylene glycol, promethazine (PHENERGAN) 12.5 mg in dextrose 5 % (D5W) 50 mL IVPB, senna-docusate 8.6-50 mg      Estimated/Assessed Needs    Weight Used For Calorie Calculations: 45.8 kg (100 lb 15.5 oz)  Energy Calorie Requirements (kcal): 8790-6454 (30-35 kcal/kg per Underweight vs Liver disease)  Energy Need Method: Kcal/kg  Protein Requirements: 55-68 (1.2-1.5 g/kg per Underweight vs GI disorder)  Weight Used For Protein Calculations: 45.5 kg (100 lb 5 oz)  Fluid Requirements (mL): 1374- 1603  Estimated Fluid Requirement Method: RDA Method  RDA Method (mL): 1374  CHO Requirement: 172-200      Nutrition Prescription Ordered    Current Diet Order: [place]    Evaluation of Received Nutrient/Fluid Intake    I/O: +203 Since admit  Energy Calories Required: not meeting needs  Protein Required: not meeting needs  Fluid Required: not meeting needs  Tolerance: not tolerating  % Intake of Estimated Energy Needs: 25 - 50 %  % Meal Intake: 25 - 50 %    Nutrition Risk    Level of Risk/Frequency of Follow-up: high (x2 weekly)       Monitor and Evaluation    Food and Nutrient Intake: energy intake, food and beverage intake  Food and Nutrient Adminstration: diet order  Knowledge/Beliefs/Attitudes: food and nutrition knowledge/skill, beliefs and attitudes  Physical Activity and Function: factors affecting access to physical activity  Anthropometric Measurements: weight, weight change, body mass index  Biochemical Data, Medical Tests and Procedures: gastrointestinal profile, inflammatory profile  Nutrition-Focused Physical Findings: overall appearance, extremities, muscles and bones, head and eyes, skin       Nutrition Follow-Up    RD Follow-up?: Yes  Harry Carlos, Registration Eligible, Provisional LDN

## 2023-10-30 NOTE — PLAN OF CARE
GOOD PARTICIPATION TODAY. PT PROGRESSING WITH FUNCTIONAL MOBILITY AND GAIT. PT REQUIRES SBA FOR BED MOBILITY AND TFS. PT AMB 70 FT X 2 WITH SBA, NO AD, AND THERAPIST HOLDING J/P DRAIN. P.T. RECOMMENDS LOW INTENSITY THERAPY AT DISCHARGE.

## 2023-10-30 NOTE — PT/OT/SLP PROGRESS
Physical Therapy Treatment    Patient Name:  Lakisha Vance   MRN:  [id number]    Recommendations:     Discharge Recommendations: Low Intensity Therapy  Discharge Equipment Recommendations: walker, rolling, bath bench, shower chair  Barriers to discharge: None    Assessment:     Lakisha Vance is a 63 y.o. female admitted with a medical diagnosis of Cal[place] of gallbladder with acute cholecystitis without obstruction.  She presents with the following impairments/functional limitations: weakness, pain, impaired endurance, impaired self care skills, impaired functional mobility, decreased upper extremity function, decreased lower extremity function.    Rehab Prognosis: Good; patient would benefit from acute skilled PT services to address these deficits and reach maximum level of function.    Recent Surgery: Procedure(s) (LRB):  XI ROBOTIC CHOLECYSTECTOMY (N/A) 2 Days Post-Op    Plan:     During this hospitalization, patient to be seen 3 x/week to address the identified rehab impairments via gait training, therapeutic activities, therapeutic exercises and progress toward the following goals:    Plan of Care Expires:  11/10/23    Subjective     Chief Complaint: LOWER ABDOMINAL PAIN  Patient/Family Comments/goals: PT READY TO WALK  Pain/Comfort:  Pain Rating 1: 5/10  Location - Side 1: Bilateral  Location - Orientation 1: lower  Location 1: abdomen  Pain Addressed 1: Reposition, Distraction  Pain Rating Post-Intervention 1: 5/10      Objective:     Communicated with NURSE SLATER prior to session.  Patient found supine with peripheral IV, telemetry, HUMAIRA drain upon PT entry to room.     General Precautions: Standard, fall  Orthopedic Precautions: N/A  Braces: N/A  Respiratory Status: Room air     Functional Mobility:  Bed Mobility: ALL BED MOBILITY TASKS WERE PERFORMED VERY SLOWLY BY PT DUE TO FEAR OF SYMPTOM EXACERBATION.   Rolling Left:  stand by assistance  Scooting: stand by assistance  Supine to Sit: stand by  "assistance  Transfers:     Sit to Stand:  stand by assistance with no AD  Stand to Sit: stand by assistance with no AD  Gait: PT AMB 70 FT X 2 TRIALS WITH SBA, NO AD, AND THERAPIST HOLDING J/P DRAIN. PT PRESENTED WITH GOOD BALANCE AND REQUIRED ONE STANDING REST BREAK. PT DEMONSTRATED VERY SLOW GAIT SPEED AND SHORT SHUFFLING STEPS.  Balance: GOOD STATIC SITTING BALANCE EOB; GOOD DYNAMIC STANDING BALANCE DURING AMB;    AM-PAC 6 CLICK MOBILITY  Turning over in bed (including adjusting bedclothes, sheets and blankets)?: 4  Sitting down on and standing up from a chair with arms (e.g., wheelchair, bedside commode, etc.): 4  Moving from lying on back to sitting on the side of the bed?: 4  Moving to and from a bed to a chair (including a wheelchair)?: 4  Need to walk in hospital room?: 4  Climbing 3-5 steps with a railing?: 1 (NT)  Basic Mobility Total Score: 21     Treatment & Education:  PT EDUCATED ON P.T. POC AND ROLE OF PT IN THE ACUTE CARE SETTING.   PT EDUCATED ON BENEFITS OF UPRIGHT POSITION AND ENCOURAGED TO REMAIN IN SEATED POSITION FOR AS LONG AS TOLERATED.   PT EDUCATED ON "CALL DON'T FALL" POLICY AND INSTRUCTED TO USE CALL BUTTON WHEN WANTING TO CHANGE POSITIONS.   PT EDUCATED ON IMPORTANCE OF PHYSICAL ACTIVITY AND GIVEN THERAPEUTIC EXERCISE TO PERFORM THROUGHOUT THE DAY (SEATED MARCHES, LAQS, ANKLE PUMPS, 10 REPS BILATERALLY EACH)   PT EDUCATED ON RW SAFETY AND INSTRUCTIONS DURING TF'S AND GAIT.  PT PERFORMED THERAPEUTIC EXERCISE PROGRAM IN SITTING CONSISTING OF SEATED MARCHES, LAQS, AND ANKLE PUMPS (5 REPS EACH TO SHOW UNDERSTANDING).     Patient left up in chair with all lines intact, call button in reach, and chair alarm on..    GOALS:   Multidisciplinary Problems       Physical Therapy Goals          Problem: Physical Therapy    Goal Priority Disciplines Outcome Goal Variances Interventions   Physical Therapy Goal     PT, PT/OT Ongoing, Progressing     Description: Goals to be met by 11/10/23.  1. Pt will " complete bed mobility MOD I.  2. Pt will complete sit to stand MOD I.  3. Pt will ambulate 200ft MOD I.  4. Pt will increase AMPAC score by 2 points to progress functional mobility.                       Time Tracking:     PT Received On: 10/30/23  PT Start Time: 0920     PT Stop Time: 1000  PT Total Time (min): 40 min     Billable Minutes: Gait Training 10 and Therapeutic Activity 30    Treatment Type: Treatment  PT/PTA: PT     Number of PTA visits since last PT visit: 0     Tiffani Monreal SPT   10/30/2023

## 2023-10-31 LAB
ALBUMIN SERPL BCP-MCNC: 1.2 G/DL (ref 3.5–5.2)
ALP SERPL-CCNC: 93 U/L (ref 55–135)
ALT SERPL W/O P-5'-P-CCNC: 6 U/L (ref 10–44)
ANION GAP SERPL CALC-SCNC: 10 MMOL/L (ref 8–16)
AST SERPL-CCNC: 20 U/L (ref 10–40)
BASOPHILS # BLD AUTO: 0.08 K/UL (ref 0–0.2)
BASOPHILS NFR BLD: 0.8 % (ref 0–1.9)
BILIRUB SERPL-MCNC: 0.2 MG/DL (ref 0.1–1)
BUN SERPL-MCNC: 11 MG/DL (ref 8–23)
CALCIUM SERPL-MCNC: 8 MG/DL (ref 8.7–10.5)
CHLORIDE SERPL-SCNC: 108 MMOL/L (ref 95–110)
CO2 SERPL-SCNC: 19 MMOL/L (ref 23–29)
CREAT SERPL-MCNC: 0.5 MG/DL (ref 0.5–1.4)
DIFFERENTIAL METHOD: ABNORMAL
EOSINOPHIL # BLD AUTO: 0.4 K/UL (ref 0–0.5)
EOSINOPHIL NFR BLD: 3.4 % (ref 0–8)
ERYTHROCYTE [DISTWIDTH] IN BLOOD BY AUTOMATED COUNT: 13.2 % (ref 11.5–14.5)
EST. GFR  (NO RACE VARIABLE): >60 ML/MIN/1.73 M^2
GLUCOSE SERPL-MCNC: 83 MG/DL (ref 70–110)
HCT VFR BLD AUTO: 30.1 % (ref 37–48.5)
HGB BLD-MCNC: 9.7 G/DL (ref 12–16)
IMM GRANULOCYTES # BLD AUTO: 0.05 K/UL (ref 0–0.04)
IMM GRANULOCYTES NFR BLD AUTO: 0.5 % (ref 0–0.5)
LYMPHOCYTES # BLD AUTO: 2.3 K/UL (ref 1–4.8)
LYMPHOCYTES NFR BLD: 22.4 % (ref 18–48)
MCH RBC QN AUTO: 29.9 PG (ref 27–31)
MCHC RBC AUTO-ENTMCNC: 32.2 G/DL (ref 32–36)
MCV RBC AUTO: 93 FL (ref 82–98)
MONOCYTES # BLD AUTO: 1 K/UL (ref 0.3–1)
MONOCYTES NFR BLD: 10 % (ref 4–15)
NEUTROPHILS # BLD AUTO: 6.4 K/UL (ref 1.8–7.7)
NEUTROPHILS NFR BLD: 62.9 % (ref 38–73)
NRBC BLD-RTO: 0 /100 WBC
PLATELET # BLD AUTO: 436 K/UL (ref 150–450)
PMV BLD AUTO: 9.1 FL (ref 9.2–12.9)
POTASSIUM SERPL-SCNC: 3.8 MMOL/L (ref 3.5–5.1)
PROT SERPL-MCNC: 5.4 G/DL (ref 6–8.4)
RBC # BLD AUTO: 3.24 M/UL (ref 4–5.4)
SODIUM SERPL-SCNC: 137 MMOL/L (ref 136–145)
WBC # BLD AUTO: 10.25 K/UL (ref 3.9–12.7)

## 2023-10-31 PROCEDURE — 97530 THERAPEUTIC ACTIVITIES: CPT

## 2023-10-31 PROCEDURE — 99222 1ST HOSP IP/OBS MODERATE 55: CPT | Mod: ,,, | Performed by: INTERNAL MEDICINE

## 2023-10-31 PROCEDURE — 97116 GAIT TRAINING THERAPY: CPT

## 2023-10-31 PROCEDURE — 36415 COLL VENOUS BLD VENIPUNCTURE: CPT | Performed by: EMERGENCY MEDICINE

## 2023-10-31 PROCEDURE — 85025 COMPLETE CBC W/AUTO DIFF WBC: CPT | Performed by: EMERGENCY MEDICINE

## 2023-10-31 PROCEDURE — 25000003 PHARM REV CODE 250: Performed by: EMERGENCY MEDICINE

## 2023-10-31 PROCEDURE — C1751 CATH, INF, PER/CENT/MIDLINE: HCPCS

## 2023-10-31 PROCEDURE — 94761 N-INVAS EAR/PLS OXIMETRY MLT: CPT

## 2023-10-31 PROCEDURE — 63600175 PHARM REV CODE 636 W HCPCS: Performed by: SURGERY

## 2023-10-31 PROCEDURE — 25000003 PHARM REV CODE 250: Performed by: SURGERY

## 2023-10-31 PROCEDURE — C9113 INJ PANTOPRAZOLE SODIUM, VIA: HCPCS | Performed by: SURGERY

## 2023-10-31 PROCEDURE — 99024 POSTOP FOLLOW-UP VISIT: CPT | Mod: ,,, | Performed by: SURGERY

## 2023-10-31 PROCEDURE — A4216 STERILE WATER/SALINE, 10 ML: HCPCS | Performed by: SURGERY

## 2023-10-31 PROCEDURE — 99024 PR POST-OP FOLLOW-UP VISIT: ICD-10-PCS | Mod: ,,, | Performed by: SURGERY

## 2023-10-31 PROCEDURE — 25000242 PHARM REV CODE 250 ALT 637 W/ HCPCS: Performed by: SURGERY

## 2023-10-31 PROCEDURE — 99222 PR INITIAL HOSPITAL CARE,LEVL II: ICD-10-PCS | Mod: ,,, | Performed by: INTERNAL MEDICINE

## 2023-10-31 PROCEDURE — 94640 AIRWAY INHALATION TREATMENT: CPT

## 2023-10-31 PROCEDURE — 21400001 HC TELEMETRY ROOM

## 2023-10-31 PROCEDURE — 36569 INSJ PICC 5 YR+ W/O IMAGING: CPT

## 2023-10-31 PROCEDURE — 80053 COMPREHEN METABOLIC PANEL: CPT | Performed by: EMERGENCY MEDICINE

## 2023-10-31 RX ORDER — PANTOPRAZOLE SODIUM 40 MG/1
40 TABLET, DELAYED RELEASE ORAL 2 TIMES DAILY
Status: DISCONTINUED | OUTPATIENT
Start: 2023-10-31 | End: 2023-11-02 | Stop reason: HOSPADM

## 2023-10-31 RX ORDER — HYDROCODONE BITARTRATE AND ACETAMINOPHEN 5; 325 MG/1; MG/1
1 TABLET ORAL EVERY 6 HOURS PRN
Status: DISCONTINUED | OUTPATIENT
Start: 2023-10-31 | End: 2023-11-02 | Stop reason: HOSPADM

## 2023-10-31 RX ORDER — ONDANSETRON 4 MG/1
4 TABLET, ORALLY DISINTEGRATING ORAL EVERY 6 HOURS PRN
Status: DISCONTINUED | OUTPATIENT
Start: 2023-10-31 | End: 2023-11-02 | Stop reason: HOSPADM

## 2023-10-31 RX ADMIN — PANTOPRAZOLE SODIUM 40 MG: 40 INJECTION, POWDER, FOR SOLUTION INTRAVENOUS at 08:10

## 2023-10-31 RX ADMIN — PANTOPRAZOLE SODIUM 40 MG: 40 TABLET, DELAYED RELEASE ORAL at 08:10

## 2023-10-31 RX ADMIN — PIPERACILLIN SODIUM AND TAZOBACTAM SODIUM 4.5 G: 4; .5 INJECTION, POWDER, FOR SOLUTION INTRAVENOUS at 05:10

## 2023-10-31 RX ADMIN — THIAMINE HCL TAB 100 MG 100 MG: 100 TAB at 08:10

## 2023-10-31 RX ADMIN — ONDANSETRON 4 MG: 2 INJECTION INTRAMUSCULAR; INTRAVENOUS at 03:10

## 2023-10-31 RX ADMIN — LEVALBUTEROL HYDROCHLORIDE 1.25 MG: 0.63 SOLUTION RESPIRATORY (INHALATION) at 12:10

## 2023-10-31 RX ADMIN — HYDROCODONE BITARTRATE AND ACETAMINOPHEN 1 TABLET: 5; 325 TABLET ORAL at 07:10

## 2023-10-31 RX ADMIN — PIPERACILLIN SODIUM AND TAZOBACTAM SODIUM 4.5 G: 4; .5 INJECTION, POWDER, FOR SOLUTION INTRAVENOUS at 10:10

## 2023-10-31 RX ADMIN — MORPHINE SULFATE 2 MG: 2 INJECTION, SOLUTION INTRAMUSCULAR; INTRAVENOUS at 07:10

## 2023-10-31 RX ADMIN — MORPHINE SULFATE 2 MG: 2 INJECTION, SOLUTION INTRAMUSCULAR; INTRAVENOUS at 03:10

## 2023-10-31 RX ADMIN — Medication 10 ML: at 06:10

## 2023-10-31 RX ADMIN — ENOXAPARIN SODIUM 30 MG: 30 INJECTION SUBCUTANEOUS at 05:10

## 2023-10-31 RX ADMIN — MORPHINE SULFATE 2 MG: 2 INJECTION, SOLUTION INTRAMUSCULAR; INTRAVENOUS at 08:10

## 2023-10-31 RX ADMIN — HYDROCODONE BITARTRATE AND ACETAMINOPHEN 1 TABLET: 5; 325 TABLET ORAL at 01:10

## 2023-10-31 RX ADMIN — Medication 1 TABLET: at 08:10

## 2023-10-31 RX ADMIN — THERA TABS 1 TABLET: TAB at 08:10

## 2023-10-31 RX ADMIN — LEVALBUTEROL HYDROCHLORIDE 1.25 MG: 0.63 SOLUTION RESPIRATORY (INHALATION) at 07:10

## 2023-10-31 RX ADMIN — PIPERACILLIN SODIUM AND TAZOBACTAM SODIUM 4.5 G: 4; .5 INJECTION, POWDER, FOR SOLUTION INTRAVENOUS at 03:10

## 2023-10-31 RX ADMIN — MIRTAZAPINE 15 MG: 15 TABLET, FILM COATED ORAL at 08:10

## 2023-10-31 RX ADMIN — Medication 10 ML: at 10:10

## 2023-10-31 RX ADMIN — Medication 10 ML: at 03:10

## 2023-10-31 NOTE — PLAN OF CARE
Pt tolerated session fair this date. SBA with all bed mobility, STS, transfers and functional ambulation.     ADLs: Pt completed drinking boost with set up assistance for container management while seated at EOB.    D/C rec: low intensity therapy.

## 2023-10-31 NOTE — ASSESSMENT & PLAN NOTE
POD #3- s/p robotic cholecystectomy.  Found to have gangrenous and perforated cholecystitis, liver cirrhotic in appearance.  Doing well overall.  LFTs remaining WNL, no evidence of decompensation at this time    -- regular diet as tolerated  -- pain control as per primary team   -- Recommend several days abx for pus and bile spillage from the perforation   -- HUMAIRA drain to remain for a few days, still a little turbid.  Will have to discuss removal and management of her ascites that it will drain   -- ok to shower   -- PT/OT  -- encourage IS and ambulation

## 2023-10-31 NOTE — ASSESSMENT & PLAN NOTE
Nutrition consulted. Most recent weight and BMI monitored-     Measurements:  Wt Readings from Last 1 Encounters:   10/31/23 44.6 kg (98 lb 5.2 oz)   Body mass index is 17.98 kg/m².    Patient has been screened and assessed by RD.    Malnutrition Type:  Context: acute illness or injury  Level: severe    Malnutrition Characteristic Summary:  Weight Loss (Malnutrition): 7.5% in 3 months (10% 2 months)  Energy Intake (Malnutrition): less than or equal to 50% for greater than or equal to 5 days  Subcutaneous Fat (Malnutrition): moderate depletion  Muscle Mass (Malnutrition): moderate depletion    Interventions/Recommendations (treatment strategy):    Consult dietary on Monday- at present CLD  1. Recommend pt diet order be modified to [place]; Low sodium diet. 2. Recommend pt receives boost plus TID. 3. Recommend pt receives PO intake encouragement during all meals. 4. Weigh daily.   Encouraged to eat more    Ad Remeron to stimulate appetite

## 2023-10-31 NOTE — PLAN OF CARE
Updated patient on plan of care. HUMAIRA dressing CDI, large amounts of serous fluid output this shift. PICC line placed, xray verified before use. Instructed patient to use call light for assistance, call light in reach. Bed alarm on. Hourly rounding performed. Vitals q4 hours. Education provided, questions answered/encouraged. Chart check complete.     Problem: Adult Inpatient Plan of Care  Goal: Plan of Care Review  Outcome: Ongoing, Progressing

## 2023-10-31 NOTE — ASSESSMENT & PLAN NOTE
Nutrition consulted. Most recent weight and BMI monitored-     Measurements:  Wt Readings from Last 1 Encounters:   10/31/23 32.7 kg (72 lb 1.5 oz)   Body mass index is 13.19 kg/m².    Patient has been screened and assessed by RD.    Management as per primary team and RD

## 2023-10-31 NOTE — SUBJECTIVE & OBJECTIVE
Interval History: gradually improving, lying in bed but walked in the halls earlier. Still has large HUMAIRA drain, about 650 cc. Getting Zosyn. New Picc line placed. CXR clear. WBC normal. Alb 1.2- dietary consulted, getting boost plus Jose David w each meal, pt encouraged to eat, also started on Remeron to improve appetite.     Review of Systems   Constitutional:  Positive for activity change and appetite change. Negative for fatigue, fever and unexpected weight change.   HENT: Negative.     Eyes: Negative.  Negative for visual disturbance.   Respiratory: Negative.  Negative for cough, shortness of breath and wheezing.    Cardiovascular: Negative.    Gastrointestinal:  Positive for abdominal distention. Negative for abdominal pain, anal bleeding, blood in stool, constipation, diarrhea, nausea, rectal pain and vomiting.   Endocrine: Negative for polyuria.   Genitourinary: Negative.    Musculoskeletal:  Negative for arthralgias and gait problem.   Skin: Negative.    Allergic/Immunologic: Negative.    Neurological:  Positive for weakness.   Hematological: Negative.    Psychiatric/Behavioral: Negative.       Objective:     Vital Signs (Most Recent):  Temp: 98.2 °F (36.8 °C) (10/31/23 1600)  Pulse: 82 (10/31/23 1600)  Resp: 18 (10/31/23 1600)  BP: 128/68 (10/31/23 1600)  SpO2: 96 % (10/31/23 1600) Vital Signs (24h Range):  Temp:  [97.9 °F (36.6 °C)-98.3 °F (36.8 °C)] 98.2 °F (36.8 °C)  Pulse:  [79-90] 82  Resp:  [14-20] 18  SpO2:  [94 %-96 %] 96 %  BP: (115-130)/(68-70) 128/68     Weight: 44.6 kg (98 lb 5.2 oz)  Body mass index is 17.98 kg/m².    Intake/Output Summary (Last 24 hours) at 10/31/2023 1656  Last data filed at 10/31/2023 1525  Gross per 24 hour   Intake 1735.92 ml   Output 915 ml   Net 820.92 ml         Physical Exam  Vitals and nursing note reviewed.   Constitutional:       General: She is not in acute distress.     Appearance: Normal appearance.   HENT:      Head: Normocephalic and atraumatic.   Eyes:       Extraocular Movements: Extraocular movements intact.      Conjunctiva/sclera: Conjunctivae normal.   Cardiovascular:      Rate and Rhythm: Normal rate and regular rhythm.   Pulmonary:      Effort: Pulmonary effort is normal.   Abdominal:      General: Abdomen is flat. There is no distension.      Palpations: Abdomen is soft. There is no mass.      Tenderness: There is no abdominal tenderness. There is no guarding or rebound.      Hernia: No hernia is present.      Comments: Incision c/d/I , HUMAIRA drain serous    Musculoskeletal:         General: No swelling, tenderness, deformity or signs of injury. Normal range of motion.   Skin:     General: Skin is warm and dry.      Coloration: Skin is not jaundiced.   Neurological:      General: No focal deficit present.      Mental Status: She is alert and oriented to person, place, and time.   Psychiatric:         Mood and Affect: Mood normal.         Behavior: Behavior normal.         Thought Content: Thought content normal.             Significant Labs: All pertinent labs within the past 24 hours have been reviewed.  CBC:   Recent Labs   Lab 10/31/23  0446   WBC 10.25   HGB 9.7*   HCT 30.1*        CMP:   Recent Labs   Lab 10/31/23  0446      K 3.8      CO2 19*   GLU 83   BUN 11   CREATININE 0.5   CALCIUM 8.0*   PROT 5.4*   ALBUMIN 1.2*   BILITOT 0.2   ALKPHOS 93   AST 20   ALT 6*   ANIONGAP 10       Significant Imaging: I have reviewed all pertinent imaging results/findings within the past 24 hours.

## 2023-10-31 NOTE — ASSESSMENT & PLAN NOTE
US abd showed sludge and gallstones  Await Hida scan in am  Also consider Gastritis vs Diverticulitis  Cont Zosyn    S/p Yari- doing better, abd pain improving  POD 2- getting better    POD 3- cont Zosyn

## 2023-10-31 NOTE — PROGRESS NOTES
[facility] - Telemetry ([facility])  Hepatology  Consult Note    Patient Name: Lakisha Vance  MRN: [id number]  Admission Date: 10/26/2023  Hospital Length of Stay: 1 days  Attending Provider: Sandeep Martinez MD   Primary Care Physician: Pranav Gonzalez MD  Principal Problem:Calculus of gallbladder with acute cholecystitis without obstruction      Consult Start Time: 10/31/2023 09:30 CDT  Consult End Time: 10/31/2023 10:00 CDT          Consults  Subjective:     Transplant status: No    HPI: 63 y.o female with alcohol liver cirrhosis  was transferred from [facility] for abdominal pain. Patient was seen by me in clinic few weeks before for elevated liver enzymes with alcohol related liver disease. Hepatology was consulted for the same    Patient reports Abdominal Pain in the RUQ, Dull aching associated nausea and emesis. Denies hematemesis, fluid overload, confusion, oliguria, fever. Pain improved with symptomatic treatment and No vomiting since admission    US showed nodular liver, gallbladder is hydropic with sludge and small dependent echogenic calculi. The gallbladder wall is mildly thickened and edematous. CT scan showed distended gallbladder and mild ascites. No CBD dilatation. Patient just back for HIDA scan     Now s/p robotic cholecystectomy.  Found to have gangrenous and perforated cholecystitis, liver cirrhotic in appearance.     Interval History: Doing well overall.  LFTs remaining WNL    Review of Systems  Constitutional:  Positive for activity change and appetite change. Negative for fatigue, fever and unexpected weight change.   HENT: Negative.     Eyes: Negative.  Negative for visual disturbance.   Respiratory: Negative.  Negative for cough, shortness of breath and wheezing.    Cardiovascular: Negative.    Gastrointestinal:  Positive for abdominal pain. Negative for abdominal distention, anal bleeding, blood in stool, constipation, diarrhea, nausea, rectal pain and vomiting.   Endocrine: Negative for  polyuria.   Genitourinary: Negative.    Musculoskeletal:  Negative for arthralgias and gait problem.   Skin: Negative.    Allergic/Immunologic: Negative.    Neurological: Negative.    Hematological: Negative.    Psychiatric/Behavioral: Negative.        Past Medical History:   Diagnosis Date    Hypertension        Past Surgical History:   Procedure Laterality Date    ESOPHAGOGASTRODUODENOSCOPY N/A 9/1/2023    Procedure: ESOPHAGOGASTRODUODENOSCOPY (EGD);  Surgeon: Nerissa Ash MD;  Location: [facility] ENDO;  Service: Endoscopy;  Laterality: N/A;    HYSTERECTOMY      ROBOT-ASSISTED CHOLECYSTECTOMY USING DA BJ XI N/A 10/28/2023    Procedure: XI ROBOTIC CHOLECYSTECTOMY;  Surgeon: Valentina De La Rosa MD;  Location: [facility] OR;  Service: General;  Laterality: N/A;       Family history of liver disease: No    Review of patient's allergies indicates:   Allergen Reactions    Baclofen Other (See Comments)     Vomiting, confusion, shaking         Tobacco Use    Smoking status: Every Day     Current packs/day: 0.50     Types: Cigarettes    Smokeless tobacco: Never   Substance and Sexual Activity    Alcohol use: Yes     Comment: social    Drug use: Not on file    Sexual activity: Not on file       Medications Prior to Admission   Medication Sig Dispense Refill Last Dose    cyanocobalamin (VITAMIN B-12) 100 MCG tablet Take 100 mcg by mouth once daily.       folic acid (FOLVITE) 1 MG tablet Take 1 tablet by mouth once daily.       furosemide (LASIX) 20 MG tablet Take 20 mg by mouth.       ondansetron (ZOFRAN-ODT) 4 MG TbDL Take 1 tablet (4 mg total) by mouth every 8 (eight) hours as needed (nausea/vomiting). 12 tablet 1     pantoprazole (PROTONIX) 40 MG tablet Take 1 tablet (40 mg total) by mouth 2 (two) times daily. 60 tablet 11     spironolactone (ALDACTONE) 50 MG tablet Take 1 tablet (50 mg total) by mouth 2 (two) times daily. 60 tablet 11        Objective:     Vital Signs (Most Recent):  Temp: 97.9 °F (36.6 °C) (10/31/23  0740)  Pulse: 87 (10/31/23 0746)  Resp: 16 (10/31/23 0746)  BP: 115/68 (10/31/23 0740)  SpO2: (!) 94 % (10/31/23 0746) Vital Signs (24h Range):  Temp:  [97.7 °F (36.5 °C)-99.4 °F (37.4 °C)] 97.9 °F (36.6 °C)  Pulse:  [80-90] 87  Resp:  [14-20] 16  SpO2:  [94 %-97 %] 94 %  BP: (110-139)/(65-70) 115/68     Weight: 32.7 kg (72 lb 1.5 oz) (10/31/23 0003)  Body mass index is 13.19 kg/m².    Physical Exam  Vitals and nursing note reviewed. Exam conducted with a chaperone present.   Constitutional:       Appearance: She is underweight. She is ill-appearing.   Cardiovascular:      Rate and Rhythm: Normal rate and regular rhythm.   Abdominal:      General: Abdomen is protuberant. Bowel sounds are normal.      Tenderness: There is abdominal tenderness in the right upper quadrant.        Musculoskeletal:      Right lower leg: No edema.      Left lower leg: No edema.   Skin:     General: Skin is warm and dry.      Capillary Refill: Capillary refill takes less than 2 seconds.   Neurological:      General: No focal deficit present.      Mental Status: She is alert and oriented to person, place, and time. Mental status is at baseline.         Significant Labs:  CBC:   Recent Labs   Lab 10/31/23  0446   WBC 10.25   RBC 3.24*   HGB 9.7*   HCT 30.1*          BMP:   Recent Labs   Lab 10/31/23  0446   GLU 83      K 3.8      CO2 19*   BUN 11   CREATININE 0.5   CALCIUM 8.0*       Coagulation:   Recent Labs   Lab 10/28/23  0524   INR 1.1         Significant Imaging:  Labs: Reviewed  MELD 3.0: 12 at 10/29/2023  4:46 AM  MELD-Na: 7 at 10/29/2023  4:46 AM  Calculated from:  Serum Creatinine: 0.6 mg/dL (Using min of 1 mg/dL) at 10/29/2023  4:46 AM  Serum Sodium: 138 mmol/L (Using max of 137 mmol/L) at 10/29/2023  4:46 AM  Total Bilirubin: 0.3 mg/dL (Using min of 1 mg/dL) at 10/29/2023  4:46 AM  Serum Albumin: 1.3 g/dL (Using min of 1.5 g/dL) at 10/29/2023  4:46 AM  INR(ratio): 1.1 at 10/28/2023  5:24 AM  Age at listing  (hypothetical): 63 years  Sex: Female at 10/29/2023  4:46 AM       Assessment/Plan:     Active Diagnoses:    Diagnosis Date Noted POA    PRINCIPAL PROBLEM:  acute cholecystitis with gangrene of GB with perforation  [K80.00] 10/26/2023 Yes    Severe protein-calorie malnutrition [E43] 10/28/2023 Yes    Alcoholic cirrhosis of liver with ascites [K70.31] 10/26/2023 Yes    Acute gangrenous cholecystitis s/p Robotic Cholecystectomy [K81.0] 07/10/2023 Yes    Moderate cigarette smoker (10-19 per day) [F17.210] 03/29/2022 Yes    HTN (hypertension) [I10] 08/13/2019 Yes      Problems Resolved During this Admission:       Alcohol related Cirrhosis   -No decompensation s/p Lap cholecystectomy  -Alcohol related  -Sober > 2 months  -Follow up Outpatient in Hepatology after discharge    acute cholecystitis with gangrene of GB with perforation   S/p Surgery    Severe PEM  Nutritional consult    [facility] as Primary team      Thank you for your consult. I will follow-up with patient. Please contact us if you have any additional questions.    Lew Weaver MD  Hepatology  [facility] - Telemetry ([facility])

## 2023-10-31 NOTE — PLAN OF CARE
PT PROGRESSING WITH FUNCTIONAL MOBILITY AND GAIT. PT REQUIRES SBA FOR BED MOBILITY, TFS, AND AMB. PT  FT WITH SBA, RW, AND THERAPIST HOLDING HUMAIRA DRAIN. P.T. RECOMMENDS LOW INTENSITY THERAPY AT DISCHARGE.

## 2023-10-31 NOTE — PT/OT/SLP PROGRESS
Physical Therapy Treatment    Patient Name:  Lakisha Vance   MRN:  [id number]    Recommendations:     Discharge Recommendations: Low Intensity Therapy  Discharge Equipment Recommendations: walker, rolling, shower chair, bedside commode  Barriers to discharge: None    Assessment:     Lakisha Vance is a 63 y.o. female admitted with a medical diagnosis of Calculus of gallbladder with acute cholecystitis without obstruction.  She presents with the following impairments/functional limitations: weakness, impaired endurance, impaired self care skills, impaired functional mobility, pain, decreased upper extremity function, decreased lower extremity function.    Rehab Prognosis: Good; patient would benefit from acute skilled PT services to address these deficits and reach maximum level of function.    Recent Surgery: Procedure(s) (LRB):  XI ROBOTIC CHOLECYSTECTOMY (N/A) 3 Days Post-Op    Plan:     During this hospitalization, patient to be seen 3 x/week to address the identified rehab impairments via gait training, therapeutic activities, therapeutic exercises and progress toward the following goals:    Plan of Care Expires:  11/10/23    Subjective     Chief Complaint: PAIN IN LOWER ABDOMEN  Patient/Family Comments/goals: PT REPORTS FEELING INCREASE IN ABDOMINAL PAIN AND WOUND DRAINAGE YESTERDAY FOLLOWING THERAPY.   Pain/Comfort:  Pain Rating 1: 7/10  Location - Side 1: Bilateral  Location - Orientation 1: lower  Location 1: abdomen  Pain Addressed 1: Reposition, Distraction (ACTIVITY PACING)  Pain Rating Post-Intervention 1: 7/10      Objective:     Communicated with NURSE ZAMORA prior to session.  Patient found supine with HUMAIRA drain, peripheral IV, bed alarm upon PT entry to room.     General Precautions: Standard, fall  Orthopedic Precautions: N/A  Braces: N/A  Respiratory Status: Room air     Functional Mobility:  Bed Mobility: PT CONTINUES TO PERFORM ALL BED MOBILITY TASKS SLOWLY DUE TO ABDOMINAL PAIN. HOWEVER, PT  "DEMONSTRATES IMPROVEMENT IN SPEED AS COMPARED TO LAST SESSION ON 10/30/23.    Scooting: stand by assistance  Supine to Sit: stand by assistance  Transfers:     Sit to Stand:  stand by assistance with rolling walker  Gait: PT  FT WITH RW, SBA, AND THERAPIST HOLDING HUMAIRA DRAIN. PT CONTINUES TO DEMONSTRATE SLOW GAIT SPEED BUT WITH IMPROVEMENT AS COMPARED TO LAST SESSION ON 10/30/23. PT PRESENTED STEADY ON FEET AND HAD NO LOB.   Balance: GOOD STATIC SITTING BALANCE EOB; GOOD DYNAMIC STANDING BALANCE DURING AMB;    AM-PAC 6 CLICK MOBILITY  Turning over in bed (including adjusting bedclothes, sheets and blankets)?: 4  Sitting down on and standing up from a chair with arms (e.g., wheelchair, bedside commode, etc.): 4  Moving from lying on back to sitting on the side of the bed?: 4  Moving to and from a bed to a chair (including a wheelchair)?: 4  Need to walk in hospital room?: 4  Climbing 3-5 steps with a railing?: 1 (NT)  Basic Mobility Total Score: 21     Treatment & Education:  PT EDUCATED ON P.T. POC AND ROLE OF PT IN THE ACUTE CARE SETTING.   PT EDUCATED ON BENEFITS OF UPRIGHT POSITION AND ENCOURAGED TO RETURN TO SEATED POSITION THROUGHOUT THE DAY FOR AS LONG AS TOLERATED.   PT EDUCATED ON "CALL DON'T FALL" POLICY AND INSTRUCTED TO USE CALL BUTTON WHEN WANTING TO CHANGE POSITIONS.   PT EDUCATED ON IMPORTANCE OF PHYSICAL ACTIVITY AND GIVEN THERAPEUTIC EXERCISE TO PERFORM THROUGHOUT THE DAY (SEATED MARCHES, LAQS, ANKLE PUMPS, 10 REPS BILATERALLY EACH)   PT EDUCATED ON RW SAFETY AND INSTRUCTIONS DURING TF'S AND GAIT.  PT DECLINED PARTICIPATION IN THERAPEUTIC EXERCISE AT THIS TIME DUE TO PAIN.     Patient left HOB elevated with all lines intact, call button within reach, bed alarm on. PT REFUSED TO SIT UPRIGHT IN CHAIR AT THIS TIME DUE TO PAIN. PT STATES SHE WILL SIT IN CHAIR LATER IN THE DAY.     GOALS:   Multidisciplinary Problems       Physical Therapy Goals          Problem: Physical Therapy    Goal Priority " Disciplines Outcome Goal Variances Interventions   Physical Therapy Goal     PT, PT/OT Ongoing, Progressing     Description: Goals to be met by 11/10/23.  1. Pt will complete bed mobility MOD I.  2. Pt will complete sit to stand MOD I.  3. Pt will ambulate 200ft MOD I.  4. Pt will increase AMPAC score by 2 points to progress functional mobility.                       Time Tracking:     PT Received On: 10/31/23  PT Start Time: 0925     PT Stop Time: 0950  PT Total Time (min): 25 min     Billable Minutes: Gait Training 10 and Therapeutic Activity 15    Treatment Type: Treatment  PT/PTA: PT     Number of PTA visits since last PT visit: 0     GEORGETTE Ramos  10/31/2023

## 2023-10-31 NOTE — SUBJECTIVE & OBJECTIVE
Interval History: AFVSS.  VANESSA.      Medications:  Continuous Infusions:  Scheduled Meds:   enoxparin  30 mg Subcutaneous Daily    folic acid-vit B6-vit B12 2.5-25-2 mg  1 tablet Oral Daily    levalbuterol  1.2495 mg Nebulization Q8H    mirtazapine  15 mg Oral QHS    multivitamin  1 tablet Oral Daily    pantoprazole  40 mg Intravenous Daily    piperacillin-tazobactam (Zosyn) IV (PEDS and ADULTS) (extended infusion is not appropriate)  4.5 g Intravenous Q8H    sodium chloride 0.9%  10 mL Intravenous Q8H    thiamine  100 mg Oral Daily     PRN Meds:melatonin, morphine, naloxone, ondansetron, polyethylene glycol, promethazine (PHENERGAN) 12.5 mg in dextrose 5 % (D5W) 50 mL IVPB, senna-docusate 8.6-50 mg     Review of patient's allergies indicates:   Allergen Reactions    Baclofen Other (See Comments)     Vomiting, confusion, shaking     Objective:     Vital Signs (Most Recent):  Temp: 97.9 °F (36.6 °C) (10/31/23 0740)  Pulse: 87 (10/31/23 0746)  Resp: 16 (10/31/23 0746)  BP: 115/68 (10/31/23 0740)  SpO2: (!) 94 % (10/31/23 0746) Vital Signs (24h Range):  Temp:  [97.7 °F (36.5 °C)-99.4 °F (37.4 °C)] 97.9 °F (36.6 °C)  Pulse:  [80-90] 87  Resp:  [14-20] 16  SpO2:  [94 %-97 %] 94 %  BP: (110-139)/(65-70) 115/68     Weight: 32.7 kg (72 lb 1.5 oz)  Body mass index is 13.19 kg/m².    Intake/Output - Last 3 Shifts         10/29 0700  10/30 0659 10/30 0700  10/31 0659 10/31 0700  11/01 0659    P.O.  720 120    I.V. (mL/kg) 10 (0.2)      IV Piggyback   1135.9    Total Intake(mL/kg) 10 (0.2) 720 (22) 1255.9 (38.4)    Urine (mL/kg/hr) 0 (0) 200 (0.3) 0 (0)    Drains 120 920 65    Other 60      Blood       Total Output 180 1120 65    Net -170 -400 +1190.9           Urine Occurrence 1 x 3 x 1 x             Physical Exam  Constitutional:       General: She is not in acute distress.     Appearance: Normal appearance.   HENT:      Head: Normocephalic and atraumatic.   Eyes:      Extraocular Movements: Extraocular movements intact.       Conjunctiva/sclera: Conjunctivae normal.   Cardiovascular:      Rate and Rhythm: Normal rate and regular rhythm.   Pulmonary:      Effort: Pulmonary effort is normal.   Abdominal:      General: Abdomen is flat. There is no distension.      Palpations: Abdomen is soft. There is no mass.      Tenderness: There is no abdominal tenderness. There is no guarding or rebound.      Hernia: No hernia is present.      Comments: Incision c/d/I , HUMAIRA drain serous    Musculoskeletal:         General: No swelling, tenderness, deformity or signs of injury. Normal range of motion.   Skin:     General: Skin is warm and dry.      Coloration: Skin is not jaundiced.   Neurological:      General: No focal deficit present.      Mental Status: She is alert and oriented to person, place, and time.   Psychiatric:         Mood and Affect: Mood normal.         Behavior: Behavior normal.         Thought Content: Thought content normal.          Significant Labs:  I have reviewed all pertinent lab results within the past 24 hours.  CBC:   Recent Labs   Lab 10/31/23  0446   WBC 10.25   RBC 3.24*   HGB 9.7*   HCT 30.1*      MCV 93   MCH 29.9   MCHC 32.2     CMP:   Recent Labs   Lab 10/31/23  0446   GLU 83   CALCIUM 8.0*   ALBUMIN 1.2*   PROT 5.4*      K 3.8   CO2 19*      BUN 11   CREATININE 0.5   ALKPHOS 93   ALT 6*   AST 20   BILITOT 0.2       Significant Diagnostics:  I have reviewed all pertinent imaging results/findings within the past 24 hours.

## 2023-10-31 NOTE — ASSESSMENT & PLAN NOTE
Chronic, controlled. Latest blood pressure and vitals reviewed-     Temp:  [97.9 °F (36.6 °C)-98.3 °F (36.8 °C)]   Pulse:  [79-90]   Resp:  [14-20]   BP: (115-130)/(68-70)   SpO2:  [94 %-96 %] .   Home meds for hypertension were reviewed and noted below.   Hypertension Medications             furosemide (LASIX) 20 MG tablet Take 20 mg by mouth.    spironolactone (ALDACTONE) 50 MG tablet Take 1 tablet (50 mg total) by mouth 2 (two) times daily.          While in the hospital, will manage blood pressure as follows; Continue home antihypertensive regimen    Will utilize p.r.n. blood pressure medication only if patient's blood pressure greater than 140/90 and she develops symptoms such as worsening chest pain or shortness of breath.

## 2023-10-31 NOTE — ASSESSMENT & PLAN NOTE
As seen on US and CT Abdomen and recent EGD with Portal Hypertensive Gastropathy  Await further input from GI    Appears compensated at present    Cirrhotic Nodular Liver seen during surgery    Doing well, no decompensation    stable

## 2023-10-31 NOTE — PROGRESS NOTES
[facility] Medicine  Progress Note    Patient Name: Lakisha Vance  MRN: [id number]  Patient Class: IP- Inpatient   Admission Date: 10/26/2023  Length of Stay: 1 days  Attending Physician: Sandeep Martinez MD  Primary Care Provider: Pranav Gonzalez MD        Subjective:     Principal Problem:Calculus of gallbladder with acute cholecystitis without obstruction        HPI:  63-year-old female with hx  of HTN, headaches, chronic liver disease, alcohol use, ascites, EGD in September 2023 with reflux esophagitis/gastritis/portal hypertensive gastropathy, and cholelithiasis presented to [facility] ER on October 26 with epigastric abdominal pain that began the night prior to presentation associated with vomiting but no diarrhea, no fever and no hematemesis and no recent alcohol use. She was placed on Protonix in September but is not taking it due to insurance coverage issues. Pt has been worked up in our GI Dept for Alcoholic Liver disease vs Cirrhosis recently. Last Alcohol use about a month ago.    In the ER , WBC 17.4, H/H 12/ 35, platelets 423, Lipase 13, CMP normal. US Abdomin showed hepatomegaly and mildly cirrhotic liver configuration. Very small perihepatic ascites. Gallbladder sludge and cholelithiasis. CT abdomen and pelvis showed distended gallbladder with sludge.  Mild ascites. Edematous gastric wall.  Gastritis should be considered.  Mild hiatal hernia.  Appears to be wall thickening of the ascending colon suspicious for colitis. Prominent diverticulosis of the lower colon with no strong evidence for diverticulitis.     In the ER, pt had received famotidine, morphine, Zofran, and Zosyn.      ER consulted Dr. Jennings and Dr. Randhawa thru the [facility] for possible Cholecystitis vs Colitis and she was accepted. She is being placed in Obs under [facility] for General Surgery and Gastroenterology evaluation.               Overview/Hospital Course:  Appreciate GI and Gen Surgery  input- Feels a little better, abd pain a little better, controlled with pain meds. Getting IVF and Zosyn. NV much better. WBC 22k. H/H stable. Hida scan positive for Cholecystitis. Plan for Robotic Yari tomorrow.     10/28- Appreciate Dr. De La Rosa, pt seen pre and post op. K was low- started on NS w KCl pre op. Post op a little groggy and in mild to mod discomfort/pain. Operative Notes reviewed- Gangrenous gallbladder with lateral wall perforation with pus and bile in the abdomen.  Liver was cirrhotic and nodular.  Extensive friable and inflamed tissues. Cont IVF, IV Abx and pain meds per surgery. Check labs in am.      10/29- POD 1, looks and feels much better, skin color and turgor have improved, appears more relaxed, has been to the BR 3 times without any dizziness or weakness. VSS, Afeb, serosanguinous drain output, about 740 cc so far. Getting Zosyn, WBC down to 13.7, H/H 10/30, electrolytes better. LFTs normal, Albumin 1.3. pt tolerated CLD well. Ok to adv as tolerated.     10/30- looks better, POD 2, pain under control, VSS, Afeb, HUMAIRA drain present, she walked in the hallways and ate a little plus drank a protein smoothie. Labs improving. LFTs normal Surgery and hepatology following. Check labs in am. Cont Abx, IS, ambulation.     10/31- gradually improving, lying in bed but walked in the halls earlier. Still has large HUMAIRA drain, about 650 cc. Getting Zosyn. New Picc line placed. CXR clear. WBC normal. Alb 1.2- dietary consulted, getting boost plus Jose David w each meal, pt encouraged to eat, also started on Remeron to improve appetite.       Interval History: gradually improving, lying in bed but walked in the halls earlier. Still has large HUMAIRA drain, about 650 cc. Getting Zosyn. New Picc line placed. CXR clear. WBC normal. Alb 1.2- dietary consulted, getting boost plus Jose David w each meal, pt encouraged to eat, also started on Remeron to improve appetite.     Review of Systems   Constitutional:  Positive for activity  change and appetite change. Negative for fatigue, fever and unexpected weight change.   HENT: Negative.     Eyes: Negative.  Negative for visual disturbance.   Respiratory: Negative.  Negative for cough, shortness of breath and wheezing.    Cardiovascular: Negative.    Gastrointestinal:  Positive for abdominal distention. Negative for abdominal pain, anal bleeding, blood in stool, constipation, diarrhea, nausea, rectal pain and vomiting.   Endocrine: Negative for polyuria.   Genitourinary: Negative.    Musculoskeletal:  Negative for arthralgias and gait problem.   Skin: Negative.    Allergic/Immunologic: Negative.    Neurological:  Positive for weakness.   Hematological: Negative.    Psychiatric/Behavioral: Negative.       Objective:     Vital Signs (Most Recent):  Temp: 98.2 °F (36.8 °C) (10/31/23 1600)  Pulse: 82 (10/31/23 1600)  Resp: 18 (10/31/23 1600)  BP: 128/68 (10/31/23 1600)  SpO2: 96 % (10/31/23 1600) Vital Signs (24h Range):  Temp:  [97.9 °F (36.6 °C)-98.3 °F (36.8 °C)] 98.2 °F (36.8 °C)  Pulse:  [79-90] 82  Resp:  [14-20] 18  SpO2:  [94 %-96 %] 96 %  BP: (115-130)/(68-70) 128/68     Weight: 44.6 kg (98 lb 5.2 oz)  Body mass index is 17.98 kg/m².    Intake/Output Summary (Last 24 hours) at 10/31/2023 1656  Last data filed at 10/31/2023 1525  Gross per 24 hour   Intake 1735.92 ml   Output 915 ml   Net 820.92 ml         Physical Exam  Vitals and nursing note reviewed.   Constitutional:       General: She is not in acute distress.     Appearance: Normal appearance.   HENT:      Head: Normocephalic and atraumatic.   Eyes:      Extraocular Movements: Extraocular movements intact.      Conjunctiva/sclera: Conjunctivae normal.   Cardiovascular:      Rate and Rhythm: Normal rate and regular rhythm.   Pulmonary:      Effort: Pulmonary effort is normal.   Abdominal:      General: Abdomen is flat. There is no distension.      Palpations: Abdomen is soft. There is no mass.      Tenderness: There is no abdominal  tenderness. There is no guarding or rebound.      Hernia: No hernia is present.      Comments: Incision c/d/I , HUMAIRA drain serous    Musculoskeletal:         General: No swelling, tenderness, deformity or signs of injury. Normal range of motion.   Skin:     General: Skin is warm and dry.      Coloration: Skin is not jaundiced.   Neurological:      General: No focal deficit present.      Mental Status: She is alert and oriented to person, place, and time.   Psychiatric:         Mood and Affect: Mood normal.         Behavior: Behavior normal.         Thought Content: Thought content normal.             Significant Labs: All pertinent labs within the past 24 hours have been reviewed.  CBC:   Recent Labs   Lab 10/31/23  0446   WBC 10.25   HGB 9.7*   HCT 30.1*        CMP:   Recent Labs   Lab 10/31/23  0446      K 3.8      CO2 19*   GLU 83   BUN 11   CREATININE 0.5   CALCIUM 8.0*   PROT 5.4*   ALBUMIN 1.2*   BILITOT 0.2   ALKPHOS 93   AST 20   ALT 6*   ANIONGAP 10       Significant Imaging: I have reviewed all pertinent imaging results/findings within the past 24 hours.      Assessment/Plan:      * acute cholecystitis with gangrene of GB with perforation   Etiology unclear, pt with Alcoholic Cirrhosis  D/D includes Gastritis vs Cholecystitis vs Diverticulitis  Await GI and Surgery input  Hida Scan ordered to r/o Cholecystitis in am  Pt was empirically started on Zosyn in the ER, will continue  IV Protonix bid, Gentle Rehydration  Use MS prn     10/27- HIDA pos  Surgery planned for tmrw  Keep NPO after MN    10/28- s/p Robotic Cholecystectomy with gangrenous perforated GB  Cont Post op care, IV Abx, IVF, pain control    10/29- POD 1- doing better, comfortable, pain under control tolerated some liquid diet, got OOB and took a few steps    10/3-  POD 2- doing better. Cont IV abx, PT/OT  HUMAIRA drain working well  Ambulate    POD 3- getting better, still has large HUMAIRA drain    Severe protein-calorie  malnutrition  Nutrition consulted. Most recent weight and BMI monitored-     Measurements:  Wt Readings from Last 1 Encounters:   10/31/23 44.6 kg (98 lb 5.2 oz)   Body mass index is 17.98 kg/m².    Patient has been screened and assessed by RD.    Malnutrition Type:  Context: acute illness or injury  Level: severe    Malnutrition Characteristic Summary:  Weight Loss (Malnutrition): 7.5% in 3 months (10% 2 months)  Energy Intake (Malnutrition): less than or equal to 50% for greater than or equal to 5 days  Subcutaneous Fat (Malnutrition): moderate depletion  Muscle Mass (Malnutrition): moderate depletion    Interventions/Recommendations (treatment strategy):    Consult dietary on Monday- at present CLD  1. Recommend pt diet order be modified to [place]; Low sodium diet. 2. Recommend pt receives boost plus TID. 3. Recommend pt receives PO intake encouragement during all meals. 4. Weigh daily.   Encouraged to eat more    Ad Remeron to stimulate appetite    Alcoholic cirrhosis of liver with ascites  As seen on US and CT Abdomen and recent EGD with Portal Hypertensive Gastropathy  Await further input from GI    Appears compensated at present    Cirrhotic Nodular Liver seen during surgery    Doing well, no decompensation    stable    Acute gangrenous cholecystitis s/p Robotic Cholecystectomy  US abd showed sludge and gallstones  Await Hida scan in am  Also consider Gastritis vs Diverticulitis  Cont Zosyn    S/p Yari- doing better, abd pain improving  POD 2- getting better    POD 3- cont Zosyn    Moderate cigarette smoker (10-19 per day)  Must quit, pt counseled > 5 mins      HTN (hypertension)  Chronic, controlled. Latest blood pressure and vitals reviewed-     Temp:  [97.9 °F (36.6 °C)-98.3 °F (36.8 °C)]   Pulse:  [79-90]   Resp:  [14-20]   BP: (115-130)/(68-70)   SpO2:  [94 %-96 %] .   Home meds for hypertension were reviewed and noted below.   Hypertension Medications             furosemide (LASIX) 20 MG tablet Take 20  mg by mouth.    spironolactone (ALDACTONE) 50 MG tablet Take 1 tablet (50 mg total) by mouth 2 (two) times daily.          While in the hospital, will manage blood pressure as follows; Continue home antihypertensive regimen    Will utilize p.r.n. blood pressure medication only if patient's blood pressure greater than 140/90 and she develops symptoms such as worsening chest pain or shortness of breath.      VTE Risk Mitigation (From admission, onward)         Ordered     enoxaparin injection 30 mg  Daily         10/26/23 1714     IP VTE HIGH RISK PATIENT  Once         10/26/23 1711     Place sequential compression device  Until discontinued         10/26/23 1711                Discharge Planning   ANDREAS:      Code Status: Full Code   Is the patient medically ready for discharge?:     Reason for patient still in hospital (select all that apply): Patient trending condition, Laboratory test, Treatment, Imaging, Consult recommendations and PT / OT recommendations  Discharge Plan A: Home, Home with family          Sandeep Martinez MD  Department of Hospital Medicine   [facility] - Telemetry ([facility])

## 2023-10-31 NOTE — PROCEDURES
"Lakisha Vance is a 63 y.o. female patient.    Temp: 98 °F (36.7 °C) (10/31/23 1133)  Pulse: 79 (10/31/23 1133)  Resp: 14 (10/31/23 1302)  BP: 130/70 (10/31/23 1133)  SpO2: (!) 94 % (10/31/23 1133)  Weight: 44.6 kg (98 lb 5.2 oz) (10/31/23 1302)  Height: 5' 2" (157.5 cm) (10/30/23 1804)    PICC  Performed by: Joaquin Chung RN  Consent Done: Yes  Time out: Immediately prior to procedure a time out was called to verify the correct patient, procedure, equipment, support staff and site/side marked as required  Indications: med administration  Anesthesia: local infiltration  Local anesthetic: lidocaine 1% without epinephrine  Anesthetic Total (mL): 3  Preparation: skin prepped with ChloraPrep  Skin prep agent dried: skin prep agent completely dried prior to procedure  Sterile barriers: all five maximum sterile barriers used - cap, mask, sterile gown, sterile gloves, and large sterile sheet  Hand hygiene: hand hygiene performed prior to central venous catheter insertion  Location details: right basilic  Catheter type: double lumen  Catheter size: 4 Fr  Catheter Length: 30cm    Ultrasound guidance: yes  Vessel Caliber: medium and patent, compressibility normal  Vascular Doppler: not done  Needle advanced into vessel with real time Ultrasound guidance.  Guidewire confirmed in vessel.  Sterile sheath used.  no esophageal manometryNumber of attempts: 1  Post-procedure: blood return through all ports, chlorhexidine patch and sterile dressing applied  Estimated blood loss (mL): 0  Specimens: No  Implants: No          Name ISHA Nuñez  10/31/2023    "

## 2023-10-31 NOTE — ASSESSMENT & PLAN NOTE
Etiology unclear, pt with Alcoholic Cirrhosis  D/D includes Gastritis vs Cholecystitis vs Diverticulitis  Await GI and Surgery input  Hida Scan ordered to r/o Cholecystitis in am  Pt was empirically started on Zosyn in the ER, will continue  IV Protonix bid, Gentle Rehydration  Use MS prn     10/27- HIDA pos  Surgery planned for tmrw  Keep NPO after MN    10/28- s/p Robotic Cholecystectomy with gangrenous perforated GB  Cont Post op care, IV Abx, IVF, pain control    10/29- POD 1- doing better, comfortable, pain under control tolerated some liquid diet, got OOB and took a few steps    10/3-  POD 2- doing better. Cont IV abx, PT/OT  HUMAIRA drain working well  Ambulate    POD 3- getting better, still has large HUMAIRA drain

## 2023-10-31 NOTE — PT/OT/SLP PROGRESS
Occupational Therapy   Treatment    Name: Lakisha Vance  MRN: [id number]  Admitting Diagnosis:  Calculus of gallbladder with acute cholecystitis without obstruction  3 Days Post-Op    Recommendations:     Discharge Recommendations: Low Intensity Therapy  Discharge Equipment Recommendations:  bedside commode, shower chair, walker, rolling  Barriers to discharge:  None    Assessment:     Lakisha Vance is a 63 y.o. female with a medical diagnosis of Calculus of gallbladder with acute cholecystitis without obstruction.  She presents with self-care debility. Performance deficits affecting function are weakness, impaired functional mobility, impaired endurance, impaired self care skills, decreased safety awareness, pain, decreased lower extremity function, decreased upper extremity function.     Rehab Prognosis:  Good; patient would benefit from acute skilled OT services to address these deficits and reach maximum level of function.       Plan:     Patient to be seen 2 x/week to address the above listed problems via self-care/home management, therapeutic activities, therapeutic exercises  Plan of Care Expires: 11/10/23  Plan of Care Reviewed with: patient    Subjective     Chief Complaint: Lack of hunger  Patient/Family Comments/goals: Pt states that following session yesterday, HUMAIRA drain had to be emptied out 4 or 5 times yesterday.  Pain/Comfort:  Pain Rating 1: 7/10  Location - Orientation 1: generalized  Location 1: abdomen  Pain Addressed 1: Reposition, Distraction (activity pacing)  Pain Rating Post-Intervention 1: 7/10    Objective:     Communicated with: pt's nurse, Pebbles, and completed a chart  review prior to session.  Patient found supine with HUMAIRA drain, bed alarm, peripheral IV upon OT entry to room.    General Precautions: Standard, fall    Orthopedic Precautions:N/A  Braces: N/A  Respiratory Status: Room air     Occupational Performance:   Bed Mobility:    Patient completed Rolling/Turning to Left with  stand  by assistance  Patient completed Supine to Sit with stand by assistance   Extended time provided for  patient to complete independently and self management pain with movement. Decrease in completion time noted  this date compared to previous date.  Patient completed a Sit>supine transfer with stand by assistance.    Functional Mobility/Transfers:  Patient completed Sit > Stand Transfer from EOB with stand by assistance  with  rolling walker   Functional Mobility: Pt ambulated 140 ft with SBA with RW for increase in activity tolerance for ADL completion.  Pt request use of RW  this date as she feels more secure and safe using it.  Pt completed a stand>sit transfer  to EOB with SBA with RW.    Activities of Daily Living:  Feeding:  Pt completed drinking a boost beverage  while seated at EOB with set up assistance for container management.         [facility] 6 Click ADL: 17    Treatment & Education:  Pt tolerated session fair+ this date. Pt engaged in therapy however refused to return to room and sit in bedside chair. Pt required max encouragement and education for benefits of OOB activities to promote recovery. During functional ambulation intervention, pt's doctor presented to provide an update on care. Pt educated on BUE ROM therex to be completed while in bed and in bedside chair to promote and maintain ROM and strength for functional independence with desired occupations. Pt encouraged to completeall meals while sitting in chair and to returnto chair later in the day. Pt educated on call don't fall policy. Pt encouraged to use call button to meet needs.    Patient left HOB elevated with all lines intact, call button in reach, and bed alarm on    GOALS:   Multidisciplinary Problems       Occupational Therapy Goals          Problem: Occupational Therapy    Goal Priority Disciplines Outcome Interventions   Occupational Therapy Goal     OT, PT/OT Ongoing, Progressing    Description: Goals to be met by: 11/10/23     Patient  will increase functional independence with ADLs by performing:    Toileting from toilet with Supervision for hygiene and clothing management.   Toilet transfer to toilet with Supervision.  Upper extremity exercise program x10 reps per handout, with supervision.                         Time Tracking:     OT Date of Treatment: 10/31/23  OT Start Time: 0910  OT Stop Time: 0940  OT Total Time (min): 30 min    Billable Minutes:Therapeutic Activity 30    OT/KUSUM: OT      RIVER Vicente  10/31/2023

## 2023-10-31 NOTE — PROGRESS NOTES
[facility] - Telemetry ([facility])  General Surgery  Progress Note    Subjective:     History of Present Illness:  Lakisha aVnce is a 63 y.o. female who presents with acute cholecystitis in the setting of cirrhosis and COPD.      Post-Op Info:  Procedure(s) (LRB):  XI ROBOTIC CHOLECYSTECTOMY (N/A)   3 Days Post-Op     Interval History: AFVSS.  VANESSA.      Medications:  Continuous Infusions:  Scheduled Meds:   enoxparin  30 mg Subcutaneous Daily    folic acid-vit B6-vit B12 2.5-25-2 mg  1 tablet Oral Daily    levalbuterol  1.2495 mg Nebulization Q8H    mirtazapine  15 mg Oral QHS    multivitamin  1 tablet Oral Daily    pantoprazole  40 mg Intravenous Daily    piperacillin-tazobactam (Zosyn) IV (PEDS and ADULTS) (extended infusion is not appropriate)  4.5 g Intravenous Q8H    sodium chloride 0.9%  10 mL Intravenous Q8H    thiamine  100 mg Oral Daily     PRN Meds:melatonin, morphine, naloxone, ondansetron, polyethylene glycol, promethazine (PHENERGAN) 12.5 mg in dextrose 5 % (D5W) 50 mL IVPB, senna-docusate 8.6-50 mg     Review of patient's allergies indicates:   Allergen Reactions    Baclofen Other (See Comments)     Vomiting, confusion, shaking     Objective:     Vital Signs (Most Recent):  Temp: 97.9 °F (36.6 °C) (10/31/23 0740)  Pulse: 87 (10/31/23 0746)  Resp: 16 (10/31/23 0746)  BP: 115/68 (10/31/23 0740)  SpO2: (!) 94 % (10/31/23 0746) Vital Signs (24h Range):  Temp:  [97.7 °F (36.5 °C)-99.4 °F (37.4 °C)] 97.9 °F (36.6 °C)  Pulse:  [80-90] 87  Resp:  [14-20] 16  SpO2:  [94 %-97 %] 94 %  BP: (110-139)/(65-70) 115/68     Weight: 32.7 kg (72 lb 1.5 oz)  Body mass index is 13.19 kg/m².    Intake/Output - Last 3 Shifts         10/29 0700  10/30 0659 10/30 0700  10/31 0659 10/31 0700  11/01 0659    P.O.  720 120    I.V. (mL/kg) 10 (0.2)      IV Piggyback   1135.9    Total Intake(mL/kg) 10 (0.2) 720 (22) 1255.9 (38.4)    Urine (mL/kg/hr) 0 (0) 200 (0.3) 0 (0)    Drains 120 920 65    Other 60      Blood       Total  Output 180 1120 65    Net -170 -400 +1190.9           Urine Occurrence 1 x 3 x 1 x             Physical Exam  Constitutional:       General: She is not in acute distress.     Appearance: Normal appearance.   HENT:      Head: Normocephalic and atraumatic.   Eyes:      Extraocular Movements: Extraocular movements intact.      Conjunctiva/sclera: Conjunctivae normal.   Cardiovascular:      Rate and Rhythm: Normal rate and regular rhythm.   Pulmonary:      Effort: Pulmonary effort is normal.   Abdominal:      General: Abdomen is flat. There is no distension.      Palpations: Abdomen is soft. There is no mass.      Tenderness: There is no abdominal tenderness. There is no guarding or rebound.      Hernia: No hernia is present.      Comments: Incision c/d/I , HUMAIRA drain serous    Musculoskeletal:         General: No swelling, tenderness, deformity or signs of injury. Normal range of motion.   Skin:     General: Skin is warm and dry.      Coloration: Skin is not jaundiced.   Neurological:      General: No focal deficit present.      Mental Status: She is alert and oriented to person, place, and time.   Psychiatric:         Mood and Affect: Mood normal.         Behavior: Behavior normal.         Thought Content: Thought content normal.          Significant Labs:  I have reviewed all pertinent lab results within the past 24 hours.  CBC:   Recent Labs   Lab 10/31/23  0446   WBC 10.25   RBC 3.24*   HGB 9.7*   HCT 30.1*      MCV 93   MCH 29.9   MCHC 32.2     CMP:   Recent Labs   Lab 10/31/23  0446   GLU 83   CALCIUM 8.0*   ALBUMIN 1.2*   PROT 5.4*      K 3.8   CO2 19*      BUN 11   CREATININE 0.5   ALKPHOS 93   ALT 6*   AST 20   BILITOT 0.2       Significant Diagnostics:  I have reviewed all pertinent imaging results/findings within the past 24 hours.    Assessment/Plan:     * acute cholecystitis with gangrene of GB with perforation   POD #3- s/p robotic cholecystectomy.  Found to have gangrenous and perforated  cholecystitis, liver cirrhotic in appearance.  Doing well overall.  LFTs remaining WNL, no evidence of decompensation at this time    -- regular diet as tolerated  -- pain control as per primary team   -- Recommend several days abx for pus and bile spillage from the perforation   -- HUMAIRA drain to remain for a few days, still a little turbid.  Will have to discuss removal and management of her ascites that it will drain   -- ok to shower   -- PT/OT  -- encourage IS and ambulation     Severe protein-calorie malnutrition  Nutrition consulted. Most recent weight and BMI monitored-     Measurements:  Wt Readings from Last 1 Encounters:   10/31/23 32.7 kg (72 lb 1.5 oz)   Body mass index is 13.19 kg/m².    Patient has been screened and assessed by RD.    Management as per primary team and RD    Alcoholic cirrhosis of liver with ascites  Management as per primary team and GI    Moderate cigarette smoker (10-19 per day)  Management as per primary     HTN (hypertension)  Management as per primary         Valentina De La Rosa MD  General Surgery  [facility] - Telemetry ([facility])

## 2023-11-01 LAB
FINAL PATHOLOGIC DIAGNOSIS: NORMAL
GROSS: NORMAL
Lab: NORMAL

## 2023-11-01 PROCEDURE — 25000003 PHARM REV CODE 250: Performed by: SURGERY

## 2023-11-01 PROCEDURE — 21400001 HC TELEMETRY ROOM

## 2023-11-01 PROCEDURE — 99222 1ST HOSP IP/OBS MODERATE 55: CPT | Mod: ,,, | Performed by: INTERNAL MEDICINE

## 2023-11-01 PROCEDURE — A4216 STERILE WATER/SALINE, 10 ML: HCPCS | Performed by: SURGERY

## 2023-11-01 PROCEDURE — 97530 THERAPEUTIC ACTIVITIES: CPT

## 2023-11-01 PROCEDURE — 25000242 PHARM REV CODE 250 ALT 637 W/ HCPCS: Performed by: SURGERY

## 2023-11-01 PROCEDURE — 97116 GAIT TRAINING THERAPY: CPT

## 2023-11-01 PROCEDURE — 94640 AIRWAY INHALATION TREATMENT: CPT

## 2023-11-01 PROCEDURE — 25000003 PHARM REV CODE 250: Performed by: EMERGENCY MEDICINE

## 2023-11-01 PROCEDURE — 94761 N-INVAS EAR/PLS OXIMETRY MLT: CPT

## 2023-11-01 PROCEDURE — 97535 SELF CARE MNGMENT TRAINING: CPT

## 2023-11-01 PROCEDURE — 99222 PR INITIAL HOSPITAL CARE,LEVL II: ICD-10-PCS | Mod: ,,, | Performed by: INTERNAL MEDICINE

## 2023-11-01 PROCEDURE — 63600175 PHARM REV CODE 636 W HCPCS: Performed by: SURGERY

## 2023-11-01 RX ORDER — FUROSEMIDE 40 MG/1
40 TABLET ORAL DAILY
Status: DISCONTINUED | OUTPATIENT
Start: 2023-11-02 | End: 2023-11-02 | Stop reason: HOSPADM

## 2023-11-01 RX ORDER — AMOXICILLIN 250 MG
2 CAPSULE ORAL DAILY
Status: DISCONTINUED | OUTPATIENT
Start: 2023-11-01 | End: 2023-11-02 | Stop reason: HOSPADM

## 2023-11-01 RX ORDER — ADHESIVE BANDAGE
30 BANDAGE TOPICAL DAILY PRN
Status: DISCONTINUED | OUTPATIENT
Start: 2023-11-01 | End: 2023-11-02 | Stop reason: HOSPADM

## 2023-11-01 RX ORDER — SPIRONOLACTONE 25 MG/1
50 TABLET ORAL DAILY
Status: DISCONTINUED | OUTPATIENT
Start: 2023-11-02 | End: 2023-11-02 | Stop reason: HOSPADM

## 2023-11-01 RX ORDER — SPIRONOLACTONE 25 MG/1
25 TABLET ORAL ONCE
Status: COMPLETED | OUTPATIENT
Start: 2023-11-01 | End: 2023-11-01

## 2023-11-01 RX ORDER — BISACODYL 5 MG
5 TABLET, DELAYED RELEASE (ENTERIC COATED) ORAL DAILY
Status: COMPLETED | OUTPATIENT
Start: 2023-11-01 | End: 2023-11-02

## 2023-11-01 RX ORDER — SPIRONOLACTONE 25 MG/1
25 TABLET ORAL DAILY
Status: DISCONTINUED | OUTPATIENT
Start: 2023-11-01 | End: 2023-11-01

## 2023-11-01 RX ORDER — FUROSEMIDE 20 MG/1
20 TABLET ORAL ONCE
Status: COMPLETED | OUTPATIENT
Start: 2023-11-01 | End: 2023-11-01

## 2023-11-01 RX ORDER — AMOXICILLIN AND CLAVULANATE POTASSIUM 875; 125 MG/1; MG/1
1 TABLET, FILM COATED ORAL EVERY 12 HOURS
Status: DISCONTINUED | OUTPATIENT
Start: 2023-11-01 | End: 2023-11-02 | Stop reason: HOSPADM

## 2023-11-01 RX ORDER — FUROSEMIDE 20 MG/1
20 TABLET ORAL DAILY
Status: DISCONTINUED | OUTPATIENT
Start: 2023-11-01 | End: 2023-11-01

## 2023-11-01 RX ADMIN — SENNOSIDES AND DOCUSATE SODIUM 2 TABLET: 50; 8.6 TABLET ORAL at 05:11

## 2023-11-01 RX ADMIN — PANTOPRAZOLE SODIUM 40 MG: 40 TABLET, DELAYED RELEASE ORAL at 08:11

## 2023-11-01 RX ADMIN — FUROSEMIDE 20 MG: 20 TABLET ORAL at 02:11

## 2023-11-01 RX ADMIN — SPIRONOLACTONE 25 MG: 25 TABLET, FILM COATED ORAL at 11:11

## 2023-11-01 RX ADMIN — ONDANSETRON 4 MG: 2 INJECTION INTRAMUSCULAR; INTRAVENOUS at 10:11

## 2023-11-01 RX ADMIN — SPIRONOLACTONE 25 MG: 25 TABLET, FILM COATED ORAL at 02:11

## 2023-11-01 RX ADMIN — MORPHINE SULFATE 2 MG: 2 INJECTION, SOLUTION INTRAMUSCULAR; INTRAVENOUS at 06:11

## 2023-11-01 RX ADMIN — MIRTAZAPINE 15 MG: 15 TABLET, FILM COATED ORAL at 08:11

## 2023-11-01 RX ADMIN — THIAMINE HCL TAB 100 MG 100 MG: 100 TAB at 08:11

## 2023-11-01 RX ADMIN — ONDANSETRON 4 MG: 2 INJECTION INTRAMUSCULAR; INTRAVENOUS at 02:11

## 2023-11-01 RX ADMIN — LEVALBUTEROL HYDROCHLORIDE 1.25 MG: 0.63 SOLUTION RESPIRATORY (INHALATION) at 04:11

## 2023-11-01 RX ADMIN — AMOXICILLIN AND CLAVULANATE POTASSIUM 1 TABLET: 875; 125 TABLET, FILM COATED ORAL at 08:11

## 2023-11-01 RX ADMIN — LEVALBUTEROL HYDROCHLORIDE 1.25 MG: 0.63 SOLUTION RESPIRATORY (INHALATION) at 08:11

## 2023-11-01 RX ADMIN — ENOXAPARIN SODIUM 30 MG: 30 INJECTION SUBCUTANEOUS at 05:11

## 2023-11-01 RX ADMIN — ONDANSETRON 4 MG: 2 INJECTION INTRAMUSCULAR; INTRAVENOUS at 05:11

## 2023-11-01 RX ADMIN — HYDROCODONE BITARTRATE AND ACETAMINOPHEN 1 TABLET: 5; 325 TABLET ORAL at 03:11

## 2023-11-01 RX ADMIN — FUROSEMIDE 20 MG: 20 TABLET ORAL at 11:11

## 2023-11-01 RX ADMIN — Medication 10 ML: at 05:11

## 2023-11-01 RX ADMIN — THERA TABS 1 TABLET: TAB at 08:11

## 2023-11-01 RX ADMIN — PIPERACILLIN SODIUM AND TAZOBACTAM SODIUM 4.5 G: 4; .5 INJECTION, POWDER, FOR SOLUTION INTRAVENOUS at 05:11

## 2023-11-01 RX ADMIN — MORPHINE SULFATE 2 MG: 2 INJECTION, SOLUTION INTRAMUSCULAR; INTRAVENOUS at 10:11

## 2023-11-01 RX ADMIN — MORPHINE SULFATE 2 MG: 2 INJECTION, SOLUTION INTRAMUSCULAR; INTRAVENOUS at 09:11

## 2023-11-01 RX ADMIN — Medication 10 ML: at 02:11

## 2023-11-01 RX ADMIN — MORPHINE SULFATE 2 MG: 2 INJECTION, SOLUTION INTRAMUSCULAR; INTRAVENOUS at 02:11

## 2023-11-01 RX ADMIN — BISACODYL 5 MG: 5 TABLET, COATED ORAL at 05:11

## 2023-11-01 RX ADMIN — HYDROCODONE BITARTRATE AND ACETAMINOPHEN 1 TABLET: 5; 325 TABLET ORAL at 09:11

## 2023-11-01 RX ADMIN — MORPHINE SULFATE 2 MG: 2 INJECTION, SOLUTION INTRAMUSCULAR; INTRAVENOUS at 12:11

## 2023-11-01 RX ADMIN — HYDROCODONE BITARTRATE AND ACETAMINOPHEN 1 TABLET: 5; 325 TABLET ORAL at 08:11

## 2023-11-01 RX ADMIN — Medication 1 TABLET: at 08:11

## 2023-11-01 RX ADMIN — MORPHINE SULFATE 2 MG: 2 INJECTION, SOLUTION INTRAMUSCULAR; INTRAVENOUS at 05:11

## 2023-11-01 NOTE — PT/OT/SLP PROGRESS
Physical Therapy Treatment    Patient Name:  Lakisha Vance   MRN:  [id number]    Recommendations:     Discharge Recommendations: Low Intensity Therapy  Discharge Equipment Recommendations: bedside commode, shower chair, walker, rolling  Barriers to discharge: None    Assessment:     Lakisha Vance is a 63 y.o. female admitted with a medical diagnosis of Calculus of gallbladder with acute cholecystitis without obstruction.  She presents with the following impairments/functional limitations: weakness, impaired endurance, impaired functional mobility, gait instability, impaired balance, decreased safety awareness, decreased lower extremity function, pain.    Rehab Prognosis: Good; patient would benefit from acute skilled PT services to address these deficits and reach maximum level of function.    Recent Surgery: Procedure(s) (LRB):  XI ROBOTIC CHOLECYSTECTOMY (N/A) 4 Days Post-Op    Plan:     During this hospitalization, patient to be seen 3 x/week to address the identified rehab impairments via gait training, therapeutic activities, therapeutic exercises and progress toward the following goals:    Plan of Care Expires:  11/10/23    Subjective     Chief Complaint: SEVERE LOWER ABDOMINAL PAIN   Patient/Family Comments/goals: PT IS WAITING ON NURSE FOR PAIN SHOT;  Pain/Comfort:  Pain Rating 1: 9/10  Location - Side 1: Bilateral  Location - Orientation 1: lower  Location 1: abdomen  Pain Addressed 1: Reposition, Distraction  Pain Rating Post-Intervention 1: 9/10      Objective:     Communicated with NURSE ZAMORA prior to session.  Patient found supine with HUMAIRA drain, PICC line upon PT entry to room.     General Precautions: Standard, fall  Orthopedic Precautions: N/A  Braces: N/A  Respiratory Status: Room air     Functional Mobility:  Bed Mobility: PT IS STILL PERFORMING ALL BED MOBILITY AND TF'S SLOWLY BUT WITH IMPROVED SPEED COMPARED TO LAST SESSION ON 11/1/23.   Rolling Left:  stand by assistance  Scooting: stand by  "assistance  Supine to Sit: stand by assistance  Transfers:     Sit to Stand:  stand by assistance with rolling walker  Stand to Sit: stand by assistance with rolling walker   Gait: PT  FT WITH RW, SBA, AND THERAPIST HOLDING J/P DRAIN. VCS GIVEN FOR THE PATIENT TO KEEP BODY CLOSE TO THE RW.   Balance: GOOD STATIC SITTING BALANCE EOB; GOOD DYNAMIC STANDING BALANCE DURING AMB;     AM-PAC 6 CLICK MOBILITY  Turning over in bed (including adjusting bedclothes, sheets and blankets)?: 4  Sitting down on and standing up from a chair with arms (e.g., wheelchair, bedside commode, etc.): 4  Moving from lying on back to sitting on the side of the bed?: 4  Moving to and from a bed to a chair (including a wheelchair)?: 4  Need to walk in hospital room?: 4  Climbing 3-5 steps with a railing?: 1 (NT)  Basic Mobility Total Score: 21     Treatment & Education:  PT EDUCATED ON P.T. POC AND ROLE OF PT IN THE ACUTE CARE SETTING.   PT EDUCATED ON BENEFITS OF UPRIGHT POSITION AND ENCOURAGED TO REMAIN IN SEATED POSITION FOR AS LONG AS TOLERATED.   PT EDUCATED ON "CALL DON'T FALL" POLICY AND INSTRUCTED TO USE CALL BUTTON WHEN WANTING TO CHANGE POSITIONS.   PT EDUCATED ON IMPORTANCE OF PHYSICAL ACTIVITY AND GIVEN THERAPEUTIC EXERCISE TO PERFORM THROUGHOUT THE DAY (SEATED MARCHES, LAQS, ANKLE PUMPS, 10 REPS BILATERALLY EACH)   PT EDUCATED ON RW SAFETY AND INSTRUCTIONS DURING TF'S AND GAIT.    Patient left up in chair with all lines intact, call button in reach, and chair alarm on..    GOALS:   Multidisciplinary Problems       Physical Therapy Goals          Problem: Physical Therapy    Goal Priority Disciplines Outcome Goal Variances Interventions   Physical Therapy Goal     PT, PT/OT Ongoing, Progressing     Description: Goals to be met by 11/10/23.  1. Pt will complete bed mobility MOD I.  2. Pt will complete sit to stand MOD I.  3. Pt will ambulate 200ft MOD I.  4. Pt will increase AMPAC score by 2 points to progress functional " mobility.                       Time Tracking:     PT Received On: 11/01/23  PT Start Time: 0905     PT Stop Time: 0930  PT Total Time (min): 25 min     Billable Minutes: Gait Training 10 and Therapeutic Activity 15    Treatment Type: Treatment  PT/PTA: PT     Number of PTA visits since last PT visit: 0     Tiffani Monreal, SPT  11/01/2023

## 2023-11-01 NOTE — ASSESSMENT & PLAN NOTE
As seen on US and CT Abdomen and recent EGD with Portal Hypertensive Gastropathy  Await further input from GI    Appears compensated at present    Cirrhotic Nodular Liver seen during surgery    Doing well, no decompensation    Stable    Add lasix and aldactone

## 2023-11-01 NOTE — PLAN OF CARE
Updated patient on plan of care. HUMAIRA CDI, draining serous fluid. Instructed patient to use call light for assistance, call light in reach. Hourly rounding performed. Vitals q4 hours. Education provided, questions answered/encouraged. Chart check complete.    Problem: Adult Inpatient Plan of Care  Goal: Plan of Care Review  Outcome: Ongoing, Progressing

## 2023-11-01 NOTE — ASSESSMENT & PLAN NOTE
US abd showed sludge and gallstones  Await Hida scan in am  Also consider Gastritis vs Diverticulitis  Cont Zosyn    S/p Yari- doing better, abd pain improving  POD 2- getting better    POD 3- cont Zosyn    POD 4- doing better, change to PO Augmentin  D/C soon  Needs Drainage bag for HUMAIRA drain

## 2023-11-01 NOTE — ASSESSMENT & PLAN NOTE
Chronic, controlled. Latest blood pressure and vitals reviewed-     Temp:  [98 °F (36.7 °C)-98.7 °F (37.1 °C)]   Pulse:  [74-89]   Resp:  [14-20]   BP: (105-128)/(63-71)   SpO2:  [96 %-99 %] .   Home meds for hypertension were reviewed and noted below.   Hypertension Medications             furosemide (LASIX) 20 MG tablet Take 20 mg by mouth.    spironolactone (ALDACTONE) 50 MG tablet Take 1 tablet (50 mg total) by mouth 2 (two) times daily.          While in the hospital, will manage blood pressure as follows; Continue home antihypertensive regimen    Will utilize p.r.n. blood pressure medication only if patient's blood pressure greater than 140/90 and she develops symptoms such as worsening chest pain or shortness of breath.

## 2023-11-01 NOTE — SUBJECTIVE & OBJECTIVE
Interval History: looks and feels well, sitting up eating lunch, had walked in  hallways twice earlier. Abd pain much better control. Still has significant serous drainage from the HUMAIRA drain, about 1100 cc yesterday- likely Ascitic Fluid plus sec to severe Hypoalbuminemia- her albumin is 1.2, LFTs normal. Dr. Weaver recommended starting Lasix and Aldactone. Pt also encouraged to eat more. Change diet to low salt with fluid restriction. May switch to oral Abx soon.     Review of Systems   Constitutional:  Positive for activity change and appetite change. Negative for fatigue, fever and unexpected weight change.   HENT: Negative.     Eyes: Negative.  Negative for visual disturbance.   Respiratory: Negative.  Negative for cough, shortness of breath and wheezing.    Cardiovascular: Negative.    Gastrointestinal:  Positive for abdominal distention. Negative for abdominal pain, anal bleeding, blood in stool, constipation, diarrhea, nausea, rectal pain and vomiting.   Endocrine: Negative for polyuria.   Genitourinary: Negative.    Musculoskeletal:  Negative for arthralgias and gait problem.   Skin: Negative.    Allergic/Immunologic: Negative.    Neurological:  Positive for weakness.   Hematological: Negative.    Psychiatric/Behavioral: Negative.       Objective:     Vital Signs (Most Recent):  Temp: 98.6 °F (37 °C) (11/01/23 1153)  Pulse: 85 (11/01/23 1153)  Resp: 18 (11/01/23 1153)  BP: 128/71 (11/01/23 1153)  SpO2: 99 % (11/01/23 1153) Vital Signs (24h Range):  Temp:  [98 °F (36.7 °C)-98.7 °F (37.1 °C)] 98.6 °F (37 °C)  Pulse:  [74-89] 85  Resp:  [14-20] 18  SpO2:  [96 %-99 %] 99 %  BP: (105-128)/(63-71) 128/71     Weight: 47.4 kg (104 lb 8 oz)  Body mass index is 19.11 kg/m².    Intake/Output Summary (Last 24 hours) at 11/1/2023 1253  Last data filed at 11/1/2023 1106  Gross per 24 hour   Intake 882.55 ml   Output 850 ml   Net 32.55 ml         Physical Exam  Vitals and nursing note reviewed.   Constitutional:        General: She is not in acute distress.     Appearance: She is well-developed.   HENT:      Head: Normocephalic and atraumatic.      Right Ear: External ear normal.      Left Ear: External ear normal.      Nose: Nose normal.      Mouth/Throat:      Mouth: Mucous membranes are moist.   Eyes:      Extraocular Movements: Extraocular movements intact.      Conjunctiva/sclera: Conjunctivae normal.   Cardiovascular:      Rate and Rhythm: Normal rate.   Pulmonary:      Effort: Pulmonary effort is normal. No respiratory distress.   Abdominal:      Comments: Abdomen generally soft with mild distention and  expected and minimal regla-incisional TTP. HUMAIRA drain serous, non-cloudy (ascites)   Musculoskeletal:      Cervical back: Neck supple.   Skin:     General: Skin is warm and dry.   Neurological:      Mental Status: She is alert and oriented to person, place, and time.   Psychiatric:         Mood and Affect: Mood normal.         Behavior: Behavior normal.         Thought Content: Thought content normal.         Judgment: Judgment normal.             Significant Labs: All pertinent labs within the past 24 hours have been reviewed.  CBC:   Recent Labs   Lab 10/31/23  0446   WBC 10.25   HGB 9.7*   HCT 30.1*        CMP:   Recent Labs   Lab 10/31/23  0446      K 3.8      CO2 19*   GLU 83   BUN 11   CREATININE 0.5   CALCIUM 8.0*   PROT 5.4*   ALBUMIN 1.2*   BILITOT 0.2   ALKPHOS 93   AST 20   ALT 6*   ANIONGAP 10       Significant Imaging: I have reviewed all pertinent imaging results/findings within the past 24 hours.

## 2023-11-01 NOTE — PT/OT/SLP PROGRESS
Occupational Therapy  Treatment    Lakisha Vance   MRN: [id number]   Admitting Diagnosis: Calculus of gallbladder with acute cholecystitis without obstruction    OT Date of Treatment: 11/01/23   OT Start Time: 0903  OT Stop Time: 0928  OT Total Time (min): 25 min    Billable Minutes:  Self Care/Home Management 10 and Therapeutic Activity 15    OT/KUSUM: OT          General Precautions: Standard, fall  Orthopedic Precautions: N/A  Braces: N/A  Respiratory Status: Room air         Subjective:  Communicated with NURSE prior to session.       Pain/Comfort  Pain Rating 1: 9/10  Location - Side 1: Right  Location 1: abdomen  Pain Addressed 1: Reposition, Cessation of Activity  Pain Rating Post-Intervention 1: 9/10    Objective:  Patient found with: HUMAIRA drain, PICC line     Functional Mobility:  Bed Mobility:  SBA WITH ALL LEVELS OF BED MOBILITY.       Transfers: SBA SIT <> STAND WITH RW EOB <> B/S CHAIR AS COMMODE.        Functional Ambulation: SBA WITH RW    Activities of Daily Living:     Feeding adaptive equipment:  NONE REQUIRED     UE adaptive equipment: NONE REQUIRED     LE adaptive equipment: TBD                    Bathing adaptive equipment: TBD    Balance:   Static Sit: FAIR: Maintains without assist, but unable to take any challenges   Dynamic Sit: FAIR+: Maintains balance through MINIMAL excursions of active trunk motion  Static Stand: FAIR: Maintains without assist but unable to take challenges  Dynamic stand: FAIR: Needs CONTACT GUARD during gait    Therapeutic Activities and Exercises:  PATIENT PRACTICED BED MOBLITY, LB DRESSING WITH FOOTWEAR MANAGEMENT TO DON/DOFF SOCKS AND SITTING/STANDING BALANCE AND FUNC T/F'S.    AM-PAC 6 CLICK ADL   How much help from another person does this patient currently need?   1 = Unable, Total/Dependent Assistance  2 = A lot, Maximum/Moderate Assistance  3 = A little, Minimum/Contact Guard/Supervision  4 = None, Modified [place]/Independent    Putting on and taking off regular  "lower body clothing? : 2  Bathing (including washing, rinsing, drying)?: 3  Toileting, which includes using toilet, bedpan, or urinal? : 3  Putting on and taking off regular upper body clothing?: 3  Taking care of personal grooming such as brushing teeth?: 3  Eating meals?: 3  Daily Activity Total Score: 17     AM-PAC Raw Score CMS "G-Code Modifier Level of Impairment Assistance   6 % Total / Unable   7 - 8 CM 80 - 100% Maximal Assist   9-13 CL 60 - 80% Moderate Assist   14 - 19 CK 40 - 60% Moderate Assist   20 - 22 CJ 20 - 40% Minimal Assist   23 CI 1-20% SBA / CGA   24 CH 0% Independent/ Mod I       Patient left up in chair with all lines intact and call button in reach    ASSESSMENT:  Lakisha Vance is a 63 y.o. female with a medical diagnosis of Calculus of gallbladder with acute cholecystitis without obstruction and presents with SELF CARE DEBILITY .    Rehab identified problem list/impairments:  weakness, impaired endurance, impaired self care skills, impaired functional mobility, gait instability, impaired balance, decreased upper extremity function, decreased lower extremity function, decreased safety awareness, pain    Rehab potential is good.    Activity tolerance: Fair    Discharge recommendations: Low Intensity Therapy   Barriers to discharge: Barriers to Discharge: None    Equipment recommendations: bedside commode, shower chair, walker, rolling    GOALS:   Multidisciplinary Problems       Occupational Therapy Goals          Problem: Occupational Therapy    Goal Priority Disciplines Outcome Interventions   Occupational Therapy Goal     OT, PT/OT Ongoing, Progressing    Description: Goals to be met by: 11/10/23     Patient will increase functional independence with ADLs by performing:    Toileting from toilet with Supervision for hygiene and clothing management.   Toilet transfer to toilet with Supervision.  Upper extremity exercise program x10 reps per handout, with supervision.                   "       Plan:  Patient to be seen 2 x/week to address the above listed problems via therapeutic exercises, self-care/home management, therapeutic activities  Plan of Care expires: 11/10/23  Plan of Care reviewed with: patient         11/01/2023

## 2023-11-01 NOTE — PROGRESS NOTES
[facility] - Telemetry ([facility])  General Surgery  Progress Note    Subjective:     History of Present Illness:  Lakisha Vance is a 63 y.o. female who presents with acute cholecystitis in the setting of cirrhosis and COPD.      Post-Op Info:  Procedure(s) (LRB):  XI ROBOTIC CHOLECYSTECTOMY (N/A)   4 Days Post-Op     Interval History: Pain controlled, tolerating a regular diet. Still with significant ascitic output in HUMAIRA drain. Ambulating well.     Medications:  Continuous Infusions:  Scheduled Meds:   enoxparin  30 mg Subcutaneous Daily    folic acid-vit B6-vit B12 2.5-25-2 mg  1 tablet Oral Daily    levalbuterol  1.2495 mg Nebulization Q8H    mirtazapine  15 mg Oral QHS    multivitamin  1 tablet Oral Daily    pantoprazole  40 mg Oral BID    piperacillin-tazobactam (Zosyn) IV (PEDS and ADULTS) (extended infusion is not appropriate)  4.5 g Intravenous Q8H    sodium chloride 0.9%  10 mL Intravenous Q8H    thiamine  100 mg Oral Daily     PRN Meds:HYDROcodone-acetaminophen, melatonin, morphine, naloxone, ondansetron, ondansetron, polyethylene glycol, promethazine (PHENERGAN) 12.5 mg in dextrose 5 % (D5W) 50 mL IVPB, senna-docusate 8.6-50 mg     Review of patient's allergies indicates:   Allergen Reactions    Baclofen Other (See Comments)     Vomiting, confusion, shaking     Objective:     Vital Signs (Most Recent):  Temp: 98 °F (36.7 °C) (11/01/23 0704)  Pulse: 74 (11/01/23 0811)  Resp: 14 (11/01/23 0852)  BP: 123/68 (11/01/23 0704)  SpO2: 98 % (11/01/23 0811) Vital Signs (24h Range):  Temp:  [98 °F (36.7 °C)-98.7 °F (37.1 °C)] 98 °F (36.7 °C)  Pulse:  [74-89] 74  Resp:  [14-20] 14  SpO2:  [94 %-98 %] 98 %  BP: (105-130)/(63-70) 123/68     Weight: 47.4 kg (104 lb 8 oz)  Body mass index is 19.11 kg/m².    Intake/Output - Last 3 Shifts         10/30 0700  10/31 0659 10/31 0700  11/01 0659 11/01 0700  11/02 0659    P.O. 720 560 237    I.V. (mL/kg)       IV Piggyback  1135.9 205.6    Total Intake(mL/kg) 720 (22)  1695.9 (35.8) 442.6 (9.3)    Urine (mL/kg/hr) 200 (0.3) 0 (0)     Drains 920 1115     Other       Total Output 1120 1115     Net -400 +580.9 +442.6           Urine Occurrence 3 x 5 x              Physical Exam  Vitals and nursing note reviewed.   Constitutional:       General: She is not in acute distress.     Appearance: She is well-developed.   HENT:      Head: Normocephalic and atraumatic.      Right Ear: External ear normal.      Left Ear: External ear normal.      Nose: Nose normal.      Mouth/Throat:      Mouth: Mucous membranes are moist.   Eyes:      Extraocular Movements: Extraocular movements intact.      Conjunctiva/sclera: Conjunctivae normal.   Cardiovascular:      Rate and Rhythm: Normal rate.   Pulmonary:      Effort: Pulmonary effort is normal. No respiratory distress.   Abdominal:      Comments: Abdomen generally soft with mild distention and  expected and minimal regla-incisional TTP. HUMAIRA drain serous, non-cloudy (ascites)   Musculoskeletal:      Cervical back: Neck supple.   Skin:     General: Skin is warm and dry.   Neurological:      Mental Status: She is alert and oriented to person, place, and time.   Psychiatric:         Behavior: Behavior normal.          Significant Labs:  I have reviewed all pertinent lab results within the past 24 hours.  CBC:   Recent Labs   Lab 10/31/23  0446   WBC 10.25   RBC 3.24*   HGB 9.7*   HCT 30.1*      MCV 93   MCH 29.9   MCHC 32.2     CMP:   Recent Labs   Lab 10/31/23  0446   GLU 83   CALCIUM 8.0*   ALBUMIN 1.2*   PROT 5.4*      K 3.8   CO2 19*      BUN 11   CREATININE 0.5   ALKPHOS 93   ALT 6*   AST 20   BILITOT 0.2       Significant Diagnostics:  I have reviewed all pertinent imaging results/findings within the past 24 hours.  No new pertinent imaging    Assessment/Plan:     * acute cholecystitis with gangrene of GB with perforation   POD #4- s/p robotic cholecystectomy.  Found to have gangrenous and perforated cholecystitis, liver cirrhotic  in appearance.  Continues to do well.     -- regular diet as tolerated  -- pain control as per primary team   -- Recommend several days abx for pus and bile spillage from the perforation. Can transition to PO Augmentin for 10 days.   -- Continue HUMAIRA drain, document output and appearance. Clearing up today.   -- ok to shower   -- PT/OT  -- encourage IS and ambulation   -- Stable for discharge from surgical standpoint with HUMAIRA drain and PO antibiotics with 1 week outpatient follow-up with Dr. De La Rosa    Severe protein-calorie malnutrition  Nutrition consulted. Most recent weight and BMI monitored-     Measurements:  Wt Readings from Last 1 Encounters:   10/31/23 32.7 kg (72 lb 1.5 oz)   Body mass index is 13.19 kg/m².    Patient has been screened and assessed by RD.    Management as per primary team and RD    Alcoholic cirrhosis of liver with ascites  Management as per primary team and GI    Moderate cigarette smoker (10-19 per day)  Management as per primary     HTN (hypertension)  Management as per primary         Asia Cardona PA-C  General Surgery  [facility] - Telemetry ([facility]

## 2023-11-01 NOTE — ASSESSMENT & PLAN NOTE
Etiology unclear, pt with Alcoholic Cirrhosis  D/D includes Gastritis vs Cholecystitis vs Diverticulitis  Await GI and Surgery input  Hida Scan ordered to r/o Cholecystitis in am  Pt was empirically started on Zosyn in the ER, will continue  IV Protonix bid, Gentle Rehydration  Use MS prn     10/27- HIDA pos  Surgery planned for tmrw  Keep NPO after MN    10/28- s/p Robotic Cholecystectomy with gangrenous perforated GB  Cont Post op care, IV Abx, IVF, pain control    10/29- POD 1- doing better, comfortable, pain under control tolerated some liquid diet, got OOB and took a few steps    10/3-  POD 2- doing better. Cont IV abx, PT/OT  HUMAIRA drain working well  Ambulate    POD 3- getting better, still has large HUMAIRA drain    POD 4, start PO Abx, start lasix and Aldactone, needs Drainage bag  D/c soon

## 2023-11-01 NOTE — ASSESSMENT & PLAN NOTE
POD #4- s/p robotic cholecystectomy.  Found to have gangrenous and perforated cholecystitis, liver cirrhotic in appearance.  Continues to do well.     -- regular diet as tolerated  -- pain control as per primary team   -- Recommend several days abx for pus and bile spillage from the perforation. Can transition to PO Augmentin for 10 days.   -- Continue HUMAIRA drain, document output and appearance. Clearing up today.   -- ok to shower   -- PT/OT  -- encourage IS and ambulation   -- Stable for discharge from surgical standpoint with HUMAIRA drain and PO antibiotics with 1 week outpatient follow-up with Dr. De La Rosa

## 2023-11-01 NOTE — PROGRESS NOTES
[facility] Tele[facility] ([facility])  Hepatology  Consult Note    Patient Name: Lakisha Vance  MRN: [id number]  Admission Date: 10/26/2023  Hospital Length of Stay: 2 days  Attending Provider: Sandeep Martinez MD   Primary Care Physician: Pranav Gonzalez MD  Principal Problem:Calculus of gallbladder with acute cholecystitis without obstruction    Consults  Subjective:     Transplant status: No    HPI: 63 y.o female with alcohol liver cirrhosis  was transferred from [facility] for abdominal pain. Patient was seen by me in clinic few weeks before for elevated liver enzymes with alcohol related liver disease. Hepatology was consulted for the same    Patient reports Abdominal Pain in the RUQ, Dull aching associated nausea and emesis. Denies hematemesis, fluid overload, confusion, oliguria, fever. Pain improved with symptomatic treatment and No vomiting since admission    US showed nodular liver, gallbladder is hydropic with sludge and small dependent echogenic calculi. The gallbladder wall is mildly thickened and edematous. CT scan showed distended gallbladder and mild ascites. No CBD dilatation. Patient just back for HIDA scan     Now s/p robotic cholecystectomy.  Found to have gangrenous and perforated cholecystitis, liver cirrhotic in appearance.     Interval History: Doing well overall.  LFTs remaining WNL  HUMAIRA drain +       Review of Systems  Constitutional:  Positive for activity change and appetite change. Negative for fatigue, fever and unexpected weight change.   HENT: Negative.     Eyes: Negative.  Negative for visual disturbance.   Respiratory: Negative.  Negative for cough, shortness of breath and wheezing.    Cardiovascular: Negative.    Gastrointestinal:  Positive for abdominal pain. Negative for abdominal distention, anal bleeding, blood in stool, constipation, diarrhea, nausea, rectal pain and vomiting.   Endocrine: Negative for polyuria.   Genitourinary: Negative.    Musculoskeletal:  Negative for  arthralgias and gait problem.   Skin: Negative.    Allergic/Immunologic: Negative.    Neurological: Negative.    Hematological: Negative.    Psychiatric/Behavioral: Negative.        Past Medical History:   Diagnosis Date    Hypertension        Past Surgical History:   Procedure Laterality Date    ESOPHAGOGASTRODUODENOSCOPY N/A 9/1/2023    Procedure: ESOPHAGOGASTRODUODENOSCOPY (EGD);  Surgeon: Nerissa Ash MD;  Location: [facility] ENDO;  Service: Endoscopy;  Laterality: N/A;    HYSTERECTOMY      ROBOT-ASSISTED CHOLECYSTECTOMY USING DA BJ XI N/A 10/28/2023    Procedure: XI ROBOTIC CHOLECYSTECTOMY;  Surgeon: Valentina De La Rosa MD;  Location: [facility] OR;  Service: General;  Laterality: N/A;       Family history of liver disease: No    Review of patient's allergies indicates:   Allergen Reactions    Baclofen Other (See Comments)     Vomiting, confusion, shaking         Tobacco Use    Smoking status: Every Day     Current packs/day: 0.50     Types: Cigarettes    Smokeless tobacco: Never   Substance and Sexual Activity    Alcohol use: Yes     Comment: social    Drug use: Not on file    Sexual activity: Not on file       Medications Prior to Admission   Medication Sig Dispense Refill Last Dose    cyanocobalamin (VITAMIN B-12) 100 MCG tablet Take 100 mcg by mouth once daily.       folic acid (FOLVITE) 1 MG tablet Take 1 tablet by mouth once daily.       furosemide (LASIX) 20 MG tablet Take 20 mg by mouth.       ondansetron (ZOFRAN-ODT) 4 MG TbDL Take 1 tablet (4 mg total) by mouth every 8 (eight) hours as needed (nausea/vomiting). 12 tablet 1     pantoprazole (PROTONIX) 40 MG tablet Take 1 tablet (40 mg total) by mouth 2 (two) times daily. 60 tablet 11     spironolactone (ALDACTONE) 50 MG tablet Take 1 tablet (50 mg total) by mouth 2 (two) times daily. 60 tablet 11        Objective:     Vital Signs (Most Recent):  Temp: 98 °F (36.7 °C) (11/01/23 0704)  Pulse: 74 (11/01/23 0811)  Resp: 14 (11/01/23 0937)  BP: 123/68  (11/01/23 0704)  SpO2: 98 % (11/01/23 0811) Vital Signs (24h Range):  Temp:  [98 °F (36.7 °C)-98.7 °F (37.1 °C)] 98 °F (36.7 °C)  Pulse:  [74-89] 74  Resp:  [14-20] 14  SpO2:  [96 %-98 %] 98 %  BP: (105-128)/(63-69) 123/68     Weight: 47.4 kg (104 lb 8 oz) (11/01/23 0009)  Body mass index is 19.11 kg/m².    Physical Exam  Vitals and nursing note reviewed. Exam conducted with a chaperone present.   Constitutional:       Appearance: She is underweight. She is ill-appearing.   Cardiovascular:      Rate and Rhythm: Normal rate and regular rhythm.   Abdominal:      General: Abdomen is protuberant. Bowel sounds are normal.      Tenderness: There is abdominal tenderness in the right upper quadrant.        Musculoskeletal:      Right lower leg: No edema.      Left lower leg: No edema.   Skin:     General: Skin is warm and dry.      Capillary Refill: Capillary refill takes less than 2 seconds.   Neurological:      General: No focal deficit present.      Mental Status: She is alert and oriented to person, place, and time. Mental status is at baseline.         Significant Labs:  CBC:   Recent Labs   Lab 10/31/23  0446   WBC 10.25   RBC 3.24*   HGB 9.7*   HCT 30.1*          BMP:   Recent Labs   Lab 10/31/23  0446   GLU 83      K 3.8      CO2 19*   BUN 11   CREATININE 0.5   CALCIUM 8.0*       Coagulation:   Recent Labs   Lab 10/28/23  0524   INR 1.1         Significant Imaging:  Labs: Reviewed  MELD 3.0: 12 at 10/29/2023  4:46 AM  MELD-Na: 7 at 10/29/2023  4:46 AM  Calculated from:  Serum Creatinine: 0.6 mg/dL (Using min of 1 mg/dL) at 10/29/2023  4:46 AM  Serum Sodium: 138 mmol/L (Using max of 137 mmol/L) at 10/29/2023  4:46 AM  Total Bilirubin: 0.3 mg/dL (Using min of 1 mg/dL) at 10/29/2023  4:46 AM  Serum Albumin: 1.3 g/dL (Using min of 1.5 g/dL) at 10/29/2023  4:46 AM  INR(ratio): 1.1 at 10/28/2023  5:24 AM  Age at listing (hypothetical): 63 years  Sex: Female at 10/29/2023  4:46 AM       Assessment/Plan:      Active Diagnoses:    Diagnosis Date Noted POA    PRINCIPAL PROBLEM:  acute cholecystitis with gangrene of GB with perforation  [K80.00] 10/26/2023 Yes    Severe protein-calorie malnutrition [E43] 10/28/2023 Yes    Alcoholic cirrhosis of liver with ascites [K70.31] 10/26/2023 Yes    Acute gangrenous cholecystitis s/p Robotic Cholecystectomy [K81.0] 07/10/2023 Yes    Moderate cigarette smoker (10-19 per day) [F17.210] 03/29/2022 Yes    HTN (hypertension) [I10] 08/13/2019 Yes      Problems Resolved During this Admission:       Alcohol related Cirrhosis   -Mild decompensation with ascites s/p Lap cholecystectomy  -Alcohol related  -Sober > 2 months  -Follow up Outpatient in Hepatology after discharge    Ascites  Persistent HUMAIRA drain with ascitic fluid  Start diuretics--Lasix and aldactone  Monitor renal function  Nutrition  Salt restricted diet        acute cholecystitis with gangrene of GB with perforation   S/p Surgery    Severe PEM  Nutritional consult    [facility] as Primary team      Thank you for your consult. I will follow-up with patient. Please contact us if you have any additional questions.    Lew Weaver MD  Hepatology  [facility] Tele[facility] ([facility])

## 2023-11-01 NOTE — PLAN OF CARE
PATIENT PRACTICED DONNING/DOFFING SOCKS IN FIGURE 4 POSITION TO AVOID ABDOMINAL DISCOMFORT WITH BENDING.  PATIENT (S) TO DON/DOFF (R) SOCK BUT UNABLE TO DON/DOFF (L) SOCK DUE TO C/O ABDOMINAL DISCOMFORT.

## 2023-11-01 NOTE — PT/OT/SLP PROGRESS
"Physical Therapy Treatment    Patient Name:  Lakisha Vance   MRN:  [id number]    Recommendations:     Discharge Recommendations: Low Intensity Therapy  Discharge Equipment Recommendations: bedside commode, shower chair, walker, rolling  Barriers to discharge: None    Assessment:     Lakisha Vance is a 63 y.o. female admitted with a medical diagnosis of Calculus of gallbladder with acute cholecystitis without obstruction.  She presents with the following impairments/functional limitations: weakness, impaired endurance, impaired functional mobility, gait instability, impaired balance, pain, decreased safety awareness, decreased coordination.    Rehab Prognosis: Good; patient would benefit from acute skilled PT services to address these deficits and reach maximum level of function.    Recent Surgery: Procedure(s) (LRB):  XI ROBOTIC CHOLECYSTECTOMY (N/A) 4 Days Post-Op    Plan:     During this hospitalization, patient to be seen 3 x/week to address the identified rehab impairments via gait training, therapeutic activities, therapeutic exercises and progress toward the following goals:    Plan of Care Expires:  11/10/23    Subjective     Chief Complaint: "I NEED TO USE THE BATHROOM NOW"  P.T. RESPONDED TO BED ALARMING IN ROOM  Patient/Family Comments/goals:   Pain/Comfort:  Pain Rating 1: 7/10  Location 1: abdomen  Pain Addressed 1: Reposition, Distraction      Objective:     Communicated with NURSE prior to session.  Patient found supine with HUMAIRA drain, bed alarm, peripheral IV upon PT entry to room.     General Precautions: Standard, fall  Orthopedic Precautions: N/A  Braces: N/A  Respiratory Status: Room air     Functional Mobility:  Bed Mobility:     Rolling Left:  stand by assistance  Scooting: stand by assistance  Supine to Sit: stand by assistance  Transfers:     Sit to Stand:  stand by assistance with no AD  Bed to Chair: stand by assistance with  no AD  using  Step Transfer  Toilet Transfer: stand by assistance " "with  no AD  using  Step Transfer, NO ASSIST FOR CLEANING AFTER TOILETING IN BATHROOM  Gait: PT AMB 8' X 2 TRIALS NO AD WITH SBA TO AND FROM BATHROOM, SLOW GUARDED PACE DUE TO PAIN, NO LOB OR SOB ON ROOM AIR  Balance: GOOD SITTING BALANCE, FAIR+ DYNAMIC BALANCE DURING GAIT    AM-PAC 6 CLICK MOBILITY  Turning over in bed (including adjusting bedclothes, sheets and blankets)?: 4  Sitting down on and standing up from a chair with arms (e.g., wheelchair, bedside commode, etc.): 4  Moving from lying on back to sitting on the side of the bed?: 4  Moving to and from a bed to a chair (including a wheelchair)?: 4  Need to walk in hospital room?: 4  Climbing 3-5 steps with a railing?: 1  Basic Mobility Total Score: 21     Treatment & Education:  PT EDUCATION:  - ROLE OF P.T. AND POC IN ACUTE CARE HOSPITAL SETTING  - ENCOURAGED TO INCREASE TIME OOB IN CHAIR TO TOLERANCE- PT SET UP FOR BREAKFAST IN CHAIR  - TO CONTINUE THERAPUETIC EXERCISES THROUGHOUT THE DAY TO INCREASE ACTIVITY TOLERANCE AND DECREASE RISK FOR PNEUMONIA AND BLOOD CLOTS: HIP FLEX/EXT, HIP ABD/ADD, QUAD SET, HEEL SLIDE, AP  - RISK FOR FALLS DUE TO GENERALIZED WEAKNESS, EDUCATED ON "CALL DON'T FALL", ENCOURAGED TO CALL FOR ASSISTANCE WITH ALL NEEDS SUCH AS BED<>CHAIR TRANSFERS OR TRIPS TO BATHROOM, PT AGREEABLE TO SAFETY PRECAUTIONS    Patient left up in chair with all lines intact, call button in reach, chair alarm on, and NURSE notified..    GOALS:   Multidisciplinary Problems       Physical Therapy Goals          Problem: Physical Therapy    Goal Priority Disciplines Outcome Goal Variances Interventions   Physical Therapy Goal     PT, PT/OT Ongoing, Progressing     Description: Goals to be met by 11/10/23.  1. Pt will complete bed mobility MOD I.  2. Pt will complete sit to stand MOD I.  3. Pt will ambulate 200ft MOD I.  4. Pt will increase AMPAC score by 2 points to progress functional mobility.                       Time Tracking:     PT Received On: " 11/01/23  PT Start Time: 0815     PT Stop Time: 0830  PT Total Time (min): 15 min     Billable Minutes: Therapeutic Activity 15    Treatment Type: Treatment  PT/PTA: PT     Number of PTA visits since last PT visit: 0     11/01/2023

## 2023-11-01 NOTE — SUBJECTIVE & OBJECTIVE
Interval History: Pain controlled, tolerating a regular diet. Still with significant ascitic output in HUMAIRA drain. Ambulating well.     Medications:  Continuous Infusions:  Scheduled Meds:   enoxparin  30 mg Subcutaneous Daily    folic acid-vit B6-vit B12 2.5-25-2 mg  1 tablet Oral Daily    levalbuterol  1.2495 mg Nebulization Q8H    mirtazapine  15 mg Oral QHS    multivitamin  1 tablet Oral Daily    pantoprazole  40 mg Oral BID    piperacillin-tazobactam (Zosyn) IV (PEDS and ADULTS) (extended infusion is not appropriate)  4.5 g Intravenous Q8H    sodium chloride 0.9%  10 mL Intravenous Q8H    thiamine  100 mg Oral Daily     PRN Meds:HYDROcodone-acetaminophen, melatonin, morphine, naloxone, ondansetron, ondansetron, polyethylene glycol, promethazine (PHENERGAN) 12.5 mg in dextrose 5 % (D5W) 50 mL IVPB, senna-docusate 8.6-50 mg     Review of patient's allergies indicates:   Allergen Reactions    Baclofen Other (See Comments)     Vomiting, confusion, shaking     Objective:     Vital Signs (Most Recent):  Temp: 98 °F (36.7 °C) (11/01/23 0704)  Pulse: 74 (11/01/23 0811)  Resp: 14 (11/01/23 0852)  BP: 123/68 (11/01/23 0704)  SpO2: 98 % (11/01/23 0811) Vital Signs (24h Range):  Temp:  [98 °F (36.7 °C)-98.7 °F (37.1 °C)] 98 °F (36.7 °C)  Pulse:  [74-89] 74  Resp:  [14-20] 14  SpO2:  [94 %-98 %] 98 %  BP: (105-130)/(63-70) 123/68     Weight: 47.4 kg (104 lb 8 oz)  Body mass index is 19.11 kg/m².    Intake/Output - Last 3 Shifts         10/30 0700  10/31 0659 10/31 0700  11/01 0659 11/01 0700  11/02 0659    P.O. 720 560 237    I.V. (mL/kg)       IV Piggyback  1135.9 205.6    Total Intake(mL/kg) 720 (22) 1695.9 (35.8) 442.6 (9.3)    Urine (mL/kg/hr) 200 (0.3) 0 (0)     Drains 920 1115     Other       Total Output 1120 1115     Net -400 +580.9 +442.6           Urine Occurrence 3 x 5 x              Physical Exam  Vitals and nursing note reviewed.   Constitutional:       General: She is not in acute distress.     Appearance: She  is well-developed.   HENT:      Head: Normocephalic and atraumatic.      Right Ear: External ear normal.      Left Ear: External ear normal.      Nose: Nose normal.      Mouth/Throat:      Mouth: Mucous membranes are moist.   Eyes:      Extraocular Movements: Extraocular movements intact.      Conjunctiva/sclera: Conjunctivae normal.   Cardiovascular:      Rate and Rhythm: Normal rate.   Pulmonary:      Effort: Pulmonary effort is normal. No respiratory distress.   Abdominal:      Comments: Abdomen generally soft with mild distention and  expected and minimal regla-incisional TTP. HUMAIRA drain serous, non-cloudy (ascites)   Musculoskeletal:      Cervical back: Neck supple.   Skin:     General: Skin is warm and dry.   Neurological:      Mental Status: She is alert and oriented to person, place, and time.   Psychiatric:         Behavior: Behavior normal.          Significant Labs:  I have reviewed all pertinent lab results within the past 24 hours.  CBC:   Recent Labs   Lab 10/31/23  0446   WBC 10.25   RBC 3.24*   HGB 9.7*   HCT 30.1*      MCV 93   MCH 29.9   MCHC 32.2     CMP:   Recent Labs   Lab 10/31/23  0446   GLU 83   CALCIUM 8.0*   ALBUMIN 1.2*   PROT 5.4*      K 3.8   CO2 19*      BUN 11   CREATININE 0.5   ALKPHOS 93   ALT 6*   AST 20   BILITOT 0.2       Significant Diagnostics:  I have reviewed all pertinent imaging results/findings within the past 24 hours.  No new pertinent imaging

## 2023-11-01 NOTE — PROGRESS NOTES
[facility] Hospital Medicine  Progress Note    Patient Name: Lakisha Vance  MRN: [id number]  Patient Class: IP- Inpatient   Admission Date: 10/26/2023  Length of Stay: 2 days  Attending Physician: Sandeep Martinez MD  Primary Care Provider: Pranav Gonzalez MD        Subjective:     Principal Problem:Calculus of gallbladder with acute cholecystitis without obstruction        HPI:  63-year-old female with hx  of HTN, headaches, chronic liver disease, alcohol use, ascites, EGD in September 2023 with reflux esophagitis/gastritis/portal hypertensive gastropathy, and cholelithiasis presented to [facility] ER on October 26 with epigastric abdominal pain that began the night prior to presentation associated with vomiting but no diarrhea, no fever and no hematemesis and no recent alcohol use. She was placed on Protonix in September but is not taking it due to insurance coverage issues. Pt has been worked up in our GI Dept for Alcoholic Liver disease vs Cirrhosis recently. Last Alcohol use about a month ago.    In the ER , WBC 17.4, H/H 12/ 35, platelets 423, Lipase 13, CMP normal. US Abdomin showed hepatomegaly and mildly cirrhotic liver configuration. Very small perihepatic ascites. Gallbladder sludge and cholelithiasis. CT abdomen and pelvis showed distended gallbladder with sludge.  Mild ascites. Edematous gastric wall.  Gastritis should be considered.  Mild hiatal hernia.  Appears to be wall thickening of the ascending colon suspicious for colitis. Prominent diverticulosis of the lower colon with no strong evidence for diverticulitis.     In the ER, pt had received famotidine, morphine, Zofran, and Zosyn.      ER consulted Dr. Jennings and Dr. Randhawa thru the [facility] for possible Cholecystitis vs Colitis and she was accepted. She is being placed in Obs under [facility]osp [facility] for General Surgery and Gastroenterology evaluation.               Overview/Hospital Course:  Appreciate GI and Gen Surgery  input- Feels a little better, abd pain a little better, controlled with pain meds. Getting IVF and Zosyn. NV much better. WBC 22k. H/H stable. Hida scan positive for Cholecystitis. Plan for Robotic Yari tomorrow.     10/28- Appreciate Dr. De La Rosa, pt seen pre and post op. K was low- started on NS w KCl pre op. Post op a little groggy and in mild to mod discomfort/pain. Operative Notes reviewed- Gangrenous gallbladder with lateral wall perforation with pus and bile in the abdomen.  Liver was cirrhotic and nodular.  Extensive friable and inflamed tissues. Cont IVF, IV Abx and pain meds per surgery. Check labs in am.      10/29- POD 1, looks and feels much better, skin color and turgor have improved, appears more relaxed, has been to the BR 3 times without any dizziness or weakness. VSS, Afeb, serosanguinous drain output, about 740 cc so far. Getting Zosyn, WBC down to 13.7, H/H 10/30, electrolytes better. LFTs normal, Albumin 1.3. pt tolerated CLD well. Ok to adv as tolerated.     10/30- looks better, POD 2, pain under control, VSS, Afeb, HUMAIRA drain present, she walked in the hallways and ate a little plus drank a protein smoothie. Labs improving. LFTs normal Surgery and hepatology following. Check labs in am. Cont Abx, IS, ambulation.     10/31- gradually improving, lying in bed but walked in the halls earlier. Still has large HUMAIRA drain, about 650 cc. Getting Zosyn. New Picc line placed. CXR clear. WBC normal. Alb 1.2- dietary consulted, getting boost plus Jose David w each meal, pt encouraged to eat, also started on Remeron to improve appetite.     11/1- looks and feels well, sitting up eating lunch, had walked in th hallways twice earlier. Abd pain much better control. Still has significant serous drainage from the HUMAIRA drain, about 1100 cc yesterday- likely Ascitic Fluid plus sec to severe Hypoalbuminemia- her albumin is 1.2, LFTs normal. Dr. Weaver recommended starting Lasix and Aldactone. Pt also encouraged to eat more.  Change diet to low salt with fluid restriction. May switch to oral Abx soon.       Interval History: looks and feels well, sitting up eating lunch, had walked in  hallways twice earlier. Abd pain much better control. Still has significant serous drainage from the HUMAIRA drain, about 1100 cc yesterday- likely Ascitic Fluid plus sec to severe Hypoalbuminemia- her albumin is 1.2, LFTs normal. Dr. Weaver recommended starting Lasix and Aldactone. Pt also encouraged to eat more. Change diet to low salt with fluid restriction. May switch to oral Abx soon.     Review of Systems   Constitutional:  Positive for activity change and appetite change. Negative for fatigue, fever and unexpected weight change.   HENT: Negative.     Eyes: Negative.  Negative for visual disturbance.   Respiratory: Negative.  Negative for cough, shortness of breath and wheezing.    Cardiovascular: Negative.    Gastrointestinal:  Positive for abdominal distention. Negative for abdominal pain, anal bleeding, blood in stool, constipation, diarrhea, nausea, rectal pain and vomiting.   Endocrine: Negative for polyuria.   Genitourinary: Negative.    Musculoskeletal:  Negative for arthralgias and gait problem.   Skin: Negative.    Allergic/Immunologic: Negative.    Neurological:  Positive for weakness.   Hematological: Negative.    Psychiatric/Behavioral: Negative.       Objective:     Vital Signs (Most Recent):  Temp: 98.6 °F (37 °C) (11/01/23 1153)  Pulse: 85 (11/01/23 1153)  Resp: 18 (11/01/23 1153)  BP: 128/71 (11/01/23 1153)  SpO2: 99 % (11/01/23 1153) Vital Signs (24h Range):  Temp:  [98 °F (36.7 °C)-98.7 °F (37.1 °C)] 98.6 °F (37 °C)  Pulse:  [74-89] 85  Resp:  [14-20] 18  SpO2:  [96 %-99 %] 99 %  BP: (105-128)/(63-71) 128/71     Weight: 47.4 kg (104 lb 8 oz)  Body mass index is 19.11 kg/m².    Intake/Output Summary (Last 24 hours) at 11/1/2023 1253  Last data filed at 11/1/2023 1106  Gross per 24 hour   Intake 882.55 ml   Output 850 ml   Net 32.55 ml          Physical Exam  Vitals and nursing note reviewed.   Constitutional:       General: She is not in acute distress.     Appearance: She is well-developed.   HENT:      Head: Normocephalic and atraumatic.      Right Ear: External ear normal.      Left Ear: External ear normal.      Nose: Nose normal.      Mouth/Throat:      Mouth: Mucous membranes are moist.   Eyes:      Extraocular Movements: Extraocular movements intact.      Conjunctiva/sclera: Conjunctivae normal.   Cardiovascular:      Rate and Rhythm: Normal rate.   Pulmonary:      Effort: Pulmonary effort is normal. No respiratory distress.   Abdominal:      Comments: Abdomen generally soft with mild distention and  expected and minimal regla-incisional TTP. HUMAIRA drain serous, non-cloudy (ascites)   Musculoskeletal:      Cervical back: Neck supple.   Skin:     General: Skin is warm and dry.   Neurological:      Mental Status: She is alert and oriented to person, place, and time.   Psychiatric:         Mood and Affect: Mood normal.         Behavior: Behavior normal.         Thought Content: Thought content normal.         Judgment: Judgment normal.             Significant Labs: All pertinent labs within the past 24 hours have been reviewed.  CBC:   Recent Labs   Lab 10/31/23  0446   WBC 10.25   HGB 9.7*   HCT 30.1*        CMP:   Recent Labs   Lab 10/31/23  0446      K 3.8      CO2 19*   GLU 83   BUN 11   CREATININE 0.5   CALCIUM 8.0*   PROT 5.4*   ALBUMIN 1.2*   BILITOT 0.2   ALKPHOS 93   AST 20   ALT 6*   ANIONGAP 10       Significant Imaging: I have reviewed all pertinent imaging results/findings within the past 24 hours.      Assessment/Plan:      * acute cholecystitis with gangrene of GB with perforation   Etiology unclear, pt with Alcoholic Cirrhosis  D/D includes Gastritis vs Cholecystitis vs Diverticulitis  Await GI and Surgery input  Hida Scan ordered to r/o Cholecystitis in am  Pt was empirically started on Zosyn in the ER, will  continue  IV Protonix bid, Gentle Rehydration  Use MS prn     10/27- HIDA pos  Surgery planned for tmrw  Keep NPO after MN    10/28- s/p Robotic Cholecystectomy with gangrenous perforated GB  Cont Post op care, IV Abx, IVF, pain control    10/29- POD 1- doing better, comfortable, pain under control tolerated some liquid diet, got OOB and took a few steps    10/3-  POD 2- doing better. Cont IV abx, PT/OT  HUMAIRA drain working well  Ambulate    POD 3- getting better, still has large HUMAIRA drain    POD 4, start PO Abx, start lasix and Aldactone, needs Drainage bag  D/c soon    Severe protein-calorie malnutrition  Nutrition consulted. Most recent weight and BMI monitored-     Measurements:  Wt Readings from Last 1 Encounters:   11/01/23 47.4 kg (104 lb 8 oz)   Body mass index is 19.11 kg/m².    Patient has been screened and assessed by RD.    Malnutrition Type:  Context: acute illness or injury  Level: severe    Malnutrition Characteristic Summary:  Weight Loss (Malnutrition): 7.5% in 3 months (10% 2 months)  Energy Intake (Malnutrition): less than or equal to 50% for greater than or equal to 5 days  Subcutaneous Fat (Malnutrition): moderate depletion  Muscle Mass (Malnutrition): moderate depletion    Interventions/Recommendations (treatment strategy):    Consult dietary on Monday- at present CLD  1. Recommend pt diet order be modified to [place]; Low sodium diet. 2. Recommend pt receives boost plus TID. 3. Recommend pt receives PO intake encouragement during all meals. 4. Weigh daily.   Encouraged to eat more    Ad Remeron to stimulate appetite    Encourage to eat more    Alcoholic cirrhosis of liver with ascites  As seen on US and CT Abdomen and recent EGD with Portal Hypertensive Gastropathy  Await further input from GI    Appears compensated at present    Cirrhotic Nodular Liver seen during surgery    Doing well, no decompensation    Stable    Add lasix and aldactone    Acute gangrenous cholecystitis s/p Robotic  Cholecystectomy  US abd showed sludge and gallstones  Await Hida scan in am  Also consider Gastritis vs Diverticulitis  Cont Zosyn    S/p Yari- doing better, abd pain improving  POD 2- getting better    POD 3- cont Zosyn    POD 4- doing better, change to PO Augmentin  D/C soon  Needs Drainage bag for HUMAIRA drain    Moderate cigarette smoker (10-19 per day)  Must quit, pt counseled > 5 mins  Must quit      HTN (hypertension)  Chronic, controlled. Latest blood pressure and vitals reviewed-     Temp:  [98 °F (36.7 °C)-98.7 °F (37.1 °C)]   Pulse:  [74-89]   Resp:  [14-20]   BP: (105-128)/(63-71)   SpO2:  [96 %-99 %] .   Home meds for hypertension were reviewed and noted below.   Hypertension Medications             furosemide (LASIX) 20 MG tablet Take 20 mg by mouth.    spironolactone (ALDACTONE) 50 MG tablet Take 1 tablet (50 mg total) by mouth 2 (two) times daily.          While in the hospital, will manage blood pressure as follows; Continue home antihypertensive regimen    Will utilize p.r.n. blood pressure medication only if patient's blood pressure greater than 140/90 and she develops symptoms such as worsening chest pain or shortness of breath.      VTE Risk Mitigation (From admission, onward)         Ordered     enoxaparin injection 30 mg  Daily         10/26/23 1714     IP VTE HIGH RISK PATIENT  Once         10/26/23 1711     Place sequential compression device  Until discontinued         10/26/23 1711                Discharge Planning   ANDREAS:      Code Status: Full Code   Is the patient medically ready for discharge?:     Reason for patient still in hospital (select all that apply): Patient trending condition, Laboratory test, Treatment, Imaging and Consult recommendations  Discharge Plan A: Home, Home with family            Sandeep Martinez MD  Department of Hospital Medicine   [facility] - Telemetry ([facility])

## 2023-11-01 NOTE — ASSESSMENT & PLAN NOTE
Nutrition consulted. Most recent weight and BMI monitored-     Measurements:  Wt Readings from Last 1 Encounters:   11/01/23 47.4 kg (104 lb 8 oz)   Body mass index is 19.11 kg/m².    Patient has been screened and assessed by RD.    Malnutrition Type:  Context: acute illness or injury  Level: severe    Malnutrition Characteristic Summary:  Weight Loss (Malnutrition): 7.5% in 3 months (10% 2 months)  Energy Intake (Malnutrition): less than or equal to 50% for greater than or equal to 5 days  Subcutaneous Fat (Malnutrition): moderate depletion  Muscle Mass (Malnutrition): moderate depletion    Interventions/Recommendations (treatment strategy):    Consult dietary on Monday- at present CLD  1. Recommend pt diet order be modified to [place]; Low sodium diet. 2. Recommend pt receives boost plus TID. 3. Recommend pt receives PO intake encouragement during all meals. 4. Weigh daily.   Encouraged to eat more    Ad Remeron to stimulate appetite    Encourage to eat more

## 2023-11-01 NOTE — PLAN OF CARE
PT IMPROVING WITH FUNCTIONAL MOBILITY AND GAIT. PT REQUIRES SBA FOR BED MOBILITY, TF'S AND GAIT. PT  FT WITH RW, SBA, AND THERAPIST HOLDING J/P DRAIN. P.T. RECOMMENDS LOW INTENSITY THERAPY AT DISCHARGE.

## 2023-11-02 ENCOUNTER — TELEPHONE (OUTPATIENT)
Dept: VASCULAR SURGERY | Facility: CLINIC | Age: 63
End: 2023-11-02
Payer: COMMERCIAL

## 2023-11-02 ENCOUNTER — PATIENT MESSAGE (OUTPATIENT)
Dept: VASCULAR SURGERY | Facility: CLINIC | Age: 63
End: 2023-11-02
Payer: COMMERCIAL

## 2023-11-02 VITALS
DIASTOLIC BLOOD PRESSURE: 66 MMHG | BODY MASS INDEX: 19.23 KG/M2 | OXYGEN SATURATION: 99 % | SYSTOLIC BLOOD PRESSURE: 110 MMHG | RESPIRATION RATE: 18 BRPM | HEIGHT: 62 IN | TEMPERATURE: 98 F | WEIGHT: 104.5 LBS | HEART RATE: 86 BPM

## 2023-11-02 PROBLEM — K80.00 CALCULUS OF GALLBLADDER WITH ACUTE CHOLECYSTITIS WITHOUT OBSTRUCTION: Status: RESOLVED | Noted: 2023-10-26 | Resolved: 2023-11-02

## 2023-11-02 PROBLEM — F17.210 MODERATE CIGARETTE SMOKER (10-19 PER DAY): Status: RESOLVED | Noted: 2022-03-29 | Resolved: 2023-11-02

## 2023-11-02 PROBLEM — K81.0 ACUTE GANGRENOUS CHOLECYSTITIS: Status: RESOLVED | Noted: 2023-07-10 | Resolved: 2023-11-02

## 2023-11-02 PROCEDURE — 63600175 PHARM REV CODE 636 W HCPCS: Performed by: SURGERY

## 2023-11-02 PROCEDURE — 94640 AIRWAY INHALATION TREATMENT: CPT

## 2023-11-02 PROCEDURE — 97530 THERAPEUTIC ACTIVITIES: CPT

## 2023-11-02 PROCEDURE — 99232 SBSQ HOSP IP/OBS MODERATE 35: CPT | Mod: ,,, | Performed by: INTERNAL MEDICINE

## 2023-11-02 PROCEDURE — 25000003 PHARM REV CODE 250: Performed by: EMERGENCY MEDICINE

## 2023-11-02 PROCEDURE — 97116 GAIT TRAINING THERAPY: CPT

## 2023-11-02 PROCEDURE — 99024 PR POST-OP FOLLOW-UP VISIT: ICD-10-PCS | Mod: ,,, | Performed by: SURGERY

## 2023-11-02 PROCEDURE — 94761 N-INVAS EAR/PLS OXIMETRY MLT: CPT

## 2023-11-02 PROCEDURE — 99232 PR SUBSEQUENT HOSPITAL CARE,LEVL II: ICD-10-PCS | Mod: ,,, | Performed by: INTERNAL MEDICINE

## 2023-11-02 PROCEDURE — 25000242 PHARM REV CODE 250 ALT 637 W/ HCPCS: Performed by: SURGERY

## 2023-11-02 PROCEDURE — 99024 POSTOP FOLLOW-UP VISIT: CPT | Mod: ,,, | Performed by: SURGERY

## 2023-11-02 PROCEDURE — 25000003 PHARM REV CODE 250: Performed by: SURGERY

## 2023-11-02 PROCEDURE — 97110 THERAPEUTIC EXERCISES: CPT

## 2023-11-02 RX ORDER — LIDOCAINE AND PRILOCAINE 25; 25 MG/G; MG/G
CREAM TOPICAL ONCE
Status: DISCONTINUED | OUTPATIENT
Start: 2023-11-02 | End: 2023-11-02 | Stop reason: HOSPADM

## 2023-11-02 RX ORDER — FUROSEMIDE 40 MG/1
40 TABLET ORAL DAILY
Qty: 30 TABLET | Refills: 11 | Status: SHIPPED | OUTPATIENT
Start: 2023-11-03 | End: 2023-11-20

## 2023-11-02 RX ORDER — ONDANSETRON 4 MG/1
4 TABLET, ORALLY DISINTEGRATING ORAL EVERY 8 HOURS PRN
Qty: 40 TABLET | Refills: 1 | Status: SHIPPED | OUTPATIENT
Start: 2023-11-02 | End: 2023-11-20 | Stop reason: SDUPTHER

## 2023-11-02 RX ORDER — SPIRONOLACTONE 50 MG/1
50 TABLET, FILM COATED ORAL DAILY
Qty: 30 TABLET | Refills: 11 | Status: ON HOLD | OUTPATIENT
Start: 2023-11-03 | End: 2023-11-13 | Stop reason: HOSPADM

## 2023-11-02 RX ORDER — AMOXICILLIN 250 MG
2 CAPSULE ORAL DAILY
Qty: 60 TABLET | Refills: 1 | Status: SHIPPED | OUTPATIENT
Start: 2023-11-03 | End: 2023-11-20

## 2023-11-02 RX ORDER — AMOXICILLIN AND CLAVULANATE POTASSIUM 875; 125 MG/1; MG/1
1 TABLET, FILM COATED ORAL EVERY 12 HOURS
Qty: 14 TABLET | Refills: 0 | Status: ON HOLD | OUTPATIENT
Start: 2023-11-02 | End: 2023-11-13 | Stop reason: HOSPADM

## 2023-11-02 RX ORDER — MIRTAZAPINE 15 MG/1
15 TABLET, FILM COATED ORAL NIGHTLY
Qty: 30 TABLET | Refills: 11 | Status: SHIPPED | OUTPATIENT
Start: 2023-11-02 | End: 2024-11-01

## 2023-11-02 RX ORDER — HYDROCODONE BITARTRATE AND ACETAMINOPHEN 5; 325 MG/1; MG/1
1 TABLET ORAL EVERY 6 HOURS PRN
Qty: 12 TABLET | Refills: 0 | Status: ON HOLD | OUTPATIENT
Start: 2023-11-02 | End: 2023-11-13 | Stop reason: SDUPTHER

## 2023-11-02 RX ORDER — LANOLIN ALCOHOL/MO/W.PET/CERES
100 CREAM (GRAM) TOPICAL DAILY
Qty: 60 TABLET | Refills: 0 | Status: SHIPPED | OUTPATIENT
Start: 2023-11-03 | End: 2024-01-09

## 2023-11-02 RX ADMIN — MORPHINE SULFATE 2 MG: 2 INJECTION, SOLUTION INTRAMUSCULAR; INTRAVENOUS at 05:11

## 2023-11-02 RX ADMIN — BISACODYL 5 MG: 5 TABLET, COATED ORAL at 09:11

## 2023-11-02 RX ADMIN — THERA TABS 1 TABLET: TAB at 09:11

## 2023-11-02 RX ADMIN — MORPHINE SULFATE 2 MG: 2 INJECTION, SOLUTION INTRAMUSCULAR; INTRAVENOUS at 12:11

## 2023-11-02 RX ADMIN — PANTOPRAZOLE SODIUM 40 MG: 40 TABLET, DELAYED RELEASE ORAL at 09:11

## 2023-11-02 RX ADMIN — Medication 1 TABLET: at 09:11

## 2023-11-02 RX ADMIN — SENNOSIDES AND DOCUSATE SODIUM 2 TABLET: 50; 8.6 TABLET ORAL at 09:11

## 2023-11-02 RX ADMIN — MAGNESIUM HYDROXIDE 2400 MG: 400 SUSPENSION ORAL at 12:11

## 2023-11-02 RX ADMIN — LEVALBUTEROL HYDROCHLORIDE 1.25 MG: 0.63 SOLUTION RESPIRATORY (INHALATION) at 01:11

## 2023-11-02 RX ADMIN — HYDROCODONE BITARTRATE AND ACETAMINOPHEN 1 TABLET: 5; 325 TABLET ORAL at 09:11

## 2023-11-02 RX ADMIN — LEVALBUTEROL HYDROCHLORIDE 1.25 MG: 0.63 SOLUTION RESPIRATORY (INHALATION) at 08:11

## 2023-11-02 RX ADMIN — SPIRONOLACTONE 50 MG: 25 TABLET ORAL at 09:11

## 2023-11-02 RX ADMIN — ONDANSETRON 4 MG: 4 TABLET, ORALLY DISINTEGRATING ORAL at 09:11

## 2023-11-02 RX ADMIN — FUROSEMIDE 40 MG: 40 TABLET ORAL at 09:11

## 2023-11-02 RX ADMIN — AMOXICILLIN AND CLAVULANATE POTASSIUM 1 TABLET: 875; 125 TABLET, FILM COATED ORAL at 09:11

## 2023-11-02 RX ADMIN — THIAMINE HCL TAB 100 MG 100 MG: 100 TAB at 09:11

## 2023-11-02 NOTE — PLAN OF CARE
Problem: Adult Inpatient Plan of Care  Goal: Plan of Care Review  Outcome: Met  Goal: Patient-Specific Goal (Individualized)  Outcome: Met  Goal: Absence of Hospital-Acquired Illness or Injury  Outcome: Met  Goal: Optimal Comfort and Wellbeing  Outcome: Met  Goal: Readiness for Transition of Care  Outcome: Met     Problem: Nausea and Vomiting  Goal: Fluid and Electrolyte Balance  Outcome: Met     Problem: Infection  Goal: Absence of Infection Signs and Symptoms  Outcome: Met     Problem: Fall Injury Risk  Goal: Absence of Fall and Fall-Related Injury  Outcome: Met     Problem: Skin Injury Risk Increased  Goal: Skin Health and Integrity  Outcome: Met

## 2023-11-02 NOTE — PT/OT/SLP PROGRESS
Physical Therapy Treatment    Patient Name:  Lakisha Vance   MRN:  [id number]    Recommendations:     Discharge Recommendations: Low Intensity Therapy  Discharge Equipment Recommendations: bedside commode, shower chair, walker, rolling  Barriers to discharge: None    Assessment:     Lakisha Vance is a 63 y.o. female admitted with a medical diagnosis of Calculus of gallbladder with acute cholecystitis without obstruction.  She presents with the following impairments/functional limitations: weakness, impaired endurance, impaired self care skills, impaired functional mobility, gait instability, decreased upper extremity function, decreased lower extremity function, decreased safety awareness, pain.    Rehab Prognosis: Good; patient would benefit from acute skilled PT services to address these deficits and reach maximum level of function.    Recent Surgery: Procedure(s) (LRB):  XI ROBOTIC CHOLECYSTECTOMY (N/A) 5 Days Post-Op    Plan:     During this hospitalization, patient to be seen 3 x/week to address the identified rehab impairments via gait training, therapeutic activities, therapeutic exercises and progress toward the following goals:    Plan of Care Expires:  11/10/23    Subjective     Chief Complaint: PAIN IN LOWER ABDOMEN  Patient/Family Comments/goals: PT WAS GIVEN MEDICATION TO HAVE A BOWEL MOVEMENT. PT HAS NOT HAD BM SINCE SURGERY.   Pain/Comfort:  Pain Rating 1: 7/10  Location - Side 1: Bilateral  Location - Orientation 1: lower  Location 1: abdomen  Pain Addressed 1: Reposition, Distraction  Pain Rating Post-Intervention 1: 7/10      Objective:     Communicated with NURSE CRAIN prior to session.  Patient found supine with HUMAIRA drain, PICC line upon PT entry to room.     General Precautions: Standard, fall  Orthopedic Precautions: N/A  Braces: N/A  Respiratory Status: Room air     Functional Mobility:  Bed Mobility:     Rolling Left:  stand by assistance  Scooting: stand by assistance  Supine to Sit: stand by  "assistance  Transfers:     Sit to Stand:  stand by assistance with rolling walker  Stand to Sit: contact guard assistance with rolling walker; PT PROVIDED CGA DUE TO PT ROOM RECENTLY CLEANED AND FLOORS BEING WET.   Gait: PT  FT WITH SBA, RW, AND THERAPIST HOLDING HUMAIRA DRAIN. PT DEMONSTRATES SLOW BUT IMPROVED GAIT SPEED COMPARED TO LAST VISTI ON 11/1/23. PT WAS STEADY THROUGHOUT AND WITH NO LOB.   Balance: GOOD STATIC SITTING BALANCE EOB, GOOD STATIC STANDING BALANCE, GOOD DYNAMIC STANDING BALANCE DURING AMB AND STANDING EXERCISES;     AM-PAC 6 CLICK MOBILITY  Turning over in bed (including adjusting bedclothes, sheets and blankets)?: 4  Sitting down on and standing up from a chair with arms (e.g., wheelchair, bedside commode, etc.): 4  Moving from lying on back to sitting on the side of the bed?: 4  Moving to and from a bed to a chair (including a wheelchair)?: 4  Need to walk in hospital room?: 4  Climbing 3-5 steps with a railing?: 1 (NT)  Basic Mobility Total Score: 21     Treatment & Education:  PT EDUCATED ON P.T. POC AND ROLE OF PT IN THE ACUTE CARE SETTING.   PT EDUCATED ON BENEFITS OF UPRIGHT POSITION AND ENCOURAGED TO REMAIN IN SEATED POSITION FOR AS LONG AS TOLERATED.   PT EDUCATED ON "CALL DON'T FALL" POLICY AND INSTRUCTED TO USE CALL BUTTON WHEN WANTING TO CHANGE POSITIONS.   PT EDUCATED ON IMPORTANCE OF PHYSICAL ACTIVITY AND GIVEN THERAPEUTIC EXERCISE TO PERFORM THROUGHOUT THE DAY (SEATED MARCHES, LAQS, ANKLE PUMPS, 10 REPS BILATERALLY EACH)   PT EDUCATED ON RW SAFETY AND INSTRUCTIONS DURING TF'S AND GAIT.  PT EDUCATED NOT TO STRAIN OR PUSH FORCEFULLY WHEN HAVING A BM, BUT TO RELAX AND BREATH DEEPLY THROUGH DIAPHRAGM.   PT PERFORMED THERAPEUTIC EXERCISE PROGRAM CONSISTING OF STANDING MARCHES, HIP ABD, HIP EXT, AND SEATED LAQS (10 REPS EACH BILATERALLY).     Patient left up in chair with all lines intact, call button in reach, and chair alarm on..    GOALS:   Multidisciplinary Problems       " Physical Therapy Goals          Problem: Physical Therapy    Goal Priority Disciplines Outcome Goal Variances Interventions   Physical Therapy Goal     PT, PT/OT Ongoing, Progressing     Description: Goals to be met by 11/10/23.  1. Pt will complete bed mobility MOD I.  2. Pt will complete sit to stand MOD I.  3. Pt will ambulate 200ft MOD I.  4. Pt will increase AMPAC score by 2 points to progress functional mobility.                       Time Tracking:     PT Received On: 11/02/23  PT Start Time: 1015     PT Stop Time: 1040  PT Total Time (min): 25 min     Billable Minutes: Gait Training 10 and Therapeutic Activity 15    Treatment Type: Treatment  PT/PTA: PT     Number of PTA visits since last PT visit: 0     GEORGETTE Ramos  11/02/2023

## 2023-11-02 NOTE — PT/OT/SLP PROGRESS
Occupational Therapy   Treatment    Name: Lakisha Vance  MRN: [id number]  Admitting Diagnosis:  Calculus of gallbladder with acute cholecystitis without obstruction  5 Days Post-Op    Recommendations:     Discharge Recommendations: Low Intensity Therapy  Discharge Equipment Recommendations:  walker, rolling, shower chair, bedside commode  Barriers to discharge:  None    Assessment:     Lakisha Vance is a 63 y.o. female with a medical diagnosis of Calculus of gallbladder with acute cholecystitis without obstruction.  She presents with self care debility. Performance deficits affecting function are weakness, impaired endurance, impaired self care skills, impaired functional mobility, impaired cardiopulmonary response to activity, decreased safety awareness, pain, decreased lower extremity function, decreased upper extremity function.     Rehab Prognosis:  Good; patient would benefit from acute skilled OT services to address these deficits and reach maximum level of function.       Plan:     Patient to be seen 2 x/week to address the above listed problems via self-care/home management, therapeutic activities, therapeutic exercises  Plan of Care Expires: 11/10/23  Plan of Care Reviewed with: patient    Subjective     Chief Complaint: Pain in abdomen and not having a BM  Patient/Family Comments/goals: Pt states she can't wait to go home.  Pain/Comfort:  Pain Rating 1: 7/10  Location - Side 1: Bilateral  Location - Orientation 1: lower  Location 1: abdomen  Pain Addressed 1: Distraction, Reposition (activity pacing)  Pain Rating Post-Intervention 1: 7/10    Objective:     Communicated with: pt's nurse, Brandy, and completed a chart review via Epic prior to session.  Patient found HOB elevated with PICC line, HUMAIRA drain upon OT entry to room.    General Precautions: Standard, fall    Orthopedic Precautions:N/A  Braces: N/A  Respiratory Status: Room air     Occupational Performance:   Bed Mobility:    Patient completed  Rolling/Turning to Left with  stand by assistance  Patient completed Supine to Sit with stand by assistance     Functional Mobility/Transfers:  Patient completed Sit <> Stand Transfer with stand by assistance  with  rolling walker   Functional Mobility: Pt ambulated 420  ft with SBA with RW for increase activity tolerance for ADL completion.  Pt  completed a stand>sit transfer  to bedside chair with SBA with RW.    Activities of Daily Living:  Feeding:  Pt completed drinking with set up assistance for container management  while seated in bedside  chair.     Lower Body Dressing: pt completed adjusting socks while seated at  EOB with SBA.        [facility] 6 Click ADL: 17    Treatment & Education:  Pt tolerated session well. Motivated to go home. Pt details of improvement in eating and endurance from previous session. Pt educated on OOB activities  and benefits  to recovery. Pt encouraged to remain OOB and in chair for  2-3 consecutive hours and  complete all meets  seated upright. Pt in agreement to POC. Pt educated on BUE therex to promote and maintain strength and ROM for functional independence for desired occupations. Pt completed BUE  therex  in 1 plane  x 15 reps seated in bedside chair. Pt educated on call don't fall policy. Encouraged to use call  button to meet needs.    Patient left up in chair with all lines intact, call button in reach, and chair alarm on    GOALS:   Multidisciplinary Problems       Occupational Therapy Goals          Problem: Occupational Therapy    Goal Priority Disciplines Outcome Interventions   Occupational Therapy Goal     OT, PT/OT Ongoing, Progressing    Description: Goals to be met by: 11/10/23     Patient will increase functional independence with ADLs by performing:    Toileting from toilet with Supervision for hygiene and clothing management.   Toilet transfer to toilet with Supervision.  Upper extremity exercise program x10 reps per handout, with supervision.                          Time Tracking:     OT Date of Treatment: 11/02/23  OT Start Time: 0945  OT Stop Time: 1025  OT Total Time (min): 40 min    Billable Minutes:Therapeutic Activity 10  Therapeutic Exercise 30    OT/KUSUM: OT      RIVER Vicente  11/2/2023

## 2023-11-02 NOTE — PLAN OF CARE
GOOD PARTICIPATION TODAY. PT REQUIRES SBA FOR BED MOBILITY, TF'S, AND GAIT. PT  FT WITH SBA, RW, AND THERAPIST HOLDING HUMAIRA DRAIN. P.T. RECOMMENDS LOW INTENSITY THERAPY AT DISCHARGE.

## 2023-11-02 NOTE — ASSESSMENT & PLAN NOTE
Nutrition consulted. Most recent weight and BMI monitored-     Measurements:  Wt Readings from Last 1 Encounters:   11/01/23 47.4 kg (104 lb 8 oz)   Body mass index is 19.11 kg/m².    Patient has been screened and assessed by RD.    Management as per primary team and RD

## 2023-11-02 NOTE — PROGRESS NOTES
[facility] - Telemetry ([facility])  Hepatology  Consult Note    Patient Name: Lakisha Vance  MRN: [id number]  Admission Date: 10/26/2023  Hospital Length of Stay: 3 days  Attending Provider: Sandeep Martinez MD   Primary Care Physician: Pranav Gonzalez MD  Principal Problem:Calculus of gallbladder with acute cholecystitis without obstruction    Consults  Subjective:     Transplant status: No    HPI: 63 y.o female with alcohol liver cirrhosis  was transferred from [facility] for abdominal pain. Patient was seen by me in clinic few weeks before for elevated liver enzymes with alcohol related liver disease. Hepatology was consulted for the same    Patient reports Abdominal Pain in the RUQ, Dull aching associated nausea and emesis. Denies hematemesis, fluid overload, confusion, oliguria, fever. Pain improved with symptomatic treatment and No vomiting since admission    US showed nodular liver, gallbladder is hydropic with sludge and small dependent echogenic calculi. The gallbladder wall is mildly thickened and edematous. CT scan showed distended gallbladder and mild ascites. No CBD dilatation. Patient just back for HIDA scan     Now s/p robotic cholecystectomy.  Found to have gangrenous and perforated cholecystitis, liver cirrhotic in appearance.     Interval History: looks and feels well, Doing well overall.  LFTs remaining WNL  HUMAIRA drain + serous drainage from the HUMAIRA drain,     Review of Systems  Constitutional:  Positive for activity change and appetite change. Negative for fatigue, fever and unexpected weight change.   HENT: Negative.     Eyes: Negative.  Negative for visual disturbance.   Respiratory: Negative.  Negative for cough, shortness of breath and wheezing.    Cardiovascular: Negative.    Gastrointestinal:  Positive for abdominal pain. Negative for abdominal distention, anal bleeding, blood in stool, constipation, diarrhea, nausea, rectal pain and vomiting.   Endocrine: Negative for polyuria.   Genitourinary:  Negative.    Musculoskeletal:  Negative for arthralgias and gait problem.   Skin: Negative.    Allergic/Immunologic: Negative.    Neurological: Negative.    Hematological: Negative.    Psychiatric/Behavioral: Negative.        Past Medical History:   Diagnosis Date    Hypertension        Past Surgical History:   Procedure Laterality Date    ESOPHAGOGASTRODUODENOSCOPY N/A 9/1/2023    Procedure: ESOPHAGOGASTRODUODENOSCOPY (EGD);  Surgeon: Nerissa Ash MD;  Location: [facility] ENDO;  Service: Endoscopy;  Laterality: N/A;    HYSTERECTOMY      ROBOT-ASSISTED CHOLECYSTECTOMY USING DA BJ XI N/A 10/28/2023    Procedure: XI ROBOTIC CHOLECYSTECTOMY;  Surgeon: Valentina De La Rosa MD;  Location: [facility] OR;  Service: General;  Laterality: N/A;       Family history of liver disease: No    Review of patient's allergies indicates:   Allergen Reactions    Baclofen Other (See Comments)     Vomiting, confusion, shaking         Tobacco Use    Smoking status: Every Day     Current packs/day: 0.50     Types: Cigarettes    Smokeless tobacco: Never   Substance and Sexual Activity    Alcohol use: Yes     Comment: social    Drug use: Not on file    Sexual activity: Not on file       Medications Prior to Admission   Medication Sig Dispense Refill Last Dose    ondansetron (ZOFRAN-ODT) 4 MG TbDL Take 1 tablet (4 mg total) by mouth every 8 (eight) hours as needed (nausea/vomiting). 12 tablet 1     pantoprazole (PROTONIX) 40 MG tablet Take 1 tablet (40 mg total) by mouth 2 (two) times daily. 60 tablet 11     spironolactone (ALDACTONE) 50 MG tablet Take 1 tablet (50 mg total) by mouth 2 (two) times daily. 60 tablet 11     [DISCONTINUED] cyanocobalamin (VITAMIN B-12) 100 MCG tablet Take 100 mcg by mouth once daily.       [DISCONTINUED] folic acid (FOLVITE) 1 MG tablet Take 1 tablet by mouth once daily.       [DISCONTINUED] furosemide (LASIX) 20 MG tablet Take 20 mg by mouth.          Objective:     Vital Signs (Most Recent):  Temp: 98.2 °F (36.8  °C) (11/02/23 1138)  Pulse: 86 (11/02/23 1138)  Resp: 18 (11/02/23 1138)  BP: 110/66 (11/02/23 1138)  SpO2: 99 % (11/02/23 1138) Vital Signs (24h Range):  Temp:  [97.6 °F (36.4 °C)-98.6 °F (37 °C)] 98.2 °F (36.8 °C)  Pulse:  [76-99] 86  Resp:  [14-20] 18  SpO2:  [96 %-100 %] 99 %  BP: (110-131)/(65-79) 110/66     Weight: 47.4 kg (104 lb 8 oz) (11/01/23 0009)  Body mass index is 19.11 kg/m².    Physical Exam  Vitals and nursing note reviewed. Exam conducted with a chaperone present.   Constitutional:       Appearance: She is underweight. She is ill-appearing.   Cardiovascular:      Rate and Rhythm: Normal rate and regular rhythm.   Abdominal:      General: Abdomen is protuberant. Bowel sounds are normal.      Tenderness: There is abdominal tenderness in the right upper quadrant.        Musculoskeletal:      Right lower leg: No edema.      Left lower leg: No edema.   Skin:     General: Skin is warm and dry.      Capillary Refill: Capillary refill takes less than 2 seconds.   Neurological:      General: No focal deficit present.      Mental Status: She is alert and oriented to person, place, and time. Mental status is at baseline.         Significant Labs:  CBC:   Recent Labs   Lab 10/31/23  0446   WBC 10.25   RBC 3.24*   HGB 9.7*   HCT 30.1*        BMP:   Recent Labs   Lab 10/31/23  0446   GLU 83      K 3.8      CO2 19*   BUN 11   CREATININE 0.5   CALCIUM 8.0*     Coagulation:   Recent Labs   Lab 10/28/23  0524   INR 1.1       Significant Imaging:  Labs: Reviewed  MELD 3.0: 12 at 10/29/2023  4:46 AM  MELD-Na: 7 at 10/29/2023  4:46 AM  Calculated from:  Serum Creatinine: 0.6 mg/dL (Using min of 1 mg/dL) at 10/29/2023  4:46 AM  Serum Sodium: 138 mmol/L (Using max of 137 mmol/L) at 10/29/2023  4:46 AM  Total Bilirubin: 0.3 mg/dL (Using min of 1 mg/dL) at 10/29/2023  4:46 AM  Serum Albumin: 1.3 g/dL (Using min of 1.5 g/dL) at 10/29/2023  4:46 AM  INR(ratio): 1.1 at 10/28/2023  5:24 AM  Age at listing  (hypothetical): 63 years  Sex: Female at 10/29/2023  4:46 AM       Assessment/Plan:     Active Diagnoses:    Diagnosis Date Noted POA    Severe protein-calorie malnutrition [E43] 10/28/2023 Yes    Alcoholic cirrhosis of liver with ascites [K70.31] 10/26/2023 Yes    HTN (hypertension) [I10] 08/13/2019 Yes      Problems Resolved During this Admission:    Diagnosis Date Noted Date Resolved POA    PRINCIPAL PROBLEM:  acute cholecystitis with gangrene of GB with perforation  [K80.00] 10/26/2023 11/02/2023 Yes    Acute gangrenous cholecystitis s/p Robotic Cholecystectomy [K81.0] 07/10/2023 11/02/2023 Yes    Moderate cigarette smoker (10-19 per day) [F17.210] 03/29/2022 11/02/2023 Yes     acute cholecystitis with gangrene of GB with perforation   S/p Surgery    Alcohol related Cirrhosis  -Sober > 2 months   -Mild decompensation with ascites s/p Lap cholecystectomy as HUMAIRA drain ++  -Started on diuretics  -Follow up Outpatient in Hepatology after discharge    Ascites  Persistent HUMAIRA drain with ascitic fluid  Adjust-Lasix and aldactone  Monitor renal function  Nutrition  Salt restricted diet      Severe PEM  Nutritional consult    Rest Mx as Primary team      Thank you for your consult. I will follow-up with patient. Please contact us if you have any additional questions.    Lew Weaver MD  Hepatology  [facility] - Telemetry ([facility])

## 2023-11-02 NOTE — SUBJECTIVE & OBJECTIVE
Interval History: AFVSS.  VANESSA.  HUMAIRA drain with >400 output ascites     Medications:  Continuous Infusions:  Scheduled Meds:   amoxicillin-clavulanate 875-125mg  1 tablet Oral Q12H    enoxparin  30 mg Subcutaneous Daily    folic acid-vit B6-vit B12 2.5-25-2 mg  1 tablet Oral Daily    furosemide  40 mg Oral Daily    levalbuterol  1.2495 mg Nebulization Q8H    mirtazapine  15 mg Oral QHS    multivitamin  1 tablet Oral Daily    pantoprazole  40 mg Oral BID    senna-docusate 8.6-50 mg  2 tablet Oral Daily    sodium chloride 0.9%  10 mL Intravenous Q8H    spironolactone  50 mg Oral Daily    thiamine  100 mg Oral Daily     PRN Meds:HYDROcodone-acetaminophen, magnesium hydroxide 400 mg/5 ml, melatonin, morphine, naloxone, ondansetron, ondansetron, polyethylene glycol, promethazine (PHENERGAN) 12.5 mg in dextrose 5 % (D5W) 50 mL IVPB     Review of patient's allergies indicates:   Allergen Reactions    Baclofen Other (See Comments)     Vomiting, confusion, shaking     Objective:     Vital Signs (Most Recent):  Temp: 98.2 °F (36.8 °C) (11/02/23 1138)  Pulse: 86 (11/02/23 1138)  Resp: 18 (11/02/23 1219)  BP: 110/66 (11/02/23 1138)  SpO2: 99 % (11/02/23 1138) Vital Signs (24h Range):  Temp:  [97.6 °F (36.4 °C)-98.2 °F (36.8 °C)] 98.2 °F (36.8 °C)  Pulse:  [76-99] 86  Resp:  [14-20] 18  SpO2:  [96 %-100 %] 99 %  BP: (110-131)/(65-79) 110/66     Weight: 47.4 kg (104 lb 8 oz)  Body mass index is 19.11 kg/m².    Intake/Output - Last 3 Shifts         10/31 0700  11/01 0659 11/01 0700 11/02 0659 11/02 0700 11/03 0659    P.O. 560 237     IV Piggyback 1135.9 205.6     Total Intake(mL/kg) 1695.9 (35.8) 442.6 (9.3)     Urine (mL/kg/hr) 0 (0) 950 (0.8) 100 (0.4)    Drains 1115 475 90    Stool  0     Total Output 1115 1425 190    Net +580.9 -982.5 -190           Urine Occurrence 5 x      Stool Occurrence  1 x              Physical Exam  Constitutional:       General: She is not in acute distress.     Appearance: Normal appearance. She is  not ill-appearing or toxic-appearing.   HENT:      Head: Normocephalic and atraumatic.   Eyes:      General: No scleral icterus.     Extraocular Movements: Extraocular movements intact.      Conjunctiva/sclera: Conjunctivae normal.   Cardiovascular:      Rate and Rhythm: Normal rate and regular rhythm.   Pulmonary:      Effort: Pulmonary effort is normal.   Abdominal:      Palpations: Abdomen is soft.      Comments: Appropriately TTP, incisions c/d/I    Skin:     General: Skin is warm and dry.      Coloration: Skin is not jaundiced.   Neurological:      General: No focal deficit present.      Mental Status: She is alert.   Psychiatric:         Mood and Affect: Mood normal.          Significant Labs:  I have reviewed all pertinent lab results within the past 24 hours.  CBC:   Recent Labs   Lab 10/31/23  0446   WBC 10.25   RBC 3.24*   HGB 9.7*   HCT 30.1*      MCV 93   MCH 29.9   MCHC 32.2     BMP:   Recent Labs   Lab 10/29/23  0446 10/31/23  0446   GLU 70 83    137   K 4.5 3.8    108   CO2 23 19*   BUN 19 11   CREATININE 0.6 0.5   CALCIUM 8.1* 8.0*   MG 1.8  --        Significant Diagnostics:  I have reviewed all pertinent imaging results/findings within the past 24 hours.

## 2023-11-02 NOTE — PLAN OF CARE
Pt tolerated session well. Pt SBA with all Bed Mobility, STS, and ambulation.    ADLs: Pt completed adjustment of socks while seated at EOB with SBA. Pt completed drinking with set up assistance for container management.    D/C Rec: Low Intensity Therapy

## 2023-11-02 NOTE — PROGRESS NOTES
[facility] - Telemetry ([facility])  General Surgery  Progress Note    Subjective:     History of Present Illness:  Lakisha Vance is a 63 y.o. female who presents with acute cholecystitis in the setting of cirrhosis and COPD.      Post-Op Info:  Procedure(s) (LRB):  XI ROBOTIC CHOLECYSTECTOMY (N/A)   5 Days Post-Op     Interval History: AFVSS.  VANESSA.  HUMAIRA drain with >400 output ascites     Medications:  Continuous Infusions:  Scheduled Meds:   amoxicillin-clavulanate 875-125mg  1 tablet Oral Q12H    enoxparin  30 mg Subcutaneous Daily    folic acid-vit B6-vit B12 2.5-25-2 mg  1 tablet Oral Daily    furosemide  40 mg Oral Daily    levalbuterol  1.2495 mg Nebulization Q8H    mirtazapine  15 mg Oral QHS    multivitamin  1 tablet Oral Daily    pantoprazole  40 mg Oral BID    senna-docusate 8.6-50 mg  2 tablet Oral Daily    sodium chloride 0.9%  10 mL Intravenous Q8H    spironolactone  50 mg Oral Daily    thiamine  100 mg Oral Daily     PRN Meds:HYDROcodone-acetaminophen, magnesium hydroxide 400 mg/5 ml, melatonin, morphine, naloxone, ondansetron, ondansetron, polyethylene glycol, promethazine (PHENERGAN) 12.5 mg in dextrose 5 % (D5W) 50 mL IVPB     Review of patient's allergies indicates:   Allergen Reactions    Baclofen Other (See Comments)     Vomiting, confusion, shaking     Objective:     Vital Signs (Most Recent):  Temp: 98.2 °F (36.8 °C) (11/02/23 1138)  Pulse: 86 (11/02/23 1138)  Resp: 18 (11/02/23 1219)  BP: 110/66 (11/02/23 1138)  SpO2: 99 % (11/02/23 1138) Vital Signs (24h Range):  Temp:  [97.6 °F (36.4 °C)-98.2 °F (36.8 °C)] 98.2 °F (36.8 °C)  Pulse:  [76-99] 86  Resp:  [14-20] 18  SpO2:  [96 %-100 %] 99 %  BP: (110-131)/(65-79) 110/66     Weight: 47.4 kg (104 lb 8 oz)  Body mass index is 19.11 kg/m².    Intake/Output - Last 3 Shifts         10/31 0700  11/01 0659 11/01 0700 11/02 0659 11/02 0700 11/03 0659    P.O. 560 237     IV Piggyback 1135.9 205.6     Total Intake(mL/kg) 1695.9 (35.8) 442.6 (9.3)     Urine  (mL/kg/hr) 0 (0) 950 (0.8) 100 (0.4)    Drains 1115 475 90    Stool  0     Total Output 1115 1425 190    Net +580.9 -982.5 -190           Urine Occurrence 5 x      Stool Occurrence  1 x              Physical Exam  Constitutional:       General: She is not in acute distress.     Appearance: Normal appearance. She is not ill-appearing or toxic-appearing.   HENT:      Head: Normocephalic and atraumatic.   Eyes:      General: No scleral icterus.     Extraocular Movements: Extraocular movements intact.      Conjunctiva/sclera: Conjunctivae normal.   Cardiovascular:      Rate and Rhythm: Normal rate and regular rhythm.   Pulmonary:      Effort: Pulmonary effort is normal.   Abdominal:      Palpations: Abdomen is soft.      Comments: Appropriately TTP, incisions c/d/I    Skin:     General: Skin is warm and dry.      Coloration: Skin is not jaundiced.   Neurological:      General: No focal deficit present.      Mental Status: She is alert.   Psychiatric:         Mood and Affect: Mood normal.          Significant Labs:  I have reviewed all pertinent lab results within the past 24 hours.  CBC:   Recent Labs   Lab 10/31/23  0446   WBC 10.25   RBC 3.24*   HGB 9.7*   HCT 30.1*      MCV 93   MCH 29.9   MCHC 32.2     BMP:   Recent Labs   Lab 10/29/23  0446 10/31/23  0446   GLU 70 83    137   K 4.5 3.8    108   CO2 23 19*   BUN 19 11   CREATININE 0.6 0.5   CALCIUM 8.1* 8.0*   MG 1.8  --        Significant Diagnostics:  I have reviewed all pertinent imaging results/findings within the past 24 hours.    Assessment/Plan:   * acute cholecystitis with gangrene of GB with perforation   POD #5- s/p robotic cholecystectomy.  Found to have gangrenous and perforated cholecystitis, liver cirrhotic in appearance.  Continues to do well.      -- regular diet as tolerated  -- pain control as per primary team   -- Recommend several days abx for pus and bile spillage from the perforation. Can transition to PO Augmentin for 10  days.   -- ok to shower   -- PT/OT  -- encourage IS and ambulation   -- Stable for discharge from surgical standpoint with HUMAIRA drain and PO antibiotics.  Plan to f/u in clinic in the next few days for HUMAIRA drain removal.       Severe protein-calorie malnutrition  Nutrition consulted. Most recent weight and BMI monitored-     Measurements:  Wt Readings from Last 1 Encounters:   11/01/23 47.4 kg (104 lb 8 oz)   Body mass index is 19.11 kg/m².    Patient has been screened and assessed by RD.    Management as per primary team and RD    Alcoholic cirrhosis of liver with ascites  Management as per primary team and GI    HTN (hypertension)  Management as per primary         Valentina De La Rosa MD  General Surgery  [facility] - Telemetry ([facility])

## 2023-11-03 ENCOUNTER — PATIENT MESSAGE (OUTPATIENT)
Dept: SURGICAL ONCOLOGY | Facility: CLINIC | Age: 63
End: 2023-11-03
Payer: COMMERCIAL

## 2023-11-05 ENCOUNTER — NURSE TRIAGE (OUTPATIENT)
Dept: ADMINISTRATIVE | Facility: CLINIC | Age: 63
End: 2023-11-05
Payer: COMMERCIAL

## 2023-11-05 ENCOUNTER — HOSPITAL ENCOUNTER (INPATIENT)
Facility: HOSPITAL | Age: 63
LOS: 6 days | Discharge: HOME | DRG: 871 | End: 2023-11-13
Attending: FAMILY MEDICINE | Admitting: HOSPITALIST
Payer: COMMERCIAL

## 2023-11-05 DIAGNOSIS — R17 JAUNDICE: ICD-10-CM

## 2023-11-05 DIAGNOSIS — R10.9 ABDOMINAL PAIN: Primary | ICD-10-CM

## 2023-11-05 DIAGNOSIS — R11.2 NAUSEA AND VOMITING, UNSPECIFIED VOMITING TYPE: ICD-10-CM

## 2023-11-05 DIAGNOSIS — G89.18 POSTOPERATIVE PAIN: ICD-10-CM

## 2023-11-05 DIAGNOSIS — K57.20 DIVERTICULITIS OF COLON WITH PERFORATION: ICD-10-CM

## 2023-11-05 LAB
ALBUMIN SERPL BCP-MCNC: 1.7 G/DL (ref 3.5–5.2)
ALP SERPL-CCNC: 166 U/L (ref 55–135)
ALT SERPL W/O P-5'-P-CCNC: 10 U/L (ref 10–44)
ANION GAP SERPL CALC-SCNC: 15 MMOL/L (ref 8–16)
AST SERPL-CCNC: 26 U/L (ref 10–40)
BASOPHILS # BLD AUTO: 0.24 K/UL (ref 0–0.2)
BASOPHILS NFR BLD: 0.5 % (ref 0–1.9)
BILIRUB SERPL-MCNC: 0.3 MG/DL (ref 0.1–1)
BUN SERPL-MCNC: 19 MG/DL (ref 8–23)
CALCIUM SERPL-MCNC: 9.5 MG/DL (ref 8.7–10.5)
CHLORIDE SERPL-SCNC: 104 MMOL/L (ref 95–110)
CO2 SERPL-SCNC: 19 MMOL/L (ref 23–29)
CREAT SERPL-MCNC: 0.8 MG/DL (ref 0.5–1.4)
DIFFERENTIAL METHOD: ABNORMAL
EOSINOPHIL # BLD AUTO: 0 K/UL (ref 0–0.5)
EOSINOPHIL NFR BLD: 0.1 % (ref 0–8)
ERYTHROCYTE [DISTWIDTH] IN BLOOD BY AUTOMATED COUNT: 13.3 % (ref 11.5–14.5)
EST. GFR  (NO RACE VARIABLE): >60 ML/MIN/1.73 M^2
ETHANOL SERPL-MCNC: <10 MG/DL
GLUCOSE SERPL-MCNC: 156 MG/DL (ref 70–110)
HCT VFR BLD AUTO: 32.9 % (ref 37–48.5)
HGB BLD-MCNC: 11 G/DL (ref 12–16)
IMM GRANULOCYTES # BLD AUTO: 0.49 K/UL (ref 0–0.04)
IMM GRANULOCYTES NFR BLD AUTO: 1.1 % (ref 0–0.5)
LACTATE SERPL-SCNC: 1.4 MMOL/L (ref 0.5–2.2)
LYMPHOCYTES # BLD AUTO: 1.5 K/UL (ref 1–4.8)
LYMPHOCYTES NFR BLD: 3.3 % (ref 18–48)
MCH RBC QN AUTO: 29 PG (ref 27–31)
MCHC RBC AUTO-ENTMCNC: 33.4 G/DL (ref 32–36)
MCV RBC AUTO: 87 FL (ref 82–98)
MONOCYTES # BLD AUTO: 1.7 K/UL (ref 0.3–1)
MONOCYTES NFR BLD: 3.7 % (ref 4–15)
NEUTROPHILS # BLD AUTO: 41.1 K/UL (ref 1.8–7.7)
NEUTROPHILS NFR BLD: 91.3 % (ref 38–73)
NRBC BLD-RTO: 0 /100 WBC
PLATELET # BLD AUTO: 985 K/UL (ref 150–450)
PLATELET BLD QL SMEAR: ABNORMAL
PMV BLD AUTO: 9.2 FL (ref 9.2–12.9)
POTASSIUM SERPL-SCNC: 2.9 MMOL/L (ref 3.5–5.1)
PROT SERPL-MCNC: 7.2 G/DL (ref 6–8.4)
RBC # BLD AUTO: 3.79 M/UL (ref 4–5.4)
SODIUM SERPL-SCNC: 138 MMOL/L (ref 136–145)
WBC # BLD AUTO: 44.94 K/UL (ref 3.9–12.7)

## 2023-11-05 PROCEDURE — 96375 TX/PRO/DX INJ NEW DRUG ADDON: CPT

## 2023-11-05 PROCEDURE — 80053 COMPREHEN METABOLIC PANEL: CPT | Performed by: FAMILY MEDICINE

## 2023-11-05 PROCEDURE — 93010 ELECTROCARDIOGRAM REPORT: CPT | Mod: ,,, | Performed by: INTERNAL MEDICINE

## 2023-11-05 PROCEDURE — 99285 EMERGENCY DEPT VISIT HI MDM: CPT | Mod: 25

## 2023-11-05 PROCEDURE — 63600175 PHARM REV CODE 636 W HCPCS: Performed by: FAMILY MEDICINE

## 2023-11-05 PROCEDURE — G0378 HOSPITAL OBSERVATION PER HR: HCPCS

## 2023-11-05 PROCEDURE — 85025 COMPLETE CBC W/AUTO DIFF WBC: CPT | Performed by: FAMILY MEDICINE

## 2023-11-05 PROCEDURE — 93005 ELECTROCARDIOGRAM TRACING: CPT

## 2023-11-05 PROCEDURE — 82077 ASSAY SPEC XCP UR&BREATH IA: CPT | Performed by: FAMILY MEDICINE

## 2023-11-05 PROCEDURE — 87040 BLOOD CULTURE FOR BACTERIA: CPT | Performed by: FAMILY MEDICINE

## 2023-11-05 PROCEDURE — 83605 ASSAY OF LACTIC ACID: CPT | Performed by: FAMILY MEDICINE

## 2023-11-05 PROCEDURE — 36415 COLL VENOUS BLD VENIPUNCTURE: CPT | Performed by: FAMILY MEDICINE

## 2023-11-05 PROCEDURE — 96365 THER/PROPH/DIAG IV INF INIT: CPT | Mod: 59

## 2023-11-05 PROCEDURE — 93010 EKG 12-LEAD: ICD-10-PCS | Mod: ,,, | Performed by: INTERNAL MEDICINE

## 2023-11-05 PROCEDURE — 25000003 PHARM REV CODE 250: Performed by: FAMILY MEDICINE

## 2023-11-05 RX ORDER — ONDANSETRON 2 MG/ML
4 INJECTION INTRAMUSCULAR; INTRAVENOUS EVERY 6 HOURS PRN
Status: DISCONTINUED | OUTPATIENT
Start: 2023-11-05 | End: 2023-11-13 | Stop reason: HOSPADM

## 2023-11-05 RX ORDER — ONDANSETRON 2 MG/ML
4 INJECTION INTRAMUSCULAR; INTRAVENOUS ONCE
Status: COMPLETED | OUTPATIENT
Start: 2023-11-05 | End: 2023-11-05

## 2023-11-05 RX ORDER — POTASSIUM CHLORIDE 7.45 MG/ML
10 INJECTION INTRAVENOUS
Status: ACTIVE | OUTPATIENT
Start: 2023-11-05 | End: 2023-11-06

## 2023-11-05 RX ORDER — MORPHINE SULFATE 4 MG/ML
4 INJECTION, SOLUTION INTRAMUSCULAR; INTRAVENOUS EVERY 4 HOURS PRN
Status: DISCONTINUED | OUTPATIENT
Start: 2023-11-05 | End: 2023-11-12

## 2023-11-05 RX ORDER — MORPHINE SULFATE 4 MG/ML
4 INJECTION, SOLUTION INTRAMUSCULAR; INTRAVENOUS
Status: COMPLETED | OUTPATIENT
Start: 2023-11-05 | End: 2023-11-05

## 2023-11-05 RX ADMIN — PIPERACILLIN AND TAZOBACTAM 4.5 G: 4; .5 INJECTION, POWDER, LYOPHILIZED, FOR SOLUTION INTRAVENOUS; PARENTERAL at 08:11

## 2023-11-05 RX ADMIN — ONDANSETRON 4 MG: 2 INJECTION INTRAMUSCULAR; INTRAVENOUS at 08:11

## 2023-11-05 RX ADMIN — MORPHINE SULFATE 4 MG: 4 INJECTION INTRAVENOUS at 11:11

## 2023-11-05 RX ADMIN — MORPHINE SULFATE 4 MG: 4 INJECTION INTRAVENOUS at 08:11

## 2023-11-05 RX ADMIN — SODIUM CHLORIDE 1341 ML: 0.9 INJECTION, SOLUTION INTRAVENOUS at 07:11

## 2023-11-05 NOTE — TELEPHONE ENCOUNTER
Friend, Haley states that pt is post op Gallbladder removal sx on 10/28/23. Now c/o pt vomiting up black material and states that tube is also leaking. States that pt is too weak to stand. Advised to call 911 per protocol. VU. Encounter routed to provider.     Reason for Disposition   Shock suspected (e.g., cold/pale/clammy skin, too weak to stand, low BP, rapid pulse)    Protocols used: Vomiting-A-AH

## 2023-11-06 PROBLEM — G89.18 POSTOPERATIVE PAIN: Status: ACTIVE | Noted: 2023-11-06

## 2023-11-06 PROBLEM — K59.03 DRUG-INDUCED CONSTIPATION: Status: ACTIVE | Noted: 2023-11-06

## 2023-11-06 PROBLEM — Z90.49 S/P LAPAROSCOPIC CHOLECYSTECTOMY: Status: ACTIVE | Noted: 2023-11-06

## 2023-11-06 PROBLEM — D72.829 LEUKOCYTOSIS: Status: ACTIVE | Noted: 2023-11-06

## 2023-11-06 PROBLEM — R11.2 NAUSEA AND VOMITING: Status: ACTIVE | Noted: 2023-11-06

## 2023-11-06 PROBLEM — G89.18 POSTOPERATIVE PAIN: Status: RESOLVED | Noted: 2023-11-06 | Resolved: 2023-11-06

## 2023-11-06 PROBLEM — R10.9 ABDOMINAL PAIN: Status: ACTIVE | Noted: 2023-11-06

## 2023-11-06 PROBLEM — A41.9 SEPSIS: Status: ACTIVE | Noted: 2023-11-06

## 2023-11-06 PROBLEM — R18.8 PELVIC FLUID COLLECTION: Status: ACTIVE | Noted: 2023-11-06

## 2023-11-06 LAB
ALBUMIN SERPL BCP-MCNC: 1.5 G/DL (ref 3.5–5.2)
ALP SERPL-CCNC: 145 U/L (ref 55–135)
ALT SERPL W/O P-5'-P-CCNC: 11 U/L (ref 10–44)
ANION GAP SERPL CALC-SCNC: 10 MMOL/L (ref 8–16)
AST SERPL-CCNC: 19 U/L (ref 10–40)
BASOPHILS # BLD AUTO: 0.22 K/UL (ref 0–0.2)
BASOPHILS NFR BLD: 0.6 % (ref 0–1.9)
BILIRUB SERPL-MCNC: 0.2 MG/DL (ref 0.1–1)
BILIRUB UR QL STRIP: NEGATIVE
BUN SERPL-MCNC: 20 MG/DL (ref 8–23)
CALCIUM SERPL-MCNC: 8 MG/DL (ref 8.7–10.5)
CHLORIDE SERPL-SCNC: 109 MMOL/L (ref 95–110)
CLARITY UR: CLEAR
CO2 SERPL-SCNC: 20 MMOL/L (ref 23–29)
COLOR UR: YELLOW
CREAT SERPL-MCNC: 0.6 MG/DL (ref 0.5–1.4)
DIFFERENTIAL METHOD: ABNORMAL
EOSINOPHIL # BLD AUTO: 0.1 K/UL (ref 0–0.5)
EOSINOPHIL NFR BLD: 0.3 % (ref 0–8)
ERYTHROCYTE [DISTWIDTH] IN BLOOD BY AUTOMATED COUNT: 13.4 % (ref 11.5–14.5)
EST. GFR  (NO RACE VARIABLE): >60 ML/MIN/1.73 M^2
GLUCOSE SERPL-MCNC: 73 MG/DL (ref 70–110)
GLUCOSE UR QL STRIP: NEGATIVE
HCT VFR BLD AUTO: 31.7 % (ref 37–48.5)
HGB BLD-MCNC: 10.1 G/DL (ref 12–16)
HGB UR QL STRIP: NEGATIVE
IMM GRANULOCYTES # BLD AUTO: 0.43 K/UL (ref 0–0.04)
IMM GRANULOCYTES NFR BLD AUTO: 1.1 % (ref 0–0.5)
INR PPP: 1.1 (ref 0.8–1.2)
KETONES UR QL STRIP: ABNORMAL
LACTATE SERPL-SCNC: 1.9 MMOL/L (ref 0.5–2.2)
LEUKOCYTE ESTERASE UR QL STRIP: NEGATIVE
LYMPHOCYTES # BLD AUTO: 2.9 K/UL (ref 1–4.8)
LYMPHOCYTES NFR BLD: 7.5 % (ref 18–48)
MAGNESIUM SERPL-MCNC: 2.1 MG/DL (ref 1.6–2.6)
MCH RBC QN AUTO: 29.1 PG (ref 27–31)
MCHC RBC AUTO-ENTMCNC: 31.9 G/DL (ref 32–36)
MCV RBC AUTO: 91 FL (ref 82–98)
MONOCYTES # BLD AUTO: 1.8 K/UL (ref 0.3–1)
MONOCYTES NFR BLD: 4.8 % (ref 4–15)
NEUTROPHILS # BLD AUTO: 32.8 K/UL (ref 1.8–7.7)
NEUTROPHILS NFR BLD: 85.7 % (ref 38–73)
NITRITE UR QL STRIP: NEGATIVE
NRBC BLD-RTO: 0 /100 WBC
PH UR STRIP: 6 [PH] (ref 5–8)
PLATELET # BLD AUTO: 740 K/UL (ref 150–450)
PMV BLD AUTO: 9.3 FL (ref 9.2–12.9)
POTASSIUM SERPL-SCNC: 2.9 MMOL/L (ref 3.5–5.1)
PROT SERPL-MCNC: 6.3 G/DL (ref 6–8.4)
PROT UR QL STRIP: ABNORMAL
PROTHROMBIN TIME: 11.9 SEC (ref 9–12.5)
RBC # BLD AUTO: 3.47 M/UL (ref 4–5.4)
SODIUM SERPL-SCNC: 139 MMOL/L (ref 136–145)
SP GR UR STRIP: >1.03 (ref 1–1.03)
URN SPEC COLLECT METH UR: ABNORMAL
UROBILINOGEN UR STRIP-ACNC: NEGATIVE EU/DL
WBC # BLD AUTO: 38.3 K/UL (ref 3.9–12.7)

## 2023-11-06 PROCEDURE — 25000003 PHARM REV CODE 250: Performed by: SURGERY

## 2023-11-06 PROCEDURE — 83735 ASSAY OF MAGNESIUM: CPT | Performed by: SURGERY

## 2023-11-06 PROCEDURE — 85025 COMPLETE CBC W/AUTO DIFF WBC: CPT | Performed by: SURGERY

## 2023-11-06 PROCEDURE — 63600175 PHARM REV CODE 636 W HCPCS: Performed by: SURGERY

## 2023-11-06 PROCEDURE — 81003 URINALYSIS AUTO W/O SCOPE: CPT | Performed by: HOSPITALIST

## 2023-11-06 PROCEDURE — G0378 HOSPITAL OBSERVATION PER HR: HCPCS

## 2023-11-06 PROCEDURE — 80053 COMPREHEN METABOLIC PANEL: CPT | Performed by: SURGERY

## 2023-11-06 PROCEDURE — 85610 PROTHROMBIN TIME: CPT | Performed by: SURGERY

## 2023-11-06 PROCEDURE — 36415 COLL VENOUS BLD VENIPUNCTURE: CPT | Performed by: SURGERY

## 2023-11-06 PROCEDURE — 99223 1ST HOSP IP/OBS HIGH 75: CPT | Mod: 24,,, | Performed by: SURGERY

## 2023-11-06 PROCEDURE — 99223 PR INITIAL HOSPITAL CARE,LEVL III: ICD-10-PCS | Mod: 24,,, | Performed by: SURGERY

## 2023-11-06 PROCEDURE — 25000003 PHARM REV CODE 250: Performed by: HOSPITALIST

## 2023-11-06 PROCEDURE — 63600175 PHARM REV CODE 636 W HCPCS: Performed by: FAMILY MEDICINE

## 2023-11-06 RX ORDER — SODIUM CHLORIDE, SODIUM LACTATE, POTASSIUM CHLORIDE, CALCIUM CHLORIDE 600; 310; 30; 20 MG/100ML; MG/100ML; MG/100ML; MG/100ML
INJECTION, SOLUTION INTRAVENOUS CONTINUOUS
Status: DISCONTINUED | OUTPATIENT
Start: 2023-11-06 | End: 2023-11-13

## 2023-11-06 RX ORDER — POTASSIUM CHLORIDE 20 MEQ/1
40 TABLET, EXTENDED RELEASE ORAL ONCE
Status: COMPLETED | OUTPATIENT
Start: 2023-11-06 | End: 2023-11-06

## 2023-11-06 RX ORDER — SUCRALFATE 1 G/10ML
1 SUSPENSION ORAL EVERY 6 HOURS
Status: DISCONTINUED | OUTPATIENT
Start: 2023-11-06 | End: 2023-11-12

## 2023-11-06 RX ORDER — AMOXICILLIN 250 MG
2 CAPSULE ORAL DAILY
Status: DISCONTINUED | OUTPATIENT
Start: 2023-11-06 | End: 2023-11-12

## 2023-11-06 RX ORDER — MAG HYDROX/ALUMINUM HYD/SIMETH 200-200-20
30 SUSPENSION, ORAL (FINAL DOSE FORM) ORAL
Status: DISCONTINUED | OUTPATIENT
Start: 2023-11-06 | End: 2023-11-12

## 2023-11-06 RX ORDER — POTASSIUM CHLORIDE 14.9 MG/ML
40 INJECTION INTRAVENOUS ONCE
Status: DISCONTINUED | OUTPATIENT
Start: 2023-11-06 | End: 2023-11-06

## 2023-11-06 RX ORDER — THIAMINE HCL 100 MG
100 TABLET ORAL DAILY
Status: DISCONTINUED | OUTPATIENT
Start: 2023-11-06 | End: 2023-11-13 | Stop reason: HOSPADM

## 2023-11-06 RX ORDER — MIRTAZAPINE 15 MG/1
15 TABLET, FILM COATED ORAL NIGHTLY
Status: DISCONTINUED | OUTPATIENT
Start: 2023-11-06 | End: 2023-11-13 | Stop reason: HOSPADM

## 2023-11-06 RX ORDER — LIDOCAINE HYDROCHLORIDE 10 MG/ML
1 INJECTION, SOLUTION EPIDURAL; INFILTRATION; INTRACAUDAL; PERINEURAL ONCE AS NEEDED
Status: DISCONTINUED | OUTPATIENT
Start: 2023-11-06 | End: 2023-11-13 | Stop reason: HOSPADM

## 2023-11-06 RX ORDER — SODIUM CHLORIDE 0.9 % (FLUSH) 0.9 %
10 SYRINGE (ML) INJECTION
Status: DISCONTINUED | OUTPATIENT
Start: 2023-11-06 | End: 2023-11-13 | Stop reason: HOSPADM

## 2023-11-06 RX ADMIN — SUCRALFATE 1 G: 1 SUSPENSION ORAL at 11:11

## 2023-11-06 RX ADMIN — ONDANSETRON 4 MG: 2 INJECTION INTRAMUSCULAR; INTRAVENOUS at 04:11

## 2023-11-06 RX ADMIN — MORPHINE SULFATE 4 MG: 4 INJECTION INTRAVENOUS at 09:11

## 2023-11-06 RX ADMIN — ONDANSETRON 4 MG: 2 INJECTION INTRAMUSCULAR; INTRAVENOUS at 11:11

## 2023-11-06 RX ADMIN — Medication 1 TABLET: at 09:11

## 2023-11-06 RX ADMIN — POTASSIUM CHLORIDE 40 MEQ: 1500 TABLET, EXTENDED RELEASE ORAL at 09:11

## 2023-11-06 RX ADMIN — MORPHINE SULFATE 4 MG: 4 INJECTION INTRAVENOUS at 08:11

## 2023-11-06 RX ADMIN — MORPHINE SULFATE 4 MG: 4 INJECTION INTRAVENOUS at 04:11

## 2023-11-06 RX ADMIN — SENNOSIDES AND DOCUSATE SODIUM 2 TABLET: 8.6; 5 TABLET ORAL at 09:11

## 2023-11-06 RX ADMIN — SODIUM CHLORIDE, POTASSIUM CHLORIDE, SODIUM LACTATE AND CALCIUM CHLORIDE: 600; 310; 30; 20 INJECTION, SOLUTION INTRAVENOUS at 09:11

## 2023-11-06 RX ADMIN — THIAMINE HCL TAB 100 MG 100 MG: 100 TAB at 09:11

## 2023-11-06 RX ADMIN — POTASSIUM CHLORIDE 40 MEQ: 1500 TABLET, EXTENDED RELEASE ORAL at 06:11

## 2023-11-06 RX ADMIN — ALUMINUM HYDROXIDE, MAGNESIUM HYDROXIDE, AND DIMETHICONE 30 ML: 200; 20; 200 SUSPENSION ORAL at 08:11

## 2023-11-06 RX ADMIN — ALUMINUM HYDROXIDE, MAGNESIUM HYDROXIDE, AND DIMETHICONE 30 ML: 200; 20; 200 SUSPENSION ORAL at 11:11

## 2023-11-06 RX ADMIN — PIPERACILLIN SODIUM AND TAZOBACTAM SODIUM 4.5 G: 4; .5 INJECTION, POWDER, FOR SOLUTION INTRAVENOUS at 10:11

## 2023-11-06 RX ADMIN — THERA TABS 1 TABLET: TAB at 09:11

## 2023-11-06 RX ADMIN — ALUMINUM HYDROXIDE, MAGNESIUM HYDROXIDE, AND DIMETHICONE 30 ML: 200; 20; 200 SUSPENSION ORAL at 05:11

## 2023-11-06 RX ADMIN — PIPERACILLIN SODIUM AND TAZOBACTAM SODIUM 4.5 G: 4; .5 INJECTION, POWDER, FOR SOLUTION INTRAVENOUS at 06:11

## 2023-11-06 RX ADMIN — MIRTAZAPINE 15 MG: 15 TABLET, FILM COATED ORAL at 08:11

## 2023-11-06 RX ADMIN — SUCRALFATE 1 G: 1 SUSPENSION ORAL at 06:11

## 2023-11-06 NOTE — ED PROVIDER NOTES
SCRIBE #1 NOTE: I, Anisha Xavier, am scribing for, and in the presence of, Valentina De La Rosa MD. I have scribed the entire note.       History     Chief Complaint   Patient presents with    Post-op Problem     Pt to ED via [facility] abd pain, N/V, constipation x1 wk. Pt has cholecystectomy on 10/23. HUMAIRA drain has seepage coming from skin. Does not hold pressure. Coffee ground emesis. On abx but not febrile.     Review of patient's allergies indicates:   Allergen Reactions    Baclofen Other (See Comments)     Vomiting, confusion, shaking         History of Present Illness     HPI    11/5/2023, 7:09 PM  History obtained from the patient      History of Present Illness: Lakisha Vance is a 63 y.o. female patient with a PMHx of HTN who presents to the Emergency Department for evaluation of a post-op problem which onset after cholecystectomy on 10/23. HUMAIRA drain has seepage coming from skin and does not hold pressure. Pt is on abx but not febrile. Symptoms are constant and moderate in severity. No mitigating or exacerbating factors reported. Associated sxs include abd pain, nausea, coffee ground emesis, and constipation for the past week.  Patient denies any SOB, cough, dysuria, CP, and all other sxs at this time. Prior Tx includes abx. No further complaints or concerns at this time.       Arrival mode: [facility] [place]    PCP: Pranav Gonzalez MD        Past Medical History:  Past Medical History:   Diagnosis Date    Hypertension        Past Surgical History:  Past Surgical History:   Procedure Laterality Date    ESOPHAGOGASTRODUODENOSCOPY N/A 9/1/2023    Procedure: ESOPHAGOGASTRODUODENOSCOPY (EGD);  Surgeon: Nerissa Ash MD;  Location: [facility] ENDO;  Service: Endoscopy;  Laterality: N/A;    HYSTERECTOMY      ROBOT-ASSISTED CHOLECYSTECTOMY USING DA BJ XI N/A 10/28/2023    Procedure: XI ROBOTIC CHOLECYSTECTOMY;  Surgeon: Valentina De La Rosa MD;  Location: [facility] OR;  Service: General;  Laterality: N/A;         Family History:  No family  history on file.    Social History:  Social History     Tobacco Use    Smoking status: Every Day     Current packs/day: 0.50     Types: Cigarettes    Smokeless tobacco: Never   Substance and Sexual Activity    Alcohol use: Yes     Comment: social    Drug use: Not on file    Sexual activity: Not on file        Review of Systems     Review of Systems   Constitutional:  Negative for fever.   HENT:  Negative for sore throat.    Respiratory:  Negative for cough and shortness of breath.    Cardiovascular:  Negative for chest pain.   Gastrointestinal:  Positive for abdominal pain, constipation, nausea and vomiting.   Genitourinary:  Negative for dysuria.   Musculoskeletal:  Negative for back pain.   Skin:  Negative for rash.   Neurological:  Negative for weakness.   Hematological:  Does not bruise/bleed easily.   All other systems reviewed and are negative.       Physical Exam     Initial Vitals [11/05/23 1858]   BP Pulse Resp Temp SpO2   113/66 88 18 97.9 °F (36.6 °C) 99 %      MAP       --          Physical Exam  Nursing Notes and Vital Signs Reviewed.  Constitutional: Patient is in no acute distress. Juandice. Frail and ill appearing.  Head: Atraumatic. Normocephalic.  Eyes: PERRL. EOM intact. Conjunctivae are not pale. Icterus scleral.  ENT: Mucous membranes are moist. Oropharynx is clear and symmetric.    Neck: Supple. Full ROM. No lymphadenopathy.  Cardiovascular: Regular rate. Regular rhythm. No murmurs, rubs, or gallops. Distal pulses are 2+ and symmetric  Pulmonary/Chest: No respiratory distress. Clear to auscultation bilaterally. No wheezing or rales.  Abdominal: Soft and non-distended. Diffuse tenderness.  No rebound, guarding, or rigidity. HUMAIRA drain on R lateral abd wall; filled with clear, serous fluid. Leaking fluid around HUMAIRA drain.   Genitourinary: No CVA tenderness  Musculoskeletal: Moves all extremities. No obvious deformities. No edema.  Skin: Warm and dry. Surgical incisions clean, dry, and  "intact.  Neurological:  Alert, awake, and appropriate.  Normal speech.  No acute focal neurological deficits are appreciated.  Psychiatric: Normal affect. Good eye contact. Appropriate in content.     ED Course   Procedures  ED Vital Signs:  Vitals:    11/05/23 1858 11/05/23 1918 11/05/23 1945 11/05/23 2000   BP: 113/66 112/63 134/64 120/64   Pulse: 88 84 88 88   Resp: 18 (!) 33 19 20   Temp: 97.9 °F (36.6 °C)      TempSrc: Oral      SpO2: 99% 99% 99% 100%   Weight:  44.7 kg (98 lb 8.7 oz)     Height: 5' 2" (1.575 m)       11/05/23 2030 11/05/23 2050   BP: 134/82    Pulse: 87    Resp: 20 20   Temp:     TempSrc:     SpO2: 99%    Weight:     Height:         Abnormal Lab Results:  Labs Reviewed   CBC W/ AUTO DIFFERENTIAL - Abnormal; Notable for the following components:       Result Value    WBC 44.94 (*)     RBC 3.79 (*)     Hemoglobin 11.0 (*)     Hematocrit 32.9 (*)     Platelets 985 (*)     Immature Granulocytes 1.1 (*)     Gran # (ANC) 41.1 (*)     Immature Grans (Abs) 0.49 (*)     Mono # 1.7 (*)     Baso # 0.24 (*)     Gran % 91.3 (*)     Lymph % 3.3 (*)     Mono % 3.7 (*)     Platelet Estimate Increased (*)     All other components within normal limits   COMPREHENSIVE METABOLIC PANEL - Abnormal; Notable for the following components:    Potassium 2.9 (*)     CO2 19 (*)     Glucose 156 (*)     Albumin 1.7 (*)     Alkaline Phosphatase 166 (*)     All other components within normal limits   CULTURE, BLOOD   CULTURE, BLOOD   LACTIC ACID, PLASMA   ALCOHOL,MEDICAL (ETHANOL)   URINALYSIS, REFLEX TO URINE CULTURE   LACTIC ACID, PLASMA        All Lab Results:  Results for orders placed or performed during the hospital encounter of 11/05/23   CBC auto differential   Result Value Ref Range    WBC 44.94 (H) 3.90 - 12.70 K/uL    RBC 3.79 (L) 4.00 - 5.40 M/uL    Hemoglobin 11.0 (L) 12.0 - 16.0 g/dL    Hematocrit 32.9 (L) 37.0 - 48.5 %    MCV 87 82 - 98 fL    MCH 29.0 27.0 - 31.0 pg    MCHC 33.4 32.0 - 36.0 g/dL    RDW 13.3 " 11.5 - 14.5 %    Platelets 985 (H) 150 - 450 K/uL    MPV 9.2 9.2 - 12.9 fL    Immature Granulocytes 1.1 (H) 0.0 - 0.5 %    Gran # (ANC) 41.1 (H) 1.8 - 7.7 K/uL    Immature Grans (Abs) 0.49 (H) 0.00 - 0.04 K/uL    Lymph # 1.5 1.0 - 4.8 K/uL    Mono # 1.7 (H) 0.3 - 1.0 K/uL    Eos # 0.0 0.0 - 0.5 K/uL    Baso # 0.24 (H) 0.00 - 0.20 K/uL    nRBC 0 0 /100 WBC    Gran % 91.3 (H) 38.0 - 73.0 %    Lymph % 3.3 (L) 18.0 - 48.0 %    Mono % 3.7 (L) 4.0 - 15.0 %    Eosinophil % 0.1 0.0 - 8.0 %    Basophil % 0.5 0.0 - 1.9 %    Platelet Estimate Increased (A)     Differential Method Automated    Comprehensive metabolic panel   Result Value Ref Range    Sodium 138 136 - 145 mmol/L    Potassium 2.9 (L) 3.5 - 5.1 mmol/L    Chloride 104 95 - 110 mmol/L    CO2 19 (L) 23 - 29 mmol/L    Glucose 156 (H) 70 - 110 mg/dL    BUN 19 8 - 23 mg/dL    Creatinine 0.8 0.5 - 1.4 mg/dL    Calcium 9.5 8.7 - 10.5 mg/dL    Total Protein 7.2 6.0 - 8.4 g/dL    Albumin 1.7 (L) 3.5 - 5.2 g/dL    Total Bilirubin 0.3 0.1 - 1.0 mg/dL    Alkaline Phosphatase 166 (H) 55 - 135 U/L    AST 26 10 - 40 U/L    ALT 10 10 - 44 U/L    eGFR >60 >60 mL/min/1.73 m^2    Anion Gap 15 8 - 16 mmol/L   Lactic acid, plasma #1   Result Value Ref Range    Lactate (Lactic Acid) 1.4 0.5 - 2.2 mmol/L        Imaging Results:  Imaging Results              CT Abdomen Pelvis  Without Contrast (Final result)  Result time 11/05/23 21:44:05      Final result by Kelsea Levin MD (11/05/23 21:44:05)                   Impression:      Presumed recent operative change cholecystectomy with small fluid collection right pelvis nonspecific      Electronically signed by: Kelsea Levin  Date:    11/05/2023  Time:    21:44               Narrative:    EXAMINATION:  CT ABDOMEN PELVIS WITHOUT CONTRAST    CLINICAL HISTORY:  Abdominal pain, post-op;    TECHNIQUE:  Low dose axial images, sagittal and coronal reformations were obtained from the lung bases to the pubic symphysis.  Contrast was not  administered.    COMPARISON:  None    FINDINGS:  Unenhanced liver and spleen normal size.    Small volume unorganized air-fluid in the gallbladder fossa and right upper quadrant drainage catheter.  Scant pneumoperitoneum.    Right pelvic fluid small volume    Pancreas unremarkable    Kidneys without hydronephrosis    Mild aortic ectasia    Shoddy lymph nodes    Small hiatal hernia likely.  No obstructive bowel findings.  Colonic diverticulosis and moderate colonic stool burden    Urinary bladder prolapse likely    Compression fracture T12 and spondylosis severe L4-5 retrolisthesis L5                                       X-Ray Chest AP Portable (Final result)  Result time 11/05/23 19:46:29      Final result by Robson Panda MD (11/05/23 19:46:29)                   Impression:      No acute abnormality.      Electronically signed by: Robson Panda  Date:    11/05/2023  Time:    19:46               Narrative:    EXAMINATION:  XR CHEST AP PORTABLE    CLINICAL HISTORY:  Sepsis;    TECHNIQUE:  Single frontal view of the chest was performed.    COMPARISON:  Multiple priors.    FINDINGS:  The lungs are clear, with normal appearance of pulmonary vasculature and no pleural effusion or pneumothorax.    The cardiac silhouette is normal in size. The hilar and mediastinal contours are unremarkable.    Bones are intact.                                       The EKG was ordered, reviewed, and independently interpreted by the ED provider.  Interpretation time: 19:14  Rate: 86 BPM  Rhythm: normal sinus rhythm  Interpretation: ST and T wave abnormality, consider anterior ischemia. Prolonged QT. No STEMI.            The Emergency Provider reviewed the vital signs and test results, which are outlined above.     ED Discussion     10:00 PM: Discussed pt's case with Dr. Matos (General surgery) who recommends Zosyn, NPOx meds, and ice chips. Also recommends repeat CBC and CMP in the morning and home medications.     10:00 PM:  Discussed case with Dr. Hunter Matos (General surgery). Dr. De La Rosa agrees with current care and management of pt and accepts admission.   Admitting Service: Hospital medicine  Admitting Physician: Dr. De La Rosa  Admit to: obs     10:00 PM: Re-evaluated pt. I have discussed test results, shared treatment plan, and the need for admission with patient and family at bedside. Pt and family express understanding at this time and agree with all information. All questions answered. Pt and family have no further questions or concerns at this time. Pt is ready for admit.          Medical Decision Making  Amount and/or Complexity of Data Reviewed  Labs: ordered. Decision-making details documented in ED Course.  Radiology: ordered and independent interpretation performed. Decision-making details documented in ED Course.  ECG/medicine tests: ordered. Decision-making details documented in ED Course.    Risk  Prescription drug management.                ED Medication(s):  Medications   potassium chloride 10 mEq in 100 mL IVPB (has no administration in time range)   morphine injection 4 mg (has no administration in time range)   ondansetron injection 4 mg (has no administration in time range)   sodium chloride 0.9% bolus 1,341 mL 1,341 mL (0 mLs Intravenous Stopped 11/5/23 2015)   morphine injection 4 mg (4 mg Intravenous Given 11/5/23 2050)   ondansetron injection 4 mg (4 mg Intravenous Given 11/5/23 2050)   piperacillin-tazobactam (ZOSYN) 4.5 g in dextrose 5 % in water (D5W) 100 mL IVPB (MB+) (0 g Intravenous Stopped 11/5/23 2120)       New Prescriptions    No medications on file               Scribe Attestation:   Scribe #1: I performed the above scribed service and the documentation accurately describes the services I performed. I attest to the accuracy of the note.     Attending:   Physician Attestation Statement for Scribe #1: I, Valentina De La Rosa MD, personally performed the services described in this documentation, as  scribed by Anisha Xavier, in my presence, and it is both accurate and complete.           Clinical Impression       ICD-10-CM ICD-9-CM   1. Abdominal pain  R10.9 789.00   2. Postoperative pain  G89.18 338.18   3. Nausea and vomiting, unspecified vomiting type  R11.2 787.01   4. Jaundice  R17 782.4       Disposition:   Disposition: Placed in Observation  Condition: Ellen Carr MD  11/08/23 4691

## 2023-11-06 NOTE — SUBJECTIVE & OBJECTIVE
No current facility-administered medications on file prior to encounter.     Current Outpatient Medications on File Prior to Encounter   Medication Sig    amoxicillin-clavulanate 875-125mg (AUGMENTIN) 875-125 mg per tablet Take 1 tablet by mouth every 12 (twelve) hours.    folic acid-vit B6-vit B12 2.5-25-2 mg (FOLBIC OR EQUIV) 2.5-25-2 mg Tab Take 1 tablet by mouth once daily.    furosemide (LASIX) 40 MG tablet Take 1 tablet (40 mg total) by mouth once daily.    HYDROcodone-acetaminophen (NORCO) 5-325 mg per tablet Take 1 tablet by mouth every 6 (six) hours as needed for Pain.    mirtazapine (REMERON) 15 MG tablet Take 1 tablet (15 mg total) by mouth every evening.    multivitamin Tab Take 1 tablet by mouth once daily.    ondansetron (ZOFRAN-ODT) 4 MG TbDL Take 1 tablet (4 mg total) by mouth every 8 (eight) hours as needed (nausea/vomiting).    pantoprazole (PROTONIX) 40 MG tablet Take 1 tablet (40 mg total) by mouth 2 (two) times daily.    senna-docusate 8.6-50 mg (PERICOLACE) 8.6-50 mg per tablet Take 2 tablets by mouth once daily.    spironolactone (ALDACTONE) 50 MG tablet Take 1 tablet (50 mg total) by mouth 2 (two) times daily.    spironolactone (ALDACTONE) 50 MG tablet Take 1 tablet (50 mg total) by mouth once daily.    thiamine 100 MG tablet Take 1 tablet (100 mg total) by mouth once daily.       Review of patient's allergies indicates:   Allergen Reactions    Baclofen Other (See Comments)     Vomiting, confusion, shaking       Past Medical History:   Diagnosis Date    Hypertension     Liver disease      Past Surgical History:   Procedure Laterality Date    ESOPHAGOGASTRODUODENOSCOPY N/A 9/1/2023    Procedure: ESOPHAGOGASTRODUODENOSCOPY (EGD);  Surgeon: Nerissa Ash MD;  Location: [facility] ENDO;  Service: Endoscopy;  Laterality: N/A;    HYSTERECTOMY      ROBOT-ASSISTED CHOLECYSTECTOMY USING DA BJ XI N/A 10/28/2023    Procedure: XI ROBOTIC CHOLECYSTECTOMY;  Surgeon: Valentina De La Rosa MD;  Location: [facility]  OR;  Service: General;  Laterality: N/A;     Family History    None       Tobacco Use    Smoking status: Every Day     Current packs/day: 0.50     Types: Cigarettes    Smokeless tobacco: Never   Substance and Sexual Activity    Alcohol use: Yes     Comment: social    Drug use: Not on file    Sexual activity: Not on file     Review of Systems   Constitutional:  Positive for activity change, appetite change, chills, fatigue and fever.   Cardiovascular:  Negative for chest pain.   Gastrointestinal:  Positive for abdominal pain, nausea and vomiting.   All other systems reviewed and are negative.    Objective:     Vital Signs (Most Recent):  Temp: 98.5 °F (36.9 °C) (11/06/23 0741)  Pulse: 96 (11/06/23 0817)  Resp: 16 (11/06/23 0741)  BP: (!) 103/56 (11/06/23 0741)  SpO2: 97 % (11/06/23 0741) Vital Signs (24h Range):  Temp:  [97.9 °F (36.6 °C)-100.1 °F (37.8 °C)] 98.5 °F (36.9 °C)  Pulse:  [] 96  Resp:  [16-33] 16  SpO2:  [96 %-100 %] 97 %  BP: (103-134)/(56-82) 103/56     Weight: 44.7 kg (98 lb 8.7 oz)  Body mass index is 18.02 kg/m².     Physical Exam  Constitutional:       Comments: Chronically ill-appearing, frail and thin   HENT:      Head: Normocephalic and atraumatic.   Eyes:      Extraocular Movements: Extraocular movements intact.      Conjunctiva/sclera: Conjunctivae normal.   Cardiovascular:      Rate and Rhythm: Normal rate and regular rhythm.   Pulmonary:      Effort: Pulmonary effort is normal.   Abdominal:      General: Abdomen is flat.      Palpations: Abdomen is soft.      Comments: Mildly tender to palpation bilateral lower quadrants, HUMAIRA drain in right upper quadrant with serous drainage, incisions c/d/I no erythema or induration   Skin:     General: Skin is warm and dry.   Neurological:      General: No focal deficit present.      Mental Status: She is alert.   Psychiatric:         Mood and Affect: Mood normal.         Behavior: Behavior normal.            I have reviewed all pertinent lab results  "within the past 24 hours.  CBC:   Recent Labs   Lab 11/05/23 1928   WBC 44.94*   RBC 3.79*   HGB 11.0*   HCT 32.9*   *   MCV 87   MCH 29.0   MCHC 33.4     CMP:   Recent Labs   Lab 11/05/23 1928   *   CALCIUM 9.5   ALBUMIN 1.7*   PROT 7.2      K 2.9*   CO2 19*      BUN 19   CREATININE 0.8   ALKPHOS 166*   ALT 10   AST 26   BILITOT 0.3     Microbiology Results (last 7 days)       Procedure Component Value Units Date/Time    Blood culture x two cultures. Draw prior to antibiotics. [[id number]] Collected: 11/05/23 1932    Order Status: Completed Specimen: Blood from Peripheral, Forearm, Left Updated: 11/06/23 0745     Blood Culture, Routine No Growth to date    Narrative:      Aerobic and anaerobic    Blood culture x two cultures. Draw prior to antibiotics. [[id number]] Collected: 11/05/23 1932    Order Status: Completed Specimen: Blood from Peripheral, Forearm, Left Updated: 11/06/23 0745     Blood Culture, Routine No Growth to date    Narrative:      Aerobic and anaerobic          No results for input(s): "COLORU", "CLARITYU", "SPECGRAV", "PHUR", "PROTEINUA", "GLUCOSEU", "BILIRUBINCON", "BLOODU", "WBCU", "RBCU", "BACTERIA", "MUCUS", "NITRITE", "LEUKOCYTESUR", "UROBILINOGEN", "HYALINECASTS" in the last 168 hours.    Significant Diagnostics:  I have reviewed all pertinent imaging results/findings within the past 24 hours.  CT: I have reviewed all pertinent results/findings within the past 24 hours and my personal findings are:  Small amount of fluid in the right upper quadrant with the drain in place as expected pneumo in that region from the HUMAIRA drain.  Small amount of fluid in the pelvis unclear etiology may represent ascites versus abscess difficult to ascertain without contrast    "

## 2023-11-06 NOTE — ASSESSMENT & PLAN NOTE
POD #9- s/p robotic cholecystectomy for gangrenous perforated cholecystitis.    Unclear if her abdominal pain is directly related to her surgery.  She does have a fluid collection in her pelvis which may represent either infected ascites or an abscess.  Normal findings within her right upper quadrant.  Her HUMAIRA drain is serous consistent with ascites.  Her leukocytosis is much higher than normally expected with a postop abscess, her thrombocytopenia is also somewhat concerning.  Unclear if perhaps she has some portal vein thrombus or if this is just secondarily infected ascites, or if this represents some component of dehydration from her nausea and vomiting.  --NPO for now  -- consult Internal Medicine for evaluation and potential transfer of care  -- we will discuss drainage of her pelvic collection following evaluation by Internal Medicine.  -- continue morphine for pain  -- continue Zosyn  -- leave HUMAIRA drain in place for now, will discuss removal today or tomorrow  -- follow-up cultures  -- repeat labs

## 2023-11-06 NOTE — HPI
62 y/o female with PMHx of HTN, alcoholic liver cirrhosis (EGD Sept '23 with reflux esophagitis, gastritis, portal hypertensive gastropathy) who recently underwent laproscopic cholecystectomy for gangrenous cholecystitis with RUQ drain placement on 10-28-23 who presented to the ER on 11-5-23 for abdominal pain, nausea/vomiting, fevers/chills. Patient discharged on 11/02/2023 in stable condition with her HUMAIRA drain in place and on antibiotics. She reports sudden onset of right sided lower abdominal and pelvis pain, associated with nausea/vomiting and subjective fevers. Patient denies vomiting blood or coffee ground emesis, denies chest pain, SOB or any other complaints. In ER, patient with temp 100.1 F, tachycardic, WBC 44k; blood cultures were obtained and started on Zosyn. UA pending. CT scan noted small amount of fluid in RUQ with drain in place and fluid collection in pelvis. Patient admitted by General Surgery this AM, plans for IR drainage of pelvic fluid collection. Hospital Medicine consulted to assist with medical management.

## 2023-11-06 NOTE — ASSESSMENT & PLAN NOTE
Likely related to whatever underlying processes causing her leukocytosis and abdominal pain    -- continue IV Zofran  -- GI evaluation if any additional coffee-ground emesis

## 2023-11-06 NOTE — ASSESSMENT & PLAN NOTE
WBC 44k - greatly elevated, higher than expected with routine postoperative abscess, was normal on discharge.  Blood cultures pending, urinalysis and urine cultures pending.  Elevation may be in part due to dehydration from nausea and vomiting    -- consider drainage of pelvic fluid collection  -- continue Zosyn

## 2023-11-06 NOTE — H&P
[facility] Surg  General Surgery  History & Physical    Patient Name: Lakisha Vance  MRN: [id number]  Admission Date: 11/5/2023  Attending Physician: Valentina De La Rosa MD   Primary Care Provider: Pranav Gonzalez MD    Patient information was obtained from patient, EMS personnel and ER records.     Subjective:     Chief Complaint/Reason for Admission:  Abdominal pain, nausea and vomiting    History of Present Illness: Ms. Martin is a 63-year-old female with a history of hypertension and cirrhosis of the liver status post robotic cholecystectomy on 10/28/2023 for gangrenous cholecystitis.  She was discharged on 11/02/2023 in stable condition with her HUMAIRA drain in place and on antibiotics.  She states that she was doing well overall following her discharge up until yesterday morning at which point in time she developed sudden onset lower abdominal pain sharp in nature and intermittent, as well as nausea, vomiting, fevers, and chills.  She attempted to take her Zofran however did not have any relief as she states she had vomiting it up.  She called EMS who brought her to the emergency department.  Per EMS she did have coffee-ground emesis however she maintains that it was not coffee-ground but was yellow in color.  Upon arrival to the emergency department her vital signs were all within normal limits.  She did develop a T-max of a 100.1° overnight at which point in time she also became mildly tachycardic up to 103.  CT scan was performed without contrast which shows a small amount of non organized fluid in the right upper quadrant with the drain in place and a fluid collection in the pelvis.  Otherwise there were no abnormal findings on her CT scan.  She was noted to have a leukocytosis up to 44,000 as well as thrombocytosis.  Blood cultures were performed and are still pending and she was admitted for further workup and management.  Currently she states that the morphine and the Zofran IV are helping with her nausea and  her pain she still intermittently has lower abdominal pain and overall feels quite weak still.      No current facility-administered medications on file prior to encounter.     Current Outpatient Medications on File Prior to Encounter   Medication Sig    amoxicillin-clavulanate 875-125mg (AUGMENTIN) 875-125 mg per tablet Take 1 tablet by mouth every 12 (twelve) hours.    folic acid-vit B6-vit B12 2.5-25-2 mg (FOLBIC OR EQUIV) 2.5-25-2 mg Tab Take 1 tablet by mouth once daily.    furosemide (LASIX) 40 MG tablet Take 1 tablet (40 mg total) by mouth once daily.    HYDROcodone-acetaminophen (NORCO) 5-325 mg per tablet Take 1 tablet by mouth every 6 (six) hours as needed for Pain.    mirtazapine (REMERON) 15 MG tablet Take 1 tablet (15 mg total) by mouth every evening.    multivitamin Tab Take 1 tablet by mouth once daily.    ondansetron (ZOFRAN-ODT) 4 MG TbDL Take 1 tablet (4 mg total) by mouth every 8 (eight) hours as needed (nausea/vomiting).    pantoprazole (PROTONIX) 40 MG tablet Take 1 tablet (40 mg total) by mouth 2 (two) times daily.    senna-docusate 8.6-50 mg (PERICOLACE) 8.6-50 mg per tablet Take 2 tablets by mouth once daily.    spironolactone (ALDACTONE) 50 MG tablet Take 1 tablet (50 mg total) by mouth 2 (two) times daily.    spironolactone (ALDACTONE) 50 MG tablet Take 1 tablet (50 mg total) by mouth once daily.    thiamine 100 MG tablet Take 1 tablet (100 mg total) by mouth once daily.       Review of patient's allergies indicates:   Allergen Reactions    Baclofen Other (See Comments)     Vomiting, confusion, shaking       Past Medical History:   Diagnosis Date    Hypertension     Liver disease      Past Surgical History:   Procedure Laterality Date    ESOPHAGOGASTRODUODENOSCOPY N/A 9/1/2023    Procedure: ESOPHAGOGASTRODUODENOSCOPY (EGD);  Surgeon: Nerissa Ash MD;  Location: [facility];  Service: Endoscopy;  Laterality: N/A;    HYSTERECTOMY      ROBOT-ASSISTED CHOLECYSTECTOMY USING DA BJ XI N/A  10/28/2023    Procedure: XI ROBOTIC CHOLECYSTECTOMY;  Surgeon: Valentina De La Rosa MD;  Location: [facility];  Service: General;  Laterality: N/A;     Family History    None       Tobacco Use    Smoking status: Every Day     Current packs/day: 0.50     Types: Cigarettes    Smokeless tobacco: Never   Substance and Sexual Activity    Alcohol use: Yes     Comment: social    Drug use: Not on file    Sexual activity: Not on file     Review of Systems   Constitutional:  Positive for activity change, appetite change, chills, fatigue and fever.   Cardiovascular:  Negative for chest pain.   Gastrointestinal:  Positive for abdominal pain, nausea and vomiting.   All other systems reviewed and are negative.    Objective:     Vital Signs (Most Recent):  Temp: 98.5 °F (36.9 °C) (11/06/23 0741)  Pulse: 96 (11/06/23 0817)  Resp: 16 (11/06/23 0741)  BP: (!) 103/56 (11/06/23 0741)  SpO2: 97 % (11/06/23 0741) Vital Signs (24h Range):  Temp:  [97.9 °F (36.6 °C)-100.1 °F (37.8 °C)] 98.5 °F (36.9 °C)  Pulse:  [] 96  Resp:  [16-33] 16  SpO2:  [96 %-100 %] 97 %  BP: (103-134)/(56-82) 103/56     Weight: 44.7 kg (98 lb 8.7 oz)  Body mass index is 18.02 kg/m².     Physical Exam  Constitutional:       Comments: Chronically ill-appearing, frail and thin   HENT:      Head: Normocephalic and atraumatic.   Eyes:      Extraocular Movements: Extraocular movements intact.      Conjunctiva/sclera: Conjunctivae normal.   Cardiovascular:      Rate and Rhythm: Normal rate and regular rhythm.   Pulmonary:      Effort: Pulmonary effort is normal.   Abdominal:      General: Abdomen is flat.      Palpations: Abdomen is soft.      Comments: Mildly tender to palpation bilateral lower quadrants, HUMAIRA drain in right upper quadrant with serous drainage, incisions c/d/I no erythema or induration   Skin:     General: Skin is warm and dry.   Neurological:      General: No focal deficit present.      Mental Status: She is alert.   Psychiatric:         Mood and Affect:  "Mood normal.         Behavior: Behavior normal.            I have reviewed all pertinent lab results within the past 24 hours.  CBC:   Recent Labs   Lab 11/05/23 1928   WBC 44.94*   RBC 3.79*   HGB 11.0*   HCT 32.9*   *   MCV 87   MCH 29.0   MCHC 33.4     CMP:   Recent Labs   Lab 11/05/23 1928   *   CALCIUM 9.5   ALBUMIN 1.7*   PROT 7.2      K 2.9*   CO2 19*      BUN 19   CREATININE 0.8   ALKPHOS 166*   ALT 10   AST 26   BILITOT 0.3     Microbiology Results (last 7 days)       Procedure Component Value Units Date/Time    Blood culture x two cultures. Draw prior to antibiotics. [[id number]] Collected: 11/05/23 1932    Order Status: Completed Specimen: Blood from Peripheral, Forearm, Left Updated: 11/06/23 0745     Blood Culture, Routine No Growth to date    Narrative:      Aerobic and anaerobic    Blood culture x two cultures. Draw prior to antibiotics. [[id number]] Collected: 11/05/23 1932    Order Status: Completed Specimen: Blood from Peripheral, Forearm, Left Updated: 11/06/23 0745     Blood Culture, Routine No Growth to date    Narrative:      Aerobic and anaerobic          No results for input(s): "COLORU", "CLARITYU", "SPECGRAV", "PHUR", "PROTEINUA", "GLUCOSEU", "BILIRUBINCON", "BLOODU", "WBCU", "RBCU", "BACTERIA", "MUCUS", "NITRITE", "LEUKOCYTESUR", "UROBILINOGEN", "HYALINECASTS" in the last 168 hours.    Significant Diagnostics:  I have reviewed all pertinent imaging results/findings within the past 24 hours.  CT: I have reviewed all pertinent results/findings within the past 24 hours and my personal findings are:  Small amount of fluid in the right upper quadrant with the drain in place as expected pneumo in that region from the HUMAIRA drain.  Small amount of fluid in the pelvis unclear etiology may represent ascites versus abscess difficult to ascertain without contrast      Assessment/Plan:     * Abdominal pain  POD #9- s/p robotic cholecystectomy for gangrenous perforated " cholecystitis.    Unclear if her abdominal pain is directly related to her surgery.  She does have a fluid collection in her pelvis which may represent either infected ascites or an abscess.  Normal findings within her right upper quadrant.  Her HUMAIRA drain is serous consistent with ascites.  Her leukocytosis is much higher than normally expected with a postop abscess especially based on the size seen on CT scan, her thrombocytopenia is also somewhat concerning.  Unclear if perhaps she has some portal vein thrombus or if this is just secondarily infected ascites, or if this represents some component of dehydration from her nausea and vomiting.  --NPO for now  -- consult Internal Medicine for evaluation and potential transfer of care  -- we will discuss drainage of her pelvic collection following evaluation by Internal Medicine.  -- continue morphine for pain  -- continue Zosyn  -- leave HUMAIRA drain in place for now, will discuss removal today or tomorrow  -- follow-up cultures  -- repeat labs    Drug-induced constipation  Likely 2/2 opioid usage    -- continue senna   -- consider Miralax or lactulose +/- enemas       Leukocytosis  WBC 44k - greatly elevated, higher than expected with routine postoperative abscess, was normal on discharge.  Blood cultures pending, urinalysis and urine cultures pending.  Elevation may be in part due to dehydration from nausea and vomiting    -- consider drainage of pelvic fluid collection  -- continue Zosyn    Nausea and vomiting  Likely related to whatever underlying processes causing her leukocytosis and abdominal pain    -- continue IV Zofran  -- GI evaluation if any additional coffee-ground emesis    Severe protein-calorie malnutrition  Nutrition consulted. Most recent weight and BMI monitored-     Measurements:  Wt Readings from Last 1 Encounters:   11/05/23 44.7 kg (98 lb 8.7 oz)   Body mass index is 18.02 kg/m².    Patient has been screened and assessed by RD.    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    -- currently holding spironolactone and Lasix  -- appreciate medicine's assistance with management    HTN (hypertension)  Holding home antihypertensives.  Appreciate assistance from internal medicine      VTE Risk Mitigation (From admission, onward)           Ordered     IP VTE HIGH RISK PATIENT  Once         11/06/23 0856     Place sequential compression device  Until discontinued         11/06/23 0856                    N/A  Family history non-contributory to current problem     Valentina De La Rosa MD  General Surgery  [facility] S[facility]

## 2023-11-06 NOTE — ASSESSMENT & PLAN NOTE
POD #10- s/p robotic cholecystectomy for gangrenous perforated cholecystitis.    Fluid in pelvis infected ascites vs abscess.  Transferred to medicine service yesterday     -- CT scan with IV contrast in AM  -- IR drainage tomorrow pending on CT scan results  -- plan to d/c HUMAIRA drain if ok with medicine prior to discharge   -- remainder of care as per primary team

## 2023-11-06 NOTE — HPI
Ms. Martin is a 63-year-old female with a history of hypertension and cirrhosis of the liver status post robotic cholecystectomy on 10/28/2023 for gangrenous cholecystitis.  She was discharged on 11/02/2023 in stable condition with her HUMAIRA drain in place and on antibiotics.  She states that she was doing well overall following her discharge up until yesterday morning at which point in time she developed sudden onset lower abdominal pain sharp in nature and intermittent, as well as nausea, vomiting, fevers, and chills.  She attempted to take her Zofran however did not have any relief as she states she had vomiting it up.  She called EMS who brought her to the emergency department.  Per EMS she did have coffee-ground emesis however she maintains that it was not coffee-ground but was yellow in color.  Upon arrival to the emergency department her vital signs were all within normal limits.  She did develop a T-max of a 100.1° overnight at which point in time she also became mildly tachycardic up to 103.  CT scan was performed without contrast which shows a small amount of non organized fluid in the right upper quadrant with the drain in place and a fluid collection in the pelvis.  Otherwise there were no abnormal findings on her CT scan.  She was noted to have a leukocytosis up to 44,000 as well as thrombocytosis.  Blood cultures were performed and are still pending and she was admitted for further workup and management.  Currently she states that the morphine and the Zofran IV are helping with her nausea and her pain she still intermittently has lower abdominal pain and overall feels quite weak still.

## 2023-11-06 NOTE — PLAN OF CARE
[facility] - Med Surg  Discharge Assessment    Primary Care Provider: Pranav Gonzalez MD     Discharge Assessment (most recent)       BRIEF DISCHARGE ASSESSMENT - 11/06/23 1041          Discharge Planning    Assessment Type Discharge Planning Brief Assessment     Resource/Environmental Concerns none     Support Systems Friends/neighbors     Assistance Needed Independent     Equipment Currently Used at Home none     Current Living Arrangements home     Patient/Family Anticipates Transition to home     Patient/Family Anticipated Services at Transition none     DME Needed Upon Discharge  none     Discharge Plan A Home                   Sw met with patient to complete assessment and to discuss d/c planning needs. Patient has no d/c needs at this time. Sw to follow up, as needed, for d/c planning purposes.

## 2023-11-06 NOTE — ASSESSMENT & PLAN NOTE
Greater than 60 days sober from alcohol.    -- currently holding spironolactone and Lasix  -- appreciate medicine's assistance with management

## 2023-11-06 NOTE — ASSESSMENT & PLAN NOTE
Nutrition consulted. Most recent weight and BMI monitored-     Measurements:  Wt Readings from Last 1 Encounters:   11/05/23 44.7 kg (98 lb 8.7 oz)   Body mass index is 18.02 kg/m².    Patient has been screened and assessed by TERESE.

## 2023-11-07 LAB
ALBUMIN SERPL BCP-MCNC: 1.3 G/DL (ref 3.5–5.2)
ALP SERPL-CCNC: 137 U/L (ref 55–135)
ALT SERPL W/O P-5'-P-CCNC: 11 U/L (ref 10–44)
ANION GAP SERPL CALC-SCNC: 7 MMOL/L (ref 8–16)
AST SERPL-CCNC: 15 U/L (ref 10–40)
BASOPHILS # BLD AUTO: 0.14 K/UL (ref 0–0.2)
BASOPHILS NFR BLD: 0.5 % (ref 0–1.9)
BILIRUB SERPL-MCNC: 0.2 MG/DL (ref 0.1–1)
BUN SERPL-MCNC: 19 MG/DL (ref 8–23)
CALCIUM SERPL-MCNC: 7.7 MG/DL (ref 8.7–10.5)
CHLORIDE SERPL-SCNC: 112 MMOL/L (ref 95–110)
CO2 SERPL-SCNC: 19 MMOL/L (ref 23–29)
CREAT SERPL-MCNC: 0.6 MG/DL (ref 0.5–1.4)
DIFFERENTIAL METHOD: ABNORMAL
EOSINOPHIL # BLD AUTO: 0.1 K/UL (ref 0–0.5)
EOSINOPHIL NFR BLD: 0.4 % (ref 0–8)
ERYTHROCYTE [DISTWIDTH] IN BLOOD BY AUTOMATED COUNT: 13.5 % (ref 11.5–14.5)
EST. GFR  (NO RACE VARIABLE): >60 ML/MIN/1.73 M^2
GLUCOSE SERPL-MCNC: 70 MG/DL (ref 70–110)
HCT VFR BLD AUTO: 31.8 % (ref 37–48.5)
HGB BLD-MCNC: 9.7 G/DL (ref 12–16)
IMM GRANULOCYTES # BLD AUTO: 0.2 K/UL (ref 0–0.04)
IMM GRANULOCYTES NFR BLD AUTO: 0.7 % (ref 0–0.5)
LYMPHOCYTES # BLD AUTO: 3.9 K/UL (ref 1–4.8)
LYMPHOCYTES NFR BLD: 13.8 % (ref 18–48)
MAGNESIUM SERPL-MCNC: 1.9 MG/DL (ref 1.6–2.6)
MCH RBC QN AUTO: 29 PG (ref 27–31)
MCHC RBC AUTO-ENTMCNC: 30.5 G/DL (ref 32–36)
MCV RBC AUTO: 95 FL (ref 82–98)
MONOCYTES # BLD AUTO: 1.8 K/UL (ref 0.3–1)
MONOCYTES NFR BLD: 6.3 % (ref 4–15)
NEUTROPHILS # BLD AUTO: 22.1 K/UL (ref 1.8–7.7)
NEUTROPHILS NFR BLD: 78.3 % (ref 38–73)
NRBC BLD-RTO: 0 /100 WBC
PLATELET # BLD AUTO: 571 K/UL (ref 150–450)
PMV BLD AUTO: 8.6 FL (ref 9.2–12.9)
POTASSIUM SERPL-SCNC: 4.1 MMOL/L (ref 3.5–5.1)
PROT SERPL-MCNC: 5.6 G/DL (ref 6–8.4)
RBC # BLD AUTO: 3.35 M/UL (ref 4–5.4)
SODIUM SERPL-SCNC: 138 MMOL/L (ref 136–145)
WBC # BLD AUTO: 28.23 K/UL (ref 3.9–12.7)

## 2023-11-07 PROCEDURE — 80053 COMPREHEN METABOLIC PANEL: CPT | Performed by: HOSPITALIST

## 2023-11-07 PROCEDURE — 11000001 HC ACUTE MED/SURG PRIVATE ROOM

## 2023-11-07 PROCEDURE — 36415 COLL VENOUS BLD VENIPUNCTURE: CPT | Performed by: HOSPITALIST

## 2023-11-07 PROCEDURE — 99232 SBSQ HOSP IP/OBS MODERATE 35: CPT | Mod: 24,,, | Performed by: SURGERY

## 2023-11-07 PROCEDURE — 85025 COMPLETE CBC W/AUTO DIFF WBC: CPT | Performed by: HOSPITALIST

## 2023-11-07 PROCEDURE — 83735 ASSAY OF MAGNESIUM: CPT | Performed by: HOSPITALIST

## 2023-11-07 PROCEDURE — 25000003 PHARM REV CODE 250: Performed by: SURGERY

## 2023-11-07 PROCEDURE — 99232 PR SUBSEQUENT HOSPITAL CARE,LEVL II: ICD-10-PCS | Mod: 24,,, | Performed by: SURGERY

## 2023-11-07 PROCEDURE — 63600175 PHARM REV CODE 636 W HCPCS: Performed by: SURGERY

## 2023-11-07 RX ADMIN — PIPERACILLIN SODIUM AND TAZOBACTAM SODIUM 4.5 G: 4; .5 INJECTION, POWDER, FOR SOLUTION INTRAVENOUS at 06:11

## 2023-11-07 RX ADMIN — THIAMINE HCL TAB 100 MG 100 MG: 100 TAB at 09:11

## 2023-11-07 RX ADMIN — ONDANSETRON 4 MG: 2 INJECTION INTRAMUSCULAR; INTRAVENOUS at 11:11

## 2023-11-07 RX ADMIN — Medication 1 TABLET: at 09:11

## 2023-11-07 RX ADMIN — SENNOSIDES AND DOCUSATE SODIUM 2 TABLET: 8.6; 5 TABLET ORAL at 09:11

## 2023-11-07 RX ADMIN — MORPHINE SULFATE 4 MG: 4 INJECTION INTRAVENOUS at 08:11

## 2023-11-07 RX ADMIN — SODIUM CHLORIDE, POTASSIUM CHLORIDE, SODIUM LACTATE AND CALCIUM CHLORIDE: 600; 310; 30; 20 INJECTION, SOLUTION INTRAVENOUS at 04:11

## 2023-11-07 RX ADMIN — ALUMINUM HYDROXIDE, MAGNESIUM HYDROXIDE, AND DIMETHICONE 30 ML: 200; 20; 200 SUSPENSION ORAL at 09:11

## 2023-11-07 RX ADMIN — SUCRALFATE 1 G: 1 SUSPENSION ORAL at 11:11

## 2023-11-07 RX ADMIN — ONDANSETRON 4 MG: 2 INJECTION INTRAMUSCULAR; INTRAVENOUS at 03:11

## 2023-11-07 RX ADMIN — ALUMINUM HYDROXIDE, MAGNESIUM HYDROXIDE, AND DIMETHICONE 30 ML: 200; 20; 200 SUSPENSION ORAL at 11:11

## 2023-11-07 RX ADMIN — MORPHINE SULFATE 4 MG: 4 INJECTION INTRAVENOUS at 03:11

## 2023-11-07 RX ADMIN — MORPHINE SULFATE 4 MG: 4 INJECTION INTRAVENOUS at 05:11

## 2023-11-07 RX ADMIN — PIPERACILLIN SODIUM AND TAZOBACTAM SODIUM 4.5 G: 4; .5 INJECTION, POWDER, FOR SOLUTION INTRAVENOUS at 11:11

## 2023-11-07 RX ADMIN — MORPHINE SULFATE 4 MG: 4 INJECTION INTRAVENOUS at 01:11

## 2023-11-07 RX ADMIN — ALUMINUM HYDROXIDE, MAGNESIUM HYDROXIDE, AND DIMETHICONE 30 ML: 200; 20; 200 SUSPENSION ORAL at 04:11

## 2023-11-07 RX ADMIN — PIPERACILLIN SODIUM AND TAZOBACTAM SODIUM 4.5 G: 4; .5 INJECTION, POWDER, FOR SOLUTION INTRAVENOUS at 02:11

## 2023-11-07 RX ADMIN — MIRTAZAPINE 15 MG: 15 TABLET, FILM COATED ORAL at 09:11

## 2023-11-07 RX ADMIN — THERA TABS 1 TABLET: TAB at 09:11

## 2023-11-07 RX ADMIN — ONDANSETRON 4 MG: 2 INJECTION INTRAMUSCULAR; INTRAVENOUS at 01:11

## 2023-11-07 RX ADMIN — ONDANSETRON 4 MG: 2 INJECTION INTRAMUSCULAR; INTRAVENOUS at 08:11

## 2023-11-07 RX ADMIN — SUCRALFATE 1 G: 1 SUSPENSION ORAL at 05:11

## 2023-11-07 RX ADMIN — MORPHINE SULFATE 4 MG: 4 INJECTION INTRAVENOUS at 11:11

## 2023-11-07 NOTE — ASSESSMENT & PLAN NOTE
Co-morbidities are present and inclusive of portal hypertension and malnutrition.  MELD-Na score calculated; MELD 3.0: 12 at 11/7/2023  7:48 AM  MELD-Na: 7 at 11/7/2023  7:48 AM  Calculated from:  Serum Creatinine: 0.6 mg/dL (Using min of 1 mg/dL) at 11/7/2023  7:48 AM  Serum Sodium: 138 mmol/L (Using max of 137 mmol/L) at 11/7/2023  7:48 AM  Total Bilirubin: 0.2 mg/dL (Using min of 1 mg/dL) at 11/7/2023  7:48 AM  Serum Albumin: 1.3 g/dL (Using min of 1.5 g/dL) at 11/7/2023  7:48 AM  INR(ratio): 1.1 at 11/6/2023  4:00 PM  Age at listing (hypothetical): 63 years  Sex: Female at 11/7/2023  7:48 AM      Continue chronic meds. Etiology likely ETOH. Will avoid any hepatotoxic meds, and monitor CBC/CMP/INR for synthetic function.

## 2023-11-07 NOTE — HOSPITAL COURSE
A 63-year-old white female was admitted with a diagnosis of perforated diverticulitis. Intravenous antibiotics were initiated, and general surgery was consulted. Throughout the week, the patient's lower quadrant pain persisted, leading to a CT scan that revealed a right pelvic abscess, perforated sigmoid colon diverticulitis, and low-grade colonic obstruction. Interventional radiology (IR) intervened by placing a drain for the pelvic abscess and obtaining fluid cultures. The patient's condition improved with decreasing white blood cell counts and improved pain control.    In the following days, there was ongoing progress, with drains in place, decreasing drainage output, and improved pain control. The surgical drain was removed, and plans were made for outpatient follow-up. However, the patient faced challenges, such as drainage from the pelvic abscess drain and discomfort at the insertion site. Persistent diarrhea prompted the initiation of cholestyramine and potassium repletion.    Post intravenous Zosyn, cultures remained negative until day . The plan is to discharge the patient with oral antibiotics for an additional 7 days. Follow-up appointments with general surgery and the primary care physician (PCP) are scheduled for 1 to 2 weeks. The patient is tolerating oral intake without any issues. General surgery agrees with the decision to discharge the patient home . Pt was seen and examined at bedside . She was determined to be suitable for d/c .

## 2023-11-07 NOTE — PROGRESS NOTES
[facility] Medicine  Progress Note    Patient Name: Lakisha Vance  MRN: [id number]  Patient Class: IP- Inpatient   Admission Date: 11/5/2023  Length of Stay: 0 days  Attending Physician: Elías Hidalgo MD  Primary Care Provider: Pranav Gonzalez MD        Subjective:     Principal Problem:Sepsis        HPI:  64 y/o female with PMHx of HTN, alcoholic liver cirrhosis (EGD Sept '23 with reflux esophagitis, gastritis, portal hypertensive gastropathy) who recently underwent laproscopic cholecystectomy for gangrenous cholecystitis with RUQ drain placement on 10-28-23 who presented to the ER on 11-5-23 for abdominal pain, nausea/vomiting, fevers/chills. Patient discharged on 11/02/2023 in stable condition with her HUMAIRA drain in place and on antibiotics. She reports sudden onset of right sided lower abdominal and pelvis pain, associated with nausea/vomiting and subjective fevers. Patient denies vomiting blood or coffee ground emesis, denies chest pain, SOB or any other complaints. In ER, patient with temp 100.1 F, tachycardic, WBC 44k; blood cultures were obtained and started on Zosyn. UA pending. CT scan noted small amount of fluid in RUQ with drain in place and fluid collection in pelvis. Patient admitted by General Surgery this AM, plans for IR drainage of pelvic fluid collection. Hospital Medicine consulted to assist with medical management.      Overview/Hospital Course:  11/7  NAEON, patient reports lower quadrant pain,   CT A/P with small amount of fluid noted in right pelvis  Discussed with IR, plan for CT A/P with contrast in AM, possible drainage  Continue Zosyn, WBC trending down, temp curve improving        Review of Systems   All other systems reviewed and are negative.    Objective:     Vital Signs (Most Recent):  Temp: 97.1 °F (36.2 °C) (11/07/23 1157)  Pulse: 85 (11/07/23 1326)  Resp: 18 (11/07/23 1336)  BP: 101/62 (11/07/23 1157)  SpO2: 96 % (11/07/23 1157) Vital Signs (24h Range):  Temp:  [97.1  °F (36.2 °C)-99.4 °F (37.4 °C)] 97.1 °F (36.2 °C)  Pulse:  [85-93] 85  Resp:  [16-20] 18  SpO2:  [94 %-96 %] 96 %  BP: ()/(52-66) 101/62     Weight: 44.5 kg (98 lb)  Body mass index is 17.92 kg/m².  No intake or output data in the 24 hours ending 11/07/23 1546      Physical Exam  Vitals and nursing note reviewed.   Constitutional:       General: She is not in acute distress.     Appearance: Normal appearance. She is normal weight. She is ill-appearing.   Cardiovascular:      Rate and Rhythm: Normal rate and regular rhythm.      Heart sounds: No murmur heard.  Pulmonary:      Effort: Pulmonary effort is normal. No respiratory distress.      Breath sounds: No wheezing.   Abdominal:      General: There is distension.      Tenderness: There is abdominal tenderness.      Comments: Right sided surgical drain   Neurological:      General: No focal deficit present.      Mental Status: She is alert and oriented to person, place, and time.   Psychiatric:         Mood and Affect: Mood normal.         Behavior: Behavior normal.             Significant Labs: All pertinent labs within the past 24 hours have been reviewed.  Recent Lab Results         11/07/23  0748   11/06/23 2006 11/06/23  1600        Albumin 1.3                      ALT 11           Anion Gap 7           Appearance, UA   Clear         AST 15           Baso # 0.14           Basophil % 0.5           Bilirubin (UA)   Negative         BILIRUBIN TOTAL 0.2  Comment: For infants and newborns, interpretation of results should be based  on gestational age, weight and in agreement with clinical  observations.    Premature Infant recommended reference ranges:  Up to 24 hours.............<8.0 mg/dL  Up to 48 hours............<12.0 mg/dL  3-5 days..................<15.0 mg/dL  6-29 days.................<15.0 mg/dL             BUN 19           Calcium 7.7           Chloride 112           CO2 19           Color, UA   Yellow         Creatinine 0.6            Differential Method Automated           eGFR >60           Eos # 0.1           Eosinophil % 0.4           Glucose 70           Glucose, UA   Negative         Gran # (ANC) 22.1           Gran % 78.3           Hematocrit 31.8           Hemoglobin 9.7           Immature Grans (Abs) 0.20  Comment: Mild elevation in immature granulocytes is non specific and   can be seen in a variety of conditions including stress response,   acute inflammation, trauma and pregnancy. Correlation with other   laboratory and clinical findings is essential.             Immature Granulocytes 0.7           INR     1.1  Comment: Coumadin Therapy:  2.0 - 3.0 for INR for all indicators except mechanical heart valves  and antiphospholipid syndromes which should use 2.5 - 3.5.         Ketones, UA   Trace         Leukocytes, UA   Negative         Lymph # 3.9           Lymph % 13.8           Magnesium  1.9           MCH 29.0           MCHC 30.5           MCV 95           Mono # 1.8           Mono % 6.3           MPV 8.6           NITRITE UA   Negative         nRBC 0           Occult Blood UA   Negative         pH, UA   6.0         Platelet Count 571           Potassium 4.1           PROTEIN TOTAL 5.6           Protein, UA   Trace  Comment: Recommend a 24 hour urine protein or a urine   protein/creatinine ratio if globulin induced proteinuria is  clinically suspected.           Protime     11.9       RBC 3.35           RDW 13.5           Sodium 138           Specific Gravity, UA   >1.030         Specimen UA   Urine, Clean Catch         UROBILINOGEN UA   Negative         WBC 28.23                   Significant Imaging: I have reviewed all pertinent imaging results/findings within the past 24 hours.    US Abdomen Limited   Final Result      The portal vein is patent.  Cirrhotic morphology of the liver with trace perihepatic ascites.         Electronically signed by: Bassam Stevens MD   Date:    11/06/2023   Time:    15:53      CT Abdomen Pelvis  Without  Contrast   Final Result      Presumed recent operative change cholecystectomy with small fluid collection right pelvis nonspecific         Electronically signed by: Kelsea Levin   Date:    11/05/2023   Time:    21:44      X-Ray Chest AP Portable   Final Result      No acute abnormality.         Electronically signed by: Robson Panda   Date:    11/05/2023   Time:    19:46      IR Abscess Drainage Peritoneal    (Results Pending)           Assessment/Plan:      * Sepsis  This patient does have evidence of infective focus  My overall impression is sepsis.  Source: Abdominal  Antibiotics given-   Antibiotics (72h ago, onward)    Start     Stop Route Frequency Ordered    11/06/23 1000  piperacillin-tazobactam (ZOSYN) 4.5 g in dextrose 5 % in water (D5W) 100 mL IVPB (MB+)         -- IV Every 8 hours (non-standard times) 11/06/23 0856        Latest lactate reviewed-  Recent Labs   Lab 11/05/23 1928 11/05/23  2349   LACTATE 1.4 1.9     Source control achieved by: IV abx, continue Zosyn, abx day # 3  CT A/P with contrast in AM, further evaluate right pelvic fluid collection  Possible drainage with IR  Surgery following  Blood cultures pending    Pelvic fluid collection  Patient c/o right lower abdominal and groin pain  CT on admit with post-operative findings and right pelvic fluid collection  Plan as above    S/P laparoscopic cholecystectomy  s/p robotic cholecystectomy with gangrenous perforated GB - 10/28/23  Right sided abdominal drain in place  CT with with noted post-operative findings and right pelvic fluid collection  Management per Surgery    Alcoholic cirrhosis of liver with ascites  Co-morbidities are present and inclusive of portal hypertension and malnutrition.  MELD-Na score calculated; MELD 3.0: 12 at 11/7/2023  7:48 AM  MELD-Na: 7 at 11/7/2023  7:48 AM  Calculated from:  Serum Creatinine: 0.6 mg/dL (Using min of 1 mg/dL) at 11/7/2023  7:48 AM  Serum Sodium: 138 mmol/L (Using max of 137 mmol/L) at  11/7/2023  7:48 AM  Total Bilirubin: 0.2 mg/dL (Using min of 1 mg/dL) at 11/7/2023  7:48 AM  Serum Albumin: 1.3 g/dL (Using min of 1.5 g/dL) at 11/7/2023  7:48 AM  INR(ratio): 1.1 at 11/6/2023  4:00 PM  Age at listing (hypothetical): 63 years  Sex: Female at 11/7/2023  7:48 AM      Continue chronic meds. Etiology likely ETOH. Will avoid any hepatotoxic meds, and monitor CBC/CMP/INR for synthetic function.     HTN (hypertension)  Chronic, controlled. Latest blood pressure and vitals reviewed-     Temp:  [97.1 °F (36.2 °C)-99.4 °F (37.4 °C)]   Pulse:  [85-93]   Resp:  [16-20]   BP: ()/(52-66)   SpO2:  [94 %-96 %] .   Home meds for hypertension were reviewed and noted below.   Hypertension Medications             furosemide (LASIX) 40 MG tablet Take 1 tablet (40 mg total) by mouth once daily.    spironolactone (ALDACTONE) 50 MG tablet Take 1 tablet (50 mg total) by mouth 2 (two) times daily.    spironolactone (ALDACTONE) 50 MG tablet Take 1 tablet (50 mg total) by mouth once daily.          While in the hospital, will manage blood pressure as follows; Adjust home antihypertensive regimen as follows- hold home meds - BP marginal    Severe protein-calorie malnutrition  Nutrition consulted. Most recent weight and BMI monitored-     Measurements:  Wt Readings from Last 1 Encounters:   11/07/23 44.5 kg (98 lb)   Body mass index is 17.92 kg/m².    Interventions/Recommendations (treatment strategy):  1. Recommend a Minced and Moist diet, per SLP recommendation 2. Recommend Boost Plus TID 3.Recommend Jose David BID 4. Daily weight 5. Meal set/assistance/encourangement TID    VTE Risk Mitigation (From admission, onward)         Ordered     IP VTE HIGH RISK PATIENT  Once         11/06/23 0856     Place sequential compression device  Until discontinued         11/06/23 0856                Discharge Planning   ANDREAS:      Code Status: Full Code   Is the patient medically ready for discharge?:     Reason for patient still in  hospital (select all that apply): Patient trending condition, Laboratory test, Treatment, Imaging and Consult recommendations  Discharge Plan A: Home                  Elías Hidalgo MD  Department of Hospital Medicine   [facility] Surg

## 2023-11-07 NOTE — CONSULTS
"  [facility] - Med Surg  Adult Nutrition  Consult Note    SUMMARY     Recommendations  1. Recommend a Minced and Moist diet, per SLP recommendation   2. Recommend Boost Plus TID   3.Recommend Jose David BID   4. Daily weight   5. Meal set/assistance/encourangement TID    Goals   1. Pt diet will be modified within 24 hours of SLP recommendation  2. Pt will consume greater than 50% of EEN/EPN by RD f/u.   3. Pt will maintain a stable by RD f/u.  Nutrition Goal Status: new  Communication of RD Recs:  (POC;sticky note)    Assessment and Plan    Nutrition Problem  Inadequate Oral Intake     Related to (etiology):   Decreased ability to consume sufficient protein/energy    Signs and Symptoms (as evidenced by):   NPO    Interventions/Recommendations (treatment strategy):  Minced and Moist level 5 orange  Commercial beverage  Mineral nutrition supplement for wound healing  Collaboration of care    Nutrition Diagnosis Status:   New         Malnutrition Assessment     Skin (Micronutrient): none (Parveen Score =16)                                 Reason for Assessment    Reason For Assessment: consult  Diagnosis: infection/sepsis, liver disease  Relevant Medical History: alcohol cirrhosis with ascites  Interdisciplinary Rounds: did not attend  General Information Comments: 64 yo female with active prinicpal problem inpatient sepsis. Current wt 98# and BMI 18.02 (underweight).LBM 10/31. Pt was talking to me and then pretended to be sleep. Pt may have issues with chewing or swallowing. Top teeth are dentures and bottom teeth are real with missing teeth. Pt said "thank you" as i was exiting the room.  Nutrition Discharge Planning: d/c on regular    Nutrition Risk Screen    Nutrition Risk Screen: difficulty chewing/swallowing    Nutrition/Diet History    Spiritual, Cultural Beliefs, Confucianist Practices, Values that Affect Care: no  Food Allergies: NKFA  Factors Affecting Nutritional Intake: chewing difficulties/inability to chew food, " "difficulty/impaired swallowing    Anthropometrics    Temp: 97.1 °F (36.2 °C)  Height Method: Stated  Height: 5' 2" (157.5 cm)  Height (inches): 62 in  Weight Method: Bed Scale  Weight: 44.5 kg (98 lb)  Weight (lb): 98 lb  Ideal Body Weight (IBW), Female: 110 lb  % Ideal Body Weight, Female (lb): 89.09 %  % Ideal Body Weight Malnutrition: 80-90% - mild deficit  BMI (Calculated): 17.9  BMI Grade: 17 - 18.4 protein-energy malnutrition grade I     Wt Readings from Last 15 Encounters:   11/07/23 44.5 kg (98 lb)   11/01/23 47.4 kg (104 lb 8 oz)   10/10/23 46.8 kg (103 lb 3.2 oz)   09/01/23 47.6 kg (105 lb)   08/23/23 51.1 kg (112 lb 12.2 oz)   08/15/23 49.4 kg (108 lb 12.8 oz)   07/26/23 47.5 kg (104 lb 12.8 oz)   07/06/23 48 kg (105 lb 12.8 oz)   06/15/23 47.6 kg (105 lb)   05/24/23 48.1 kg (106 lb)   06/30/22 48.6 kg (107 lb 2.3 oz)   12/02/20 55.9 kg (123 lb 3.2 oz)   08/27/19 54.9 kg (121 lb)   08/13/19 54.9 kg (121 lb)   [    Lab/Procedures/Meds    Pertinent Labs Reviewed: reviewed  Pertinent Medications Reviewed: reviewed    Lab Results   Component Value Date    WBC 28.23 (H) 11/07/2023    HGB 9.7 (L) 11/07/2023    HCT 31.8 (L) 11/07/2023    MCV 95 11/07/2023     (H) 11/07/2023     Lab Results   Component Value Date    WBC 28.23 (H) 11/07/2023     BMP  Lab Results   Component Value Date     11/07/2023    K 4.1 11/07/2023     (H) 11/07/2023    CO2 19 (L) 11/07/2023    BUN 19 11/07/2023    CREATININE 0.6 11/07/2023    CALCIUM 7.7 (L) 11/07/2023    ANIONGAP 7 (L) 11/07/2023    EGFRNORACEVR >60 11/07/2023     Lab Results   Component Value Date     11/07/2023    K 4.1 11/07/2023     (H) 11/07/2023    CO2 19 (L) 11/07/2023     Scheduled Meds:   aluminum-magnesium hydroxide-simethicone  30 mL Oral QID (AC & HS)    folic acid-vit B6-vit B12 2.5-25-2 mg  1 tablet Oral Daily    mirtazapine  15 mg Oral QHS    multivitamin  1 tablet Oral Daily    piperacillin-tazobactam (Zosyn) IV (PEDS and " ADULTS) (extended infusion is not appropriate)  4.5 g Intravenous Q8H    senna-docusate 8.6-50 mg  2 tablet Oral Daily    sucralfate  1 g Oral Q6H    thiamine  100 mg Oral Daily     Continuous Infusions:   lactated ringers 75 mL/hr at 11/06/23 0945     PRN Meds:.LIDOcaine (PF) 10 mg/ml (1%), morphine, ondansetron, sodium chloride 0.9%      Estimated/Assessed Needs    Weight Used For Calorie Calculations: 44.5 kg (98 lb)  Energy Calorie Requirements (kcal): 8252-9090 (underweight 30-35)  Energy Need Method: Kcal/kg  Protein Requirements: 53-66 (underweight 1.2-1.5)  Weight Used For Protein Calculations: 44.5 kg (98 lb)  Fluid Requirements (mL): 1333 or per MD/NP  Estimated Fluid Requirement Method: RDA Method  RDA Method (mL): 1333         Nutrition Prescription Ordered    Current Diet Order: NPO    Evaluation of Received Nutrient/Fluid Intake    I/O: Net Since Admit: +1381 mL  Energy Calories Required: not meeting needs  Protein Required: not meeting needs  Fluid Required:  (no data)  Tolerance: not tolerating  % Intake of Estimated Energy Needs: 0 - 25 %  % Meal Intake: NPO    Nutrition Risk    Level of Risk/Frequency of Follow-up: high (time 2 weekly)       Monitor and Evaluation    Food and Nutrient Intake: energy intake, food and beverage intake  Food and Nutrient Adminstration: diet order  Knowledge/Beliefs/Attitudes: food and nutrition knowledge/skill, beliefs and attitudes  Anthropometric Measurements: weight change  Nutrition-Focused Physical Findings: overall appearance, extremities, muscles and bones, skin       Nutrition Follow-Up    RD Follow-up?: Yes  Kaila Louis, Dietetic Intern

## 2023-11-07 NOTE — SUBJECTIVE & OBJECTIVE
Review of Systems   All other systems reviewed and are negative.    Objective:     Vital Signs (Most Recent):  Temp: 97.1 °F (36.2 °C) (11/07/23 1157)  Pulse: 85 (11/07/23 1326)  Resp: 18 (11/07/23 1336)  BP: 101/62 (11/07/23 1157)  SpO2: 96 % (11/07/23 1157) Vital Signs (24h Range):  Temp:  [97.1 °F (36.2 °C)-99.4 °F (37.4 °C)] 97.1 °F (36.2 °C)  Pulse:  [85-93] 85  Resp:  [16-20] 18  SpO2:  [94 %-96 %] 96 %  BP: ()/(52-66) 101/62     Weight: 44.5 kg (98 lb)  Body mass index is 17.92 kg/m².  No intake or output data in the 24 hours ending 11/07/23 1546      Physical Exam  Vitals and nursing note reviewed.   Constitutional:       General: She is not in acute distress.     Appearance: Normal appearance. She is normal weight. She is ill-appearing.   Cardiovascular:      Rate and Rhythm: Normal rate and regular rhythm.      Heart sounds: No murmur heard.  Pulmonary:      Effort: Pulmonary effort is normal. No respiratory distress.      Breath sounds: No wheezing.   Abdominal:      General: There is distension.      Tenderness: There is abdominal tenderness.      Comments: Right sided surgical drain   Neurological:      General: No focal deficit present.      Mental Status: She is alert and oriented to person, place, and time.   Psychiatric:         Mood and Affect: Mood normal.         Behavior: Behavior normal.             Significant Labs: All pertinent labs within the past 24 hours have been reviewed.  Recent Lab Results         11/07/23  0748   11/06/23  2006   11/06/23  1600        Albumin 1.3                      ALT 11           Anion Gap 7           Appearance, UA   Clear         AST 15           Baso # 0.14           Basophil % 0.5           Bilirubin (UA)   Negative         BILIRUBIN TOTAL 0.2  Comment: For infants and newborns, interpretation of results should be based  on gestational age, weight and in agreement with clinical  observations.    Premature Infant recommended reference  ranges:  Up to 24 hours.............<8.0 mg/dL  Up to 48 hours............<12.0 mg/dL  3-5 days..................<15.0 mg/dL  6-29 days.................<15.0 mg/dL             BUN 19           Calcium 7.7           Chloride 112           CO2 19           Color, UA   Yellow         Creatinine 0.6           Differential Method Automated           eGFR >60           Eos # 0.1           Eosinophil % 0.4           Glucose 70           Glucose, UA   Negative         Gran # (ANC) 22.1           Gran % 78.3           Hematocrit 31.8           Hemoglobin 9.7           Immature Grans (Abs) 0.20  Comment: Mild elevation in immature granulocytes is non specific and   can be seen in a variety of conditions including stress response,   acute inflammation, trauma and pregnancy. Correlation with other   laboratory and clinical findings is essential.             Immature Granulocytes 0.7           INR     1.1  Comment: Coumadin Therapy:  2.0 - 3.0 for INR for all indicators except mechanical heart valves  and antiphospholipid syndromes which should use 2.5 - 3.5.         Ketones, UA   Trace         Leukocytes, UA   Negative         Lymph # 3.9           Lymph % 13.8           Magnesium  1.9           MCH 29.0           MCHC 30.5           MCV 95           Mono # 1.8           Mono % 6.3           MPV 8.6           NITRITE UA   Negative         nRBC 0           Occult Blood UA   Negative         pH, UA   6.0         Platelet Count 571           Potassium 4.1           PROTEIN TOTAL 5.6           Protein, UA   Trace  Comment: Recommend a 24 hour urine protein or a urine   protein/creatinine ratio if globulin induced proteinuria is  clinically suspected.           Protime     11.9       RBC 3.35           RDW 13.5           Sodium 138           Specific Gravity, UA   >1.030         Specimen UA   Urine, Clean Catch         UROBILINOGEN UA   Negative         WBC 28.23                   Significant Imaging: I have reviewed all pertinent  imaging results/findings within the past 24 hours.    US Abdomen Limited   Final Result      The portal vein is patent.  Cirrhotic morphology of the liver with trace perihepatic ascites.         Electronically signed by: Bassam Stevens MD   Date:    11/06/2023   Time:    15:53      CT Abdomen Pelvis  Without Contrast   Final Result      Presumed recent operative change cholecystectomy with small fluid collection right pelvis nonspecific         Electronically signed by: Kelsea Levin   Date:    11/05/2023   Time:    21:44      X-Ray Chest AP Portable   Final Result      No acute abnormality.         Electronically signed by: Robson Panda   Date:    11/05/2023   Time:    19:46      IR Abscess Drainage Peritoneal    (Results Pending)

## 2023-11-07 NOTE — PROGRESS NOTES
[facility]  General Surgery  Progress Note    Subjective:     History of Present Illness:  Ms. Martin is a 63-year-old female with a history of hypertension and cirrhosis of the liver status post robotic cholecystectomy on 10/28/2023 for gangrenous cholecystitis.  She was discharged on 11/02/2023 in stable condition with her HUMAIRA drain in place and on antibiotics.  She states that she was doing well overall following her discharge up until yesterday morning at which point in time she developed sudden onset lower abdominal pain sharp in nature and intermittent, as well as nausea, vomiting, fevers, and chills.  She attempted to take her Zofran however did not have any relief as she states she had vomiting it up.  She called EMS who brought her to the emergency department.  Per EMS she did have coffee-ground emesis however she maintains that it was not coffee-ground but was yellow in color.  Upon arrival to the emergency department her vital signs were all within normal limits.  She did develop a T-max of a 100.1° overnight at which point in time she also became mildly tachycardic up to 103.  CT scan was performed without contrast which shows a small amount of non organized fluid in the right upper quadrant with the drain in place and a fluid collection in the pelvis.  Otherwise there were no abnormal findings on her CT scan.  She was noted to have a leukocytosis up to 44,000 as well as thrombocytosis.  Blood cultures were performed and are still pending and she was admitted for further workup and management.  Currently she states that the morphine and the Zofran IV are helping with her nausea and her pain she still intermittently has lower abdominal pain and overall feels quite weak still.      Post-Op Info:  * No surgery found *         Interval History: temp max 99.4F, some hypotension 94/52.  Still feeling crummy.  WBC remains elevated at 28K.  HUMAIRA drain output not recorded     Medications:  Continuous  Infusions:   lactated ringers 75 mL/hr at 11/07/23 1604     Scheduled Meds:   aluminum-magnesium hydroxide-simethicone  30 mL Oral QID (AC & HS)    folic acid-vit B6-vit B12 2.5-25-2 mg  1 tablet Oral Daily    mirtazapine  15 mg Oral QHS    multivitamin  1 tablet Oral Daily    piperacillin-tazobactam (Zosyn) IV (PEDS and ADULTS) (extended infusion is not appropriate)  4.5 g Intravenous Q8H    senna-docusate 8.6-50 mg  2 tablet Oral Daily    sucralfate  1 g Oral Q6H    thiamine  100 mg Oral Daily     PRN Meds:LIDOcaine (PF) 10 mg/ml (1%), morphine, ondansetron, sodium chloride 0.9%     Review of patient's allergies indicates:   Allergen Reactions    Baclofen Other (See Comments)     Vomiting, confusion, shaking     Objective:     Vital Signs (Most Recent):  Temp: 97.1 °F (36.2 °C) (11/07/23 1157)  Pulse: 85 (11/07/23 1326)  Resp: 18 (11/07/23 1336)  BP: 101/62 (11/07/23 1157)  SpO2: 96 % (11/07/23 1157) Vital Signs (24h Range):  Temp:  [97.1 °F (36.2 °C)-99.4 °F (37.4 °C)] 97.1 °F (36.2 °C)  Pulse:  [85-93] 85  Resp:  [16-20] 18  SpO2:  [94 %-96 %] 96 %  BP: (101-114)/(53-66) 101/62     Weight: 44.5 kg (98 lb)  Body mass index is 17.92 kg/m².    Intake/Output - Last 3 Shifts         11/05 0700 11/06 0659 11/06 0700 11/07 0659 11/07 0700 11/08 0659    IV Piggyback 1441      Total Intake(mL/kg) 1441 (32.2)      Other 60      Total Output 60      Net +1381             Urine Occurrence  1 x              Physical Exam  Vitals reviewed.   Constitutional:       Comments: Thin, chronically ill appearing but in good spirits    HENT:      Mouth/Throat:      Mouth: Mucous membranes are dry.   Eyes:      Extraocular Movements: Extraocular movements intact.      Conjunctiva/sclera: Conjunctivae normal.   Cardiovascular:      Rate and Rhythm: Normal rate.   Pulmonary:      Effort: Pulmonary effort is normal.   Abdominal:      General: Abdomen is flat.      Palpations: Abdomen is soft.      Tenderness: There is no  abdominal tenderness.      Comments: HUMAIRA drain serous, incisions well healed    Skin:     General: Skin is warm and dry.   Neurological:      General: No focal deficit present.      Mental Status: She is alert.   Psychiatric:         Mood and Affect: Mood normal.         Behavior: Behavior normal.          Significant Labs:  I have reviewed all pertinent lab results within the past 24 hours.  CBC:   Recent Labs   Lab 11/07/23 0748   WBC 28.23*   RBC 3.35*   HGB 9.7*   HCT 31.8*   *   MCV 95   MCH 29.0   MCHC 30.5*     CMP:   Recent Labs   Lab 11/07/23 0748   GLU 70   CALCIUM 7.7*   ALBUMIN 1.3*   PROT 5.6*      K 4.1   CO2 19*   *   BUN 19   CREATININE 0.6   ALKPHOS 137*   ALT 11   AST 15   BILITOT 0.2     Coagulation:   Recent Labs   Lab 11/06/23  1600   LABPROT 11.9   INR 1.1     Microbiology Results (last 7 days)       Procedure Component Value Units Date/Time    Blood culture x two cultures. Draw prior to antibiotics. [[id number]] Collected: 11/05/23 1932    Order Status: Completed Specimen: Blood from Peripheral, Forearm, Left Updated: 11/07/23 0612     Blood Culture, Routine No Growth to date      No Growth to date    Narrative:      Aerobic and anaerobic    Blood culture x two cultures. Draw prior to antibiotics. [[id number]] Collected: 11/05/23 1932    Order Status: Completed Specimen: Blood from Peripheral, Forearm, Left Updated: 11/07/23 0612     Blood Culture, Routine No Growth to date      No Growth to date    Narrative:      Aerobic and anaerobic          Specimen (24h ago, onward)      None          Recent Labs   Lab 11/06/23 2006   COLORU Yellow   SPECGRAV >1.030*   PHUR 6.0   PROTEINUA Trace*   NITRITE Negative   LEUKOCYTESUR Negative   UROBILINOGEN Negative       Significant Diagnostics:  I have reviewed all pertinent imaging results/findings within the past 24 hours.    Assessment/Plan:     Drug-induced constipation  Likely 2/2 opioid usage    -- continue senna   -- consider  Miralax or lactulose +/- enemas       Leukocytosis  WBC 44k - greatly elevated, higher than expected with routine postoperative abscess, was normal on discharge.  Blood cultures pending, urinalysis and urine cultures pending.  Elevation may be in part due to dehydration from nausea and vomiting    -- consider drainage of pelvic fluid collection  -- continue Zosyn  -- defer to primary team     Abdominal pain  POD #10- s/p robotic cholecystectomy for gangrenous perforated cholecystitis.    Fluid in pelvis infected ascites vs abscess.  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    -- defer to primary team     HTN (hypertension)  Defer to primary team         Valentina De La Rosa MD  General Surgery  [facility]

## 2023-11-07 NOTE — ASSESSMENT & PLAN NOTE
Co-morbidities are present and inclusive of portal hypertension and malnutrition.  MELD-Na score calculated; MELD 3.0: 12 at 11/6/2023  4:00 PM  MELD-Na: 7 at 11/6/2023  4:00 PM  Calculated from:  Serum Creatinine: 0.6 mg/dL (Using min of 1 mg/dL) at 11/6/2023  8:31 AM  Serum Sodium: 139 mmol/L (Using max of 137 mmol/L) at 11/6/2023  8:31 AM  Total Bilirubin: 0.2 mg/dL (Using min of 1 mg/dL) at 11/6/2023  8:31 AM  Serum Albumin: 1.5 g/dL at 11/6/2023  8:31 AM  INR(ratio): 1.1 at 11/6/2023  4:00 PM  Age at listing (hypothetical): 63 years  Sex: Female at 11/6/2023  4:00 PM      Continue chronic meds. Etiology likely ETOH. Will avoid any hepatotoxic meds, and monitor CBC/CMP/INR for synthetic function.

## 2023-11-07 NOTE — DISCHARGE SUMMARY
[facility] ([facility] Medicine  Discharge Summary      Patient Name: Lakisha Vance  MRN: [id number]  DIMITRIS: [id number]  Patient Class: IP- Inpatient  Admission Date: 10/26/2023  Hospital Length of Stay: 3 days  Discharge Date and Time: 11/2/2023  3:04 PM  Attending Physician: No att. providers found   Discharging Provider: Sandeep Martinez MD  Primary Care Provider: Pranav Gonzalez MD    Primary Care Team: Networked reference to record PCT     HPI:   63-year-old female with hx  of HTN, headaches, chronic liver disease, alcohol use, ascites, EGD in September 2023 with reflux esophagitis/gastritis/portal hypertensive gastropathy, and cholelithiasis presented to [facility] ER on October 26 with epigastric abdominal pain that began the night prior to presentation associated with vomiting but no diarrhea, no fever and no hematemesis and no recent alcohol use. She was placed on Protonix in September but is not taking it due to insurance coverage issues. Pt has been worked up in our GI Dept for Alcoholic Liver disease vs Cirrhosis recently. Last Alcohol use about a month ago.    In the ER , WBC 17.4, H/H 12/ 35, platelets 423, Lipase 13, CMP normal. US Abdomin showed hepatomegaly and mildly cirrhotic liver configuration. Very small perihepatic ascites. Gallbladder sludge and cholelithiasis. CT abdomen and pelvis showed distended gallbladder with sludge.  Mild ascites. Edematous gastric wall.  Gastritis should be considered.  Mild hiatal hernia.  Appears to be wall thickening of the ascending colon suspicious for colitis. Prominent diverticulosis of the lower colon with no strong evidence for diverticulitis.     In the ER, pt had received famotidine, morphine, Zofran, and Zosyn.      ER consulted Dr. Jennings and Dr. Randhawa thru the Transfer [facility] for possible Cholecystitis vs Colitis and she was accepted. She is being placed in Obs under Hosp Med for General Surgery and Gastroenterology evaluation.                Procedure(s) (LRB):  XI ROBOTIC CHOLECYSTECTOMY (N/A)      Hospital Course:   Appreciate GI and Gen Surgery input- Feels a little better, abd pain a little better, controlled with pain meds. Getting IVF and Zosyn. NV much better. WBC 22k. H/H stable. Hida scan positive for Cholecystitis. Plan for Robotic Yari tomorrow.     10/28- Appreciate Dr. De La Rosa, pt seen pre and post op. K was low- started on NS w KCl pre op. Post op a little groggy and in mild to mod discomfort/pain. Operative Notes reviewed- Gangrenous gallbladder with lateral wall perforation with pus and bile in the abdomen.  Liver was cirrhotic and nodular.  Extensive friable and inflamed tissues. Cont IVF, IV Abx and pain meds per surgery. Check labs in am.      10/29- POD 1, looks and feels much better, skin color and turgor have improved, appears more relaxed, has been to the BR 3 times without any dizziness or weakness. VSS, Afeb, serosanguinous drain output, about 740 cc so far. Getting Zosyn, WBC down to 13.7, H/H 10/30, electrolytes better. LFTs normal, Albumin 1.3. pt tolerated CLD well. Ok to adv as tolerated.     10/30- looks better, POD 2, pain under control, VSS, Afeb, HUMAIRA drain present, she walked in the hallways and ate a little plus drank a protein smoothie. Labs improving. LFTs normal Surgery and hepatology following. Check labs in am. Cont Abx, IS, ambulation.     10/31- gradually improving, lying in bed but walked in the halls earlier. Still has large HUMAIRA drain, about 650 cc. Getting Zosyn. New Picc line placed. CXR clear. WBC normal. Alb 1.2- dietary consulted, getting boost plus Jose David w each meal, pt encouraged to eat, also started on Remeron to improve appetite.     11/1- looks and feels well, sitting up eating lunch, had walked in the hallways twice earlier. Abd pain much better control. Still has significant serous drainage from the HUMAIRA drain, about 1100 cc yesterday- likely Ascitic Fluid plus sec to severe Hypoalbuminemia-  her albumin is 1.2, LFTs normal. Dr. Weaver recommended starting Lasix and Aldactone. Pt also encouraged to eat more. Change diet to low salt with fluid restriction. May switch to oral Abx soon.     11/2- appears very well, walking around on RA, abd pain minimal. Dr. De La Rosa took out her Drain and placed a stitch over the area as she was draining mostly Ascitic fluid. Pt will be continued on Lasix and Aldactone as per Hepatology. Both Surg and hepatology have cleared her for discharge. She will be on oral Abx for another few days and will f/u with surgery next week. She is eating drinking well, walking around well. She was seen and examined and deemed stable for discharge home today.         Goals of Care Treatment Preferences:  Code Status: Full Code      Consults:   Consults (From admission, onward)        Status Ordering Provider     Inpatient consult to Registered Dietitian/Nutritionist  Once        Provider:  (Not yet assigned)    Completed ANGELITA BAILEY.     Inpatient consult to General Surgery  Once        Provider:  Valentina De La Rosa MD    Completed ANGELITA BAILEY.     Inpatient consult to Gastroenterology  Once        Provider:  Tanvi Jennings MD    Completed ANGELITA ABILEY.          No new Assessment & Plan notes have been filed under this hospital service since the last note was generated.  Service: Hospital Medicine    Final Active Diagnoses:    Diagnosis Date Noted POA    Severe protein-calorie malnutrition [E43] 10/28/2023 Yes    Alcoholic cirrhosis of liver with ascites [K70.31] 10/26/2023 Yes    HTN (hypertension) [I10] 08/13/2019 Yes      Problems Resolved During this Admission:    Diagnosis Date Noted Date Resolved POA    PRINCIPAL PROBLEM:  acute cholecystitis with gangrene of GB with perforation  [K80.00] 10/26/2023 11/02/2023 Yes    Acute gangrenous cholecystitis s/p Robotic Cholecystectomy [K81.0] 07/10/2023 11/02/2023 Yes    Moderate cigarette smoker (10-19 per day) [F17.210]  03/29/2022 11/02/2023 Yes       Discharged Condition: stable    Disposition: Home or Self Care    Follow Up:   Follow-up Information     Pranav Gonzalez MD. Schedule an appointment as soon as possible for a visit in 3 day(s).    Specialty: Family Medicine  Why: Hospital follow up, As needed  Contact information:  [address]  [address]  [place] [address]  [phone]             Valentina De La Rosa MD. Schedule an appointment as soon as possible for a visit in 1 week(s).    Specialty: Surgical Oncology  Why: Hospital follow up  Contact information:  [address]  [place] [address]  [phone]                       Patient Instructions:      Diet Adult Regular     Activity as tolerated       Significant Diagnostic Studies: Labs: All labs within the past 24 hours have been reviewed  Radiology:       Pending Diagnostic Studies:     None         Medications:   Reconciled Home Medications:      Medication List      START taking these medications    amoxicillin-clavulanate 875-125mg 875-125 mg per tablet  Commonly known as: AUGMENTIN  Take 1 tablet by mouth every 12 (twelve) hours.     folic acid-vit B6-vit B12 2.5-25-2 mg 2.5-25-2 mg Tab  Commonly known as: FOLBIC or Equiv  Take 1 tablet by mouth once daily.     HIGH POTENCY MULTIVIT (W-IRON) 9 mg iron-400 mcg Tab tablet  Generic drug: multivit-iron-FA-calcium-mins  Take 1 tablet by mouth once daily.     HYDROcodone-acetaminophen 5-325 mg per tablet  Commonly known as: NORCO  Take 1 tablet by mouth every 6 (six) hours as needed for Pain.     mirtazapine 15 MG tablet  Commonly known as: REMERON  Take 1 tablet (15 mg total) by mouth every evening.     STOOL SOFTENER-LAXATIVE 8.6-50 mg per tablet  Generic drug: senna-docusate 8.6-50 mg  Take 2 tablets by mouth once daily.     thiamine 100 MG tablet  Take 1 tablet (100 mg total) by mouth once daily.        CHANGE how you take these medications    furosemide 40 MG tablet  Commonly known as: LASIX  Take  1 tablet (40 mg total) by mouth once daily.  What changed:   · medication strength  · how much to take  · when to take this     * spironolactone 50 MG tablet  Commonly known as: ALDACTONE  Take 1 tablet (50 mg total) by mouth 2 (two) times daily.  What changed: Another medication with the same name was added. Make sure you understand how and when to take each.     * spironolactone 50 MG tablet  Commonly known as: ALDACTONE  Take 1 tablet (50 mg total) by mouth once daily.  What changed: You were already taking a medication with the same name, and this prescription was added. Make sure you understand how and when to take each.         * This list has 2 medication(s) that are the same as other medications prescribed for you. Read the directions carefully, and ask your doctor or other care provider to review them with you.            CONTINUE taking these medications    ondansetron 4 MG Tbdl  Commonly known as: ZOFRAN-ODT  Take 1 tablet (4 mg total) by mouth every 8 (eight) hours as needed (nausea/vomiting).     pantoprazole 40 MG tablet  Commonly known as: PROTONIX  Take 1 tablet (40 mg total) by mouth 2 (two) times daily.        STOP taking these medications    folic acid 1 MG tablet  Commonly known as: FOLVITE     VITAMIN B-12 100 MCG tablet  Generic drug: cyanocobalamin            Indwelling Lines/Drains at time of discharge:   Lines/Drains/Airways     Drain  Duration                Closed/Suction Drain 10/28/23 1430 Tube - 1 Right RLQ Bulb 15 Fr. 10 days                Time spent on the discharge of patient: 45 minutes         Sandeep Martinez MD  Department of Hospital Medicine  [facility] ([facility])

## 2023-11-07 NOTE — ASSESSMENT & PLAN NOTE
s/p robotic cholecystectomy with gangrenous perforated GB - 10/28/23  Right sided abdominal drain in place  CT with with noted post-operative findings and right pelvic fluid collection  Management per Surgery

## 2023-11-07 NOTE — ASSESSMENT & PLAN NOTE
Nutrition consulted. Most recent weight and BMI monitored-     Measurements:  Wt Readings from Last 1 Encounters:   11/07/23 44.5 kg (98 lb)   Body mass index is 17.92 kg/m².    Interventions/Recommendations (treatment strategy):  1. Recommend a Minced and Moist diet, per SLP recommendation 2. Recommend Boost Plus TID 3.Recommend Jose David BID 4. Daily weight 5. Meal set/assistance/encourangement TID

## 2023-11-07 NOTE — H&P
[facility] Medicine  History & Physical    Patient Name: Lakisha Vance  MRN: [id number]  Patient Class: OP- Observation  Admission Date: 11/5/2023  Attending Physician: Elías Hidalgo MD   Primary Care Provider: Pranav Gonzalez MD         Patient information was obtained from patient, past medical records and ER records.     Subjective:     Principal Problem:Sepsis    Chief Complaint:   Chief Complaint   Patient presents with    Abdominal Pain     Sudden onset abdominal pain, nausea, vomiting, fevers, chills, and weakness yesterday AM.  Per EMS coffee ground emesis         HPI: 62 y/o female with PMHx of HTN, alcoholic liver cirrhosis (EGD Sept '23 with reflux esophagitis, gastritis, portal hypertensive gastropathy) who recently underwent laproscopic cholecystectomy for gangrenous cholecystitis with RUQ drain placement on 10-28-23 who presented to the ER on 11-5-23 for abdominal pain, nausea/vomiting, fevers/chills. Patient discharged on 11/02/2023 in stable condition with her HUMAIRA drain in place and on antibiotics. She reports sudden onset of right sided lower abdominal and pelvis pain, associated with nausea/vomiting and subjective fevers. Patient denies vomiting blood or coffee ground emesis, denies chest pain, SOB or any other complaints. In ER, patient with temp 100.1 F, tachycardic, WBC 44k; blood cultures were obtained and started on Zosyn. UA pending. CT scan noted small amount of fluid in RUQ with drain in place and fluid collection in pelvis. Patient admitted by General Surgery this AM, plans for IR drainage of pelvic fluid collection. Hospital Medicine consulted to assist with medical management.      Past Medical History:   Diagnosis Date    Hypertension     Liver disease        Past Surgical History:   Procedure Laterality Date    ESOPHAGOGASTRODUODENOSCOPY N/A 9/1/2023    Procedure: ESOPHAGOGASTRODUODENOSCOPY (EGD);  Surgeon: Nerissa Ash MD;  Location: [facility];  Service:  Endoscopy;  Laterality: N/A;    HYSTERECTOMY      ROBOT-ASSISTED CHOLECYSTECTOMY USING DA BJ XI N/A 10/28/2023    Procedure: XI ROBOTIC CHOLECYSTECTOMY;  Surgeon: Valentina De La Rosa MD;  Location: [facility];  Service: General;  Laterality: N/A;       Review of patient's allergies indicates:   Allergen Reactions    Baclofen Other (See Comments)     Vomiting, confusion, shaking       No current facility-administered medications on file prior to encounter.     Current Outpatient Medications on File Prior to Encounter   Medication Sig    amoxicillin-clavulanate 875-125mg (AUGMENTIN) 875-125 mg per tablet Take 1 tablet by mouth every 12 (twelve) hours.    folic acid-vit B6-vit B12 2.5-25-2 mg (FOLBIC OR EQUIV) 2.5-25-2 mg Tab Take 1 tablet by mouth once daily.    furosemide (LASIX) 40 MG tablet Take 1 tablet (40 mg total) by mouth once daily.    HYDROcodone-acetaminophen (NORCO) 5-325 mg per tablet Take 1 tablet by mouth every 6 (six) hours as needed for Pain.    mirtazapine (REMERON) 15 MG tablet Take 1 tablet (15 mg total) by mouth every evening.    multivitamin Tab Take 1 tablet by mouth once daily.    ondansetron (ZOFRAN-ODT) 4 MG TbDL Take 1 tablet (4 mg total) by mouth every 8 (eight) hours as needed (nausea/vomiting).    pantoprazole (PROTONIX) 40 MG tablet Take 1 tablet (40 mg total) by mouth 2 (two) times daily.    senna-docusate 8.6-50 mg (PERICOLACE) 8.6-50 mg per tablet Take 2 tablets by mouth once daily.    spironolactone (ALDACTONE) 50 MG tablet Take 1 tablet (50 mg total) by mouth 2 (two) times daily.    spironolactone (ALDACTONE) 50 MG tablet Take 1 tablet (50 mg total) by mouth once daily.    thiamine 100 MG tablet Take 1 tablet (100 mg total) by mouth once daily.     Family History    None       Tobacco Use    Smoking status: Every Day     Current packs/day: 0.50     Types: Cigarettes    Smokeless tobacco: Never   Substance and Sexual Activity    Alcohol use: Yes     Comment: social     Drug use: Not on file    Sexual activity: Not on file     Review of Systems   All other systems reviewed and are negative.    Objective:     Vital Signs (Most Recent):  Temp: 98.8 °F (37.1 °C) (11/06/23 1609)  Pulse: 92 (11/06/23 1748)  Resp: 18 (11/06/23 1609)  BP: (!) 94/52 (11/06/23 1609)  SpO2: (!) 94 % (11/06/23 1609) Vital Signs (24h Range):  Temp:  [97.9 °F (36.6 °C)-100.1 °F (37.8 °C)] 98.8 °F (37.1 °C)  Pulse:  [] 92  Resp:  [16-20] 18  SpO2:  [94 %-100 %] 94 %  BP: ()/(52-82) 94/52     Weight: 44.7 kg (98 lb 8.7 oz)  Body mass index is 18.02 kg/m².     Physical Exam  Vitals and nursing note reviewed.   Constitutional:       General: She is not in acute distress.     Appearance: Normal appearance. She is normal weight. She is ill-appearing.   HENT:      Head: Normocephalic and atraumatic.      Nose: Nose normal.      Mouth/Throat:      Mouth: Mucous membranes are dry.      Pharynx: Oropharynx is clear.   Eyes:      Extraocular Movements: Extraocular movements intact.      Pupils: Pupils are equal, round, and reactive to light.   Cardiovascular:      Rate and Rhythm: Tachycardia present.      Pulses: Normal pulses.      Heart sounds: Normal heart sounds.   Pulmonary:      Effort: Pulmonary effort is normal. No respiratory distress.      Breath sounds: Normal breath sounds. No wheezing, rhonchi or rales.   Abdominal:      General: Abdomen is flat. Bowel sounds are normal. There is no distension.      Palpations: Abdomen is soft.      Tenderness: There is abdominal tenderness. There is no guarding.      Comments: RUQ drain in place  Right lower quadrant/pelvic TTP   Skin:     Coloration: Skin is pale.   Neurological:      General: No focal deficit present.      Mental Status: She is alert and oriented to person, place, and time.   Psychiatric:         Mood and Affect: Mood normal.         Behavior: Behavior normal.              CRANIAL NERVES     CN III, IV, VI   Pupils are equal, round, and  reactive to light.       Significant Labs: All pertinent labs within the past 24 hours have been reviewed.  Recent Lab Results         11/06/23  1600   11/06/23  0831   11/05/23  2349        Albumin   1.5         ALP   145         ALT   11         Anion Gap   10         AST   19         Baso #   0.22         Basophil %   0.6         BILIRUBIN TOTAL   0.2  Comment: For infants and newborns, interpretation of results should be based  on gestational age, weight and in agreement with clinical  observations.    Premature Infant recommended reference ranges:  Up to 24 hours.............<8.0 mg/dL  Up to 48 hours............<12.0 mg/dL  3-5 days..................<15.0 mg/dL  6-29 days.................<15.0 mg/dL           BUN   20         Calcium   8.0         Chloride   109         CO2   20         Creatinine   0.6         Differential Method   Automated         eGFR   >60         Eos #   0.1         Eosinophil %   0.3         Glucose   73         Gran # (ANC)   32.8         Gran %   85.7         Hematocrit   31.7         Hemoglobin   10.1         Immature Grans (Abs)   0.43  Comment: Mild elevation in immature granulocytes is non specific and   can be seen in a variety of conditions including stress response,   acute inflammation, trauma and pregnancy. Correlation with other   laboratory and clinical findings is essential.           Immature Granulocytes   1.1         INR 1.1  Comment: Coumadin Therapy:  2.0 - 3.0 for INR for all indicators except mechanical heart valves  and antiphospholipid syndromes which should use 2.5 - 3.5.             Lactate, Phi     1.9  Comment: Falsely low lactic acid results can be found in samples   containing >=13.0 mg/dL total bilirubin and/or >=3.5 mg/dL   direct bilirubin.         Lymph #   2.9         Lymph %   7.5         Magnesium    2.1         MCH   29.1         MCHC   31.9         MCV   91         Mono #   1.8         Mono %   4.8         MPV   9.3         nRBC   0         Platelet  Count   740         Potassium   2.9         PROTEIN TOTAL   6.3         Protime 11.9           RBC   3.47         RDW   13.4         Sodium   139         WBC   38.30                 Significant Imaging: I have reviewed all pertinent imaging results/findings within the past 24 hours.    US Abdomen Limited   Final Result      The portal vein is patent.  Cirrhotic morphology of the liver with trace perihepatic ascites.         Electronically signed by: Bassam Stevens MD   Date:    11/06/2023   Time:    15:53      CT Abdomen Pelvis  Without Contrast   Final Result      Presumed recent operative change cholecystectomy with small fluid collection right pelvis nonspecific         Electronically signed by: Kelsea Levin   Date:    11/05/2023   Time:    21:44      X-Ray Chest AP Portable   Final Result      No acute abnormality.         Electronically signed by: Robson Panda   Date:    11/05/2023   Time:    19:46      IR Abscess Drainage Peritoneal    (Results Pending)         Assessment/Plan:     * Sepsis  This patient does have evidence of infective focus  My overall impression is sepsis.  Source: Abdominal  Antibiotics given-   Antibiotics (72h ago, onward)    Start     Stop Route Frequency Ordered    11/06/23 1000  piperacillin-tazobactam (ZOSYN) 4.5 g in dextrose 5 % in water (D5W) 100 mL IVPB (MB+)         -- IV Every 8 hours (non-standard times) 11/06/23 0856        Latest lactate reviewed-  Recent Labs   Lab 11/05/23  1928 11/05/23  2349   LACTATE 1.4 1.9     Source control achieved by: IV abx  Agree with drainage of pelvic fluid collection  Blood cultures pending  UA/culture collection pending    Pelvic fluid collection  CT on admit with post-operative findings and right pelvic fluid collection  Surgery following, plans for IR drainage    S/P laparoscopic cholecystectomy  s/p robotic cholecystectomy with gangrenous perforated GB - 10/28/23  RUQ drain in place  CT with with noted post-operative findings and right  pelvic fluid collection  Management per Surgery    Alcoholic cirrhosis of liver with ascites  Co-morbidities are present and inclusive of portal hypertension and malnutrition.  MELD-Na score calculated; MELD 3.0: 12 at 11/6/2023  4:00 PM  MELD-Na: 7 at 11/6/2023  4:00 PM  Calculated from:  Serum Creatinine: 0.6 mg/dL (Using min of 1 mg/dL) at 11/6/2023  8:31 AM  Serum Sodium: 139 mmol/L (Using max of 137 mmol/L) at 11/6/2023  8:31 AM  Total Bilirubin: 0.2 mg/dL (Using min of 1 mg/dL) at 11/6/2023  8:31 AM  Serum Albumin: 1.5 g/dL at 11/6/2023  8:31 AM  INR(ratio): 1.1 at 11/6/2023  4:00 PM  Age at listing (hypothetical): 63 years  Sex: Female at 11/6/2023  4:00 PM      Continue chronic meds. Etiology likely ETOH. Will avoid any hepatotoxic meds, and monitor CBC/CMP/INR for synthetic function.     HTN (hypertension)  Chronic, controlled. Latest blood pressure and vitals reviewed-     Temp:  [97.9 °F (36.6 °C)-100.1 °F (37.8 °C)]   Pulse:  []   Resp:  [16-20]   BP: ()/(52-82)   SpO2:  [94 %-100 %] .   Home meds for hypertension were reviewed and noted below.   Hypertension Medications             furosemide (LASIX) 40 MG tablet Take 1 tablet (40 mg total) by mouth once daily.    spironolactone (ALDACTONE) 50 MG tablet Take 1 tablet (50 mg total) by mouth 2 (two) times daily.    spironolactone (ALDACTONE) 50 MG tablet Take 1 tablet (50 mg total) by mouth once daily.          While in the hospital, will manage blood pressure as follows; Adjust home antihypertensive regimen as follows- hold home meds - BP marginal    Severe protein-calorie malnutrition  Nutrition consulted. Most recent weight and BMI monitored-     Measurements:  Wt Readings from Last 1 Encounters:   11/05/23 44.7 kg (98 lb 8.7 oz)   Body mass index is 18.02 kg/m².      Malnutrition Type:  Context:    Level:      Malnutrition Characteristic Summary:       Interventions/Recommendations (treatment strategy):         VTE Risk Mitigation (From  admission, onward)         Ordered     IP VTE HIGH RISK PATIENT  Once         11/06/23 0856     Place sequential compression device  Until discontinued         11/06/23 0856                   On 11/06/2023, patient should be placed in hospital observation services under my care.            Elías Hidalgo MD  Department of Hospital Medicine  [facility] - Med Surg    N/A  Family history non-contributory to current problem

## 2023-11-07 NOTE — ASSESSMENT & PLAN NOTE
Chronic, controlled. Latest blood pressure and vitals reviewed-     Temp:  [97.1 °F (36.2 °C)-99.4 °F (37.4 °C)]   Pulse:  [85-93]   Resp:  [16-20]   BP: ()/(52-66)   SpO2:  [94 %-96 %] .   Home meds for hypertension were reviewed and noted below.   Hypertension Medications             furosemide (LASIX) 40 MG tablet Take 1 tablet (40 mg total) by mouth once daily.    spironolactone (ALDACTONE) 50 MG tablet Take 1 tablet (50 mg total) by mouth 2 (two) times daily.    spironolactone (ALDACTONE) 50 MG tablet Take 1 tablet (50 mg total) by mouth once daily.          While in the hospital, will manage blood pressure as follows; Adjust home antihypertensive regimen as follows- hold home meds - BP marginal

## 2023-11-07 NOTE — ASSESSMENT & PLAN NOTE
Likely related to whatever underlying processes causing her leukocytosis and abdominal pain    -- continue IV Zofran  -- GI evaluation if any additional coffee-ground emesis  -- defer to primary team

## 2023-11-07 NOTE — ASSESSMENT & PLAN NOTE
WBC 44k - greatly elevated, higher than expected with routine postoperative abscess, was normal on discharge.  Blood cultures pending, urinalysis and urine cultures pending.  Elevation may be in part due to dehydration from nausea and vomiting    -- consider drainage of pelvic fluid collection  -- continue Zosyn  -- defer to primary team

## 2023-11-07 NOTE — PLAN OF CARE
Nutrition Recommendations 11/7  1. Recommend a Minced and Moist diet, per SLP recommendation   2. Recommend Boost Plus TID   3.Recommend Jose David BID   4. Daily weight   5. Meal set/assistance/encourangement TID    Goals   1. Pt diet will be modified within 24 hours of SLP recommendation  2. Pt will consume greater than 50% of EEN/EPN by RD f/u.   3. Pt will maintain a stable by RD f/u.  Nutrition Goal Status: new  Communication of RD Recs:  (POC;sticky note)  Kaila Louis, Dietetic Intern

## 2023-11-07 NOTE — SUBJECTIVE & OBJECTIVE
Past Medical History:   Diagnosis Date    Hypertension     Liver disease        Past Surgical History:   Procedure Laterality Date    ESOPHAGOGASTRODUODENOSCOPY N/A 9/1/2023    Procedure: ESOPHAGOGASTRODUODENOSCOPY (EGD);  Surgeon: Nerissa Ash MD;  Location: [facility];  Service: Endoscopy;  Laterality: N/A;    HYSTERECTOMY      ROBOT-ASSISTED CHOLECYSTECTOMY USING DA BJ XI N/A 10/28/2023    Procedure: XI ROBOTIC CHOLECYSTECTOMY;  Surgeon: Valentina De La Rosa MD;  Location: [facility] OR;  Service: General;  Laterality: N/A;       Review of patient's allergies indicates:   Allergen Reactions    Baclofen Other (See Comments)     Vomiting, confusion, shaking       No current facility-administered medications on file prior to encounter.     Current Outpatient Medications on File Prior to Encounter   Medication Sig    amoxicillin-clavulanate 875-125mg (AUGMENTIN) 875-125 mg per tablet Take 1 tablet by mouth every 12 (twelve) hours.    folic acid-vit B6-vit B12 2.5-25-2 mg (FOLBIC OR EQUIV) 2.5-25-2 mg Tab Take 1 tablet by mouth once daily.    furosemide (LASIX) 40 MG tablet Take 1 tablet (40 mg total) by mouth once daily.    HYDROcodone-acetaminophen (NORCO) 5-325 mg per tablet Take 1 tablet by mouth every 6 (six) hours as needed for Pain.    mirtazapine (REMERON) 15 MG tablet Take 1 tablet (15 mg total) by mouth every evening.    multivitamin Tab Take 1 tablet by mouth once daily.    ondansetron (ZOFRAN-ODT) 4 MG TbDL Take 1 tablet (4 mg total) by mouth every 8 (eight) hours as needed (nausea/vomiting).    pantoprazole (PROTONIX) 40 MG tablet Take 1 tablet (40 mg total) by mouth 2 (two) times daily.    senna-docusate 8.6-50 mg (PERICOLACE) 8.6-50 mg per tablet Take 2 tablets by mouth once daily.    spironolactone (ALDACTONE) 50 MG tablet Take 1 tablet (50 mg total) by mouth 2 (two) times daily.    spironolactone (ALDACTONE) 50 MG tablet Take 1 tablet (50 mg total) by mouth once daily.    thiamine 100 MG tablet Take 1  tablet (100 mg total) by mouth once daily.     Family History    None       Tobacco Use    Smoking status: Every Day     Current packs/day: 0.50     Types: Cigarettes    Smokeless tobacco: Never   Substance and Sexual Activity    Alcohol use: Yes     Comment: social    Drug use: Not on file    Sexual activity: Not on file     Review of Systems   All other systems reviewed and are negative.    Objective:     Vital Signs (Most Recent):  Temp: 98.8 °F (37.1 °C) (11/06/23 1609)  Pulse: 92 (11/06/23 1748)  Resp: 18 (11/06/23 1609)  BP: (!) 94/52 (11/06/23 1609)  SpO2: (!) 94 % (11/06/23 1609) Vital Signs (24h Range):  Temp:  [97.9 °F (36.6 °C)-100.1 °F (37.8 °C)] 98.8 °F (37.1 °C)  Pulse:  [] 92  Resp:  [16-20] 18  SpO2:  [94 %-100 %] 94 %  BP: ()/(52-82) 94/52     Weight: 44.7 kg (98 lb 8.7 oz)  Body mass index is 18.02 kg/m².     Physical Exam  Vitals and nursing note reviewed.   Constitutional:       General: She is not in acute distress.     Appearance: Normal appearance. She is normal weight. She is ill-appearing.   HENT:      Head: Normocephalic and atraumatic.      Nose: Nose normal.      Mouth/Throat:      Mouth: Mucous membranes are dry.      Pharynx: Oropharynx is clear.   Eyes:      Extraocular Movements: Extraocular movements intact.      Pupils: Pupils are equal, round, and reactive to light.   Cardiovascular:      Rate and Rhythm: Tachycardia present.      Pulses: Normal pulses.      Heart sounds: Normal heart sounds.   Pulmonary:      Effort: Pulmonary effort is normal. No respiratory distress.      Breath sounds: Normal breath sounds. No wheezing, rhonchi or rales.   Abdominal:      General: Abdomen is flat. Bowel sounds are normal. There is no distension.      Palpations: Abdomen is soft.      Tenderness: There is abdominal tenderness. There is no guarding.      Comments: RUQ drain in place  Right lower quadrant/pelvic TTP   Skin:     Coloration: Skin is pale.   Neurological:      General: No  focal deficit present.      Mental Status: She is alert and oriented to person, place, and time.   Psychiatric:         Mood and Affect: Mood normal.         Behavior: Behavior normal.              CRANIAL NERVES     CN III, IV, VI   Pupils are equal, round, and reactive to light.       Significant Labs: All pertinent labs within the past 24 hours have been reviewed.  Recent Lab Results         11/06/23  1600   11/06/23  0831   11/05/23  2349        Albumin   1.5         ALP   145         ALT   11         Anion Gap   10         AST   19         Baso #   0.22         Basophil %   0.6         BILIRUBIN TOTAL   0.2  Comment: For infants and newborns, interpretation of results should be based  on gestational age, weight and in agreement with clinical  observations.    Premature Infant recommended reference ranges:  Up to 24 hours.............<8.0 mg/dL  Up to 48 hours............<12.0 mg/dL  3-5 days..................<15.0 mg/dL  6-29 days.................<15.0 mg/dL           BUN   20         Calcium   8.0         Chloride   109         CO2   20         Creatinine   0.6         Differential Method   Automated         eGFR   >60         Eos #   0.1         Eosinophil %   0.3         Glucose   73         Gran # (ANC)   32.8         Gran %   85.7         Hematocrit   31.7         Hemoglobin   10.1         Immature Grans (Abs)   0.43  Comment: Mild elevation in immature granulocytes is non specific and   can be seen in a variety of conditions including stress response,   acute inflammation, trauma and pregnancy. Correlation with other   laboratory and clinical findings is essential.           Immature Granulocytes   1.1         INR 1.1  Comment: Coumadin Therapy:  2.0 - 3.0 for INR for all indicators except mechanical heart valves  and antiphospholipid syndromes which should use 2.5 - 3.5.             Lactate, Phi     1.9  Comment: Falsely low lactic acid results can be found in samples   containing >=13.0 mg/dL total  bilirubin and/or >=3.5 mg/dL   direct bilirubin.         Lymph #   2.9         Lymph %   7.5         Magnesium    2.1         MCH   29.1         MCHC   31.9         MCV   91         Mono #   1.8         Mono %   4.8         MPV   9.3         nRBC   0         Platelet Count   740         Potassium   2.9         PROTEIN TOTAL   6.3         Protime 11.9           RBC   3.47         RDW   13.4         Sodium   139         WBC   38.30                 Significant Imaging: I have reviewed all pertinent imaging results/findings within the past 24 hours.    US Abdomen Limited   Final Result      The portal vein is patent.  Cirrhotic morphology of the liver with trace perihepatic ascites.         Electronically signed by: Bassam Stevens MD   Date:    11/06/2023   Time:    15:53      CT Abdomen Pelvis  Without Contrast   Final Result      Presumed recent operative change cholecystectomy with small fluid collection right pelvis nonspecific         Electronically signed by: Kelsea Levin   Date:    11/05/2023   Time:    21:44      X-Ray Chest AP Portable   Final Result      No acute abnormality.         Electronically signed by: Robson Panda   Date:    11/05/2023   Time:    19:46      IR Abscess Drainage Peritoneal    (Results Pending)

## 2023-11-07 NOTE — ASSESSMENT & PLAN NOTE
Nutrition consulted. Most recent weight and BMI monitored-     Measurements:  Wt Readings from Last 1 Encounters:   11/05/23 44.7 kg (98 lb 8.7 oz)   Body mass index is 18.02 kg/m².      Malnutrition Type:  Context:    Level:      Malnutrition Characteristic Summary:       Interventions/Recommendations (treatment strategy):

## 2023-11-07 NOTE — ASSESSMENT & PLAN NOTE
Patient c/o right lower abdominal and groin pain  CT on admit with post-operative findings and right pelvic fluid collection  Plan as above

## 2023-11-07 NOTE — SUBJECTIVE & OBJECTIVE
Interval History: temp max 99.4F, some hypotension 94/52.  Still feeling crummy.  WBC remains elevated at 28K.  HUMAIRA drain output not recorded     Medications:  Continuous Infusions:   lactated ringers 75 mL/hr at 11/07/23 1604     Scheduled Meds:   aluminum-magnesium hydroxide-simethicone  30 mL Oral QID (AC & HS)    folic acid-vit B6-vit B12 2.5-25-2 mg  1 tablet Oral Daily    mirtazapine  15 mg Oral QHS    multivitamin  1 tablet Oral Daily    piperacillin-tazobactam (Zosyn) IV (PEDS and ADULTS) (extended infusion is not appropriate)  4.5 g Intravenous Q8H    senna-docusate 8.6-50 mg  2 tablet Oral Daily    sucralfate  1 g Oral Q6H    thiamine  100 mg Oral Daily     PRN Meds:LIDOcaine (PF) 10 mg/ml (1%), morphine, ondansetron, sodium chloride 0.9%     Review of patient's allergies indicates:   Allergen Reactions    Baclofen Other (See Comments)     Vomiting, confusion, shaking     Objective:     Vital Signs (Most Recent):  Temp: 97.1 °F (36.2 °C) (11/07/23 1157)  Pulse: 85 (11/07/23 1326)  Resp: 18 (11/07/23 1336)  BP: 101/62 (11/07/23 1157)  SpO2: 96 % (11/07/23 1157) Vital Signs (24h Range):  Temp:  [97.1 °F (36.2 °C)-99.4 °F (37.4 °C)] 97.1 °F (36.2 °C)  Pulse:  [85-93] 85  Resp:  [16-20] 18  SpO2:  [94 %-96 %] 96 %  BP: (101-114)/(53-66) 101/62     Weight: 44.5 kg (98 lb)  Body mass index is 17.92 kg/m².    Intake/Output - Last 3 Shifts         11/05 0700 11/06 0659 11/06 0700 11/07 0659 11/07 0700 11/08 0659    IV Piggyback 1441      Total Intake(mL/kg) 1441 (32.2)      Other 60      Total Output 60      Net +1381             Urine Occurrence  1 x              Physical Exam  Vitals reviewed.   Constitutional:       Comments: Thin, chronically ill appearing but in good spirits    HENT:      Mouth/Throat:      Mouth: Mucous membranes are dry.   Eyes:      Extraocular Movements: Extraocular movements intact.      Conjunctiva/sclera: Conjunctivae normal.   Cardiovascular:      Rate and Rhythm: Normal rate.    Pulmonary:      Effort: Pulmonary effort is normal.   Abdominal:      General: Abdomen is flat.      Palpations: Abdomen is soft.      Tenderness: There is no abdominal tenderness.      Comments: HUMAIRA drain serous, incisions well healed    Skin:     General: Skin is warm and dry.   Neurological:      General: No focal deficit present.      Mental Status: She is alert.   Psychiatric:         Mood and Affect: Mood normal.         Behavior: Behavior normal.          Significant Labs:  I have reviewed all pertinent lab results within the past 24 hours.  CBC:   Recent Labs   Lab 11/07/23 0748   WBC 28.23*   RBC 3.35*   HGB 9.7*   HCT 31.8*   *   MCV 95   MCH 29.0   MCHC 30.5*     CMP:   Recent Labs   Lab 11/07/23 0748   GLU 70   CALCIUM 7.7*   ALBUMIN 1.3*   PROT 5.6*      K 4.1   CO2 19*   *   BUN 19   CREATININE 0.6   ALKPHOS 137*   ALT 11   AST 15   BILITOT 0.2     Coagulation:   Recent Labs   Lab 11/06/23  1600   LABPROT 11.9   INR 1.1     Microbiology Results (last 7 days)       Procedure Component Value Units Date/Time    Blood culture x two cultures. Draw prior to antibiotics. [[id number]] Collected: 11/05/23 1932    Order Status: Completed Specimen: Blood from Peripheral, Forearm, Left Updated: 11/07/23 0612     Blood Culture, Routine No Growth to date      No Growth to date    Narrative:      Aerobic and anaerobic    Blood culture x two cultures. Draw prior to antibiotics. [[id number]] Collected: 11/05/23 1932    Order Status: Completed Specimen: Blood from Peripheral, Forearm, Left Updated: 11/07/23 0612     Blood Culture, Routine No Growth to date      No Growth to date    Narrative:      Aerobic and anaerobic          Specimen (24h ago, onward)      None          Recent Labs   Lab 11/06/23 2006   COLORU Yellow   SPECGRAV >1.030*   PHUR 6.0   PROTEINUA Trace*   NITRITE Negative   LEUKOCYTESUR Negative   UROBILINOGEN Negative       Significant Diagnostics:  I have reviewed all pertinent  imaging results/findings within the past 24 hours.

## 2023-11-07 NOTE — ASSESSMENT & PLAN NOTE
This patient does have evidence of infective focus  My overall impression is sepsis.  Source: Abdominal  Antibiotics given-   Antibiotics (72h ago, onward)    Start     Stop Route Frequency Ordered    11/06/23 1000  piperacillin-tazobactam (ZOSYN) 4.5 g in dextrose 5 % in water (D5W) 100 mL IVPB (MB+)         -- IV Every 8 hours (non-standard times) 11/06/23 0856        Latest lactate reviewed-  Recent Labs   Lab 11/05/23  1928 11/05/23  2349   LACTATE 1.4 1.9     Source control achieved by: IV abx  Agree with drainage of pelvic fluid collection  Blood cultures pending  UA/culture collection pending

## 2023-11-07 NOTE — ASSESSMENT & PLAN NOTE
Chronic, controlled. Latest blood pressure and vitals reviewed-     Temp:  [97.9 °F (36.6 °C)-100.1 °F (37.8 °C)]   Pulse:  []   Resp:  [16-20]   BP: ()/(52-82)   SpO2:  [94 %-100 %] .   Home meds for hypertension were reviewed and noted below.   Hypertension Medications             furosemide (LASIX) 40 MG tablet Take 1 tablet (40 mg total) by mouth once daily.    spironolactone (ALDACTONE) 50 MG tablet Take 1 tablet (50 mg total) by mouth 2 (two) times daily.    spironolactone (ALDACTONE) 50 MG tablet Take 1 tablet (50 mg total) by mouth once daily.          While in the hospital, will manage blood pressure as follows; Adjust home antihypertensive regimen as follows- hold home meds - BP marginal

## 2023-11-07 NOTE — ASSESSMENT & PLAN NOTE
This patient does have evidence of infective focus  My overall impression is sepsis.  Source: Abdominal  Antibiotics given-   Antibiotics (72h ago, onward)    Start     Stop Route Frequency Ordered    11/06/23 1000  piperacillin-tazobactam (ZOSYN) 4.5 g in dextrose 5 % in water (D5W) 100 mL IVPB (MB+)         -- IV Every 8 hours (non-standard times) 11/06/23 0856        Latest lactate reviewed-  Recent Labs   Lab 11/05/23  1928 11/05/23  2349   LACTATE 1.4 1.9     Source control achieved by: IV abx, continue Zosyn, abx day # 3  CT A/P with contrast in AM, further evaluate right pelvic fluid collection  Possible drainage with IR  Surgery following  Blood cultures pending

## 2023-11-07 NOTE — ASSESSMENT & PLAN NOTE
s/p robotic cholecystectomy with gangrenous perforated GB - 10/28/23  RUQ drain in place  CT with with noted post-operative findings and right pelvic fluid collection  Management per Surgery

## 2023-11-07 NOTE — ASSESSMENT & PLAN NOTE
CT on admit with post-operative findings and right pelvic fluid collection  Surgery following, plans for IR drainage

## 2023-11-08 PROBLEM — K57.20 DIVERTICULITIS OF LARGE INTESTINE WITH PERFORATION WITHOUT ABSCESS OR BLEEDING: Status: ACTIVE | Noted: 2023-11-08

## 2023-11-08 LAB
ALBUMIN SERPL BCP-MCNC: 1.3 G/DL (ref 3.5–5.2)
ALP SERPL-CCNC: 142 U/L (ref 55–135)
ALT SERPL W/O P-5'-P-CCNC: 5 U/L (ref 10–44)
ANION GAP SERPL CALC-SCNC: 8 MMOL/L (ref 8–16)
AST SERPL-CCNC: 15 U/L (ref 10–40)
BASOPHILS # BLD AUTO: 0.14 K/UL (ref 0–0.2)
BASOPHILS NFR BLD: 0.6 % (ref 0–1.9)
BILIRUB SERPL-MCNC: 0.2 MG/DL (ref 0.1–1)
BUN SERPL-MCNC: 15 MG/DL (ref 8–23)
CALCIUM SERPL-MCNC: 7.9 MG/DL (ref 8.7–10.5)
CHLORIDE SERPL-SCNC: 108 MMOL/L (ref 95–110)
CO2 SERPL-SCNC: 22 MMOL/L (ref 23–29)
CREAT SERPL-MCNC: 0.5 MG/DL (ref 0.5–1.4)
DIFFERENTIAL METHOD: ABNORMAL
EOSINOPHIL # BLD AUTO: 0.2 K/UL (ref 0–0.5)
EOSINOPHIL NFR BLD: 0.8 % (ref 0–8)
ERYTHROCYTE [DISTWIDTH] IN BLOOD BY AUTOMATED COUNT: 13.3 % (ref 11.5–14.5)
EST. GFR  (NO RACE VARIABLE): >60 ML/MIN/1.73 M^2
GLUCOSE SERPL-MCNC: 71 MG/DL (ref 70–110)
HCT VFR BLD AUTO: 33.1 % (ref 37–48.5)
HGB BLD-MCNC: 10.5 G/DL (ref 12–16)
IMM GRANULOCYTES # BLD AUTO: 0.1 K/UL (ref 0–0.04)
IMM GRANULOCYTES NFR BLD AUTO: 0.5 % (ref 0–0.5)
LYMPHOCYTES # BLD AUTO: 3.1 K/UL (ref 1–4.8)
LYMPHOCYTES NFR BLD: 14.1 % (ref 18–48)
MCH RBC QN AUTO: 29.1 PG (ref 27–31)
MCHC RBC AUTO-ENTMCNC: 31.7 G/DL (ref 32–36)
MCV RBC AUTO: 92 FL (ref 82–98)
MONOCYTES # BLD AUTO: 1 K/UL (ref 0.3–1)
MONOCYTES NFR BLD: 4.5 % (ref 4–15)
NEUTROPHILS # BLD AUTO: 17.4 K/UL (ref 1.8–7.7)
NEUTROPHILS NFR BLD: 79.5 % (ref 38–73)
NRBC BLD-RTO: 0 /100 WBC
PLATELET # BLD AUTO: 674 K/UL (ref 150–450)
PMV BLD AUTO: 9.2 FL (ref 9.2–12.9)
POTASSIUM SERPL-SCNC: 3.7 MMOL/L (ref 3.5–5.1)
PROT SERPL-MCNC: 5.8 G/DL (ref 6–8.4)
RBC # BLD AUTO: 3.61 M/UL (ref 4–5.4)
SODIUM SERPL-SCNC: 138 MMOL/L (ref 136–145)
WBC # BLD AUTO: 21.91 K/UL (ref 3.9–12.7)

## 2023-11-08 PROCEDURE — 87075 CULTR BACTERIA EXCEPT BLOOD: CPT | Performed by: RADIOLOGY

## 2023-11-08 PROCEDURE — 36415 COLL VENOUS BLD VENIPUNCTURE: CPT | Performed by: HOSPITALIST

## 2023-11-08 PROCEDURE — 63600175 PHARM REV CODE 636 W HCPCS: Performed by: SURGERY

## 2023-11-08 PROCEDURE — 11000001 HC ACUTE MED/SURG PRIVATE ROOM

## 2023-11-08 PROCEDURE — 25500020 PHARM REV CODE 255: Performed by: HOSPITALIST

## 2023-11-08 PROCEDURE — 25000003 PHARM REV CODE 250: Performed by: RADIOLOGY

## 2023-11-08 PROCEDURE — 87070 CULTURE OTHR SPECIMN AEROBIC: CPT | Performed by: RADIOLOGY

## 2023-11-08 PROCEDURE — 63600175 PHARM REV CODE 636 W HCPCS: Performed by: RADIOLOGY

## 2023-11-08 PROCEDURE — 85025 COMPLETE CBC W/AUTO DIFF WBC: CPT | Performed by: HOSPITALIST

## 2023-11-08 PROCEDURE — 87186 SC STD MICRODIL/AGAR DIL: CPT | Performed by: RADIOLOGY

## 2023-11-08 PROCEDURE — 25000003 PHARM REV CODE 250: Performed by: SURGERY

## 2023-11-08 PROCEDURE — 87205 SMEAR GRAM STAIN: CPT | Performed by: RADIOLOGY

## 2023-11-08 PROCEDURE — 87077 CULTURE AEROBIC IDENTIFY: CPT | Performed by: RADIOLOGY

## 2023-11-08 PROCEDURE — 99232 SBSQ HOSP IP/OBS MODERATE 35: CPT | Mod: 24,,, | Performed by: SURGERY

## 2023-11-08 PROCEDURE — 99232 PR SUBSEQUENT HOSPITAL CARE,LEVL II: ICD-10-PCS | Mod: 24,,, | Performed by: SURGERY

## 2023-11-08 PROCEDURE — 80053 COMPREHEN METABOLIC PANEL: CPT | Performed by: HOSPITALIST

## 2023-11-08 RX ORDER — FENTANYL CITRATE 50 UG/ML
INJECTION, SOLUTION INTRAMUSCULAR; INTRAVENOUS CODE/TRAUMA/SEDATION MEDICATION
Status: COMPLETED | OUTPATIENT
Start: 2023-11-08 | End: 2023-11-08

## 2023-11-08 RX ORDER — LIDOCAINE HYDROCHLORIDE 10 MG/ML
INJECTION INFILTRATION; PERINEURAL CODE/TRAUMA/SEDATION MEDICATION
Status: COMPLETED | OUTPATIENT
Start: 2023-11-08 | End: 2023-11-08

## 2023-11-08 RX ORDER — DIPHENHYDRAMINE HYDROCHLORIDE 50 MG/ML
INJECTION INTRAMUSCULAR; INTRAVENOUS CODE/TRAUMA/SEDATION MEDICATION
Status: COMPLETED | OUTPATIENT
Start: 2023-11-08 | End: 2023-11-08

## 2023-11-08 RX ADMIN — PIPERACILLIN SODIUM AND TAZOBACTAM SODIUM 4.5 G: 4; .5 INJECTION, POWDER, FOR SOLUTION INTRAVENOUS at 01:11

## 2023-11-08 RX ADMIN — FENTANYL CITRATE 50 MCG: 0.05 INJECTION, SOLUTION INTRAMUSCULAR; INTRAVENOUS at 12:11

## 2023-11-08 RX ADMIN — SUCRALFATE 1 G: 1 SUSPENSION ORAL at 05:11

## 2023-11-08 RX ADMIN — DIPHENHYDRAMINE HYDROCHLORIDE 25 MG: 50 INJECTION INTRAMUSCULAR; INTRAVENOUS at 12:11

## 2023-11-08 RX ADMIN — ALUMINUM HYDROXIDE, MAGNESIUM HYDROXIDE, AND DIMETHICONE 30 ML: 200; 20; 200 SUSPENSION ORAL at 01:11

## 2023-11-08 RX ADMIN — THIAMINE HCL TAB 100 MG 100 MG: 100 TAB at 08:11

## 2023-11-08 RX ADMIN — ALUMINUM HYDROXIDE, MAGNESIUM HYDROXIDE, AND DIMETHICONE 30 ML: 200; 20; 200 SUSPENSION ORAL at 08:11

## 2023-11-08 RX ADMIN — SUCRALFATE 1 G: 1 SUSPENSION ORAL at 01:11

## 2023-11-08 RX ADMIN — SODIUM CHLORIDE, POTASSIUM CHLORIDE, SODIUM LACTATE AND CALCIUM CHLORIDE: 600; 310; 30; 20 INJECTION, SOLUTION INTRAVENOUS at 04:11

## 2023-11-08 RX ADMIN — MORPHINE SULFATE 4 MG: 4 INJECTION INTRAVENOUS at 05:11

## 2023-11-08 RX ADMIN — MORPHINE SULFATE 4 MG: 4 INJECTION INTRAVENOUS at 08:11

## 2023-11-08 RX ADMIN — MORPHINE SULFATE 4 MG: 4 INJECTION INTRAVENOUS at 03:11

## 2023-11-08 RX ADMIN — MIRTAZAPINE 15 MG: 15 TABLET, FILM COATED ORAL at 08:11

## 2023-11-08 RX ADMIN — IOHEXOL 100 ML: 350 INJECTION, SOLUTION INTRAVENOUS at 11:11

## 2023-11-08 RX ADMIN — THERA TABS 1 TABLET: TAB at 08:11

## 2023-11-08 RX ADMIN — PIPERACILLIN SODIUM AND TAZOBACTAM SODIUM 4.5 G: 4; .5 INJECTION, POWDER, FOR SOLUTION INTRAVENOUS at 03:11

## 2023-11-08 RX ADMIN — MORPHINE SULFATE 4 MG: 4 INJECTION INTRAVENOUS at 11:11

## 2023-11-08 RX ADMIN — ONDANSETRON 4 MG: 2 INJECTION INTRAMUSCULAR; INTRAVENOUS at 03:11

## 2023-11-08 RX ADMIN — SENNOSIDES AND DOCUSATE SODIUM 2 TABLET: 8.6; 5 TABLET ORAL at 08:11

## 2023-11-08 RX ADMIN — SUCRALFATE 1 G: 1 SUSPENSION ORAL at 06:11

## 2023-11-08 RX ADMIN — Medication 1 TABLET: at 08:11

## 2023-11-08 RX ADMIN — PIPERACILLIN SODIUM AND TAZOBACTAM SODIUM 4.5 G: 4; .5 INJECTION, POWDER, FOR SOLUTION INTRAVENOUS at 09:11

## 2023-11-08 RX ADMIN — LIDOCAINE HYDROCHLORIDE 5 ML: 10 INJECTION, SOLUTION INFILTRATION; PERINEURAL at 12:11

## 2023-11-08 RX ADMIN — ALUMINUM HYDROXIDE, MAGNESIUM HYDROXIDE, AND DIMETHICONE 30 ML: 200; 20; 200 SUSPENSION ORAL at 03:11

## 2023-11-08 RX ADMIN — ONDANSETRON 4 MG: 2 INJECTION INTRAMUSCULAR; INTRAVENOUS at 05:11

## 2023-11-08 NOTE — SUBJECTIVE & OBJECTIVE
Review of Systems   All other systems reviewed and are negative.    Objective:     Vital Signs (Most Recent):  Temp: 98.4 °F (36.9 °C) (11/08/23 1321)  Pulse: 80 (11/08/23 1321)  Resp: 18 (11/08/23 1558)  BP: 122/69 (11/08/23 1321)  SpO2: 96 % (11/08/23 1321) Vital Signs (24h Range):  Temp:  [98.2 °F (36.8 °C)-99.2 °F (37.3 °C)] 98.4 °F (36.9 °C)  Pulse:  [80-97] 80  Resp:  [14-20] 18  SpO2:  [94 %-100 %] 96 %  BP: (110-178)/(63-77) 122/69     Weight: 44.5 kg (98 lb)  Body mass index is 17.92 kg/m².    Intake/Output Summary (Last 24 hours) at 11/8/2023 1622  Last data filed at 11/8/2023 0626  Gross per 24 hour   Intake 1000 ml   Output 410 ml   Net 590 ml         Physical Exam  Vitals and nursing note reviewed.   Constitutional:       General: She is not in acute distress.     Appearance: Normal appearance. She is normal weight. She is ill-appearing.   Cardiovascular:      Rate and Rhythm: Normal rate and regular rhythm.      Heart sounds: No murmur heard.  Pulmonary:      Effort: Pulmonary effort is normal. No respiratory distress.      Breath sounds: No wheezing.   Abdominal:      General: There is distension.      Tenderness: There is abdominal tenderness.      Comments: RUQ drain (recent cholecystectomy)  RLQ drain (right pelvic abscess)   Neurological:      General: No focal deficit present.      Mental Status: She is alert and oriented to person, place, and time.   Psychiatric:         Mood and Affect: Mood normal.         Behavior: Behavior normal.             Significant Labs: All pertinent labs within the past 24 hours have been reviewed.  Recent Lab Results         11/08/23  0508        Albumin 1.3              ALT 5       Anion Gap 8       AST 15       Baso # 0.14       Basophil % 0.6       BILIRUBIN TOTAL 0.2  Comment: For infants and newborns, interpretation of results should be based  on gestational age, weight and in agreement with clinical  observations.    Premature Infant recommended  reference ranges:  Up to 24 hours.............<8.0 mg/dL  Up to 48 hours............<12.0 mg/dL  3-5 days..................<15.0 mg/dL  6-29 days.................<15.0 mg/dL         BUN 15       Calcium 7.9       Chloride 108       CO2 22       Creatinine 0.5       Differential Method Automated       eGFR >60       Eos # 0.2       Eosinophil % 0.8       Glucose 71       Gran # (ANC) 17.4       Gran % 79.5       Hematocrit 33.1       Hemoglobin 10.5       Immature Grans (Abs) 0.10  Comment: Mild elevation in immature granulocytes is non specific and   can be seen in a variety of conditions including stress response,   acute inflammation, trauma and pregnancy. Correlation with other   laboratory and clinical findings is essential.         Immature Granulocytes 0.5       Lymph # 3.1       Lymph % 14.1       MCH 29.1       MCHC 31.7       MCV 92       Mono # 1.0       Mono % 4.5       MPV 9.2       nRBC 0       Platelet Count 674       Potassium 3.7       PROTEIN TOTAL 5.8       RBC 3.61       RDW 13.3       Sodium 138       WBC 21.91               Significant Imaging: I have reviewed all pertinent imaging results/findings within the past 24 hours.    CT Abdomen Pelvis With IV Contrast   Final Result      1. Right pelvic abscess.   2. Uncomplicated perforated sigmoid colon diverticulitis and low-grade colonic obstruction.  Sigmoidoscopy is recommended following resolution to assess for potential underlying mass.   3. Gas in the anterior peritoneal cavity may be related to prior robotic surgery and drain placement.; However this may also be related to the perforated diverticulitis.  Close clinical follow-up is recommended.   4. Persistent small fluid and gas collection with adjacent drain in the cholecystectomy bed.   5. Finding in the right inguinal canal which may represent inflamed fluid or suppurative lymph node.  Attention on follow-up is recommended.   6. Mild bibasilar atelectasis and adjacent pleural effusions.    7. Other findings as above.   Findings regarding the perforated diverticulitis were communicated to Dr. Hidalgo and Dr. De La Rosa by epic chat at the time of making the findings.         Electronically signed by: Malachi Ruiz   Date:    11/08/2023   Time:    13:19      US Abdomen Limited   Final Result      The portal vein is patent.  Cirrhotic morphology of the liver with trace perihepatic ascites.         Electronically signed by: Bassam Stevens MD   Date:    11/06/2023   Time:    15:53      CT Abdomen Pelvis  Without Contrast   Final Result      Presumed recent operative change cholecystectomy with small fluid collection right pelvis nonspecific         Electronically signed by: Kelsea Levin   Date:    11/05/2023   Time:    21:44      X-Ray Chest AP Portable   Final Result      No acute abnormality.         Electronically signed by: Robson Panda   Date:    11/05/2023   Time:    19:46      IR Abscess Drainage Peritoneal    (Results Pending)

## 2023-11-08 NOTE — ASSESSMENT & PLAN NOTE
Likely related to whatever underlying processes causing her leukocytosis and abdominal pain- likely 2/2 perforated diverticulitis.      -- continue IV Zofran  -- GI evaluation if any additional coffee-ground emesis

## 2023-11-08 NOTE — PROGRESS NOTES
[facility] Medicine  Progress Note    Patient Name: Lakisha Vance  MRN: [id number]  Patient Class: IP- Inpatient   Admission Date: 11/5/2023  Length of Stay: 1 days  Attending Physician: Elías Hidalgo MD  Primary Care Provider: Pranav Gonzalez MD        Subjective:     Principal Problem:Sepsis        HPI:  62 y/o female with PMHx of HTN, alcoholic liver cirrhosis (EGD Sept '23 with reflux esophagitis, gastritis, portal hypertensive gastropathy) who recently underwent laproscopic cholecystectomy for gangrenous cholecystitis with RUQ drain placement on 10-28-23 who presented to the ER on 11-5-23 for abdominal pain, nausea/vomiting, fevers/chills. Patient discharged on 11/02/2023 in stable condition with her HUMAIRA drain in place and on antibiotics. She reports sudden onset of right sided lower abdominal and pelvis pain, associated with nausea/vomiting and subjective fevers. Patient denies vomiting blood or coffee ground emesis, denies chest pain, SOB or any other complaints. In ER, patient with temp 100.1 F, tachycardic, WBC 44k; blood cultures were obtained and started on Zosyn. UA pending. CT scan noted small amount of fluid in RUQ with drain in place and fluid collection in pelvis. Patient admitted by General Surgery this AM, plans for IR drainage of pelvic fluid collection. Hospital Medicine consulted to assist with medical management.      Overview/Hospital Course:  11/7  NAEON, patient reports lower quadrant pain,   CT A/P with small amount of fluid noted in right pelvis  Discussed with IR, plan for CT A/P with contrast in AM, possible drainage  Continue Zosyn, WBC trending down, temp curve improving    11/8  CT A/P with noted right pelvic abscess, perforated sigmoid colon diverticulitis and low grade colonic obstruction  Patient s/p IR drain placement for right pelvic abscess this afternoon; fluid cultures obtained  Continue Zosyn, WBC trending down, pain control        Review of Systems   All other  systems reviewed and are negative.    Objective:     Vital Signs (Most Recent):  Temp: 98.4 °F (36.9 °C) (11/08/23 1321)  Pulse: 80 (11/08/23 1321)  Resp: 18 (11/08/23 1558)  BP: 122/69 (11/08/23 1321)  SpO2: 96 % (11/08/23 1321) Vital Signs (24h Range):  Temp:  [98.2 °F (36.8 °C)-99.2 °F (37.3 °C)] 98.4 °F (36.9 °C)  Pulse:  [80-97] 80  Resp:  [14-20] 18  SpO2:  [94 %-100 %] 96 %  BP: (110-178)/(63-77) 122/69     Weight: 44.5 kg (98 lb)  Body mass index is 17.92 kg/m².    Intake/Output Summary (Last 24 hours) at 11/8/2023 1622  Last data filed at 11/8/2023 0626  Gross per 24 hour   Intake 1000 ml   Output 410 ml   Net 590 ml         Physical Exam  Vitals and nursing note reviewed.   Constitutional:       General: She is not in acute distress.     Appearance: Normal appearance. She is normal weight. She is ill-appearing.   Cardiovascular:      Rate and Rhythm: Normal rate and regular rhythm.      Heart sounds: No murmur heard.  Pulmonary:      Effort: Pulmonary effort is normal. No respiratory distress.      Breath sounds: No wheezing.   Abdominal:      General: There is distension.      Tenderness: There is abdominal tenderness.      Comments: RUQ drain (recent cholecystectomy)  RLQ drain (right pelvic abscess)   Neurological:      General: No focal deficit present.      Mental Status: She is alert and oriented to person, place, and time.   Psychiatric:         Mood and Affect: Mood normal.         Behavior: Behavior normal.             Significant Labs: All pertinent labs within the past 24 hours have been reviewed.  Recent Lab Results         11/08/23  0508        Albumin 1.3              ALT 5       Anion Gap 8       AST 15       Baso # 0.14       Basophil % 0.6       BILIRUBIN TOTAL 0.2  Comment: For infants and newborns, interpretation of results should be based  on gestational age, weight and in agreement with clinical  observations.    Premature Infant recommended reference ranges:  Up to 24  hours.............<8.0 mg/dL  Up to 48 hours............<12.0 mg/dL  3-5 days..................<15.0 mg/dL  6-29 days.................<15.0 mg/dL         BUN 15       Calcium 7.9       Chloride 108       CO2 22       Creatinine 0.5       Differential Method Automated       eGFR >60       Eos # 0.2       Eosinophil % 0.8       Glucose 71       Gran # (ANC) 17.4       Gran % 79.5       Hematocrit 33.1       Hemoglobin 10.5       Immature Grans (Abs) 0.10  Comment: Mild elevation in immature granulocytes is non specific and   can be seen in a variety of conditions including stress response,   acute inflammation, trauma and pregnancy. Correlation with other   laboratory and clinical findings is essential.         Immature Granulocytes 0.5       Lymph # 3.1       Lymph % 14.1       MCH 29.1       MCHC 31.7       MCV 92       Mono # 1.0       Mono % 4.5       MPV 9.2       nRBC 0       Platelet Count 674       Potassium 3.7       PROTEIN TOTAL 5.8       RBC 3.61       RDW 13.3       Sodium 138       WBC 21.91               Significant Imaging: I have reviewed all pertinent imaging results/findings within the past 24 hours.    CT Abdomen Pelvis With IV Contrast   Final Result      1. Right pelvic abscess.   2. Uncomplicated perforated sigmoid colon diverticulitis and low-grade colonic obstruction.  Sigmoidoscopy is recommended following resolution to assess for potential underlying mass.   3. Gas in the anterior peritoneal cavity may be related to prior robotic surgery and drain placement.; However this may also be related to the perforated diverticulitis.  Close clinical follow-up is recommended.   4. Persistent small fluid and gas collection with adjacent drain in the cholecystectomy bed.   5. Finding in the right inguinal canal which may represent inflamed fluid or suppurative lymph node.  Attention on follow-up is recommended.   6. Mild bibasilar atelectasis and adjacent pleural effusions.   7. Other findings as above.    Findings regarding the perforated diverticulitis were communicated to Dr. Hidalgo and Dr. De La Rosa by epic chat at the time of making the findings.         Electronically signed by: Malachi Ruiz   Date:    11/08/2023   Time:    13:19      US Abdomen Limited   Final Result      The portal vein is patent.  Cirrhotic morphology of the liver with trace perihepatic ascites.         Electronically signed by: Bassam Stevens MD   Date:    11/06/2023   Time:    15:53      CT Abdomen Pelvis  Without Contrast   Final Result      Presumed recent operative change cholecystectomy with small fluid collection right pelvis nonspecific         Electronically signed by: Kelsea Levin   Date:    11/05/2023   Time:    21:44      X-Ray Chest AP Portable   Final Result      No acute abnormality.         Electronically signed by: Robson Panda   Date:    11/05/2023   Time:    19:46      IR Abscess Drainage Peritoneal    (Results Pending)           Assessment/Plan:      * Sepsis  This patient does have evidence of infective focus  My overall impression is sepsis.  Source: Abdominal  Antibiotics given-   Antibiotics (72h ago, onward)    Start     Stop Route Frequency Ordered    11/06/23 1000  piperacillin-tazobactam (ZOSYN) 4.5 g in dextrose 5 % in water (D5W) 100 mL IVPB (MB+)         -- IV Every 8 hours (non-standard times) 11/06/23 0856        Latest lactate reviewed-  Recent Labs   Lab 11/05/23  1928 11/05/23  2349   LACTATE 1.4 1.9     CT A/P with right pelvic abscess, perforated diverticulitis - Surgery following  S/p IR drainage of right pelvic abscess (11-8-23)  Source control achieved by: IV abx, continue Zosyn, abx day # 3  Pelvic abscess culture pending, blood cultures NGTD    Pelvic fluid collection  Patient c/o right lower abdominal and groin pain  CT with perforated diverticulitis and right pelvic abscess  Plan as above    S/P laparoscopic cholecystectomy  s/p robotic cholecystectomy with gangrenous perforated GB -  10/28/23  Right sided abdominal drain in place  CT with with noted post-operative findings and right pelvic fluid collection  Management per Surgery    Alcoholic cirrhosis of liver with ascites  Co-morbidities are present and inclusive of portal hypertension and malnutrition.  MELD-Na score calculated; MELD 3.0: 12 at 11/7/2023  7:48 AM  MELD-Na: 7 at 11/7/2023  7:48 AM  Calculated from:  Serum Creatinine: 0.6 mg/dL (Using min of 1 mg/dL) at 11/7/2023  7:48 AM  Serum Sodium: 138 mmol/L (Using max of 137 mmol/L) at 11/7/2023  7:48 AM  Total Bilirubin: 0.2 mg/dL (Using min of 1 mg/dL) at 11/7/2023  7:48 AM  Serum Albumin: 1.3 g/dL (Using min of 1.5 g/dL) at 11/7/2023  7:48 AM  INR(ratio): 1.1 at 11/6/2023  4:00 PM  Age at listing (hypothetical): 63 years  Sex: Female at 11/7/2023  7:48 AM      Continue chronic meds. Etiology likely ETOH. Will avoid any hepatotoxic meds, and monitor CBC/CMP/INR for synthetic function.     HTN (hypertension)  Chronic, controlled. Latest blood pressure and vitals reviewed-     Temp:  [97.1 °F (36.2 °C)-99.4 °F (37.4 °C)]   Pulse:  [85-93]   Resp:  [16-20]   BP: ()/(52-66)   SpO2:  [94 %-96 %] .   Home meds for hypertension were reviewed and noted below.   Hypertension Medications             furosemide (LASIX) 40 MG tablet Take 1 tablet (40 mg total) by mouth once daily.    spironolactone (ALDACTONE) 50 MG tablet Take 1 tablet (50 mg total) by mouth 2 (two) times daily.    spironolactone (ALDACTONE) 50 MG tablet Take 1 tablet (50 mg total) by mouth once daily.          While in the hospital, will manage blood pressure as follows; Adjust home antihypertensive regimen as follows- hold home meds - BP marginal    Severe protein-calorie malnutrition  Nutrition consulted. Most recent weight and BMI monitored-     Measurements:  Wt Readings from Last 1 Encounters:   11/07/23 44.5 kg (98 lb)   Body mass index is 17.92 kg/m².    Interventions/Recommendations (treatment strategy):  1.  Recommend a Minced and Moist diet, per SLP recommendation 2. Recommend Boost Plus TID 3.Recommend Jose David BID 4. Daily weight 5. Meal set/assistance/encourangement TID      VTE Risk Mitigation (From admission, onward)         Ordered     IP VTE HIGH RISK PATIENT  Once         11/06/23 0856     Place sequential compression device  Until discontinued         11/06/23 0856                Discharge Planning   ANDREAS:      Code Status: Full Code   Is the patient medically ready for discharge?:     Reason for patient still in hospital (select all that apply): Patient trending condition, Laboratory test, Treatment and Consult recommendations  Discharge Plan A: Home                  Elías Hidalgo MD  Department of Hospital Medicine   [facility] - Med Surg

## 2023-11-08 NOTE — ASSESSMENT & PLAN NOTE
Found on IV CT scan today - as well as colitis    -- continue abx  -- no plans for surgical intervention in uncomplicated perforated diverticulitis in this frail, medically complicated patient  -- ok for diet  -- will need colonoscopy 6-8 weeks following resolution of diverticulitis (last scope was at age 40)

## 2023-11-08 NOTE — INTERVAL H&P NOTE
The patient has been examined and the H&P has been reviewed:    I concur with the findings and changes have been noted since the H&P was written: patient has pelvic abscess and perforated diverticulitis.   The indications, risks, benefits, and alternatives to this procedure and moderate sedation have been fully reviewed with the patient and written informed consent has been obtained.          Active Hospital Problems    Diagnosis  POA    *Sepsis [A41.9]  Yes    Nausea and vomiting [R11.2]  Yes    Abdominal pain [R10.9]  Yes    Leukocytosis [D72.829]  Yes    Drug-induced constipation [K59.03]  Yes    S/P laparoscopic cholecystectomy [Z90.49]  Not Applicable    Pelvic fluid collection [R18.8]  Yes    Severe protein-calorie malnutrition [E43]  Yes    Alcoholic cirrhosis of liver with ascites [K70.31]  Yes    HTN (hypertension) [I10]  Yes      Resolved Hospital Problems    Diagnosis Date Resolved POA    Postoperative pain [G89.18] 11/06/2023 Yes

## 2023-11-08 NOTE — PROGRESS NOTES
[facility] Surg  General Surgery  Progress Note    Subjective:     History of Present Illness:  Ms. Martin is a 63-year-old female with a history of hypertension and cirrhosis of the liver status post robotic cholecystectomy on 10/28/2023 for gangrenous cholecystitis.  She was discharged on 11/02/2023 in stable condition with her HUMAIRA drain in place and on antibiotics.  She states that she was doing well overall following her discharge up until yesterday morning at which point in time she developed sudden onset lower abdominal pain sharp in nature and intermittent, as well as nausea, vomiting, fevers, and chills.  She attempted to take her Zofran however did not have any relief as she states she had vomiting it up.  She called EMS who brought her to the emergency department.  Per EMS she did have coffee-ground emesis however she maintains that it was not coffee-ground but was yellow in color.  Upon arrival to the emergency department her vital signs were all within normal limits.  She did develop a T-max of a 100.1° overnight at which point in time she also became mildly tachycardic up to 103.  CT scan was performed without contrast which shows a small amount of non organized fluid in the right upper quadrant with the drain in place and a fluid collection in the pelvis.  Otherwise there were no abnormal findings on her CT scan.  She was noted to have a leukocytosis up to 44,000 as well as thrombocytosis.  Blood cultures were performed and are still pending and she was admitted for further workup and management.  Currently she states that the morphine and the Zofran IV are helping with her nausea and her pain she still intermittently has lower abdominal pain and overall feels quite weak still.      Post-Op Info:  * No surgery found *         Interval History: AFVSS.  No acute events.  CT scan today.  Abdominal pain improving.  HUMAIRA drain remains serous ascites.     Medications:  Continuous Infusions:   lactated ringers  75 mL/hr at 11/08/23 0626     Scheduled Meds:   aluminum-magnesium hydroxide-simethicone  30 mL Oral QID (AC & HS)    folic acid-vit B6-vit B12 2.5-25-2 mg  1 tablet Oral Daily    mirtazapine  15 mg Oral QHS    multivitamin  1 tablet Oral Daily    piperacillin-tazobactam (Zosyn) IV (PEDS and ADULTS) (extended infusion is not appropriate)  4.5 g Intravenous Q8H    senna-docusate 8.6-50 mg  2 tablet Oral Daily    sucralfate  1 g Oral Q6H    thiamine  100 mg Oral Daily     PRN Meds:LIDOcaine (PF) 10 mg/ml (1%), morphine, ondansetron, sodium chloride 0.9%     Review of patient's allergies indicates:   Allergen Reactions    Baclofen Other (See Comments)     Vomiting, confusion, shaking     Objective:     Vital Signs (Most Recent):  Temp: 98.4 °F (36.9 °C) (11/08/23 1321)  Pulse: 80 (11/08/23 1321)  Resp: 18 (11/08/23 1321)  BP: 122/69 (11/08/23 1321)  SpO2: 96 % (11/08/23 1321) Vital Signs (24h Range):  Temp:  [98.2 °F (36.8 °C)-99.2 °F (37.3 °C)] 98.4 °F (36.9 °C)  Pulse:  [80-97] 80  Resp:  [14-20] 18  SpO2:  [94 %-100 %] 96 %  BP: (110-178)/(63-77) 122/69     Weight: 44.5 kg (98 lb)  Body mass index is 17.92 kg/m².    Intake/Output - Last 3 Shifts         11/06 0700 11/07 0659 11/07 0700 11/08 0659 11/08 0700 11/09 0659    P.O.  0     I.V. (mL/kg)  900 (20.2)     IV Piggyback  100     Total Intake(mL/kg)  1000 (22.5)     Drains  410     Other       Total Output  410     Net  +590            Urine Occurrence 1 x               Physical Exam  Vitals reviewed.   Constitutional:       Appearance: Normal appearance.      Comments: Chronically ill appearing, thin   HENT:      Head: Normocephalic and atraumatic.   Eyes:      Extraocular Movements: Extraocular movements intact.      Conjunctiva/sclera: Conjunctivae normal.   Cardiovascular:      Rate and Rhythm: Normal rate.   Pulmonary:      Effort: Pulmonary effort is normal.   Abdominal:      General: Abdomen is flat.      Palpations: Abdomen is soft.      Comments:  Mildly TTP, HUMAIRA in RUQ serous, RLQ IR drain deep yellow    Skin:     General: Skin is warm and dry.   Neurological:      General: No focal deficit present.      Mental Status: She is alert.   Psychiatric:         Mood and Affect: Mood normal.          Significant Labs:  I have reviewed all pertinent lab results within the past 24 hours.  CBC:   Recent Labs   Lab 11/08/23  0508   WBC 21.91*   RBC 3.61*   HGB 10.5*   HCT 33.1*   *   MCV 92   MCH 29.1   MCHC 31.7*     CMP:   Recent Labs   Lab 11/08/23  0508   GLU 71   CALCIUM 7.9*   ALBUMIN 1.3*   PROT 5.8*      K 3.7   CO2 22*      BUN 15   CREATININE 0.5   ALKPHOS 142*   ALT 5*   AST 15   BILITOT 0.2     Microbiology Results (last 7 days)       Procedure Component Value Units Date/Time    Culture, Anaerobe [[id number]] Collected: 11/08/23 1250    Order Status: Sent Specimen: Body Fluid from Back Updated: 11/08/23 1312    Gram stain [[id number]] Collected: 11/08/23 1250    Order Status: Sent Specimen: Body Fluid from Back Updated: 11/08/23 1311    Culture, Fluid  (Aerobic) [[id number]] Collected: 11/08/23 1250    Order Status: Sent Specimen: Body Fluid from Back Updated: 11/08/23 1311    Blood culture x two cultures. Draw prior to antibiotics. [[id number]] Collected: 11/05/23 1932    Order Status: Completed Specimen: Blood from Peripheral, Forearm, Left Updated: 11/08/23 0612     Blood Culture, Routine No Growth to date      No Growth to date      No Growth to date    Narrative:      Aerobic and anaerobic    Blood culture x two cultures. Draw prior to antibiotics. [[id number]] Collected: 11/05/23 1932    Order Status: Completed Specimen: Blood from Peripheral, Forearm, Left Updated: 11/08/23 0612     Blood Culture, Routine No Growth to date      No Growth to date      No Growth to date    Narrative:      Aerobic and anaerobic          Specimen (24h ago, onward)      None          Recent Labs   Lab 11/06/23 2006   COLORU Yellow   SPECGRAV >1.030*    PHUR 6.0   PROTEINUA Trace*   NITRITE Negative   LEUKOCYTESUR Negative   UROBILINOGEN Negative       Significant Diagnostics:  I have reviewed all pertinent imaging results/findings within the past 24 hours.  CT: I have reviewed all pertinent results/findings within the past 24 hours and my personal findings are:  mild uncomplicated perforated diverticulitis, significant stool burden, colonic inflammation.     Impression:     1. Right pelvic abscess.  2. Uncomplicated perforated sigmoid colon diverticulitis and low-grade colonic obstruction.  Sigmoidoscopy is recommended following resolution to assess for potential underlying mass.  3. Gas in the anterior peritoneal cavity may be related to prior robotic surgery and drain placement.; However this may also be related to the perforated diverticulitis.  Close clinical follow-up is recommended.  4. Persistent small fluid and gas collection with adjacent drain in the cholecystectomy bed.  5. Finding in the right inguinal canal which may represent inflamed fluid or suppurative lymph node.  Attention on follow-up is recommended.  6. Mild bibasilar atelectasis and adjacent pleural effusions.    Assessment/Plan:     Diverticulitis of large intestine with perforation without abscess or bleeding  Found on IV CT scan today - as well as colitis    -- continue abx  -- no plans for surgical intervention in uncomplicated perforated diverticulitis in this frail, medically complicated patient  -- ok for diet  -- will need colonoscopy 6-8 weeks following resolution of diverticulitis (last scope was at age 40)    Drug-induced constipation  Avoid aggressive bowel regimen with colitis and diverticulitis   -- continue Miralax       Leukocytosis  WBC improving.  Likely 2/2 perforated diverticulitis and colitis.      -- continue Zosyn  -- defer to primary team     Abdominal pain  POD #11- s/p robotic cholecystectomy for gangrenous perforated cholecystitis.    Fluid in pelvis infected  ascites vs abscess.  IR drainage benign appearing- not feculent or bilious.  Likely source of leukocytosis and pain is diverticulitis.      -- plan to d/c HUMAIRA drain if ok with medicine prior to discharge   -- remainder of care as per primary team     Nausea and vomiting  Likely related to whatever underlying processes causing her leukocytosis and abdominal pain- likely 2/2 perforated diverticulitis.      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    -- defer to primary team     HTN (hypertension)  Defer to primary team         Valentina De La Rosa MD  General Surgery  [facility]

## 2023-11-08 NOTE — ASSESSMENT & PLAN NOTE
This patient does have evidence of infective focus  My overall impression is sepsis.  Source: Abdominal  Antibiotics given-   Antibiotics (72h ago, onward)    Start     Stop Route Frequency Ordered    11/06/23 1000  piperacillin-tazobactam (ZOSYN) 4.5 g in dextrose 5 % in water (D5W) 100 mL IVPB (MB+)         -- IV Every 8 hours (non-standard times) 11/06/23 0856        Latest lactate reviewed-  Recent Labs   Lab 11/05/23  1928 11/05/23  2349   LACTATE 1.4 1.9     CT A/P with right pelvic abscess, perforated diverticulitis - Surgery following  S/p IR drainage of right pelvic abscess (11-8-23)  Source control achieved by: IV abx, continue Zosyn, abx day # 3  Pelvic abscess culture pending, blood cultures NGTD

## 2023-11-08 NOTE — ASSESSMENT & PLAN NOTE
Patient c/o right lower abdominal and groin pain  CT with perforated diverticulitis and right pelvic abscess  Plan as above

## 2023-11-08 NOTE — PLAN OF CARE
Patient resting, no distress noted, pain medication and nausea med just given see MAR, patient has had significant output out of the HUMAIRA drain see Flowsheet, VSS, chart check completed, fall precautions in place including the bed alarm

## 2023-11-08 NOTE — ASSESSMENT & PLAN NOTE
WBC improving.  Likely 2/2 perforated diverticulitis and colitis.      -- continue Zosyn  -- defer to primary team

## 2023-11-08 NOTE — INTERVAL H&P NOTE
The patient has been examined and the H&P has been reviewed:    I concur with the findings and no changes have occurred since H&P was written.        Active Hospital Problems    Diagnosis  POA    *Sepsis [A41.9]  Yes    Nausea and vomiting [R11.2]  Yes    Abdominal pain [R10.9]  Yes    Leukocytosis [D72.829]  Yes    Drug-induced constipation [K59.03]  Yes    S/P laparoscopic cholecystectomy [Z90.49]  Not Applicable    Pelvic fluid collection [R18.8]  Yes    Severe protein-calorie malnutrition [E43]  Yes    Alcoholic cirrhosis of liver with ascites [K70.31]  Yes    HTN (hypertension) [I10]  Yes      Resolved Hospital Problems    Diagnosis Date Resolved POA    Postoperative pain [G89.18] 11/06/2023 Yes

## 2023-11-08 NOTE — OP NOTE
Date:  11/08/2023    Pre Op Diagnosis: pelvic abscess     Post Op Diagnosis: same     Procedure:  pelvic abscess drain placement     Procedure performed by: Jass PATRICK, Pato SHELTON     Written Informed Consent Obtained: Yes     Specimen Removed:  yes     Estimated Blood Loss:  minimal     Findings: Local anesthesia and moderate sedation used     The patient tolerated the procedure well and there were no complications.      Disposition: patient tolerated procedure well and left the department in stable condition    Sterile technique was performed in the right gluteal region, lidocaine was used as a local anesthetic.  A sample taken from pelvic abscess was sent to lab and a 8.5 Fr catheter was placed.  Patient tolerated the procedure well without immediate complications.  Please see radiologist report for details. F/u with PCP and/or ordering physician.

## 2023-11-08 NOTE — ASSESSMENT & PLAN NOTE
POD #11- s/p robotic cholecystectomy for gangrenous perforated cholecystitis.    Fluid in pelvis infected ascites vs abscess.  IR drainage benign appearing- not feculent or bilious.  Likely source of leukocytosis and pain is diverticulitis.      -- plan to d/c HUMAIRA drain if ok with medicine prior to discharge   -- remainder of care as per primary team

## 2023-11-08 NOTE — SUBJECTIVE & OBJECTIVE
Interval History: AFVSS.      Medications:  Continuous Infusions:   lactated ringers 75 mL/hr at 11/08/23 0626     Scheduled Meds:   aluminum-magnesium hydroxide-simethicone  30 mL Oral QID (AC & HS)    folic acid-vit B6-vit B12 2.5-25-2 mg  1 tablet Oral Daily    mirtazapine  15 mg Oral QHS    multivitamin  1 tablet Oral Daily    piperacillin-tazobactam (Zosyn) IV (PEDS and ADULTS) (extended infusion is not appropriate)  4.5 g Intravenous Q8H    senna-docusate 8.6-50 mg  2 tablet Oral Daily    sucralfate  1 g Oral Q6H    thiamine  100 mg Oral Daily     PRN Meds:LIDOcaine (PF) 10 mg/ml (1%), morphine, ondansetron, sodium chloride 0.9%     Review of patient's allergies indicates:   Allergen Reactions    Baclofen Other (See Comments)     Vomiting, confusion, shaking     Objective:     Vital Signs (Most Recent):  Temp: 98.4 °F (36.9 °C) (11/08/23 1321)  Pulse: 80 (11/08/23 1321)  Resp: 18 (11/08/23 1321)  BP: 122/69 (11/08/23 1321)  SpO2: 96 % (11/08/23 1321) Vital Signs (24h Range):  Temp:  [98.2 °F (36.8 °C)-99.2 °F (37.3 °C)] 98.4 °F (36.9 °C)  Pulse:  [80-97] 80  Resp:  [14-20] 18  SpO2:  [94 %-100 %] 96 %  BP: (110-178)/(63-77) 122/69     Weight: 44.5 kg (98 lb)  Body mass index is 17.92 kg/m².    Intake/Output - Last 3 Shifts         11/06 0700 11/07 0659 11/07 0700 11/08 0659 11/08 0700 11/09 0659    P.O.  0     I.V. (mL/kg)  900 (20.2)     IV Piggyback  100     Total Intake(mL/kg)  1000 (22.5)     Drains  410     Other       Total Output  410     Net  +590            Urine Occurrence 1 x               Physical Exam  Vitals reviewed.   Constitutional:       Appearance: Normal appearance.      Comments: Chronically ill appearing, thin   HENT:      Head: Normocephalic and atraumatic.   Eyes:      Extraocular Movements: Extraocular movements intact.      Conjunctiva/sclera: Conjunctivae normal.   Cardiovascular:      Rate and Rhythm: Normal rate.   Pulmonary:      Effort: Pulmonary effort is normal.   Abdominal:       General: Abdomen is flat.      Palpations: Abdomen is soft.      Comments: Mildly TTP, HUMAIRA in RUQ serous, RLQ IR drain deep yellow    Skin:     General: Skin is warm and dry.   Neurological:      General: No focal deficit present.      Mental Status: She is alert.   Psychiatric:         Mood and Affect: Mood normal.          Significant Labs:  I have reviewed all pertinent lab results within the past 24 hours.  CBC:   Recent Labs   Lab 11/08/23  0508   WBC 21.91*   RBC 3.61*   HGB 10.5*   HCT 33.1*   *   MCV 92   MCH 29.1   MCHC 31.7*     CMP:   Recent Labs   Lab 11/08/23  0508   GLU 71   CALCIUM 7.9*   ALBUMIN 1.3*   PROT 5.8*      K 3.7   CO2 22*      BUN 15   CREATININE 0.5   ALKPHOS 142*   ALT 5*   AST 15   BILITOT 0.2     Microbiology Results (last 7 days)       Procedure Component Value Units Date/Time    Culture, Anaerobe [[id number]] Collected: 11/08/23 1250    Order Status: Sent Specimen: Body Fluid from Back Updated: 11/08/23 1312    Gram stain [[id number]] Collected: 11/08/23 1250    Order Status: Sent Specimen: Body Fluid from Back Updated: 11/08/23 1311    Culture, Fluid  (Aerobic) [[id number]] Collected: 11/08/23 1250    Order Status: Sent Specimen: Body Fluid from Back Updated: 11/08/23 1311    Blood culture x two cultures. Draw prior to antibiotics. [[id number]] Collected: 11/05/23 1932    Order Status: Completed Specimen: Blood from Peripheral, Forearm, Left Updated: 11/08/23 0612     Blood Culture, Routine No Growth to date      No Growth to date      No Growth to date    Narrative:      Aerobic and anaerobic    Blood culture x two cultures. Draw prior to antibiotics. [[id number]] Collected: 11/05/23 1932    Order Status: Completed Specimen: Blood from Peripheral, Forearm, Left Updated: 11/08/23 0612     Blood Culture, Routine No Growth to date      No Growth to date      No Growth to date    Narrative:      Aerobic and anaerobic          Specimen (24h ago, onward)      None           Recent Labs   Lab 11/06/23 2006   COLORU Yellow   SPECGRAV >1.030*   PHUR 6.0   PROTEINUA Trace*   NITRITE Negative   LEUKOCYTESUR Negative   UROBILINOGEN Negative       Significant Diagnostics:  I have reviewed all pertinent imaging results/findings within the past 24 hours.  CT: I have reviewed all pertinent results/findings within the past 24 hours and my personal findings are:  mild uncomplicated perforated diverticulitis, significant stool burden, colonic inflammation.     Impression:     1. Right pelvic abscess.  2. Uncomplicated perforated sigmoid colon diverticulitis and low-grade colonic obstruction.  Sigmoidoscopy is recommended following resolution to assess for potential underlying mass.  3. Gas in the anterior peritoneal cavity may be related to prior robotic surgery and drain placement.; However this may also be related to the perforated diverticulitis.  Close clinical follow-up is recommended.  4. Persistent small fluid and gas collection with adjacent drain in the cholecystectomy bed.  5. Finding in the right inguinal canal which may represent inflamed fluid or suppurative lymph node.  Attention on follow-up is recommended.  6. Mild bibasilar atelectasis and adjacent pleural effusions.

## 2023-11-09 LAB
ANION GAP SERPL CALC-SCNC: 7 MMOL/L (ref 8–16)
BASOPHILS # BLD AUTO: 0.14 K/UL (ref 0–0.2)
BASOPHILS NFR BLD: 0.9 % (ref 0–1.9)
BUN SERPL-MCNC: 12 MG/DL (ref 8–23)
CALCIUM SERPL-MCNC: 7.7 MG/DL (ref 8.7–10.5)
CHLORIDE SERPL-SCNC: 106 MMOL/L (ref 95–110)
CO2 SERPL-SCNC: 24 MMOL/L (ref 23–29)
CREAT SERPL-MCNC: 0.5 MG/DL (ref 0.5–1.4)
DIFFERENTIAL METHOD: ABNORMAL
EOSINOPHIL # BLD AUTO: 0.4 K/UL (ref 0–0.5)
EOSINOPHIL NFR BLD: 2.7 % (ref 0–8)
ERYTHROCYTE [DISTWIDTH] IN BLOOD BY AUTOMATED COUNT: 13.2 % (ref 11.5–14.5)
EST. GFR  (NO RACE VARIABLE): >60 ML/MIN/1.73 M^2
GLUCOSE SERPL-MCNC: 68 MG/DL (ref 70–110)
GRAM STN SPEC: NORMAL
GRAM STN SPEC: NORMAL
HCT VFR BLD AUTO: 29.5 % (ref 37–48.5)
HGB BLD-MCNC: 9.3 G/DL (ref 12–16)
IMM GRANULOCYTES # BLD AUTO: 0.07 K/UL (ref 0–0.04)
IMM GRANULOCYTES NFR BLD AUTO: 0.5 % (ref 0–0.5)
LYMPHOCYTES # BLD AUTO: 3.7 K/UL (ref 1–4.8)
LYMPHOCYTES NFR BLD: 24.2 % (ref 18–48)
MCH RBC QN AUTO: 28.6 PG (ref 27–31)
MCHC RBC AUTO-ENTMCNC: 31.5 G/DL (ref 32–36)
MCV RBC AUTO: 91 FL (ref 82–98)
MONOCYTES # BLD AUTO: 0.9 K/UL (ref 0.3–1)
MONOCYTES NFR BLD: 6 % (ref 4–15)
NEUTROPHILS # BLD AUTO: 10.2 K/UL (ref 1.8–7.7)
NEUTROPHILS NFR BLD: 65.7 % (ref 38–73)
NRBC BLD-RTO: 0 /100 WBC
PLATELET # BLD AUTO: 804 K/UL (ref 150–450)
PMV BLD AUTO: 9 FL (ref 9.2–12.9)
POTASSIUM SERPL-SCNC: 3.6 MMOL/L (ref 3.5–5.1)
RBC # BLD AUTO: 3.25 M/UL (ref 4–5.4)
SODIUM SERPL-SCNC: 137 MMOL/L (ref 136–145)
WBC # BLD AUTO: 15.47 K/UL (ref 3.9–12.7)

## 2023-11-09 PROCEDURE — 11000001 HC ACUTE MED/SURG PRIVATE ROOM

## 2023-11-09 PROCEDURE — 85025 COMPLETE CBC W/AUTO DIFF WBC: CPT | Performed by: HOSPITALIST

## 2023-11-09 PROCEDURE — 99231 SBSQ HOSP IP/OBS SF/LOW 25: CPT | Mod: 24,,, | Performed by: SURGERY

## 2023-11-09 PROCEDURE — 63600175 PHARM REV CODE 636 W HCPCS: Performed by: SURGERY

## 2023-11-09 PROCEDURE — 80048 BASIC METABOLIC PNL TOTAL CA: CPT | Performed by: HOSPITALIST

## 2023-11-09 PROCEDURE — 99231 PR SUBSEQUENT HOSPITAL CARE,LEVL I: ICD-10-PCS | Mod: 24,,, | Performed by: SURGERY

## 2023-11-09 PROCEDURE — 36415 COLL VENOUS BLD VENIPUNCTURE: CPT | Performed by: HOSPITALIST

## 2023-11-09 PROCEDURE — 25000003 PHARM REV CODE 250: Performed by: SURGERY

## 2023-11-09 RX ADMIN — SUCRALFATE 1 G: 1 SUSPENSION ORAL at 12:11

## 2023-11-09 RX ADMIN — THIAMINE HCL TAB 100 MG 100 MG: 100 TAB at 09:11

## 2023-11-09 RX ADMIN — THERA TABS 1 TABLET: TAB at 09:11

## 2023-11-09 RX ADMIN — ONDANSETRON 4 MG: 2 INJECTION INTRAMUSCULAR; INTRAVENOUS at 07:11

## 2023-11-09 RX ADMIN — MORPHINE SULFATE 4 MG: 4 INJECTION INTRAVENOUS at 07:11

## 2023-11-09 RX ADMIN — MORPHINE SULFATE 4 MG: 4 INJECTION INTRAVENOUS at 12:11

## 2023-11-09 RX ADMIN — SODIUM CHLORIDE, POTASSIUM CHLORIDE, SODIUM LACTATE AND CALCIUM CHLORIDE: 600; 310; 30; 20 INJECTION, SOLUTION INTRAVENOUS at 07:11

## 2023-11-09 RX ADMIN — SUCRALFATE 1 G: 1 SUSPENSION ORAL at 06:11

## 2023-11-09 RX ADMIN — MORPHINE SULFATE 4 MG: 4 INJECTION INTRAVENOUS at 05:11

## 2023-11-09 RX ADMIN — PIPERACILLIN SODIUM AND TAZOBACTAM SODIUM 4.5 G: 4; .5 INJECTION, POWDER, FOR SOLUTION INTRAVENOUS at 06:11

## 2023-11-09 RX ADMIN — ONDANSETRON 4 MG: 2 INJECTION INTRAMUSCULAR; INTRAVENOUS at 03:11

## 2023-11-09 RX ADMIN — SENNOSIDES AND DOCUSATE SODIUM 2 TABLET: 8.6; 5 TABLET ORAL at 09:11

## 2023-11-09 RX ADMIN — ALUMINUM HYDROXIDE, MAGNESIUM HYDROXIDE, AND DIMETHICONE 30 ML: 200; 20; 200 SUSPENSION ORAL at 12:11

## 2023-11-09 RX ADMIN — MORPHINE SULFATE 4 MG: 4 INJECTION INTRAVENOUS at 10:11

## 2023-11-09 RX ADMIN — MIRTAZAPINE 15 MG: 15 TABLET, FILM COATED ORAL at 08:11

## 2023-11-09 RX ADMIN — ALUMINUM HYDROXIDE, MAGNESIUM HYDROXIDE, AND DIMETHICONE 30 ML: 200; 20; 200 SUSPENSION ORAL at 06:11

## 2023-11-09 RX ADMIN — PIPERACILLIN SODIUM AND TAZOBACTAM SODIUM 4.5 G: 4; .5 INJECTION, POWDER, FOR SOLUTION INTRAVENOUS at 10:11

## 2023-11-09 RX ADMIN — Medication 1 TABLET: at 09:11

## 2023-11-09 RX ADMIN — ONDANSETRON 4 MG: 2 INJECTION INTRAMUSCULAR; INTRAVENOUS at 10:11

## 2023-11-09 RX ADMIN — PIPERACILLIN SODIUM AND TAZOBACTAM SODIUM 4.5 G: 4; .5 INJECTION, POWDER, FOR SOLUTION INTRAVENOUS at 03:11

## 2023-11-09 RX ADMIN — SUCRALFATE 1 G: 1 SUSPENSION ORAL at 05:11

## 2023-11-09 RX ADMIN — ALUMINUM HYDROXIDE, MAGNESIUM HYDROXIDE, AND DIMETHICONE 30 ML: 200; 20; 200 SUSPENSION ORAL at 05:11

## 2023-11-09 RX ADMIN — ONDANSETRON 4 MG: 2 INJECTION INTRAMUSCULAR; INTRAVENOUS at 12:11

## 2023-11-09 RX ADMIN — ALUMINUM HYDROXIDE, MAGNESIUM HYDROXIDE, AND DIMETHICONE 30 ML: 200; 20; 200 SUSPENSION ORAL at 08:11

## 2023-11-09 NOTE — PROGRESS NOTES
[facility] Medicine  Progress Note    Patient Name: Lakisha Vance  MRN: [id number]  Patient Class: IP- Inpatient   Admission Date: 11/5/2023  Length of Stay: 2 days  Attending Physician: Elías Hidalgo MD  Primary Care Provider: Pranav Gonzalez MD        Subjective:     Principal Problem:Diverticulitis of colon with perforation        HPI:  62 y/o female with PMHx of HTN, alcoholic liver cirrhosis (EGD Sept '23 with reflux esophagitis, gastritis, portal hypertensive gastropathy) who recently underwent laproscopic cholecystectomy for gangrenous cholecystitis with RUQ drain placement on 10-28-23 who presented to the ER on 11-5-23 for abdominal pain, nausea/vomiting, fevers/chills. Patient discharged on 11/02/2023 in stable condition with her HUMAIRA drain in place and on antibiotics. She reports sudden onset of right sided lower abdominal and pelvis pain, associated with nausea/vomiting and subjective fevers. Patient denies vomiting blood or coffee ground emesis, denies chest pain, SOB or any other complaints. In ER, patient with temp 100.1 F, tachycardic, WBC 44k; blood cultures were obtained and started on Zosyn. UA pending. CT scan noted small amount of fluid in RUQ with drain in place and fluid collection in pelvis. Patient admitted by General Surgery this AM, plans for IR drainage of pelvic fluid collection. Hospital Medicine consulted to assist with medical management.      Overview/Hospital Course:  11/7  NAEON, patient reports lower quadrant pain,   CT A/P with small amount of fluid noted in right pelvis  Discussed with IR, plan for CT A/P with contrast in AM, possible drainage  Continue Zosyn, WBC trending down, temp curve improving    11/8  CT A/P with noted right pelvic abscess, perforated sigmoid colon diverticulitis and low grade colonic obstruction  Patient s/p IR drain placement for right pelvic abscess this afternoon; fluid cultures obtained  Continue Zosyn, WBC trending down, pain  control    11/9  NAEON, patient reports abdominal pain better controlled today, denies nausea, chest pain, reports +BM  RLQ drain in place with purulent drainage  Continue Zosyn, WBC trending down, cultures pending  RUQ drain from recent cholecystectomy in place, plans for removal per Surgery        Review of Systems   All other systems reviewed and are negative.    Objective:     Vital Signs (Most Recent):  Temp: 98.5 °F (36.9 °C) (11/09/23 0736)  Pulse: 81 (11/09/23 0736)  Resp: 18 (Simultaneous filing. User may not have seen previous data.) (11/09/23 0736)  BP: 120/68 (11/09/23 0736)  SpO2: (!) 94 % (11/09/23 0736) Vital Signs (24h Range):  Temp:  [98.1 °F (36.7 °C)-98.9 °F (37.2 °C)] 98.5 °F (36.9 °C)  Pulse:  [76-97] 81  Resp:  [14-18] 18  SpO2:  [94 %-100 %] 94 %  BP: (109-178)/(54-77) 120/68     Weight: 41.8 kg (92 lb 2.4 oz)  Body mass index is 16.85 kg/m².    Intake/Output Summary (Last 24 hours) at 11/9/2023 1023  Last data filed at 11/9/2023 0637  Gross per 24 hour   Intake 120 ml   Output 985 ml   Net -865 ml           Physical Exam  Vitals and nursing note reviewed.   Constitutional:       General: She is not in acute distress.     Appearance: Normal appearance. She is normal weight. She is ill-appearing.   Cardiovascular:      Rate and Rhythm: Normal rate and regular rhythm.      Heart sounds: No murmur heard.  Pulmonary:      Effort: Pulmonary effort is normal. No respiratory distress.      Breath sounds: No wheezing.   Abdominal:      General: There is distension.      Tenderness: There is abdominal tenderness.      Comments: RUQ drain (recent cholecystectomy)  RLQ drain (right pelvic abscess)   Neurological:      General: No focal deficit present.      Mental Status: She is alert and oriented to person, place, and time.   Psychiatric:         Mood and Affect: Mood normal.         Behavior: Behavior normal.             Significant Labs: All pertinent labs within the past 24 hours have been  reviewed.  Recent Lab Results         11/09/23  0531   11/08/23  1250        Anion Gap 7         Baso # 0.14         Basophil % 0.9         BUN 12         Calcium 7.7         Chloride 106         CO2 24         Creatinine 0.5         Differential Method Automated         eGFR >60         Eos # 0.4         Eosinophil % 2.7         Glucose 68         Gram Stain Result   Rare WBC's          No organisms seen       Gran # (ANC) 10.2         Gran % 65.7         Hematocrit 29.5         Hemoglobin 9.3         Immature Grans (Abs) 0.07  Comment: Mild elevation in immature granulocytes is non specific and   can be seen in a variety of conditions including stress response,   acute inflammation, trauma and pregnancy. Correlation with other   laboratory and clinical findings is essential.           Immature Granulocytes 0.5         Lymph # 3.7         Lymph % 24.2         MCH 28.6         MCHC 31.5         MCV 91         Mono # 0.9         Mono % 6.0         MPV 9.0         nRBC 0         Platelet Count 804         Potassium 3.6         RBC 3.25         RDW 13.2         Sodium 137         WBC 15.47                 Significant Imaging: I have reviewed all pertinent imaging results/findings within the past 24 hours.    IR Abscess Drainage Peritoneal   Final Result      Technically successful CT guided abscess drain of a pelvic collection.      Recommend monitoring out puts and tube removal can be obtained when out puts are less than 10 mL over 24 hours and the patient has clinically improved.  Recommend flushing the catheter with 5 cc of sterile saline every shift.      All CT scans at this facility use dose modulation, iterative reconstruction, and/or weight based dosing when appropriate to reduce radiation dose to as low as reasonable achievable.         Electronically signed by: Malachi Ruiz   Date:    11/08/2023   Time:    17:29      CT Abdomen Pelvis With IV Contrast   Final Result      1. Right pelvic abscess.   2.  Uncomplicated perforated sigmoid colon diverticulitis and low-grade colonic obstruction.  Sigmoidoscopy is recommended following resolution to assess for potential underlying mass.   3. Gas in the anterior peritoneal cavity may be related to prior robotic surgery and drain placement.; However this may also be related to the perforated diverticulitis.  Close clinical follow-up is recommended.   4. Persistent small fluid and gas collection with adjacent drain in the cholecystectomy bed.   5. Finding in the right inguinal canal which may represent inflamed fluid or suppurative lymph node.  Attention on follow-up is recommended.   6. Mild bibasilar atelectasis and adjacent pleural effusions.   7. Other findings as above.   Findings regarding the perforated diverticulitis were communicated to Dr. Hidalgo and Dr. De La Rosa by epic chat at the time of making the findings.         Electronically signed by: Malachi Ruiz   Date:    11/08/2023   Time:    13:19      US Abdomen Limited   Final Result      The portal vein is patent.  Cirrhotic morphology of the liver with trace perihepatic ascites.         Electronically signed by: Bassam Stevens MD   Date:    11/06/2023   Time:    15:53      CT Abdomen Pelvis  Without Contrast   Final Result      Presumed recent operative change cholecystectomy with small fluid collection right pelvis nonspecific         Electronically signed by: Kelsea Levin   Date:    11/05/2023   Time:    21:44      X-Ray Chest AP Portable   Final Result      No acute abnormality.         Electronically signed by: Robson Panda   Date:    11/05/2023   Time:    19:46              Assessment/Plan:      Sepsis  This patient does have evidence of infective focus  My overall impression is sepsis.  Source: Abdominal  Antibiotics given-   Antibiotics (72h ago, onward)    Start     Stop Route Frequency Ordered    11/06/23 1000  piperacillin-tazobactam (ZOSYN) 4.5 g in dextrose 5 % in water (D5W) 100 mL IVPB (MB+)          -- IV Every 8 hours (non-standard times) 11/06/23 0856        Latest lactate reviewed-  Recent Labs   Lab 11/05/23  1928 11/05/23  2349   LACTATE 1.4 1.9     CT A/P with right pelvic abscess, perforated diverticulitis - Surgery following  S/p IR drainage of right pelvic abscess (11-8-23)  Source control achieved by: IV abx, continue Zosyn, abx day # 3  Pelvic abscess culture pending, blood cultures NGTD    Pelvic fluid collection  Patient c/o right lower abdominal and groin pain  CT with perforated diverticulitis and right pelvic abscess  Plan as above    S/P laparoscopic cholecystectomy  s/p robotic cholecystectomy with gangrenous perforated GB - 10/28/23  Right sided abdominal drain in place  CT with with noted post-operative findings and right pelvic fluid collection  Management per Surgery    Alcoholic cirrhosis of liver with ascites  Co-morbidities are present and inclusive of portal hypertension and malnutrition.  MELD-Na score calculated; MELD 3.0: 12 at 11/7/2023  7:48 AM  MELD-Na: 7 at 11/7/2023  7:48 AM  Calculated from:  Serum Creatinine: 0.6 mg/dL (Using min of 1 mg/dL) at 11/7/2023  7:48 AM  Serum Sodium: 138 mmol/L (Using max of 137 mmol/L) at 11/7/2023  7:48 AM  Total Bilirubin: 0.2 mg/dL (Using min of 1 mg/dL) at 11/7/2023  7:48 AM  Serum Albumin: 1.3 g/dL (Using min of 1.5 g/dL) at 11/7/2023  7:48 AM  INR(ratio): 1.1 at 11/6/2023  4:00 PM  Age at listing (hypothetical): 63 years  Sex: Female at 11/7/2023  7:48 AM      Continue chronic meds. Etiology likely ETOH. Will avoid any hepatotoxic meds, and monitor CBC/CMP/INR for synthetic function.     HTN (hypertension)  Chronic, controlled. Latest blood pressure and vitals reviewed-     Temp:  [97.1 °F (36.2 °C)-99.4 °F (37.4 °C)]   Pulse:  [85-93]   Resp:  [16-20]   BP: ()/(52-66)   SpO2:  [94 %-96 %] .   Home meds for hypertension were reviewed and noted below.   Hypertension Medications             furosemide (LASIX) 40 MG tablet Take 1  tablet (40 mg total) by mouth once daily.    spironolactone (ALDACTONE) 50 MG tablet Take 1 tablet (50 mg total) by mouth 2 (two) times daily.    spironolactone (ALDACTONE) 50 MG tablet Take 1 tablet (50 mg total) by mouth once daily.          While in the hospital, will manage blood pressure as follows; Adjust home antihypertensive regimen as follows- hold home meds - BP marginal    Severe protein-calorie malnutrition  Nutrition consulted. Most recent weight and BMI monitored-     Measurements:  Wt Readings from Last 1 Encounters:   11/07/23 44.5 kg (98 lb)   Body mass index is 17.92 kg/m².    Interventions/Recommendations (treatment strategy):  1. Recommend a Minced and Moist diet, per SLP recommendation 2. Recommend Boost Plus TID 3.Recommend Jose David BID 4. Daily weight 5. Meal set/assistance/encourangement TID      VTE Risk Mitigation (From admission, onward)         Ordered     IP VTE HIGH RISK PATIENT  Once         11/06/23 0856     Place sequential compression device  Until discontinued         11/06/23 0856                Discharge Planning   ANDREAS:      Code Status: Full Code   Is the patient medically ready for discharge?:     Reason for patient still in hospital (select all that apply): Patient trending condition, Laboratory test, Treatment and Consult recommendations  Discharge Plan A: Home                  Elías Hidalgo MD  Department of Hospital Medicine   [facility] - Med Surg

## 2023-11-09 NOTE — ASSESSMENT & PLAN NOTE
Likely likely 2/2 perforated diverticulitis.  Resolved now    -- continue IV Zofran  -- GI evaluation if any additional coffee-ground emesis

## 2023-11-09 NOTE — PLAN OF CARE
Call button within reach. Pain medication given per MD order. Nausea PRN medication given per MD order.   Problem: Adult Inpatient Plan of Care  Goal: Plan of Care Review  Outcome: Ongoing, Progressing  Goal: Patient-Specific Goal (Individualized)  Outcome: Ongoing, Progressing  Goal: Absence of Hospital-Acquired Illness or Injury  Outcome: Ongoing, Progressing  Goal: Optimal Comfort and Wellbeing  Outcome: Ongoing, Progressing  Goal: Readiness for Transition of Care  Outcome: Ongoing, Progressing     Problem: Skin Injury Risk Increased  Goal: Skin Health and Integrity  Outcome: Ongoing, Progressing     Problem: Fall Injury Risk  Goal: Absence of Fall and Fall-Related Injury  Outcome: Ongoing, Progressing     Problem: Adjustment to Illness (Sepsis/Septic Shock)  Goal: Optimal Coping  Outcome: Ongoing, Progressing     Problem: Infection Progression (Sepsis/Septic Shock)  Goal: Absence of Infection Signs and Symptoms  Outcome: Ongoing, Progressing     Problem: Pain Acute  Goal: Acceptable Pain Control and Functional Ability  Outcome: Ongoing, Progressing     Problem: Nausea and Vomiting  Goal: Fluid and Electrolyte Balance  Outcome: Ongoing, Progressing

## 2023-11-09 NOTE — SUBJECTIVE & OBJECTIVE
Review of Systems   All other systems reviewed and are negative.    Objective:     Vital Signs (Most Recent):  Temp: 98.5 °F (36.9 °C) (11/09/23 0736)  Pulse: 81 (11/09/23 0736)  Resp: 18 (Simultaneous filing. User may not have seen previous data.) (11/09/23 0736)  BP: 120/68 (11/09/23 0736)  SpO2: (!) 94 % (11/09/23 0736) Vital Signs (24h Range):  Temp:  [98.1 °F (36.7 °C)-98.9 °F (37.2 °C)] 98.5 °F (36.9 °C)  Pulse:  [76-97] 81  Resp:  [14-18] 18  SpO2:  [94 %-100 %] 94 %  BP: (109-178)/(54-77) 120/68     Weight: 41.8 kg (92 lb 2.4 oz)  Body mass index is 16.85 kg/m².    Intake/Output Summary (Last 24 hours) at 11/9/2023 1023  Last data filed at 11/9/2023 0637  Gross per 24 hour   Intake 120 ml   Output 985 ml   Net -865 ml           Physical Exam  Vitals and nursing note reviewed.   Constitutional:       General: She is not in acute distress.     Appearance: Normal appearance. She is normal weight. She is ill-appearing.   Cardiovascular:      Rate and Rhythm: Normal rate and regular rhythm.      Heart sounds: No murmur heard.  Pulmonary:      Effort: Pulmonary effort is normal. No respiratory distress.      Breath sounds: No wheezing.   Abdominal:      General: There is distension.      Tenderness: There is abdominal tenderness.      Comments: RUQ drain (recent cholecystectomy)  RLQ drain (right pelvic abscess)   Neurological:      General: No focal deficit present.      Mental Status: She is alert and oriented to person, place, and time.   Psychiatric:         Mood and Affect: Mood normal.         Behavior: Behavior normal.             Significant Labs: All pertinent labs within the past 24 hours have been reviewed.  Recent Lab Results         11/09/23  0531   11/08/23  1250        Anion Gap 7         Baso # 0.14         Basophil % 0.9         BUN 12         Calcium 7.7         Chloride 106         CO2 24         Creatinine 0.5         Differential Method Automated         eGFR >60         Eos # 0.4          Eosinophil % 2.7         Glucose 68         Gram Stain Result   Rare WBC's          No organisms seen       Gran # (ANC) 10.2         Gran % 65.7         Hematocrit 29.5         Hemoglobin 9.3         Immature Grans (Abs) 0.07  Comment: Mild elevation in immature granulocytes is non specific and   can be seen in a variety of conditions including stress response,   acute inflammation, trauma and pregnancy. Correlation with other   laboratory and clinical findings is essential.           Immature Granulocytes 0.5         Lymph # 3.7         Lymph % 24.2         MCH 28.6         MCHC 31.5         MCV 91         Mono # 0.9         Mono % 6.0         MPV 9.0         nRBC 0         Platelet Count 804         Potassium 3.6         RBC 3.25         RDW 13.2         Sodium 137         WBC 15.47                 Significant Imaging: I have reviewed all pertinent imaging results/findings within the past 24 hours.    IR Abscess Drainage Peritoneal   Final Result      Technically successful CT guided abscess drain of a pelvic collection.      Recommend monitoring out puts and tube removal can be obtained when out puts are less than 10 mL over 24 hours and the patient has clinically improved.  Recommend flushing the catheter with 5 cc of sterile saline every shift.      All CT scans at this facility use dose modulation, iterative reconstruction, and/or weight based dosing when appropriate to reduce radiation dose to as low as reasonable achievable.         Electronically signed by: Malachi Ruiz   Date:    11/08/2023   Time:    17:29      CT Abdomen Pelvis With IV Contrast   Final Result      1. Right pelvic abscess.   2. Uncomplicated perforated sigmoid colon diverticulitis and low-grade colonic obstruction.  Sigmoidoscopy is recommended following resolution to assess for potential underlying mass.   3. Gas in the anterior peritoneal cavity may be related to prior robotic surgery and drain placement.; However this may also be  related to the perforated diverticulitis.  Close clinical follow-up is recommended.   4. Persistent small fluid and gas collection with adjacent drain in the cholecystectomy bed.   5. Finding in the right inguinal canal which may represent inflamed fluid or suppurative lymph node.  Attention on follow-up is recommended.   6. Mild bibasilar atelectasis and adjacent pleural effusions.   7. Other findings as above.   Findings regarding the perforated diverticulitis were communicated to Dr. Hidalgo and Dr. De La Rosa by epic chat at the time of making the findings.         Electronically signed by: Malachi Ruiz   Date:    11/08/2023   Time:    13:19      US Abdomen Limited   Final Result      The portal vein is patent.  Cirrhotic morphology of the liver with trace perihepatic ascites.         Electronically signed by: Bassam Stevens MD   Date:    11/06/2023   Time:    15:53      CT Abdomen Pelvis  Without Contrast   Final Result      Presumed recent operative change cholecystectomy with small fluid collection right pelvis nonspecific         Electronically signed by: Kelsea Levin   Date:    11/05/2023   Time:    21:44      X-Ray Chest AP Portable   Final Result      No acute abnormality.         Electronically signed by: Robson Panda   Date:    11/05/2023   Time:    19:46

## 2023-11-09 NOTE — ASSESSMENT & PLAN NOTE
Found on IV CT scan on 11/8/23- as well as colitis    -- continue abx  -- no plans for surgical intervention in uncomplicated perforated diverticulitis in this frail, medically complicated patient  -- ok for diet  -- will need colonoscopy 6-8 weeks following resolution of diverticulitis (last scope was at age 40)

## 2023-11-09 NOTE — ASSESSMENT & PLAN NOTE
POD #12- s/p robotic cholecystectomy for gangrenous perforated cholecystitis.  Abdominal pain unrelated to cholecystectomy, likely 2/2 perforated diverticulitis.      Fluid in pelvis infected ascites vs abscess from diverticulitis.  IR drain non-feculent in appearance.      -- plan to d/c HUMAIRA drain if ok with medicine prior to discharge   -- remainder of care as per primary team   -- agree w/ abx

## 2023-11-09 NOTE — SUBJECTIVE & OBJECTIVE
Interval History: AFVSS.  White count improving.  Patient overall feeling better.  HUMAIRA drain with 250 cc output, IR drain with 435 cc output.    Medications:  Continuous Infusions:   lactated ringers 75 mL/hr at 11/09/23 0742     Scheduled Meds:   aluminum-magnesium hydroxide-simethicone  30 mL Oral QID (AC & HS)    folic acid-vit B6-vit B12 2.5-25-2 mg  1 tablet Oral Daily    mirtazapine  15 mg Oral QHS    multivitamin  1 tablet Oral Daily    piperacillin-tazobactam (Zosyn) IV (PEDS and ADULTS) (extended infusion is not appropriate)  4.5 g Intravenous Q8H    senna-docusate 8.6-50 mg  2 tablet Oral Daily    sucralfate  1 g Oral Q6H    thiamine  100 mg Oral Daily     PRN Meds:LIDOcaine (PF) 10 mg/ml (1%), morphine, ondansetron, sodium chloride 0.9%     Review of patient's allergies indicates:   Allergen Reactions    Baclofen Other (See Comments)     Vomiting, confusion, shaking     Objective:     Vital Signs (Most Recent):  Temp: 98.5 °F (36.9 °C) (11/09/23 0736)  Pulse: 81 (11/09/23 0736)  Resp: 18 (Simultaneous filing. User may not have seen previous data.) (11/09/23 0736)  BP: 120/68 (11/09/23 0736)  SpO2: (!) 94 % (11/09/23 0736) Vital Signs (24h Range):  Temp:  [98.1 °F (36.7 °C)-98.9 °F (37.2 °C)] 98.5 °F (36.9 °C)  Pulse:  [76-97] 81  Resp:  [14-18] 18  SpO2:  [94 %-100 %] 94 %  BP: (109-178)/(54-77) 120/68     Weight: 41.8 kg (92 lb 2.4 oz)  Body mass index is 16.85 kg/m².    Intake/Output - Last 3 Shifts         11/07 0700  11/08 0659 11/08 0700  11/09 0659 11/09 0700  11/10 0659    P.O. 0 120     I.V. (mL/kg) 900 (20.2)      IV Piggyback 100      Total Intake(mL/kg) 1000 (22.5) 120 (2.9)     Urine (mL/kg/hr)  300 (0.3)     Drains 410 685     Total Output 410 985     Net +590 -865                     Physical Exam  Vitals reviewed.   Constitutional:       Comments: Thin, chronically ill-appearing   HENT:      Head: Normocephalic and atraumatic.   Eyes:      Conjunctiva/sclera: Conjunctivae normal.    Cardiovascular:      Rate and Rhythm: Normal rate.   Pulmonary:      Effort: Pulmonary effort is normal.   Abdominal:      General: There is no distension.      Palpations: Abdomen is soft. There is no mass.      Tenderness: There is no abdominal tenderness.      Hernia: No hernia is present.      Comments: Incisions clean, dry, intact.  HUMAIRA drain serous, IR drain serous/turbid   Skin:     General: Skin is warm and dry.   Neurological:      General: No focal deficit present.      Mental Status: She is alert.   Psychiatric:         Mood and Affect: Mood normal.         Behavior: Behavior normal.          Significant Labs:  I have reviewed all pertinent lab results within the past 24 hours.  CBC:   Recent Labs   Lab 11/09/23  0531   WBC 15.47*   RBC 3.25*   HGB 9.3*   HCT 29.5*   *   MCV 91   MCH 28.6   MCHC 31.5*     CMP:   Recent Labs   Lab 11/08/23  0508 11/09/23  0531   GLU 71 68*   CALCIUM 7.9* 7.7*   ALBUMIN 1.3*  --    PROT 5.8*  --     137   K 3.7 3.6   CO2 22* 24    106   BUN 15 12   CREATININE 0.5 0.5   ALKPHOS 142*  --    ALT 5*  --    AST 15  --    BILITOT 0.2  --      Microbiology Results (last 7 days)       Procedure Component Value Units Date/Time    Blood culture x two cultures. Draw prior to antibiotics. [[id number]] Collected: 11/05/23 1932    Order Status: Completed Specimen: Blood from Peripheral, Forearm, Left Updated: 11/09/23 0612     Blood Culture, Routine No Growth to date      No Growth to date      No Growth to date      No Growth to date    Narrative:      Aerobic and anaerobic    Blood culture x two cultures. Draw prior to antibiotics. [[id number]] Collected: 11/05/23 1932    Order Status: Completed Specimen: Blood from Peripheral, Forearm, Left Updated: 11/09/23 0612     Blood Culture, Routine No Growth to date      No Growth to date      No Growth to date      No Growth to date    Narrative:      Aerobic and anaerobic    Culture, Fluid  (Aerobic) [[id number]]  Collected: 11/08/23 1250    Order Status: Completed Specimen: Body Fluid from Back Updated: 11/09/23 0438    Narrative:      Gluteal abscess    Gram stain [[id number]] Collected: 11/08/23 1250    Order Status: Completed Specimen: Body Fluid from Back Updated: 11/09/23 0229     Gram Stain Result Rare WBC's      No organisms seen    Narrative:      Gluteal abscess    Culture, Anaerobe [[id number]] Collected: 11/08/23 1250    Order Status: Sent Specimen: Body Fluid from Back Updated: 11/08/23 2244            Significant Diagnostics:  I have reviewed all pertinent imaging results/findings within the past 24 hours.  CT: I have reviewed all pertinent results/findings within the past 24 hours and my personal findings are:  Mild uncomplicated perforated diverticulitis, constipation, colitis    Impression:     1. Right pelvic abscess.  2. Uncomplicated perforated sigmoid colon diverticulitis and low-grade colonic obstruction.  Sigmoidoscopy is recommended following resolution to assess for potential underlying mass.  3. Gas in the anterior peritoneal cavity may be related to prior robotic surgery and drain placement.; However this may also be related to the perforated diverticulitis.  Close clinical follow-up is recommended.  4. Persistent small fluid and gas collection with adjacent drain in the cholecystectomy bed.  5. Finding in the right inguinal canal which may represent inflamed fluid or suppurative lymph node.  Attention on follow-up is recommended.  6. Mild bibasilar atelectasis and adjacent pleural effusions.

## 2023-11-09 NOTE — PROGRESS NOTES
[facility] S[facility]  General Surgery  Progress Note    Subjective:     History of Present Illness:  Ms. Martin is a 63-year-old female with a history of hypertension and cirrhosis of the liver status post robotic cholecystectomy on 10/28/2023 for gangrenous cholecystitis.  She was discharged on 11/02/2023 in stable condition with her HUMAIRA drain in place and on antibiotics.  She states that she was doing well overall following her discharge up until yesterday morning at which point in time she developed sudden onset lower abdominal pain sharp in nature and intermittent, as well as nausea, vomiting, fevers, and chills.  She attempted to take her Zofran however did not have any relief as she states she had vomiting it up.  She called EMS who brought her to the emergency department.  Per EMS she did have coffee-ground emesis however she maintains that it was not coffee-ground but was yellow in color.  Upon arrival to the emergency department her vital signs were all within normal limits.  She did develop a T-max of a 100.1° overnight at which point in time she also became mildly tachycardic up to 103.  CT scan was performed without contrast which shows a small amount of non organized fluid in the right upper quadrant with the drain in place and a fluid collection in the pelvis.  Otherwise there were no abnormal findings on her CT scan.  She was noted to have a leukocytosis up to 44,000 as well as thrombocytosis.  Blood cultures were performed and are still pending and she was admitted for further workup and management.  Currently she states that the morphine and the Zofran IV are helping with her nausea and her pain she still intermittently has lower abdominal pain and overall feels quite weak still.      Post-Op Info:  * No surgery found *         Interval History: AFVSS.  White count improving.  Patient overall feeling better.  HUMAIRA drain with 250 cc output, IR drain with 435 cc output.    Medications:  Continuous  Infusions:   lactated ringers 75 mL/hr at 11/09/23 0742     Scheduled Meds:   aluminum-magnesium hydroxide-simethicone  30 mL Oral QID (AC & HS)    folic acid-vit B6-vit B12 2.5-25-2 mg  1 tablet Oral Daily    mirtazapine  15 mg Oral QHS    multivitamin  1 tablet Oral Daily    piperacillin-tazobactam (Zosyn) IV (PEDS and ADULTS) (extended infusion is not appropriate)  4.5 g Intravenous Q8H    senna-docusate 8.6-50 mg  2 tablet Oral Daily    sucralfate  1 g Oral Q6H    thiamine  100 mg Oral Daily     PRN Meds:LIDOcaine (PF) 10 mg/ml (1%), morphine, ondansetron, sodium chloride 0.9%     Review of patient's allergies indicates:   Allergen Reactions    Baclofen Other (See Comments)     Vomiting, confusion, shaking     Objective:     Vital Signs (Most Recent):  Temp: 98.5 °F (36.9 °C) (11/09/23 0736)  Pulse: 81 (11/09/23 0736)  Resp: 18 (Simultaneous filing. User may not have seen previous data.) (11/09/23 0736)  BP: 120/68 (11/09/23 0736)  SpO2: (!) 94 % (11/09/23 0736) Vital Signs (24h Range):  Temp:  [98.1 °F (36.7 °C)-98.9 °F (37.2 °C)] 98.5 °F (36.9 °C)  Pulse:  [76-97] 81  Resp:  [14-18] 18  SpO2:  [94 %-100 %] 94 %  BP: (109-178)/(54-77) 120/68     Weight: 41.8 kg (92 lb 2.4 oz)  Body mass index is 16.85 kg/m².    Intake/Output - Last 3 Shifts         11/07 0700  11/08 0659 11/08 0700  11/09 0659 11/09 0700  11/10 0659    P.O. 0 120     I.V. (mL/kg) 900 (20.2)      IV Piggyback 100      Total Intake(mL/kg) 1000 (22.5) 120 (2.9)     Urine (mL/kg/hr)  300 (0.3)     Drains 410 685     Total Output 410 985     Net +590 -865                     Physical Exam  Vitals reviewed.   Constitutional:       Comments: Thin, chronically ill-appearing   HENT:      Head: Normocephalic and atraumatic.   Eyes:      Conjunctiva/sclera: Conjunctivae normal.   Cardiovascular:      Rate and Rhythm: Normal rate.   Pulmonary:      Effort: Pulmonary effort is normal.   Abdominal:      General: There is no distension.       Palpations: Abdomen is soft. There is no mass.      Tenderness: There is no abdominal tenderness.      Hernia: No hernia is present.      Comments: Incisions clean, dry, intact.  HUMAIRA drain serous, IR drain serous/turbid   Skin:     General: Skin is warm and dry.   Neurological:      General: No focal deficit present.      Mental Status: She is alert.   Psychiatric:         Mood and Affect: Mood normal.         Behavior: Behavior normal.          Significant Labs:  I have reviewed all pertinent lab results within the past 24 hours.  CBC:   Recent Labs   Lab 11/09/23  0531   WBC 15.47*   RBC 3.25*   HGB 9.3*   HCT 29.5*   *   MCV 91   MCH 28.6   MCHC 31.5*     CMP:   Recent Labs   Lab 11/08/23  0508 11/09/23  0531   GLU 71 68*   CALCIUM 7.9* 7.7*   ALBUMIN 1.3*  --    PROT 5.8*  --     137   K 3.7 3.6   CO2 22* 24    106   BUN 15 12   CREATININE 0.5 0.5   ALKPHOS 142*  --    ALT 5*  --    AST 15  --    BILITOT 0.2  --      Microbiology Results (last 7 days)       Procedure Component Value Units Date/Time    Blood culture x two cultures. Draw prior to antibiotics. [[id number]] Collected: 11/05/23 1932    Order Status: Completed Specimen: Blood from Peripheral, Forearm, Left Updated: 11/09/23 0612     Blood Culture, Routine No Growth to date      No Growth to date      No Growth to date      No Growth to date    Narrative:      Aerobic and anaerobic    Blood culture x two cultures. Draw prior to antibiotics. [[id number]] Collected: 11/05/23 1932    Order Status: Completed Specimen: Blood from Peripheral, Forearm, Left Updated: 11/09/23 0612     Blood Culture, Routine No Growth to date      No Growth to date      No Growth to date      No Growth to date    Narrative:      Aerobic and anaerobic    Culture, Fluid  (Aerobic) [[id number]] Collected: 11/08/23 1250    Order Status: Completed Specimen: Body Fluid from Back Updated: 11/09/23 0438    Narrative:      Gluteal abscess    Gram stain [[id number]]  Collected: 11/08/23 1250    Order Status: Completed Specimen: Body Fluid from Back Updated: 11/09/23 0229     Gram Stain Result Rare WBC's      No organisms seen    Narrative:      Gluteal abscess    Culture, Anaerobe [[id number]] Collected: 11/08/23 1250    Order Status: Sent Specimen: Body Fluid from Back Updated: 11/08/23 7484            Significant Diagnostics:  I have reviewed all pertinent imaging results/findings within the past 24 hours.  CT: I have reviewed all pertinent results/findings within the past 24 hours and my personal findings are:  Mild uncomplicated perforated diverticulitis, constipation, colitis    Impression:     1. Right pelvic abscess.  2. Uncomplicated perforated sigmoid colon diverticulitis and low-grade colonic obstruction.  Sigmoidoscopy is recommended following resolution to assess for potential underlying mass.  3. Gas in the anterior peritoneal cavity may be related to prior robotic surgery and drain placement.; However this may also be related to the perforated diverticulitis.  Close clinical follow-up is recommended.  4. Persistent small fluid and gas collection with adjacent drain in the cholecystectomy bed.  5. Finding in the right inguinal canal which may represent inflamed fluid or suppurative lymph node.  Attention on follow-up is recommended.  6. Mild bibasilar atelectasis and adjacent pleural effusions.    Assessment/Plan:     * Diverticulitis of colon with perforation  Found on IV CT scan on 11/8/23- as well as colitis    -- continue abx  -- no plans for surgical intervention in uncomplicated perforated diverticulitis in this frail, medically complicated patient  -- ok for diet  -- will need colonoscopy 6-8 weeks following resolution of diverticulitis (last scope was at age 40)    Drug-induced constipation  Avoid aggressive bowel regimen with colitis and diverticulitis   -- continue Miralax       Leukocytosis  WBC improving.  Likely 2/2 perforated diverticulitis and  colitis.      -- continue Zosyn  -- defer to primary team     Abdominal pain  POD #12- s/p robotic cholecystectomy for gangrenous perforated cholecystitis.  Abdominal pain unrelated to cholecystectomy, likely 2/2 perforated diverticulitis.      Fluid in pelvis infected ascites vs abscess from diverticulitis.  IR drain non-feculent in appearance.      -- plan to d/c HUMAIRA drain if ok with medicine prior to discharge   -- remainder of care as per primary team   -- agree w/ abx     Nausea and vomiting  Likely likely 2/2 perforated diverticulitis.  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    -- defer to primary team     HTN (hypertension)  Defer to primary team         Valentina De La Rosa MD  General Surgery  [facility]

## 2023-11-09 NOTE — PHYSICIAN QUERY
PT Name: Lakisha Vance  MR #: [id number]     DOCUMENTATION CLARIFICATION     CDS/: Luz Overton               Contact information:[email or web address]  This form is a permanent document in the medical record.    Query Date: November 9, 2023    By submitting this query, we are merely seeking further clarification of documentation to reflect the severity of illness of your patient. Please utilize your independent clinical judgment when addressing the question(s) below.    The Medical Record reflects the following:     Indicators   Supporting Clinical Findings Location in Medical Record   x Lab Value(s)  11/05/23 19:28 11/06/23 08:31 11/07/23 07:48 11/08/23 05:08 11/09/23 05:31   Potassium 2.9  2.9  4.1 3.7 3.6    Labs 11/05 - 11/09   x Treatment/Medication Potassium chloride SA CR tablet 40 meq x 2 doses given on 11/06   MAR    Other       Provider, please specify the diagnosis or diagnoses that correspond(s) to the above indicators. Flako all that apply:    [   x] Hypokalemia   [   ] Other electrolyte disturbance (please specify): __________   [   ]  Clinically Undetermined       Please document in your progress notes daily for the duration of treatment until resolved, and include in your discharge summary.

## 2023-11-09 NOTE — PLAN OF CARE
11/09/23 1300   Discharge Reassessment   Assessment Type Discharge Planning Reassessment   Did the patient's condition or plan change since previous assessment? No   Discharge Plan discussed with:   (Attending MD, Dr. Hidalgo)   Communicated ANDREAS with patient/caregiver Date not available/Unable to determine   Discharge Plan A Home with family   DME Needed Upon Discharge  none   Transition of Care Barriers None   Why the patient remains in the hospital Requires continued medical care   Post-Acute Status   Discharge Delays None known at this time       Assessment/Plan:      Sepsis  This patient does have evidence of infective focus  My overall impression is sepsis.  Source: Abdominal  Antibiotics given-             Antibiotics (72h ago, onward)     Start     Stop Route Frequency Ordered     11/06/23 1000   piperacillin-tazobactam (ZOSYN) 4.5 g in dextrose 5 % in water (D5W) 100 mL IVPB (MB+)         -- IV Every 8 hours (non-standard times) 11/06/23 0856          Latest lactate reviewed-       Recent Labs   Lab 11/05/23  1928 11/05/23  2349   LACTATE 1.4 1.9      CT A/P with right pelvic abscess, perforated diverticulitis - Surgery following  S/p IR drainage of right pelvic abscess (11-8-23)  Source control achieved by: IV abx, continue Zosyn, abx day # 3  Pelvic abscess culture pending, blood cultures NGTD     Pelvic fluid collection  Patient c/o right lower abdominal and groin pain  CT with perforated diverticulitis and right pelvic abscess  Plan as above     S/P laparoscopic cholecystectomy  s/p robotic cholecystectomy with gangrenous perforated GB - 10/28/23  Right sided abdominal drain in place  CT with with noted post-operative findings and right pelvic fluid collection  Management per Surgery     Alcoholic cirrhosis of liver with ascites  Co-morbidities are present and inclusive of portal hypertension and malnutrition.  MELD-Na score calculated; MELD 3.0: 12 at 11/7/2023  7:48 AM  MELD-Na: 7 at 11/7/2023  7:48  AM  Calculated from:  Serum Creatinine: 0.6 mg/dL (Using min of 1 mg/dL) at 11/7/2023  7:48 AM  Serum Sodium: 138 mmol/L (Using max of 137 mmol/L) at 11/7/2023  7:48 AM  Total Bilirubin: 0.2 mg/dL (Using min of 1 mg/dL) at 11/7/2023  7:48 AM  Serum Albumin: 1.3 g/dL (Using min of 1.5 g/dL) at 11/7/2023  7:48 AM  INR(ratio): 1.1 at 11/6/2023  4:00 PM  Age at listing (hypothetical): 63 years  Sex: Female at 11/7/2023  7:48 AM        Continue chronic meds. Etiology likely ETOH. Will avoid any hepatotoxic meds, and monitor CBC/CMP/INR for synthetic function.      HTN (hypertension)  Chronic, controlled. Latest blood pressure and vitals reviewed-      Temp:  [97.1 °F (36.2 °C)-99.4 °F (37.4 °C)]   Pulse:  [85-93]   Resp:  [16-20]   BP: ()/(52-66)   SpO2:  [94 %-96 %] .   Home meds for hypertension were reviewed and noted below.        Hypertension Medications                 furosemide (LASIX) 40 MG tablet Take 1 tablet (40 mg total) by mouth once daily.     spironolactone (ALDACTONE) 50 MG tablet Take 1 tablet (50 mg total) by mouth 2 (two) times daily.     spironolactone (ALDACTONE) 50 MG tablet Take 1 tablet (50 mg total) by mouth once daily.             While in the hospital, will manage blood pressure as follows; Adjust home antihypertensive regimen as follows- hold home meds - BP marginal     Severe protein-calorie malnutrition  Nutrition consulted. Most recent weight and BMI monitored-      Measurements:      Wt Readings from Last 1 Encounters:   11/07/23 44.5 kg (98 lb)   Body mass index is 17.92 kg/m².     Interventions/Recommendations (treatment strategy):  1. Recommend a Minced and Moist diet, per SLP recommendation 2. Recommend Boost Plus TID 3.Recommend Jose David BID 4. Daily weight 5. Meal set/assistance/encourangement TID            VTE Risk Mitigation (From admission, onward)           Ordered       IP VTE HIGH RISK PATIENT  Once         11/06/23 0856       Place sequential compression device  Until  discontinued         11/06/23 0856                    Discharge Planning   ANDREAS:      Code Status: Full Code   Is the patient medically ready for discharge?:     Reason for patient still in hospital (select all that apply): Patient trending condition, Laboratory test, Treatment and Consult recommendations  Discharge Plan A: Home

## 2023-11-10 LAB
ANION GAP SERPL CALC-SCNC: 10 MMOL/L (ref 8–16)
ANISOCYTOSIS BLD QL SMEAR: SLIGHT
BASOPHILS # BLD AUTO: 0.14 K/UL (ref 0–0.2)
BASOPHILS NFR BLD: 1.1 % (ref 0–1.9)
BUN SERPL-MCNC: 11 MG/DL (ref 8–23)
CALCIUM SERPL-MCNC: 7.6 MG/DL (ref 8.7–10.5)
CHLORIDE SERPL-SCNC: 106 MMOL/L (ref 95–110)
CO2 SERPL-SCNC: 22 MMOL/L (ref 23–29)
CREAT SERPL-MCNC: 0.6 MG/DL (ref 0.5–1.4)
DACRYOCYTES BLD QL SMEAR: ABNORMAL
DIFFERENTIAL METHOD: ABNORMAL
EOSINOPHIL # BLD AUTO: 0.3 K/UL (ref 0–0.5)
EOSINOPHIL NFR BLD: 2.4 % (ref 0–8)
ERYTHROCYTE [DISTWIDTH] IN BLOOD BY AUTOMATED COUNT: 13.2 % (ref 11.5–14.5)
EST. GFR  (NO RACE VARIABLE): >60 ML/MIN/1.73 M^2
GLUCOSE SERPL-MCNC: 86 MG/DL (ref 70–110)
HCT VFR BLD AUTO: 32.7 % (ref 37–48.5)
HGB BLD-MCNC: 10.4 G/DL (ref 12–16)
IMM GRANULOCYTES # BLD AUTO: 0.04 K/UL (ref 0–0.04)
IMM GRANULOCYTES NFR BLD AUTO: 0.3 % (ref 0–0.5)
LYMPHOCYTES # BLD AUTO: 4 K/UL (ref 1–4.8)
LYMPHOCYTES NFR BLD: 31 % (ref 18–48)
MAGNESIUM SERPL-MCNC: 2.3 MG/DL (ref 1.6–2.6)
MCH RBC QN AUTO: 29.1 PG (ref 27–31)
MCHC RBC AUTO-ENTMCNC: 31.8 G/DL (ref 32–36)
MCV RBC AUTO: 91 FL (ref 82–98)
MONOCYTES # BLD AUTO: 1 K/UL (ref 0.3–1)
MONOCYTES NFR BLD: 7.9 % (ref 4–15)
NEUTROPHILS # BLD AUTO: 7.5 K/UL (ref 1.8–7.7)
NEUTROPHILS NFR BLD: 57.3 % (ref 38–73)
NRBC BLD-RTO: 0 /100 WBC
PLATELET # BLD AUTO: 763 K/UL (ref 150–450)
PLATELET BLD QL SMEAR: ABNORMAL
PMV BLD AUTO: 8.7 FL (ref 9.2–12.9)
POTASSIUM SERPL-SCNC: 3.6 MMOL/L (ref 3.5–5.1)
RBC # BLD AUTO: 3.58 M/UL (ref 4–5.4)
SODIUM SERPL-SCNC: 138 MMOL/L (ref 136–145)
WBC # BLD AUTO: 12.98 K/UL (ref 3.9–12.7)

## 2023-11-10 PROCEDURE — 25000003 PHARM REV CODE 250: Performed by: SURGERY

## 2023-11-10 PROCEDURE — 99231 PR SUBSEQUENT HOSPITAL CARE,LEVL I: ICD-10-PCS | Mod: 24,,, | Performed by: SURGERY

## 2023-11-10 PROCEDURE — 63600175 PHARM REV CODE 636 W HCPCS: Performed by: SURGERY

## 2023-11-10 PROCEDURE — 83735 ASSAY OF MAGNESIUM: CPT | Performed by: HOSPITALIST

## 2023-11-10 PROCEDURE — 99231 SBSQ HOSP IP/OBS SF/LOW 25: CPT | Mod: 24,,, | Performed by: SURGERY

## 2023-11-10 PROCEDURE — 80048 BASIC METABOLIC PNL TOTAL CA: CPT | Performed by: HOSPITALIST

## 2023-11-10 PROCEDURE — 36415 COLL VENOUS BLD VENIPUNCTURE: CPT | Performed by: HOSPITALIST

## 2023-11-10 PROCEDURE — 11000001 HC ACUTE MED/SURG PRIVATE ROOM

## 2023-11-10 PROCEDURE — 85025 COMPLETE CBC W/AUTO DIFF WBC: CPT | Performed by: HOSPITALIST

## 2023-11-10 RX ORDER — LIDOCAINE HYDROCHLORIDE 20 MG/ML
JELLY TOPICAL ONCE
Status: COMPLETED | OUTPATIENT
Start: 2023-11-10 | End: 2023-11-10

## 2023-11-10 RX ORDER — LIDOCAINE HYDROCHLORIDE 10 MG/ML
10 INJECTION, SOLUTION EPIDURAL; INFILTRATION; INTRACAUDAL; PERINEURAL ONCE
Status: DISCONTINUED | OUTPATIENT
Start: 2023-11-10 | End: 2023-11-13 | Stop reason: HOSPADM

## 2023-11-10 RX ORDER — MORPHINE SULFATE 2 MG/ML
1 INJECTION, SOLUTION INTRAMUSCULAR; INTRAVENOUS ONCE
Status: COMPLETED | OUTPATIENT
Start: 2023-11-10 | End: 2023-11-10

## 2023-11-10 RX ADMIN — THIAMINE HCL TAB 100 MG 100 MG: 100 TAB at 09:11

## 2023-11-10 RX ADMIN — MORPHINE SULFATE 4 MG: 4 INJECTION INTRAVENOUS at 08:11

## 2023-11-10 RX ADMIN — SUCRALFATE 1 G: 1 SUSPENSION ORAL at 12:11

## 2023-11-10 RX ADMIN — PIPERACILLIN SODIUM AND TAZOBACTAM SODIUM 4.5 G: 4; .5 INJECTION, POWDER, FOR SOLUTION INTRAVENOUS at 09:11

## 2023-11-10 RX ADMIN — PIPERACILLIN SODIUM AND TAZOBACTAM SODIUM 4.5 G: 4; .5 INJECTION, POWDER, FOR SOLUTION INTRAVENOUS at 05:11

## 2023-11-10 RX ADMIN — ALUMINUM HYDROXIDE, MAGNESIUM HYDROXIDE, AND DIMETHICONE 30 ML: 200; 20; 200 SUSPENSION ORAL at 09:11

## 2023-11-10 RX ADMIN — SODIUM CHLORIDE, POTASSIUM CHLORIDE, SODIUM LACTATE AND CALCIUM CHLORIDE: 600; 310; 30; 20 INJECTION, SOLUTION INTRAVENOUS at 06:11

## 2023-11-10 RX ADMIN — MORPHINE SULFATE 4 MG: 4 INJECTION INTRAVENOUS at 03:11

## 2023-11-10 RX ADMIN — ONDANSETRON 4 MG: 2 INJECTION INTRAMUSCULAR; INTRAVENOUS at 06:11

## 2023-11-10 RX ADMIN — ALUMINUM HYDROXIDE, MAGNESIUM HYDROXIDE, AND DIMETHICONE 30 ML: 200; 20; 200 SUSPENSION ORAL at 06:11

## 2023-11-10 RX ADMIN — MORPHINE SULFATE 4 MG: 4 INJECTION INTRAVENOUS at 02:11

## 2023-11-10 RX ADMIN — ALUMINUM HYDROXIDE, MAGNESIUM HYDROXIDE, AND DIMETHICONE 30 ML: 200; 20; 200 SUSPENSION ORAL at 04:11

## 2023-11-10 RX ADMIN — ONDANSETRON 4 MG: 2 INJECTION INTRAMUSCULAR; INTRAVENOUS at 03:11

## 2023-11-10 RX ADMIN — LIDOCAINE HYDROCHLORIDE: 20 JELLY TOPICAL at 12:11

## 2023-11-10 RX ADMIN — SUCRALFATE 1 G: 1 SUSPENSION ORAL at 06:11

## 2023-11-10 RX ADMIN — SUCRALFATE 1 G: 1 SUSPENSION ORAL at 05:11

## 2023-11-10 RX ADMIN — ONDANSETRON 4 MG: 2 INJECTION INTRAMUSCULAR; INTRAVENOUS at 09:11

## 2023-11-10 RX ADMIN — Medication 1 TABLET: at 09:11

## 2023-11-10 RX ADMIN — MORPHINE SULFATE 1 MG: 2 INJECTION, SOLUTION INTRAMUSCULAR; INTRAVENOUS at 01:11

## 2023-11-10 RX ADMIN — MORPHINE SULFATE 4 MG: 4 INJECTION INTRAVENOUS at 10:11

## 2023-11-10 RX ADMIN — MIRTAZAPINE 15 MG: 15 TABLET, FILM COATED ORAL at 09:11

## 2023-11-10 RX ADMIN — ALUMINUM HYDROXIDE, MAGNESIUM HYDROXIDE, AND DIMETHICONE 30 ML: 200; 20; 200 SUSPENSION ORAL at 10:11

## 2023-11-10 RX ADMIN — PIPERACILLIN SODIUM AND TAZOBACTAM SODIUM 4.5 G: 4; .5 INJECTION, POWDER, FOR SOLUTION INTRAVENOUS at 01:11

## 2023-11-10 RX ADMIN — THERA TABS 1 TABLET: TAB at 10:11

## 2023-11-10 RX ADMIN — MORPHINE SULFATE 4 MG: 4 INJECTION INTRAVENOUS at 06:11

## 2023-11-10 RX ADMIN — SENNOSIDES AND DOCUSATE SODIUM 2 TABLET: 8.6; 5 TABLET ORAL at 09:11

## 2023-11-10 NOTE — SUBJECTIVE & OBJECTIVE
Interval History: AFVSS.  No acute events.  Feeling well. Had several Bms today     Medications:  Continuous Infusions:   lactated ringers 75 mL/hr at 11/09/23 0742     Scheduled Meds:   aluminum-magnesium hydroxide-simethicone  30 mL Oral QID (AC & HS)    folic acid-vit B6-vit B12 2.5-25-2 mg  1 tablet Oral Daily    LIDOcaine (PF) 10 mg/ml (1%)  10 mL Other Once    mirtazapine  15 mg Oral QHS    multivitamin  1 tablet Oral Daily    piperacillin-tazobactam (Zosyn) IV (PEDS and ADULTS) (extended infusion is not appropriate)  4.5 g Intravenous Q8H    senna-docusate 8.6-50 mg  2 tablet Oral Daily    sucralfate  1 g Oral Q6H    thiamine  100 mg Oral Daily     PRN Meds:LIDOcaine (PF) 10 mg/ml (1%), morphine, ondansetron, sodium chloride 0.9%     Review of patient's allergies indicates:   Allergen Reactions    Baclofen Other (See Comments)     Vomiting, confusion, shaking     Objective:     Vital Signs (Most Recent):  Temp: 98.8 °F (37.1 °C) (11/10/23 1315)  Pulse: 80 (11/10/23 1158)  Resp: 16 (11/10/23 1523)  BP: 120/71 (11/10/23 1151)  SpO2: 97 % (11/10/23 1151) Vital Signs (24h Range):  Temp:  [97.2 °F (36.2 °C)-98.8 °F (37.1 °C)] 98.8 °F (37.1 °C)  Pulse:  [79-91] 80  Resp:  [16-19] 16  SpO2:  [95 %-98 %] 97 %  BP: (105-120)/(62-74) 120/71     Weight: 41.8 kg (92 lb 2.4 oz)  Body mass index is 16.85 kg/m².    Intake/Output - Last 3 Shifts         11/08 0700  11/09 0659 11/09 0700  11/10 0659 11/10 0700  11/11 0659    P.O. 120 240     I.V. (mL/kg)       IV Piggyback       Total Intake(mL/kg) 120 (2.9) 240 (5.7)     Urine (mL/kg/hr) 300 (0.3) 0 (0)     Drains 685 800 300    Stool  0 0    Total Output 985 800 300    Net -865 -560 -300           Urine Occurrence  3 x     Stool Occurrence  1 x 1 x             Physical Exam  Constitutional:       Comments: Chronically frail appearing    HENT:      Mouth/Throat:      Mouth: Mucous membranes are moist.   Cardiovascular:      Rate and Rhythm: Normal rate.   Pulmonary:       Effort: Pulmonary effort is normal.   Abdominal:      General: Abdomen is flat.      Palpations: Abdomen is soft.      Comments: IR drain dark yellow, HUMAIRA drain serous ascites    Skin:     General: Skin is warm and dry.   Neurological:      General: No focal deficit present.      Mental Status: She is alert.          Significant Labs:  I have reviewed all pertinent lab results within the past 24 hours.  CBC:   Recent Labs   Lab 11/10/23  0646   WBC 12.98*   RBC 3.58*   HGB 10.4*   HCT 32.7*   *   MCV 91   MCH 29.1   MCHC 31.8*     BMP:   Recent Labs   Lab 11/10/23  0646   GLU 86      K 3.6      CO2 22*   BUN 11   CREATININE 0.6   CALCIUM 7.6*   MG 2.3     Microbiology Results (last 7 days)       Procedure Component Value Units Date/Time    Culture, Fluid  (Aerobic) [[id number]] Collected: 11/08/23 1250    Order Status: Completed Specimen: Body Fluid from Back Updated: 11/10/23 0725     AEROBIC CULTURE - FLUID No growth    Narrative:      Gluteal abscess    Blood culture x two cultures. Draw prior to antibiotics. [[id number]] Collected: 11/05/23 1932    Order Status: Completed Specimen: Blood from Peripheral, Forearm, Left Updated: 11/10/23 0612     Blood Culture, Routine No Growth to date      No Growth to date      No Growth to date      No Growth to date      No Growth to date    Narrative:      Aerobic and anaerobic    Blood culture x two cultures. Draw prior to antibiotics. [[id number]] Collected: 11/05/23 1932    Order Status: Completed Specimen: Blood from Peripheral, Forearm, Left Updated: 11/10/23 0612     Blood Culture, Routine No Growth to date      No Growth to date      No Growth to date      No Growth to date      No Growth to date    Narrative:      Aerobic and anaerobic    Culture, Anaerobe [[id number]] Collected: 11/08/23 1250    Order Status: Completed Specimen: Body Fluid from Back Updated: 11/10/23 0608     Anaerobic Culture Culture in progress    Narrative:      Gluteal abscess     Gram stain [[id number]] Collected: 11/08/23 1250    Order Status: Completed Specimen: Body Fluid from Back Updated: 11/09/23 0229     Gram Stain Result Rare WBC's      No organisms seen    Narrative:      Gluteal abscess          Specimen (24h ago, onward)      None            Significant Diagnostics:  I have reviewed all pertinent imaging results/findings within the past 24 hours.

## 2023-11-10 NOTE — PLAN OF CARE
Call button within reach. PRN pain medication given per MD order. PRN nausea medication given per MD order.   Problem: Adult Inpatient Plan of Care  Goal: Plan of Care Review  Outcome: Ongoing, Progressing  Goal: Patient-Specific Goal (Individualized)  Outcome: Ongoing, Progressing  Goal: Absence of Hospital-Acquired Illness or Injury  Outcome: Ongoing, Progressing  Goal: Optimal Comfort and Wellbeing  Outcome: Ongoing, Progressing  Goal: Readiness for Transition of Care  Outcome: Ongoing, Progressing     Problem: Skin Injury Risk Increased  Goal: Skin Health and Integrity  Outcome: Ongoing, Progressing     Problem: Fall Injury Risk  Goal: Absence of Fall and Fall-Related Injury  Outcome: Ongoing, Progressing     Problem: Infection Progression (Sepsis/Septic Shock)  Goal: Absence of Infection Signs and Symptoms  Outcome: Ongoing, Progressing     Problem: Pain Acute  Goal: Acceptable Pain Control and Functional Ability  Outcome: Ongoing, Progressing     Problem: Nausea and Vomiting  Goal: Fluid and Electrolyte Balance  Outcome: Ongoing, Progressing

## 2023-11-10 NOTE — ASSESSMENT & PLAN NOTE
"This patient does have evidence of infective focus  My overall impression is sepsis.  Source: Abdominal  Antibiotics given-   Antibiotics (72h ago, onward)    Start     Stop Route Frequency Ordered    11/06/23 1000  piperacillin-tazobactam (ZOSYN) 4.5 g in dextrose 5 % in water (D5W) 100 mL IVPB (MB+)         -- IV Every 8 hours (non-standard times) 11/06/23 0856        Latest lactate reviewed-  No results for input(s): "LACTATE" in the last 72 hours.  CT A/P with right pelvic abscess, perforated diverticulitis - Surgery following  S/p IR drainage of right pelvic abscess (11-8-23)  Source control achieved by: IV abx, continue Zosyn, abx day # 6  Pelvic abscess culture pending, blood cultures NGTD  "

## 2023-11-10 NOTE — PROGRESS NOTES
[facility]  General Surgery  Progress Note    Subjective:     History of Present Illness:  Ms. Martin is a 63-year-old female with a history of hypertension and cirrhosis of the liver status post robotic cholecystectomy on 10/28/2023 for gangrenous cholecystitis.  She was discharged on 11/02/2023 in stable condition with her HUMAIRA drain in place and on antibiotics.  She states that she was doing well overall following her discharge up until yesterday morning at which point in time she developed sudden onset lower abdominal pain sharp in nature and intermittent, as well as nausea, vomiting, fevers, and chills.  She attempted to take her Zofran however did not have any relief as she states she had vomiting it up.  She called EMS who brought her to the emergency department.  Per EMS she did have coffee-ground emesis however she maintains that it was not coffee-ground but was yellow in color.  Upon arrival to the emergency department her vital signs were all within normal limits.  She did develop a T-max of a 100.1° overnight at which point in time she also became mildly tachycardic up to 103.  CT scan was performed without contrast which shows a small amount of non organized fluid in the right upper quadrant with the drain in place and a fluid collection in the pelvis.  Otherwise there were no abnormal findings on her CT scan.  She was noted to have a leukocytosis up to 44,000 as well as thrombocytosis.  Blood cultures were performed and are still pending and she was admitted for further workup and management.  Currently she states that the morphine and the Zofran IV are helping with her nausea and her pain she still intermittently has lower abdominal pain and overall feels quite weak still.      Post-Op Info:  * No surgery found *         Interval History: AFVSS.  No acute events.  Feeling well. Had several Bms today     Medications:  Continuous Infusions:   lactated ringers 75 mL/hr at 11/09/23 0742     Scheduled  Meds:   aluminum-magnesium hydroxide-simethicone  30 mL Oral QID (AC & HS)    folic acid-vit B6-vit B12 2.5-25-2 mg  1 tablet Oral Daily    LIDOcaine (PF) 10 mg/ml (1%)  10 mL Other Once    mirtazapine  15 mg Oral QHS    multivitamin  1 tablet Oral Daily    piperacillin-tazobactam (Zosyn) IV (PEDS and ADULTS) (extended infusion is not appropriate)  4.5 g Intravenous Q8H    senna-docusate 8.6-50 mg  2 tablet Oral Daily    sucralfate  1 g Oral Q6H    thiamine  100 mg Oral Daily     PRN Meds:LIDOcaine (PF) 10 mg/ml (1%), morphine, ondansetron, sodium chloride 0.9%     Review of patient's allergies indicates:   Allergen Reactions    Baclofen Other (See Comments)     Vomiting, confusion, shaking     Objective:     Vital Signs (Most Recent):  Temp: 98.8 °F (37.1 °C) (11/10/23 1315)  Pulse: 80 (11/10/23 1158)  Resp: 16 (11/10/23 1523)  BP: 120/71 (11/10/23 1151)  SpO2: 97 % (11/10/23 1151) Vital Signs (24h Range):  Temp:  [97.2 °F (36.2 °C)-98.8 °F (37.1 °C)] 98.8 °F (37.1 °C)  Pulse:  [79-91] 80  Resp:  [16-19] 16  SpO2:  [95 %-98 %] 97 %  BP: (105-120)/(62-74) 120/71     Weight: 41.8 kg (92 lb 2.4 oz)  Body mass index is 16.85 kg/m².    Intake/Output - Last 3 Shifts         11/08 0700  11/09 0659 11/09 0700  11/10 0659 11/10 0700  11/11 0659    P.O. 120 240     I.V. (mL/kg)       IV Piggyback       Total Intake(mL/kg) 120 (2.9) 240 (5.7)     Urine (mL/kg/hr) 300 (0.3) 0 (0)     Drains 685 800 300    Stool  0 0    Total Output 985 800 300    Net -865 -560 -300           Urine Occurrence  3 x     Stool Occurrence  1 x 1 x             Physical Exam  Constitutional:       Comments: Chronically frail appearing    HENT:      Mouth/Throat:      Mouth: Mucous membranes are moist.   Cardiovascular:      Rate and Rhythm: Normal rate.   Pulmonary:      Effort: Pulmonary effort is normal.   Abdominal:      General: Abdomen is flat.      Palpations: Abdomen is soft.      Comments: IR drain dark yellow, HUMAIRA drain serous  ascites    Skin:     General: Skin is warm and dry.   Neurological:      General: No focal deficit present.      Mental Status: She is alert.          Significant Labs:  I have reviewed all pertinent lab results within the past 24 hours.  CBC:   Recent Labs   Lab 11/10/23  0646   WBC 12.98*   RBC 3.58*   HGB 10.4*   HCT 32.7*   *   MCV 91   MCH 29.1   MCHC 31.8*     BMP:   Recent Labs   Lab 11/10/23  0646   GLU 86      K 3.6      CO2 22*   BUN 11   CREATININE 0.6   CALCIUM 7.6*   MG 2.3     Microbiology Results (last 7 days)       Procedure Component Value Units Date/Time    Culture, Fluid  (Aerobic) [[id number]] Collected: 11/08/23 1250    Order Status: Completed Specimen: Body Fluid from Back Updated: 11/10/23 0725     AEROBIC CULTURE - FLUID No growth    Narrative:      Gluteal abscess    Blood culture x two cultures. Draw prior to antibiotics. [[id number]] Collected: 11/05/23 1932    Order Status: Completed Specimen: Blood from Peripheral, Forearm, Left Updated: 11/10/23 0612     Blood Culture, Routine No Growth to date      No Growth to date      No Growth to date      No Growth to date      No Growth to date    Narrative:      Aerobic and anaerobic    Blood culture x two cultures. Draw prior to antibiotics. [[id number]] Collected: 11/05/23 1932    Order Status: Completed Specimen: Blood from Peripheral, Forearm, Left Updated: 11/10/23 0612     Blood Culture, Routine No Growth to date      No Growth to date      No Growth to date      No Growth to date      No Growth to date    Narrative:      Aerobic and anaerobic    Culture, Anaerobe [[id number]] Collected: 11/08/23 1250    Order Status: Completed Specimen: Body Fluid from Back Updated: 11/10/23 0608     Anaerobic Culture Culture in progress    Narrative:      Gluteal abscess    Gram stain [[id number]] Collected: 11/08/23 1250    Order Status: Completed Specimen: Body Fluid from Back Updated: 11/09/23 0229     Gram Stain Result Rare WBC's       No organisms seen    Narrative:      Gluteal abscess          Specimen (24h ago, onward)      None            Significant Diagnostics:  I have reviewed all pertinent imaging results/findings within the past 24 hours.    Assessment/Plan:     * Diverticulitis of colon with perforation  Found on IV CT scan on 11/8/23- as well as colitis    -- continue abx  -- no plans for surgical intervention in uncomplicated perforated diverticulitis in this frail, medically complicated patient  -- ok for diet  -- will need colonoscopy 6-8 weeks following resolution of diverticulitis (last scope was at age 40)    Drug-induced constipation  Avoid aggressive bowel regimen with colitis and diverticulitis.  Has had several BMs today and is feeling improved.     Leukocytosis  WBC improving.  Likely 2/2 perforated diverticulitis and colitis.      -- continue Zosyn  -- defer to primary team     Abdominal pain  POD #13- s/p robotic cholecystectomy for gangrenous perforated cholecystitis.  Abdominal pain unrelated to cholecystectomy, likely 2/2 perforated diverticulitis.      Fluid in pelvis infected ascites vs abscess from diverticulitis.  IR drain non-feculent in appearance.      -- surgical HUMAIRA drain removed today at bedside.  Will need to f/u in clinic in 2 weeks for suture removal   -- remainder of care as per primary team   -- agree w/ abx     Nausea and vomiting  Likely likely 2/2 perforated diverticulitis.  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    -- defer to primary team     HTN (hypertension)  Defer to primary team         Valentina De La Rosa MD  General Surgery  [facility]

## 2023-11-10 NOTE — ASSESSMENT & PLAN NOTE
POD #13- s/p robotic cholecystectomy for gangrenous perforated cholecystitis.  Abdominal pain unrelated to cholecystectomy, likely 2/2 perforated diverticulitis.      Fluid in pelvis infected ascites vs abscess from diverticulitis.  IR drain non-feculent in appearance.      -- surgical HUMAIRA drain removed today at bedside.  Will need to f/u in clinic in 2 weeks for suture removal   -- remainder of care as per primary team   -- agree w/ abx

## 2023-11-10 NOTE — PLAN OF CARE
Discussed poc with pt, pt verbalized understanding    Purposeful rounding every 2hours    VS wnl  Cardiac monitoring in use, pt is NSR, tele monitor # [id number]  Fall precautions in place, remains injury free  Pain under control with PRN meds    IVFs  LR@75mL  Accurate I&Os  Abx given as prescribed  Bed locked at lowest position  Call light within reach    Chart check complete  Will cont with POC

## 2023-11-10 NOTE — PROGRESS NOTES
[facility] Medicine  Progress Note    Patient Name: Lakisha Vance  MRN: [id number]  Patient Class: IP- Inpatient   Admission Date: 11/5/2023  Length of Stay: 3 days  Attending Physician: Elías Hidalgo MD  Primary Care Provider: Pranav Gonzalez MD        Subjective:     Principal Problem:Diverticulitis of colon with perforation        HPI:  64 y/o female with PMHx of HTN, alcoholic liver cirrhosis (EGD Sept '23 with reflux esophagitis, gastritis, portal hypertensive gastropathy) who recently underwent laproscopic cholecystectomy for gangrenous cholecystitis with RUQ drain placement on 10-28-23 who presented to the ER on 11-5-23 for abdominal pain, nausea/vomiting, fevers/chills. Patient discharged on 11/02/2023 in stable condition with her HUMAIRA drain in place and on antibiotics. She reports sudden onset of right sided lower abdominal and pelvis pain, associated with nausea/vomiting and subjective fevers. Patient denies vomiting blood or coffee ground emesis, denies chest pain, SOB or any other complaints. In ER, patient with temp 100.1 F, tachycardic, WBC 44k; blood cultures were obtained and started on Zosyn. UA pending. CT scan noted small amount of fluid in RUQ with drain in place and fluid collection in pelvis. Patient admitted by General Surgery this AM, plans for IR drainage of pelvic fluid collection. Hospital Medicine consulted to assist with medical management.      Overview/Hospital Course:  11/7  NAEON, patient reports lower quadrant pain,   CT A/P with small amount of fluid noted in right pelvis  Discussed with IR, plan for CT A/P with contrast in AM, possible drainage  Continue Zosyn, WBC trending down, temp curve improving    11/8  CT A/P with noted right pelvic abscess, perforated sigmoid colon diverticulitis and low grade colonic obstruction  Patient s/p IR drain placement for right pelvic abscess this afternoon; fluid cultures obtained  Continue Zosyn, WBC trending down, pain  control    11/9  NAEON, patient reports abdominal pain better controlled today, denies nausea, chest pain, reports +BM  RLQ drain in place with purulent drainage  Continue Zosyn, WBC trending down, cultures pending  RUQ drain from recent cholecystectomy in place, plans for removal per Surgery    11/10  NAEON, surgical HUMAIRA drain removed today by Surgery, f/u in 2 weeks outpatient for suture removal.  RLQ drain for pelvic abscess with purulent drainage, 300 cc  Continue Zosyn, WBC trending down, cultures pending        Review of Systems   All other systems reviewed and are negative.    Objective:     Vital Signs (Most Recent):  Temp: 98.4 °F (36.9 °C) (11/10/23 1610)  Pulse: 87 (11/10/23 1610)  Resp: 17 (11/10/23 1610)  BP: 113/69 (11/10/23 1610)  SpO2: 98 % (11/10/23 1610) Vital Signs (24h Range):  Temp:  [97.2 °F (36.2 °C)-98.8 °F (37.1 °C)] 98.4 °F (36.9 °C)  Pulse:  [79-91] 87  Resp:  [16-19] 17  SpO2:  [95 %-98 %] 98 %  BP: (105-120)/(62-74) 113/69     Weight: 41.8 kg (92 lb 2.4 oz)  Body mass index is 16.85 kg/m².    Intake/Output Summary (Last 24 hours) at 11/10/2023 1618  Last data filed at 11/10/2023 1004  Gross per 24 hour   Intake 240 ml   Output 900 ml   Net -660 ml           Physical Exam  Vitals and nursing note reviewed.   Constitutional:       General: She is not in acute distress.     Appearance: Normal appearance. She is normal weight. She is ill-appearing.   Cardiovascular:      Rate and Rhythm: Normal rate and regular rhythm.      Heart sounds: No murmur heard.  Pulmonary:      Effort: Pulmonary effort is normal. No respiratory distress.      Breath sounds: No wheezing.   Abdominal:      General: There is distension.      Tenderness: There is abdominal tenderness.      Comments: RLQ drain (right pelvic abscess)   Neurological:      General: No focal deficit present.      Mental Status: She is alert and oriented to person, place, and time.   Psychiatric:         Mood and Affect: Mood normal.          Behavior: Behavior normal.             Significant Labs: All pertinent labs within the past 24 hours have been reviewed.  Recent Lab Results         11/10/23  0646        Anion Gap 10       Aniso Slight       Baso # 0.14       Basophil % 1.1       BUN 11       Calcium 7.6       Chloride 106       CO2 22       Creatinine 0.6       Differential Method Automated       eGFR >60       Eos # 0.3       Eosinophil % 2.4       Glucose 86       Gran # (ANC) 7.5       Gran % 57.3       Hematocrit 32.7       Hemoglobin 10.4       Immature Grans (Abs) 0.04  Comment: Mild elevation in immature granulocytes is non specific and   can be seen in a variety of conditions including stress response,   acute inflammation, trauma and pregnancy. Correlation with other   laboratory and clinical findings is essential.         Immature Granulocytes 0.3       Lymph # 4.0       Lymph % 31.0       Magnesium  2.3       MCH 29.1       MCHC 31.8       MCV 91       Mono # 1.0       Mono % 7.9       MPV 8.7       nRBC 0       Platelet Estimate Increased       Platelet Count 763       Potassium 3.6       RBC 3.58       RDW 13.2       Sodium 138       Teardrop Cells Occasional       WBC 12.98               Significant Imaging: I have reviewed all pertinent imaging results/findings within the past 24 hours.    IR Abscess Drainage Peritoneal   Final Result      Technically successful CT guided abscess drain of a pelvic collection.      Recommend monitoring out puts and tube removal can be obtained when out puts are less than 10 mL over 24 hours and the patient has clinically improved.  Recommend flushing the catheter with 5 cc of sterile saline every shift.      All CT scans at this facility use dose modulation, iterative reconstruction, and/or weight based dosing when appropriate to reduce radiation dose to as low as reasonable achievable.         Electronically signed by: Malachi Ruiz   Date:    11/08/2023   Time:    17:29      CT Abdomen Pelvis With  IV Contrast   Final Result      1. Right pelvic abscess.   2. Uncomplicated perforated sigmoid colon diverticulitis and low-grade colonic obstruction.  Sigmoidoscopy is recommended following resolution to assess for potential underlying mass.   3. Gas in the anterior peritoneal cavity may be related to prior robotic surgery and drain placement.; However this may also be related to the perforated diverticulitis.  Close clinical follow-up is recommended.   4. Persistent small fluid and gas collection with adjacent drain in the cholecystectomy bed.   5. Finding in the right inguinal canal which may represent inflamed fluid or suppurative lymph node.  Attention on follow-up is recommended.   6. Mild bibasilar atelectasis and adjacent pleural effusions.   7. Other findings as above.   Findings regarding the perforated diverticulitis were communicated to Dr. Hidalgo and Dr. De La Rosa by epic chat at the time of making the findings.         Electronically signed by: Malachi Ruiz   Date:    11/08/2023   Time:    13:19      US Abdomen Limited   Final Result      The portal vein is patent.  Cirrhotic morphology of the liver with trace perihepatic ascites.         Electronically signed by: Bassam Stevens MD   Date:    11/06/2023   Time:    15:53      CT Abdomen Pelvis  Without Contrast   Final Result      Presumed recent operative change cholecystectomy with small fluid collection right pelvis nonspecific         Electronically signed by: Kelsea Levin   Date:    11/05/2023   Time:    21:44      X-Ray Chest AP Portable   Final Result      No acute abnormality.         Electronically signed by: Robson Panda   Date:    11/05/2023   Time:    19:46              Assessment/Plan:      Sepsis  This patient does have evidence of infective focus  My overall impression is sepsis.  Source: Abdominal  Antibiotics given-   Antibiotics (72h ago, onward)      Start     Stop Route Frequency Ordered    11/06/23 1000  piperacillin-tazobactam  "(ZOSYN) 4.5 g in dextrose 5 % in water (D5W) 100 mL IVPB (MB+)         -- IV Every 8 hours (non-standard times) 11/06/23 0856          Latest lactate reviewed-  No results for input(s): "LACTATE" in the last 72 hours.  CT A/P with right pelvic abscess, perforated diverticulitis - Surgery following  S/p IR drainage of right pelvic abscess (11-8-23)  Source control achieved by: IV abx, continue Zosyn, abx day # 6  Pelvic abscess culture pending, blood cultures NGTD    Pelvic fluid collection  Patient c/o right lower abdominal and groin pain  CT with perforated diverticulitis and right pelvic abscess  Plan as above    S/P laparoscopic cholecystectomy  s/p robotic cholecystectomy with gangrenous perforated GB - 10/28/23  Right sided abdominal drain in place  CT with with noted post-operative findings and right pelvic fluid collection  Management per Surgery    Alcoholic cirrhosis of liver with ascites  Co-morbidities are present and inclusive of portal hypertension and malnutrition.  MELD-Na score calculated; MELD 3.0: 12 at 11/7/2023  7:48 AM  MELD-Na: 7 at 11/7/2023  7:48 AM  Calculated from:  Serum Creatinine: 0.6 mg/dL (Using min of 1 mg/dL) at 11/7/2023  7:48 AM  Serum Sodium: 138 mmol/L (Using max of 137 mmol/L) at 11/7/2023  7:48 AM  Total Bilirubin: 0.2 mg/dL (Using min of 1 mg/dL) at 11/7/2023  7:48 AM  Serum Albumin: 1.3 g/dL (Using min of 1.5 g/dL) at 11/7/2023  7:48 AM  INR(ratio): 1.1 at 11/6/2023  4:00 PM  Age at listing (hypothetical): 63 years  Sex: Female at 11/7/2023  7:48 AM      Continue chronic meds. Etiology likely ETOH. Will avoid any hepatotoxic meds, and monitor CBC/CMP/INR for synthetic function.     HTN (hypertension)  Chronic, controlled. Latest blood pressure and vitals reviewed-     Temp:  [97.1 °F (36.2 °C)-99.4 °F (37.4 °C)]   Pulse:  [85-93]   Resp:  [16-20]   BP: ()/(52-66)   SpO2:  [94 %-96 %] .   Home meds for hypertension were reviewed and noted below.   Hypertension " Medications               furosemide (LASIX) 40 MG tablet Take 1 tablet (40 mg total) by mouth once daily.    spironolactone (ALDACTONE) 50 MG tablet Take 1 tablet (50 mg total) by mouth 2 (two) times daily.    spironolactone (ALDACTONE) 50 MG tablet Take 1 tablet (50 mg total) by mouth once daily.            While in the hospital, will manage blood pressure as follows; Adjust home antihypertensive regimen as follows- hold home meds - BP marginal    Severe protein-calorie malnutrition  Nutrition consulted. Most recent weight and BMI monitored-     Measurements:  Wt Readings from Last 1 Encounters:   11/07/23 44.5 kg (98 lb)   Body mass index is 17.92 kg/m².    Interventions/Recommendations (treatment strategy):  1. Recommend a Minced and Moist diet, per SLP recommendation 2. Recommend Boost Plus TID 3.Recommend Jose David BID 4. Daily weight 5. Meal set/assistance/encourangement TID      VTE Risk Mitigation (From admission, onward)           Ordered     IP VTE HIGH RISK PATIENT  Once         11/06/23 0856     Place sequential compression device  Until discontinued         11/06/23 0856                    Discharge Planning   ANDREAS:      Code Status: Full Code   Is the patient medically ready for discharge?:     Reason for patient still in hospital (select all that apply): Patient trending condition, Laboratory test, Treatment and Consult recommendations  Discharge Plan A: Home with family   Discharge Delays: None known at this time              Elías Hidalgo MD  Department of Hospital Medicine   [facility]

## 2023-11-10 NOTE — PROGRESS NOTES
[facility]  Adult Nutrition  Progress Note    SUMMARY       Recommendations    1. Recommend a Minced and Moist diet, per SLP recommendation   2. Recommend Boost Plus TID   3.Recommend Jose David BID   4. Daily weight   5. Meal set/assistance/encourangement TID    Goals:   1. Pt diet will be modified within 24 hours of SLP recommendations   2. Pt will consume greater than 50% of EEN/EPN by RD f/u.   3. Pt will maintain a stable weight by RD f/u.    Nutrition Goal Status: progressing towards goal  Communication of RD Recs:  (POC;sticky note)    Assessment and Plan    Endocrine  Severe protein-calorie malnutrition  Malnutrition Type:  Context: chronic illness, social/environmental circumstances  Level: severe    Related to (etiology):   Conditions associated with medical diagnoses: diverticulitis and liver disease    Signs and Symptoms (as evidenced by):   Weight loss  Decreased appetite/po intake  GI intolerance: nausea, vomiting, abdominal pain and constipation    Malnutrition Characteristic Summary:  Weight Loss (Malnutrition): greater than 10% in 6 months  Energy Intake (Malnutrition): less than or equal to 75% for greater than or equal to 1 month      Interventions (treatment strategy):  1. Recommend a Minced and Moist diet, per SLP recommendation 2. Recommend Boost Plus TID 3.Recommend Jose David BID 4. Daily weight 5. Meal set/assistance/encourangement TID    Nutrition Diagnosis Status:   New         Malnutrition Assessment  Malnutrition Context: chronic illness, social/environmental circumstances  Malnutrition Level: severe  Skin (Micronutrient): none (Parveen Score =16)   Micronutrient Evaluation Summary: suspected deficiency   Weight Loss (Malnutrition): greater than 10% in 6 months  Energy Intake (Malnutrition): less than or equal to 75% for greater than or equal to 1 month                         Reason for Assessment    Reason For Assessment: RD follow-up    Diagnosis: infection/sepsis, liver disease  Patient  "Active Problem List   Diagnosis    HTN (hypertension)    Bilateral lower extremity edema    DNR (do not resuscitate)    Macrocytic anemia    Alcohol abuse    Gout    Pharyngoesophageal dysphagia    Alcoholic cirrhosis of liver with ascites    Severe protein-calorie malnutrition    Nausea and vomiting    Abdominal pain    Leukocytosis    Drug-induced constipation    Sepsis    S/P laparoscopic cholecystectomy    Pelvic fluid collection    Diverticulitis of colon with perforation       Relevant Medical History: alcohol cirrhosis with ascites  Past Medical History:   Diagnosis Date    Hypertension     Liver disease      Interdisciplinary Rounds: did not attend (RD remote)    General Information Comments:   11/10/2023: Patient is on and IDDSI lever 5: minced and moist diet with decreased intake.  Patient with nausea/vomiting and constipation.   Labs reviewed.  NKFA.  LBM: 11/10/2023.   Patient is positive for malnutrition due to weight loss and decreased po intake.  Underweight BMI: 16.85. Commercial beverages recommended. RD to continue to encourage po intake and monitor tolerance.    Nutrition Discharge Planning: Patient to d/c on recommended texture modified diet.    Nutrition Risk Screen    Nutrition Risk Screen: difficulty chewing/swallowing    Nutrition/Diet History    Spiritual, Cultural Beliefs, Alevism Practices, Values that Affect Care: no  Food Allergies: NKFA  Factors Affecting Nutritional Intake: chewing difficulties/inability to chew food, difficulty/impaired swallowing    Anthropometrics    Temp: 98.8 °F (37.1 °C)  Height Method: Stated  Height: 5' 2" (157.5 cm)  Height (inches): 62 in  Weight Method: Bed Scale  Weight: 41.8 kg (92 lb 2.4 oz)  Weight (lb): 92.15 lb  Ideal Body Weight (IBW), Female: 110 lb  % Ideal Body Weight, Female (lb): 83.77 %  % Ideal Body Weight Malnutrition: 80-90% - mild deficit  BMI (Calculated): 16.9  BMI Grade: 16 - 16.9 protein-energy malnutrition grade II  Weight Loss: " unintentional  Usual Body Weight (UBW), k.1 kg (within 6 months)  % Usual Body Weight: 87.08  % Weight Change From Usual Weight: -13.1 %       Lab/Procedures/Meds    Pertinent Labs Reviewed: reviewed  BMP  Lab Results   Component Value Date     11/10/2023    K 3.6 11/10/2023     11/10/2023    CO2 22 (L) 11/10/2023    BUN 11 11/10/2023    CREATININE 0.6 11/10/2023    CALCIUM 7.6 (L) 11/10/2023    ANIONGAP 10 11/10/2023    EGFRNORACEVR >60 11/10/2023     Lab Results   Component Value Date    HGBA1C 4.8 2022     Lab Results   Component Value Date    CALCIUM 7.6 (L) 11/10/2023    PHOS 4.0 10/29/2023       Pertinent Medications Reviewed: reviewed  Scheduled Meds:   aluminum-magnesium hydroxide-simethicone  30 mL Oral QID (AC & HS)    folic acid-vit B6-vit B12 2.5-25-2 mg  1 tablet Oral Daily    LIDOcaine (PF) 10 mg/ml (1%)  10 mL Other Once    mirtazapine  15 mg Oral QHS    morphine  1 mg Intravenous Once    multivitamin  1 tablet Oral Daily    piperacillin-tazobactam (Zosyn) IV (PEDS and ADULTS) (extended infusion is not appropriate)  4.5 g Intravenous Q8H    senna-docusate 8.6-50 mg  2 tablet Oral Daily    sucralfate  1 g Oral Q6H    thiamine  100 mg Oral Daily     Continuous Infusions:   lactated ringers 75 mL/hr at 23 0742     PRN Meds:.LIDOcaine (PF) 10 mg/ml (1%), morphine, ondansetron, sodium chloride 0.9%    Physical Findings/Assessment         Estimated/Assessed Needs    Weight Used For Calorie Calculations: 44.5 kg (98 lb)  Energy Calorie Requirements (kcal): 9646-5864 (underweight 30-35)  Energy Need Method: Kcal/kg  Protein Requirements: 53-66 (underweight 1.2-1.5)  Weight Used For Protein Calculations: 44.5 kg (98 lb)  Fluid Requirements (mL): 1333 or per MD/NP  Estimated Fluid Requirement Method: RDA Method  RDA Method (mL): 1333         Nutrition Prescription Ordered    Current Diet Order: IDDSI level 5: minced and moist  Oral Nutrition Supplement: Boost    Evaluation of  Received Nutrient/Fluid Intake    % Kcal Needs: <50%  % Protein Needs: <50%  I/O: +246mls since admit  Energy Calories Required: not meeting needs  Protein Required: not meeting needs  Fluid Required: not meeting needs  Tolerance: not tolerating (nausea, vomiting, abdominal pain and constipation)  % Intake of Estimated Energy Needs: 25 - 50 %  % Meal Intake: 25 - 50 %    Nutrition Risk    Level of Risk/Frequency of Follow-up: high (time 2 weekly)     Monitor and Evaluation    Food and Nutrient Intake: energy intake, food and beverage intake  Food and Nutrient Adminstration: diet order  Knowledge/Beliefs/Attitudes: food and nutrition knowledge/skill, beliefs and attitudes  Anthropometric Measurements: weight change  Nutrition-Focused Physical Findings: overall appearance, extremities, muscles and bones, skin     Nutrition Follow-Up    RD Follow-up?: Yes  Tori Sinclair, MS, RDN, LDN

## 2023-11-10 NOTE — SUBJECTIVE & OBJECTIVE
Review of Systems   All other systems reviewed and are negative.    Objective:     Vital Signs (Most Recent):  Temp: 98.4 °F (36.9 °C) (11/10/23 1610)  Pulse: 87 (11/10/23 1610)  Resp: 17 (11/10/23 1610)  BP: 113/69 (11/10/23 1610)  SpO2: 98 % (11/10/23 1610) Vital Signs (24h Range):  Temp:  [97.2 °F (36.2 °C)-98.8 °F (37.1 °C)] 98.4 °F (36.9 °C)  Pulse:  [79-91] 87  Resp:  [16-19] 17  SpO2:  [95 %-98 %] 98 %  BP: (105-120)/(62-74) 113/69     Weight: 41.8 kg (92 lb 2.4 oz)  Body mass index is 16.85 kg/m².    Intake/Output Summary (Last 24 hours) at 11/10/2023 1618  Last data filed at 11/10/2023 1004  Gross per 24 hour   Intake 240 ml   Output 900 ml   Net -660 ml           Physical Exam  Vitals and nursing note reviewed.   Constitutional:       General: She is not in acute distress.     Appearance: Normal appearance. She is normal weight. She is ill-appearing.   Cardiovascular:      Rate and Rhythm: Normal rate and regular rhythm.      Heart sounds: No murmur heard.  Pulmonary:      Effort: Pulmonary effort is normal. No respiratory distress.      Breath sounds: No wheezing.   Abdominal:      General: There is distension.      Tenderness: There is abdominal tenderness.      Comments: RLQ drain (right pelvic abscess)   Neurological:      General: No focal deficit present.      Mental Status: She is alert and oriented to person, place, and time.   Psychiatric:         Mood and Affect: Mood normal.         Behavior: Behavior normal.             Significant Labs: All pertinent labs within the past 24 hours have been reviewed.  Recent Lab Results         11/10/23  0646        Anion Gap 10       Aniso Slight       Baso # 0.14       Basophil % 1.1       BUN 11       Calcium 7.6       Chloride 106       CO2 22       Creatinine 0.6       Differential Method Automated       eGFR >60       Eos # 0.3       Eosinophil % 2.4       Glucose 86       Gran # (ANC) 7.5       Gran % 57.3       Hematocrit 32.7       Hemoglobin 10.4        Immature Grans (Abs) 0.04  Comment: Mild elevation in immature granulocytes is non specific and   can be seen in a variety of conditions including stress response,   acute inflammation, trauma and pregnancy. Correlation with other   laboratory and clinical findings is essential.         Immature Granulocytes 0.3       Lymph # 4.0       Lymph % 31.0       Magnesium  2.3       MCH 29.1       MCHC 31.8       MCV 91       Mono # 1.0       Mono % 7.9       MPV 8.7       nRBC 0       Platelet Estimate Increased       Platelet Count 763       Potassium 3.6       RBC 3.58       RDW 13.2       Sodium 138       Teardrop Cells Occasional       WBC 12.98               Significant Imaging: I have reviewed all pertinent imaging results/findings within the past 24 hours.    IR Abscess Drainage Peritoneal   Final Result      Technically successful CT guided abscess drain of a pelvic collection.      Recommend monitoring out puts and tube removal can be obtained when out puts are less than 10 mL over 24 hours and the patient has clinically improved.  Recommend flushing the catheter with 5 cc of sterile saline every shift.      All CT scans at this facility use dose modulation, iterative reconstruction, and/or weight based dosing when appropriate to reduce radiation dose to as low as reasonable achievable.         Electronically signed by: Malachi Ruiz   Date:    11/08/2023   Time:    17:29      CT Abdomen Pelvis With IV Contrast   Final Result      1. Right pelvic abscess.   2. Uncomplicated perforated sigmoid colon diverticulitis and low-grade colonic obstruction.  Sigmoidoscopy is recommended following resolution to assess for potential underlying mass.   3. Gas in the anterior peritoneal cavity may be related to prior robotic surgery and drain placement.; However this may also be related to the perforated diverticulitis.  Close clinical follow-up is recommended.   4. Persistent small fluid and gas collection with adjacent  drain in the cholecystectomy bed.   5. Finding in the right inguinal canal which may represent inflamed fluid or suppurative lymph node.  Attention on follow-up is recommended.   6. Mild bibasilar atelectasis and adjacent pleural effusions.   7. Other findings as above.   Findings regarding the perforated diverticulitis were communicated to Dr. Hidalgo and Dr. De La Rosa by epic chat at the time of making the findings.         Electronically signed by: Malacih Ruiz   Date:    11/08/2023   Time:    13:19      US Abdomen Limited   Final Result      The portal vein is patent.  Cirrhotic morphology of the liver with trace perihepatic ascites.         Electronically signed by: Bassam Stevens MD   Date:    11/06/2023   Time:    15:53      CT Abdomen Pelvis  Without Contrast   Final Result      Presumed recent operative change cholecystectomy with small fluid collection right pelvis nonspecific         Electronically signed by: Kelsea Levin   Date:    11/05/2023   Time:    21:44      X-Ray Chest AP Portable   Final Result      No acute abnormality.         Electronically signed by: Robson Panda   Date:    11/05/2023   Time:    19:46

## 2023-11-10 NOTE — PLAN OF CARE
Nutrition Plan of Care:    Recommendations     1. Recommend a Minced and Moist diet, per SLP recommendation   2. Recommend Boost Plus TID   3.Recommend Jose David BID   4. Daily weight   5. Meal set/assistance/encourangement TID     Goals:   1. Pt diet will be modified within 24 hours of SLP recommendations   2. Pt will consume greater than 50% of EEN/EPN by RD f/u.   3. Pt will maintain a stable weight by RD f/u.     Nutrition Goal Status: progressing towards goal  Communication of RD Recs:  (POC;sticky note)     Assessment and Plan     Endocrine  Severe protein-calorie malnutrition  Malnutrition Type:  Context: chronic illness, social/environmental circumstances  Level: severe     Related to (etiology):   Conditions associated with medical diagnoses: diverticulitis and liver disease     Signs and Symptoms (as evidenced by):   Weight loss  Decreased appetite/po intake  GI intolerance: nausea, vomiting, abdominal pain and constipation     Malnutrition Characteristic Summary:  Weight Loss (Malnutrition): greater than 10% in 6 months  Energy Intake (Malnutrition): less than or equal to 75% for greater than or equal to 1 month        Interventions (treatment strategy):  1. Recommend a Minced and Moist diet, per SLP recommendation 2. Recommend Boost Plus TID 3.Recommend Jose David BID 4. Daily weight 5. Meal set/assistance/encourangement TID     Nutrition Diagnosis Status:   New           Malnutrition Assessment  Malnutrition Context: chronic illness, social/environmental circumstances  Malnutrition Level: severe  Skin (Micronutrient): none (Parveen Score =16)   Micronutrient Evaluation Summary: suspected deficiency   Weight Loss (Malnutrition): greater than 10% in 6 months  Energy Intake (Malnutrition): less than or equal to 75% for greater than or equal to 1 month     Tori Sinclair MS, RDN, LDN

## 2023-11-10 NOTE — PLAN OF CARE
Bed locked, in lowest position. Call bell in reach. Purposeful rounding done. Cardiac monitoring in use. Chart check complete. Will continue with plan of care.

## 2023-11-11 LAB
BACTERIA BLD CULT: NORMAL
BACTERIA BLD CULT: NORMAL
BASOPHILS # BLD AUTO: 0.15 K/UL (ref 0–0.2)
BASOPHILS NFR BLD: 1 % (ref 0–1.9)
DIFFERENTIAL METHOD: ABNORMAL
EOSINOPHIL # BLD AUTO: 0.1 K/UL (ref 0–0.5)
EOSINOPHIL NFR BLD: 0.9 % (ref 0–8)
ERYTHROCYTE [DISTWIDTH] IN BLOOD BY AUTOMATED COUNT: 13.2 % (ref 11.5–14.5)
HCT VFR BLD AUTO: 29.8 % (ref 37–48.5)
HGB BLD-MCNC: 9.6 G/DL (ref 12–16)
IMM GRANULOCYTES # BLD AUTO: 0.07 K/UL (ref 0–0.04)
IMM GRANULOCYTES NFR BLD AUTO: 0.5 % (ref 0–0.5)
LYMPHOCYTES # BLD AUTO: 2.6 K/UL (ref 1–4.8)
LYMPHOCYTES NFR BLD: 17.8 % (ref 18–48)
MCH RBC QN AUTO: 29 PG (ref 27–31)
MCHC RBC AUTO-ENTMCNC: 32.2 G/DL (ref 32–36)
MCV RBC AUTO: 90 FL (ref 82–98)
MONOCYTES # BLD AUTO: 0.9 K/UL (ref 0.3–1)
MONOCYTES NFR BLD: 6.2 % (ref 4–15)
NEUTROPHILS # BLD AUTO: 10.9 K/UL (ref 1.8–7.7)
NEUTROPHILS NFR BLD: 73.6 % (ref 38–73)
NRBC BLD-RTO: 0 /100 WBC
PLATELET # BLD AUTO: 688 K/UL (ref 150–450)
PMV BLD AUTO: 8.9 FL (ref 9.2–12.9)
RBC # BLD AUTO: 3.31 M/UL (ref 4–5.4)
WBC # BLD AUTO: 14.76 K/UL (ref 3.9–12.7)

## 2023-11-11 PROCEDURE — 25000003 PHARM REV CODE 250: Performed by: SURGERY

## 2023-11-11 PROCEDURE — 25000003 PHARM REV CODE 250: Performed by: HOSPITALIST

## 2023-11-11 PROCEDURE — 99221 1ST HOSP IP/OBS SF/LOW 40: CPT | Mod: ,,, | Performed by: STUDENT IN AN ORGANIZED HEALTH CARE EDUCATION/TRAINING PROGRAM

## 2023-11-11 PROCEDURE — 11000001 HC ACUTE MED/SURG PRIVATE ROOM

## 2023-11-11 PROCEDURE — 63600175 PHARM REV CODE 636 W HCPCS: Performed by: SURGERY

## 2023-11-11 PROCEDURE — 36415 COLL VENOUS BLD VENIPUNCTURE: CPT | Performed by: HOSPITALIST

## 2023-11-11 PROCEDURE — 85025 COMPLETE CBC W/AUTO DIFF WBC: CPT | Performed by: HOSPITALIST

## 2023-11-11 PROCEDURE — 99221 PR INITIAL HOSPITAL CARE,LEVL I: ICD-10-PCS | Mod: ,,, | Performed by: STUDENT IN AN ORGANIZED HEALTH CARE EDUCATION/TRAINING PROGRAM

## 2023-11-11 RX ORDER — LOPERAMIDE HYDROCHLORIDE 2 MG/1
2 CAPSULE ORAL 4 TIMES DAILY PRN
Status: DISCONTINUED | OUTPATIENT
Start: 2023-11-11 | End: 2023-11-13 | Stop reason: HOSPADM

## 2023-11-11 RX ADMIN — SUCRALFATE 1 G: 1 SUSPENSION ORAL at 12:11

## 2023-11-11 RX ADMIN — SUCRALFATE 1 G: 1 SUSPENSION ORAL at 05:11

## 2023-11-11 RX ADMIN — SODIUM CHLORIDE, POTASSIUM CHLORIDE, SODIUM LACTATE AND CALCIUM CHLORIDE: 600; 310; 30; 20 INJECTION, SOLUTION INTRAVENOUS at 10:11

## 2023-11-11 RX ADMIN — ALUMINUM HYDROXIDE, MAGNESIUM HYDROXIDE, AND DIMETHICONE 30 ML: 200; 20; 200 SUSPENSION ORAL at 04:11

## 2023-11-11 RX ADMIN — MORPHINE SULFATE 4 MG: 4 INJECTION INTRAVENOUS at 02:11

## 2023-11-11 RX ADMIN — SUCRALFATE 1 G: 1 SUSPENSION ORAL at 01:11

## 2023-11-11 RX ADMIN — THERA TABS 1 TABLET: TAB at 09:11

## 2023-11-11 RX ADMIN — MORPHINE SULFATE 4 MG: 4 INJECTION INTRAVENOUS at 10:11

## 2023-11-11 RX ADMIN — PIPERACILLIN SODIUM AND TAZOBACTAM SODIUM 4.5 G: 4; .5 INJECTION, POWDER, FOR SOLUTION INTRAVENOUS at 02:11

## 2023-11-11 RX ADMIN — MORPHINE SULFATE 4 MG: 4 INJECTION INTRAVENOUS at 01:11

## 2023-11-11 RX ADMIN — ONDANSETRON 4 MG: 2 INJECTION INTRAMUSCULAR; INTRAVENOUS at 04:11

## 2023-11-11 RX ADMIN — PIPERACILLIN SODIUM AND TAZOBACTAM SODIUM 4.5 G: 4; .5 INJECTION, POWDER, FOR SOLUTION INTRAVENOUS at 05:11

## 2023-11-11 RX ADMIN — ALUMINUM HYDROXIDE, MAGNESIUM HYDROXIDE, AND DIMETHICONE 30 ML: 200; 20; 200 SUSPENSION ORAL at 05:11

## 2023-11-11 RX ADMIN — ONDANSETRON 4 MG: 2 INJECTION INTRAMUSCULAR; INTRAVENOUS at 02:11

## 2023-11-11 RX ADMIN — SODIUM CHLORIDE, POTASSIUM CHLORIDE, SODIUM LACTATE AND CALCIUM CHLORIDE: 600; 310; 30; 20 INJECTION, SOLUTION INTRAVENOUS at 11:11

## 2023-11-11 RX ADMIN — Medication 1 TABLET: at 09:11

## 2023-11-11 RX ADMIN — LOPERAMIDE HYDROCHLORIDE 2 MG: 2 CAPSULE ORAL at 12:11

## 2023-11-11 RX ADMIN — ALUMINUM HYDROXIDE, MAGNESIUM HYDROXIDE, AND DIMETHICONE 30 ML: 200; 20; 200 SUSPENSION ORAL at 08:11

## 2023-11-11 RX ADMIN — THIAMINE HCL TAB 100 MG 100 MG: 100 TAB at 09:11

## 2023-11-11 RX ADMIN — MORPHINE SULFATE 4 MG: 4 INJECTION INTRAVENOUS at 11:11

## 2023-11-11 RX ADMIN — SUCRALFATE 1 G: 1 SUSPENSION ORAL at 11:11

## 2023-11-11 RX ADMIN — MORPHINE SULFATE 4 MG: 4 INJECTION INTRAVENOUS at 07:11

## 2023-11-11 RX ADMIN — LOPERAMIDE HYDROCHLORIDE 2 MG: 2 CAPSULE ORAL at 05:11

## 2023-11-11 RX ADMIN — ONDANSETRON 4 MG: 2 INJECTION INTRAMUSCULAR; INTRAVENOUS at 08:11

## 2023-11-11 RX ADMIN — PIPERACILLIN SODIUM AND TAZOBACTAM SODIUM 4.5 G: 4; .5 INJECTION, POWDER, FOR SOLUTION INTRAVENOUS at 10:11

## 2023-11-11 RX ADMIN — ALUMINUM HYDROXIDE, MAGNESIUM HYDROXIDE, AND DIMETHICONE 30 ML: 200; 20; 200 SUSPENSION ORAL at 10:11

## 2023-11-11 RX ADMIN — MIRTAZAPINE 15 MG: 15 TABLET, FILM COATED ORAL at 08:11

## 2023-11-11 RX ADMIN — MORPHINE SULFATE 4 MG: 4 INJECTION INTRAVENOUS at 05:11

## 2023-11-11 NOTE — PLAN OF CARE
Discussed poc with pt, pt verbalized understanding     Purposeful rounding every 2hours     VS wnl  Fall precautions in place, remains injury free  Pain and nausea under control with PRN meds     IVFs  LR@75mL  Accurate I&Os  Abx given as prescribed  Bed locked at lowest position  Call light within reach     Chart check complete  Will cont with POC

## 2023-11-11 NOTE — ASSESSMENT & PLAN NOTE
Avoid aggressive bowel regimen with colitis and diverticulitis.  Has had several BMs today and is feeling improved.

## 2023-11-11 NOTE — PROGRESS NOTES
[facility]  General Surgery  Progress Note    Subjective:     History of Present Illness:  Ms. Martin is a 63-year-old female with a history of hypertension and cirrhosis of the liver status post robotic cholecystectomy on 10/28/2023 for gangrenous cholecystitis.  She was discharged on 11/02/2023 in stable condition with her HUMAIRA drain in place and on antibiotics.  She states that she was doing well overall following her discharge up until yesterday morning at which point in time she developed sudden onset lower abdominal pain sharp in nature and intermittent, as well as nausea, vomiting, fevers, and chills.  She attempted to take her Zofran however did not have any relief as she states she had vomiting it up.  She called EMS who brought her to the emergency department.  Per EMS she did have coffee-ground emesis however she maintains that it was not coffee-ground but was yellow in color.  Upon arrival to the emergency department her vital signs were all within normal limits.  She did develop a T-max of a 100.1° overnight at which point in time she also became mildly tachycardic up to 103.  CT scan was performed without contrast which shows a small amount of non organized fluid in the right upper quadrant with the drain in place and a fluid collection in the pelvis.  Otherwise there were no abnormal findings on her CT scan.  She was noted to have a leukocytosis up to 44,000 as well as thrombocytosis.  Blood cultures were performed and are still pending and she was admitted for further workup and management.  Currently she states that the morphine and the Zofran IV are helping with her nausea and her pain she still intermittently has lower abdominal pain and overall feels quite weak still.      Post-Op Info:  * No surgery found *         Interval History: reports pain well controlled. Having diarrhea and unable to control stool. Poor appetite with minimal PO intake    Medications:  Continuous Infusions:    lactated ringers 20 mL/hr at 11/10/23 2036     Scheduled Meds:   aluminum-magnesium hydroxide-simethicone  30 mL Oral QID (AC & HS)    folic acid-vit B6-vit B12 2.5-25-2 mg  1 tablet Oral Daily    LIDOcaine (PF) 10 mg/ml (1%)  10 mL Other Once    mirtazapine  15 mg Oral QHS    multivitamin  1 tablet Oral Daily    piperacillin-tazobactam (Zosyn) IV (PEDS and ADULTS) (extended infusion is not appropriate)  4.5 g Intravenous Q8H    senna-docusate 8.6-50 mg  2 tablet Oral Daily    sucralfate  1 g Oral Q6H    thiamine  100 mg Oral Daily     PRN Meds:LIDOcaine (PF) 10 mg/ml (1%), morphine, ondansetron, sodium chloride 0.9%     Review of patient's allergies indicates:   Allergen Reactions    Baclofen Other (See Comments)     Vomiting, confusion, shaking     Objective:     Vital Signs (Most Recent):  Temp: 98.5 °F (36.9 °C) (11/11/23 0757)  Pulse: 80 (11/11/23 0757)  Resp: 16 (11/11/23 0757)  BP: 121/63 (11/11/23 0757)  SpO2: (!) 93 % (11/11/23 0757) Vital Signs (24h Range):  Temp:  [97.6 °F (36.4 °C)-98.9 °F (37.2 °C)] 98.5 °F (36.9 °C)  Pulse:  [75-87] 80  Resp:  [16-18] 16  SpO2:  [93 %-100 %] 93 %  BP: (100-121)/(59-71) 121/63     Weight: 41.8 kg (92 lb 2.4 oz)  Body mass index is 16.85 kg/m².    Intake/Output - Last 3 Shifts         11/09 0700  11/10 0659 11/10 0700  11/11 0659 11/11 0700  11/12 0659    P.O. 240      I.V. (mL/kg)  2781.1 (66.5)     IV Piggyback  592.8     Total Intake(mL/kg) 240 (5.7) 3373.9 (80.7)     Urine (mL/kg/hr) 0 (0) 0 (0)     Drains 800 350     Stool 0 0     Total Output 800 350     Net -560 +3023.9            Urine Occurrence 3 x 1 x     Stool Occurrence 1 x 2 x 1 x             Physical Exam  Constitutional:       Appearance: She is well-developed.   HENT:      Head: Normocephalic and atraumatic.   Eyes:      Conjunctiva/sclera: Conjunctivae normal.      Pupils: Pupils are equal, round, and reactive to light.   Neck:      Thyroid: No thyromegaly.   Cardiovascular:      Rate and  Rhythm: Normal rate and regular rhythm.   Pulmonary:      Effort: Pulmonary effort is normal. No respiratory distress.   Abdominal:      Comments: Cirrhotic, distended, nontender. HUMAIRA with thin SS output. RUQ drain site without ascites leaking   Musculoskeletal:         General: No tenderness. Normal range of motion.      Cervical back: Normal range of motion.   Skin:     General: Skin is warm and dry.      Capillary Refill: Capillary refill takes less than 2 seconds.   Neurological:      General: No focal deficit present.      Mental Status: She is alert and oriented to person, place, and time.          Significant Labs:  I have reviewed all pertinent lab results within the past 24 hours.  CBC:   Recent Labs   Lab 11/11/23  0721   WBC 14.76*   RBC 3.31*   HGB 9.6*   HCT 29.8*   *   MCV 90   MCH 29.0   MCHC 32.2     CMP:   Recent Labs   Lab 11/08/23  0508 11/09/23  0531 11/10/23  0646   GLU 71   < > 86   CALCIUM 7.9*   < > 7.6*   ALBUMIN 1.3*  --   --    PROT 5.8*  --   --       < > 138   K 3.7   < > 3.6   CO2 22*   < > 22*      < > 106   BUN 15   < > 11   CREATININE 0.5   < > 0.6   ALKPHOS 142*  --   --    ALT 5*  --   --    AST 15  --   --    BILITOT 0.2  --   --     < > = values in this interval not displayed.       Significant Diagnostics:  I have reviewed all pertinent imaging results/findings within the past 24 hours.    Assessment/Plan:     * Diverticulitis of colon with perforation  Found on IV CT scan on 11/8/23- as well as colitis    -- continue abx  -- no plans for surgical intervention in uncomplicated perforated diverticulitis in this frail, medically complicated patient  -- ok for diet  -- will need colonoscopy 6-8 weeks following resolution of diverticulitis (last scope was at age 40)    Drug-induced constipation  Avoid aggressive bowel regimen with colitis and diverticulitis.  Has had several BMs today and is feeling improved.     Leukocytosis  WBC improving.  Likely 2/2 perforated  diverticulitis and colitis.      -- continue Zosyn  -- defer to primary team     Abdominal pain  POD #14- s/p robotic cholecystectomy for gangrenous perforated cholecystitis.  Abdominal pain unrelated to cholecystectomy, likely 2/2 perforated diverticulitis.      Fluid in pelvis infected ascites vs abscess from diverticulitis.  IR drain non-feculent in appearance.      -- surgical HUMAIRA drain removed at bedside.  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    -- defer to primary team     HTN (hypertension)  Defer to primary team         Tayler Fishman MD  General Surgery  [facility]

## 2023-11-11 NOTE — ASSESSMENT & PLAN NOTE
POD #14- s/p robotic cholecystectomy for gangrenous perforated cholecystitis.  Abdominal pain unrelated to cholecystectomy, likely 2/2 perforated diverticulitis.      Fluid in pelvis infected ascites vs abscess from diverticulitis.  IR drain non-feculent in appearance.      -- surgical HUMAIRA drain removed at bedside.  Will need to f/u in clinic in 2 weeks for suture removal   -- remainder of care as per primary team   -- agree w/ abx   -- ok for discharge when tolerating PO better  -- stop stool softeners with diarrhea

## 2023-11-11 NOTE — SUBJECTIVE & OBJECTIVE
Review of Systems   All other systems reviewed and are negative.    Objective:     Vital Signs (Most Recent):  Temp: 98.4 °F (36.9 °C) (11/11/23 1126)  Pulse: 73 (11/11/23 1330)  Resp: 16 (11/11/23 1126)  BP: 106/65 (11/11/23 1126)  SpO2: 98 % (11/11/23 1126) Vital Signs (24h Range):  Temp:  [97.6 °F (36.4 °C)-98.9 °F (37.2 °C)] 98.4 °F (36.9 °C)  Pulse:  [73-87] 73  Resp:  [16-18] 16  SpO2:  [93 %-100 %] 98 %  BP: (100-121)/(59-69) 106/65     Weight: 41.8 kg (92 lb 2.4 oz)  Body mass index is 16.85 kg/m².    Intake/Output Summary (Last 24 hours) at 11/11/2023 1432  Last data filed at 11/10/2023 2036  Gross per 24 hour   Intake 3373.94 ml   Output 50 ml   Net 3323.94 ml           Physical Exam  Vitals and nursing note reviewed.   Constitutional:       General: She is not in acute distress.     Appearance: Normal appearance. She is normal weight. She is ill-appearing.   Cardiovascular:      Rate and Rhythm: Normal rate and regular rhythm.      Heart sounds: No murmur heard.  Pulmonary:      Effort: Pulmonary effort is normal. No respiratory distress.      Breath sounds: No wheezing.   Abdominal:      General: There is no distension.      Tenderness: There is abdominal tenderness.      Comments: RLQ drain (right pelvic abscess)   Neurological:      General: No focal deficit present.      Mental Status: She is alert and oriented to person, place, and time.   Psychiatric:         Mood and Affect: Mood normal.         Behavior: Behavior normal.             Significant Labs: All pertinent labs within the past 24 hours have been reviewed.  Recent Lab Results         11/11/23  0721        Baso # 0.15       Basophil % 1.0       Differential Method Automated       Eos # 0.1       Eosinophil % 0.9       Gran # (ANC) 10.9       Gran % 73.6       Hematocrit 29.8       Hemoglobin 9.6       Immature Grans (Abs) 0.07  Comment: Mild elevation in immature granulocytes is non specific and   can be seen in a variety of conditions  including stress response,   acute inflammation, trauma and pregnancy. Correlation with other   laboratory and clinical findings is essential.         Immature Granulocytes 0.5       Lymph # 2.6       Lymph % 17.8       MCH 29.0       MCHC 32.2       MCV 90       Mono # 0.9       Mono % 6.2       MPV 8.9       nRBC 0       Platelet Count 688       RBC 3.31       RDW 13.2       WBC 14.76               Significant Imaging: I have reviewed all pertinent imaging results/findings within the past 24 hours.    IR Abscess Drainage Peritoneal   Final Result      Technically successful CT guided abscess drain of a pelvic collection.      Recommend monitoring out puts and tube removal can be obtained when out puts are less than 10 mL over 24 hours and the patient has clinically improved.  Recommend flushing the catheter with 5 cc of sterile saline every shift.      All CT scans at this facility use dose modulation, iterative reconstruction, and/or weight based dosing when appropriate to reduce radiation dose to as low as reasonable achievable.         Electronically signed by: Malachi Ruiz   Date:    11/08/2023   Time:    17:29      CT Abdomen Pelvis With IV Contrast   Final Result      1. Right pelvic abscess.   2. Uncomplicated perforated sigmoid colon diverticulitis and low-grade colonic obstruction.  Sigmoidoscopy is recommended following resolution to assess for potential underlying mass.   3. Gas in the anterior peritoneal cavity may be related to prior robotic surgery and drain placement.; However this may also be related to the perforated diverticulitis.  Close clinical follow-up is recommended.   4. Persistent small fluid and gas collection with adjacent drain in the cholecystectomy bed.   5. Finding in the right inguinal canal which may represent inflamed fluid or suppurative lymph node.  Attention on follow-up is recommended.   6. Mild bibasilar atelectasis and adjacent pleural effusions.   7. Other findings as  above.   Findings regarding the perforated diverticulitis were communicated to Dr. Hidalgo and Dr. De La Rosa by epic chat at the time of making the findings.         Electronically signed by: Malachi Ruiz   Date:    11/08/2023   Time:    13:19      US Abdomen Limited   Final Result      The portal vein is patent.  Cirrhotic morphology of the liver with trace perihepatic ascites.         Electronically signed by: Bassam Stevnes MD   Date:    11/06/2023   Time:    15:53      CT Abdomen Pelvis  Without Contrast   Final Result      Presumed recent operative change cholecystectomy with small fluid collection right pelvis nonspecific         Electronically signed by: Kelsea Levin   Date:    11/05/2023   Time:    21:44      X-Ray Chest AP Portable   Final Result      No acute abnormality.         Electronically signed by: Robson Panda   Date:    11/05/2023   Time:    19:46

## 2023-11-11 NOTE — PROGRESS NOTES
Pt here with her daughter. States symptoms started 1 -2 days ago. Symptoms include sinus drainage, upset stomach, nausea in am.   Visit Vitals  BP (!) 191/85   Pulse 90   Temp 99 °F (37.2 °C) (Temporal)   Resp 18   Wt 63 kg (139 lb)   SpO2 91%   BMI 27.15 kg/m²            [facility]  Progress Note    Patient Name: Lakisha Vance  MRN: [id number]  Patient Class: IP- Inpatient   Admission Date: 11/5/2023  Length of Stay: 4 days  Attending Physician: Elías Hidalgo MD  Primary Care Provider: Pranav Gonzalez MD        Subjective:     Principal Problem:Diverticulitis of colon with perforation        HPI:  64 y/o female with PMHx of HTN, alcoholic liver cirrhosis (EGD Sept '23 with reflux esophagitis, gastritis, portal hypertensive gastropathy) who recently underwent laproscopic cholecystectomy for gangrenous cholecystitis with RUQ drain placement on 10-28-23 who presented to the ER on 11-5-23 for abdominal pain, nausea/vomiting, fevers/chills. Patient discharged on 11/02/2023 in stable condition with her HUMAIRA drain in place and on antibiotics. She reports sudden onset of right sided lower abdominal and pelvis pain, associated with nausea/vomiting and subjective fevers. Patient denies vomiting blood or coffee ground emesis, denies chest pain, SOB or any other complaints. In ER, patient with temp 100.1 F, tachycardic, WBC 44k; blood cultures were obtained and started on Zosyn. UA pending. CT scan noted small amount of fluid in RUQ with drain in place and fluid collection in pelvis. Patient admitted by General Surgery this AM, plans for IR drainage of pelvic fluid collection. Hospital Medicine consulted to assist with medical management.      Overview/Hospital Course:  11/7  NAEON, patient reports lower quadrant pain,   CT A/P with small amount of fluid noted in right pelvis  Discussed with IR, plan for CT A/P with contrast in AM, possible drainage  Continue Zosyn, WBC trending down, temp curve improving    11/8  CT A/P with noted right pelvic abscess, perforated sigmoid colon diverticulitis and low grade colonic obstruction  Patient s/p IR drain placement for right pelvic abscess this afternoon; fluid cultures obtained  Continue Zosyn, WBC trending down, pain  control    11/9  NAEON, patient reports abdominal pain better controlled today, denies nausea, chest pain, reports +BM  RLQ drain in place with purulent drainage  Continue Zosyn, WBC trending down, cultures pending  RUQ drain from recent cholecystectomy in place, plans for removal per Surgery    11/10  NAEON, surgical HUMAIRA drain removed today by Surgery, f/u in 2 weeks outpatient for suture removal.  RLQ drain for pelvic abscess with purulent drainage, 300 cc  Continue Zosyn, WBC trending down, cultures pending    11/11  RLQ drain output 50 cc yesterday, plan to remove < 10 cc  Pain controlled, cultures pending, continue Zosyn        Review of Systems   All other systems reviewed and are negative.    Objective:     Vital Signs (Most Recent):  Temp: 98.4 °F (36.9 °C) (11/11/23 1126)  Pulse: 73 (11/11/23 1330)  Resp: 16 (11/11/23 1126)  BP: 106/65 (11/11/23 1126)  SpO2: 98 % (11/11/23 1126) Vital Signs (24h Range):  Temp:  [97.6 °F (36.4 °C)-98.9 °F (37.2 °C)] 98.4 °F (36.9 °C)  Pulse:  [73-87] 73  Resp:  [16-18] 16  SpO2:  [93 %-100 %] 98 %  BP: (100-121)/(59-69) 106/65     Weight: 41.8 kg (92 lb 2.4 oz)  Body mass index is 16.85 kg/m².    Intake/Output Summary (Last 24 hours) at 11/11/2023 1432  Last data filed at 11/10/2023 2036  Gross per 24 hour   Intake 3373.94 ml   Output 50 ml   Net 3323.94 ml           Physical Exam  Vitals and nursing note reviewed.   Constitutional:       General: She is not in acute distress.     Appearance: Normal appearance. She is normal weight. She is ill-appearing.   Cardiovascular:      Rate and Rhythm: Normal rate and regular rhythm.      Heart sounds: No murmur heard.  Pulmonary:      Effort: Pulmonary effort is normal. No respiratory distress.      Breath sounds: No wheezing.   Abdominal:      General: There is no distension.      Tenderness: There is abdominal tenderness.      Comments: RLQ drain (right pelvic abscess)   Neurological:      General: No focal deficit present.       Mental Status: She is alert and oriented to person, place, and time.   Psychiatric:         Mood and Affect: Mood normal.         Behavior: Behavior normal.             Significant Labs: All pertinent labs within the past 24 hours have been reviewed.  Recent Lab Results         11/11/23  0721        Baso # 0.15       Basophil % 1.0       Differential Method Automated       Eos # 0.1       Eosinophil % 0.9       Gran # (ANC) 10.9       Gran % 73.6       Hematocrit 29.8       Hemoglobin 9.6       Immature Grans (Abs) 0.07  Comment: Mild elevation in immature granulocytes is non specific and   can be seen in a variety of conditions including stress response,   acute inflammation, trauma and pregnancy. Correlation with other   laboratory and clinical findings is essential.         Immature Granulocytes 0.5       Lymph # 2.6       Lymph % 17.8       MCH 29.0       MCHC 32.2       MCV 90       Mono # 0.9       Mono % 6.2       MPV 8.9       nRBC 0       Platelet Count 688       RBC 3.31       RDW 13.2       WBC 14.76               Significant Imaging: I have reviewed all pertinent imaging results/findings within the past 24 hours.    IR Abscess Drainage Peritoneal   Final Result      Technically successful CT guided abscess drain of a pelvic collection.      Recommend monitoring out puts and tube removal can be obtained when out puts are less than 10 mL over 24 hours and the patient has clinically improved.  Recommend flushing the catheter with 5 cc of sterile saline every shift.      All CT scans at this facility use dose modulation, iterative reconstruction, and/or weight based dosing when appropriate to reduce radiation dose to as low as reasonable achievable.         Electronically signed by: Malachi Ruiz   Date:    11/08/2023   Time:    17:29      CT Abdomen Pelvis With IV Contrast   Final Result      1. Right pelvic abscess.   2. Uncomplicated perforated sigmoid colon diverticulitis and low-grade colonic  obstruction.  Sigmoidoscopy is recommended following resolution to assess for potential underlying mass.   3. Gas in the anterior peritoneal cavity may be related to prior robotic surgery and drain placement.; However this may also be related to the perforated diverticulitis.  Close clinical follow-up is recommended.   4. Persistent small fluid and gas collection with adjacent drain in the cholecystectomy bed.   5. Finding in the right inguinal canal which may represent inflamed fluid or suppurative lymph node.  Attention on follow-up is recommended.   6. Mild bibasilar atelectasis and adjacent pleural effusions.   7. Other findings as above.   Findings regarding the perforated diverticulitis were communicated to Dr. Hidalgo and Dr. De La Rosa by epic chat at the time of making the findings.         Electronically signed by: Malachi Ruiz   Date:    11/08/2023   Time:    13:19      US Abdomen Limited   Final Result      The portal vein is patent.  Cirrhotic morphology of the liver with trace perihepatic ascites.         Electronically signed by: Bassam Stevens MD   Date:    11/06/2023   Time:    15:53      CT Abdomen Pelvis  Without Contrast   Final Result      Presumed recent operative change cholecystectomy with small fluid collection right pelvis nonspecific         Electronically signed by: Kelsea Levin   Date:    11/05/2023   Time:    21:44      X-Ray Chest AP Portable   Final Result      No acute abnormality.         Electronically signed by: Robson Panda   Date:    11/05/2023   Time:    19:46              Assessment/Plan:      Sepsis  This patient does have evidence of infective focus  My overall impression is sepsis.  Source: Abdominal  Antibiotics given-   Antibiotics (72h ago, onward)    Start     Stop Route Frequency Ordered    11/06/23 1000  piperacillin-tazobactam (ZOSYN) 4.5 g in dextrose 5 % in water (D5W) 100 mL IVPB (MB+)         -- IV Every 8 hours (non-standard times) 11/06/23 0856        Latest  "lactate reviewed-  No results for input(s): "LACTATE" in the last 72 hours.  CT A/P with right pelvic abscess, perforated diverticulitis - Surgery following  S/p IR drainage of right pelvic abscess (11-8-23)  Source control achieved by: IV abx, continue Zosyn, abx day # 6  Pelvic abscess culture pending, blood cultures NGTD    Pelvic fluid collection  Patient c/o right lower abdominal and groin pain  CT with perforated diverticulitis and right pelvic abscess  Plan as above    S/P laparoscopic cholecystectomy  s/p robotic cholecystectomy with gangrenous perforated GB - 10/28/23  Right sided abdominal drain in place  CT with with noted post-operative findings and right pelvic fluid collection  Management per Surgery    Alcoholic cirrhosis of liver with ascites  Co-morbidities are present and inclusive of portal hypertension and malnutrition.  MELD-Na score calculated; MELD 3.0: 12 at 11/7/2023  7:48 AM  MELD-Na: 7 at 11/7/2023  7:48 AM  Calculated from:  Serum Creatinine: 0.6 mg/dL (Using min of 1 mg/dL) at 11/7/2023  7:48 AM  Serum Sodium: 138 mmol/L (Using max of 137 mmol/L) at 11/7/2023  7:48 AM  Total Bilirubin: 0.2 mg/dL (Using min of 1 mg/dL) at 11/7/2023  7:48 AM  Serum Albumin: 1.3 g/dL (Using min of 1.5 g/dL) at 11/7/2023  7:48 AM  INR(ratio): 1.1 at 11/6/2023  4:00 PM  Age at listing (hypothetical): 63 years  Sex: Female at 11/7/2023  7:48 AM      Continue chronic meds. Etiology likely ETOH. Will avoid any hepatotoxic meds, and monitor CBC/CMP/INR for synthetic function.     HTN (hypertension)  Chronic, controlled. Latest blood pressure and vitals reviewed-     Temp:  [97.1 °F (36.2 °C)-99.4 °F (37.4 °C)]   Pulse:  [85-93]   Resp:  [16-20]   BP: ()/(52-66)   SpO2:  [94 %-96 %] .   Home meds for hypertension were reviewed and noted below.   Hypertension Medications             furosemide (LASIX) 40 MG tablet Take 1 tablet (40 mg total) by mouth once daily.    spironolactone (ALDACTONE) 50 MG tablet Take " 1 tablet (50 mg total) by mouth 2 (two) times daily.    spironolactone (ALDACTONE) 50 MG tablet Take 1 tablet (50 mg total) by mouth once daily.          While in the hospital, will manage blood pressure as follows; Adjust home antihypertensive regimen as follows- hold home meds - BP marginal    Severe protein-calorie malnutrition  Nutrition consulted. Most recent weight and BMI monitored-     Measurements:  Wt Readings from Last 1 Encounters:   11/07/23 44.5 kg (98 lb)   Body mass index is 17.92 kg/m².    Interventions/Recommendations (treatment strategy):  1. Recommend a Minced and Moist diet, per SLP recommendation 2. Recommend Boost Plus TID 3.Recommend Jose David BID 4. Daily weight 5. Meal set/assistance/encourangement TID      VTE Risk Mitigation (From admission, onward)         Ordered     IP VTE HIGH RISK PATIENT  Once         11/06/23 0856     Place sequential compression device  Until discontinued         11/06/23 0856                Discharge Planning   ANDREAS:      Code Status: Full Code   Is the patient medically ready for discharge?:     Reason for patient still in hospital (select all that apply): Patient trending condition, Laboratory test and Treatment  Discharge Plan A: Home with family   Discharge Delays: None known at this time              Elías Hidalgo MD  Department of Hospital Medicine   [facility] - Med Surg

## 2023-11-11 NOTE — SUBJECTIVE & OBJECTIVE
Interval History: reports pain well controlled. Having diarrhea and unable to control stool. Poor appetite with minimal PO intake    Medications:  Continuous Infusions:   lactated ringers 20 mL/hr at 11/10/23 2036     Scheduled Meds:   aluminum-magnesium hydroxide-simethicone  30 mL Oral QID (AC & HS)    folic acid-vit B6-vit B12 2.5-25-2 mg  1 tablet Oral Daily    LIDOcaine (PF) 10 mg/ml (1%)  10 mL Other Once    mirtazapine  15 mg Oral QHS    multivitamin  1 tablet Oral Daily    piperacillin-tazobactam (Zosyn) IV (PEDS and ADULTS) (extended infusion is not appropriate)  4.5 g Intravenous Q8H    senna-docusate 8.6-50 mg  2 tablet Oral Daily    sucralfate  1 g Oral Q6H    thiamine  100 mg Oral Daily     PRN Meds:LIDOcaine (PF) 10 mg/ml (1%), morphine, ondansetron, sodium chloride 0.9%     Review of patient's allergies indicates:   Allergen Reactions    Baclofen Other (See Comments)     Vomiting, confusion, shaking     Objective:     Vital Signs (Most Recent):  Temp: 98.5 °F (36.9 °C) (11/11/23 0757)  Pulse: 80 (11/11/23 0757)  Resp: 16 (11/11/23 0757)  BP: 121/63 (11/11/23 0757)  SpO2: (!) 93 % (11/11/23 0757) Vital Signs (24h Range):  Temp:  [97.6 °F (36.4 °C)-98.9 °F (37.2 °C)] 98.5 °F (36.9 °C)  Pulse:  [75-87] 80  Resp:  [16-18] 16  SpO2:  [93 %-100 %] 93 %  BP: (100-121)/(59-71) 121/63     Weight: 41.8 kg (92 lb 2.4 oz)  Body mass index is 16.85 kg/m².    Intake/Output - Last 3 Shifts         11/09 0700  11/10 0659 11/10 0700  11/11 0659 11/11 0700  11/12 0659    P.O. 240      I.V. (mL/kg)  2781.1 (66.5)     IV Piggyback  592.8     Total Intake(mL/kg) 240 (5.7) 3373.9 (80.7)     Urine (mL/kg/hr) 0 (0) 0 (0)     Drains 800 350     Stool 0 0     Total Output 800 350     Net -560 +3023.9            Urine Occurrence 3 x 1 x     Stool Occurrence 1 x 2 x 1 x             Physical Exam  Constitutional:       Appearance: She is well-developed.   HENT:      Head: Normocephalic and atraumatic.   Eyes:       Conjunctiva/sclera: Conjunctivae normal.      Pupils: Pupils are equal, round, and reactive to light.   Neck:      Thyroid: No thyromegaly.   Cardiovascular:      Rate and Rhythm: Normal rate and regular rhythm.   Pulmonary:      Effort: Pulmonary effort is normal. No respiratory distress.   Abdominal:      Comments: Cirrhotic, distended, nontender. HUMAIRA with thin SS output. RUQ drain site without ascites leaking   Musculoskeletal:         General: No tenderness. Normal range of motion.      Cervical back: Normal range of motion.   Skin:     General: Skin is warm and dry.      Capillary Refill: Capillary refill takes less than 2 seconds.   Neurological:      General: No focal deficit present.      Mental Status: She is alert and oriented to person, place, and time.          Significant Labs:  I have reviewed all pertinent lab results within the past 24 hours.  CBC:   Recent Labs   Lab 11/11/23  0721   WBC 14.76*   RBC 3.31*   HGB 9.6*   HCT 29.8*   *   MCV 90   MCH 29.0   MCHC 32.2     CMP:   Recent Labs   Lab 11/08/23  0508 11/09/23  0531 11/10/23  0646   GLU 71   < > 86   CALCIUM 7.9*   < > 7.6*   ALBUMIN 1.3*  --   --    PROT 5.8*  --   --       < > 138   K 3.7   < > 3.6   CO2 22*   < > 22*      < > 106   BUN 15   < > 11   CREATININE 0.5   < > 0.6   ALKPHOS 142*  --   --    ALT 5*  --   --    AST 15  --   --    BILITOT 0.2  --   --     < > = values in this interval not displayed.       Significant Diagnostics:  I have reviewed all pertinent imaging results/findings within the past 24 hours.

## 2023-11-12 LAB
ANION GAP SERPL CALC-SCNC: 8 MMOL/L (ref 8–16)
BASOPHILS # BLD AUTO: 0.14 K/UL (ref 0–0.2)
BASOPHILS NFR BLD: 1.2 % (ref 0–1.9)
BUN SERPL-MCNC: 6 MG/DL (ref 8–23)
CALCIUM SERPL-MCNC: 7.5 MG/DL (ref 8.7–10.5)
CHLORIDE SERPL-SCNC: 106 MMOL/L (ref 95–110)
CO2 SERPL-SCNC: 23 MMOL/L (ref 23–29)
CREAT SERPL-MCNC: 0.6 MG/DL (ref 0.5–1.4)
DIFFERENTIAL METHOD: ABNORMAL
EOSINOPHIL # BLD AUTO: 0.4 K/UL (ref 0–0.5)
EOSINOPHIL NFR BLD: 3.1 % (ref 0–8)
ERYTHROCYTE [DISTWIDTH] IN BLOOD BY AUTOMATED COUNT: 13.2 % (ref 11.5–14.5)
EST. GFR  (NO RACE VARIABLE): >60 ML/MIN/1.73 M^2
GLUCOSE SERPL-MCNC: 67 MG/DL (ref 70–110)
HCT VFR BLD AUTO: 30.3 % (ref 37–48.5)
HGB BLD-MCNC: 9.7 G/DL (ref 12–16)
IMM GRANULOCYTES # BLD AUTO: 0.04 K/UL (ref 0–0.04)
IMM GRANULOCYTES NFR BLD AUTO: 0.3 % (ref 0–0.5)
LYMPHOCYTES # BLD AUTO: 3.8 K/UL (ref 1–4.8)
LYMPHOCYTES NFR BLD: 32 % (ref 18–48)
MCH RBC QN AUTO: 29.3 PG (ref 27–31)
MCHC RBC AUTO-ENTMCNC: 32 G/DL (ref 32–36)
MCV RBC AUTO: 92 FL (ref 82–98)
MONOCYTES # BLD AUTO: 0.6 K/UL (ref 0.3–1)
MONOCYTES NFR BLD: 5.4 % (ref 4–15)
NEUTROPHILS # BLD AUTO: 6.9 K/UL (ref 1.8–7.7)
NEUTROPHILS NFR BLD: 58 % (ref 38–73)
NRBC BLD-RTO: 0 /100 WBC
PLATELET # BLD AUTO: 570 K/UL (ref 150–450)
PMV BLD AUTO: 8.7 FL (ref 9.2–12.9)
POTASSIUM SERPL-SCNC: 3.4 MMOL/L (ref 3.5–5.1)
RBC # BLD AUTO: 3.31 M/UL (ref 4–5.4)
SODIUM SERPL-SCNC: 137 MMOL/L (ref 136–145)
WBC # BLD AUTO: 11.84 K/UL (ref 3.9–12.7)

## 2023-11-12 PROCEDURE — 11000001 HC ACUTE MED/SURG PRIVATE ROOM

## 2023-11-12 PROCEDURE — 63600175 PHARM REV CODE 636 W HCPCS: Performed by: HOSPITALIST

## 2023-11-12 PROCEDURE — 85025 COMPLETE CBC W/AUTO DIFF WBC: CPT | Performed by: HOSPITALIST

## 2023-11-12 PROCEDURE — 25000003 PHARM REV CODE 250: Performed by: SURGERY

## 2023-11-12 PROCEDURE — 99231 PR SUBSEQUENT HOSPITAL CARE,LEVL I: ICD-10-PCS | Mod: ,,, | Performed by: STUDENT IN AN ORGANIZED HEALTH CARE EDUCATION/TRAINING PROGRAM

## 2023-11-12 PROCEDURE — 36415 COLL VENOUS BLD VENIPUNCTURE: CPT | Performed by: HOSPITALIST

## 2023-11-12 PROCEDURE — 80048 BASIC METABOLIC PNL TOTAL CA: CPT | Performed by: HOSPITALIST

## 2023-11-12 PROCEDURE — 99231 SBSQ HOSP IP/OBS SF/LOW 25: CPT | Mod: ,,, | Performed by: STUDENT IN AN ORGANIZED HEALTH CARE EDUCATION/TRAINING PROGRAM

## 2023-11-12 PROCEDURE — 25000003 PHARM REV CODE 250: Performed by: HOSPITALIST

## 2023-11-12 PROCEDURE — 63600175 PHARM REV CODE 636 W HCPCS: Performed by: SURGERY

## 2023-11-12 RX ORDER — SODIUM CHLORIDE 9 MG/ML
INJECTION, SOLUTION INTRAVENOUS
Status: DISCONTINUED | OUTPATIENT
Start: 2023-11-12 | End: 2023-11-13 | Stop reason: HOSPADM

## 2023-11-12 RX ORDER — POTASSIUM CHLORIDE 20 MEQ/1
40 TABLET, EXTENDED RELEASE ORAL 2 TIMES DAILY
Status: DISCONTINUED | OUTPATIENT
Start: 2023-11-12 | End: 2023-11-13 | Stop reason: HOSPADM

## 2023-11-12 RX ORDER — MORPHINE SULFATE 2 MG/ML
2 INJECTION, SOLUTION INTRAMUSCULAR; INTRAVENOUS EVERY 6 HOURS PRN
Status: DISCONTINUED | OUTPATIENT
Start: 2023-11-12 | End: 2023-11-13 | Stop reason: HOSPADM

## 2023-11-12 RX ORDER — CHOLESTYRAMINE 4 G/9G
1 POWDER, FOR SUSPENSION ORAL 2 TIMES DAILY
Status: DISCONTINUED | OUTPATIENT
Start: 2023-11-12 | End: 2023-11-13 | Stop reason: HOSPADM

## 2023-11-12 RX ORDER — HYDROCODONE BITARTRATE AND ACETAMINOPHEN 5; 325 MG/1; MG/1
1 TABLET ORAL EVERY 6 HOURS PRN
Status: DISCONTINUED | OUTPATIENT
Start: 2023-11-12 | End: 2023-11-13 | Stop reason: HOSPADM

## 2023-11-12 RX ORDER — HYDROCODONE BITARTRATE AND ACETAMINOPHEN 7.5; 325 MG/1; MG/1
1 TABLET ORAL EVERY 6 HOURS PRN
Status: DISCONTINUED | OUTPATIENT
Start: 2023-11-12 | End: 2023-11-13 | Stop reason: HOSPADM

## 2023-11-12 RX ADMIN — MIRTAZAPINE 15 MG: 15 TABLET, FILM COATED ORAL at 08:11

## 2023-11-12 RX ADMIN — LOPERAMIDE HYDROCHLORIDE 2 MG: 2 CAPSULE ORAL at 07:11

## 2023-11-12 RX ADMIN — POTASSIUM CHLORIDE 40 MEQ: 1500 TABLET, EXTENDED RELEASE ORAL at 08:11

## 2023-11-12 RX ADMIN — PIPERACILLIN SODIUM AND TAZOBACTAM SODIUM 4.5 G: 4; .5 INJECTION, POWDER, FOR SOLUTION INTRAVENOUS at 02:11

## 2023-11-12 RX ADMIN — THERA TABS 1 TABLET: TAB at 09:11

## 2023-11-12 RX ADMIN — POTASSIUM CHLORIDE 40 MEQ: 1500 TABLET, EXTENDED RELEASE ORAL at 09:11

## 2023-11-12 RX ADMIN — MORPHINE SULFATE 4 MG: 4 INJECTION INTRAVENOUS at 04:11

## 2023-11-12 RX ADMIN — PIPERACILLIN SODIUM AND TAZOBACTAM SODIUM 4.5 G: 4; .5 INJECTION, POWDER, FOR SOLUTION INTRAVENOUS at 06:11

## 2023-11-12 RX ADMIN — PIPERACILLIN SODIUM AND TAZOBACTAM SODIUM 4.5 G: 4; .5 INJECTION, POWDER, FOR SOLUTION INTRAVENOUS at 09:11

## 2023-11-12 RX ADMIN — ONDANSETRON 4 MG: 2 INJECTION INTRAMUSCULAR; INTRAVENOUS at 08:11

## 2023-11-12 RX ADMIN — ONDANSETRON 4 MG: 2 INJECTION INTRAMUSCULAR; INTRAVENOUS at 12:11

## 2023-11-12 RX ADMIN — MORPHINE SULFATE 2 MG: 2 INJECTION, SOLUTION INTRAMUSCULAR; INTRAVENOUS at 09:11

## 2023-11-12 RX ADMIN — ONDANSETRON 4 MG: 2 INJECTION INTRAMUSCULAR; INTRAVENOUS at 04:11

## 2023-11-12 RX ADMIN — SUCRALFATE 1 G: 1 SUSPENSION ORAL at 06:11

## 2023-11-12 RX ADMIN — CHOLESTYRAMINE 4 G: 4 POWDER, FOR SUSPENSION ORAL at 11:11

## 2023-11-12 RX ADMIN — MORPHINE SULFATE 4 MG: 4 INJECTION INTRAVENOUS at 10:11

## 2023-11-12 RX ADMIN — Medication 1 TABLET: at 09:11

## 2023-11-12 RX ADMIN — THIAMINE HCL TAB 100 MG 100 MG: 100 TAB at 09:11

## 2023-11-12 RX ADMIN — CHOLESTYRAMINE 4 G: 4 POWDER, FOR SUSPENSION ORAL at 08:11

## 2023-11-12 RX ADMIN — HYDROCODONE BITARTRATE AND ACETAMINOPHEN 1 TABLET: 7.5; 325 TABLET ORAL at 03:11

## 2023-11-12 RX ADMIN — ALUMINUM HYDROXIDE, MAGNESIUM HYDROXIDE, AND DIMETHICONE 30 ML: 200; 20; 200 SUSPENSION ORAL at 06:11

## 2023-11-12 RX ADMIN — LOPERAMIDE HYDROCHLORIDE 2 MG: 2 CAPSULE ORAL at 03:11

## 2023-11-12 RX ADMIN — SENNOSIDES AND DOCUSATE SODIUM 2 TABLET: 8.6; 5 TABLET ORAL at 09:11

## 2023-11-12 NOTE — ASSESSMENT & PLAN NOTE
Co-morbidities are present and inclusive of portal hypertension and malnutrition.  MELD-Na score calculated; MELD 3.0: 12 at 11/8/2023  5:08 AM  MELD-Na: 7 at 11/8/2023  5:08 AM  Calculated from:  Serum Creatinine: 0.5 mg/dL (Using min of 1 mg/dL) at 11/8/2023  5:08 AM  Serum Sodium: 138 mmol/L (Using max of 137 mmol/L) at 11/8/2023  5:08 AM  Total Bilirubin: 0.2 mg/dL (Using min of 1 mg/dL) at 11/8/2023  5:08 AM  Serum Albumin: 1.3 g/dL (Using min of 1.5 g/dL) at 11/8/2023  5:08 AM  INR(ratio): 1.1 at 11/6/2023  4:00 PM  Age at listing (hypothetical): 63 years  Sex: Female at 11/8/2023  5:08 AM    Continue chronic meds. Etiology likely ETOH. Will avoid any hepatotoxic meds, and monitor CBC/CMP/INR for synthetic function.

## 2023-11-12 NOTE — PROGRESS NOTES
[facility] Medicine  Progress Note    Patient Name: Lakisha Vance  MRN: [id number]  Patient Class: IP- Inpatient   Admission Date: 11/5/2023  Length of Stay: 5 days  Attending Physician: Elías Hidalgo MD  Primary Care Provider: Pranav Gonzalez MD        Subjective:     Principal Problem:Sepsis        HPI:  62 y/o female with PMHx of HTN, alcoholic liver cirrhosis (EGD Sept '23 with reflux esophagitis, gastritis, portal hypertensive gastropathy) who recently underwent laproscopic cholecystectomy for gangrenous cholecystitis with RUQ drain placement on 10-28-23 who presented to the ER on 11-5-23 for abdominal pain, nausea/vomiting, fevers/chills. Patient discharged on 11/02/2023 in stable condition with her HUMAIRA drain in place and on antibiotics. She reports sudden onset of right sided lower abdominal and pelvis pain, associated with nausea/vomiting and subjective fevers. Patient denies vomiting blood or coffee ground emesis, denies chest pain, SOB or any other complaints. In ER, patient with temp 100.1 F, tachycardic, WBC 44k; blood cultures were obtained and started on Zosyn. UA pending. CT scan noted small amount of fluid in RUQ with drain in place and fluid collection in pelvis. Patient admitted by General Surgery this AM, plans for IR drainage of pelvic fluid collection. Hospital Medicine consulted to assist with medical management.      Overview/Hospital Course:  11/7  VANESSAON, patient reports lower quadrant pain,   CT A/P with small amount of fluid noted in right pelvis  Discussed with IR, plan for CT A/P with contrast in AM, possible drainage  Continue Zosyn, WBC trending down, temp curve improving    11/8  CT A/P with noted right pelvic abscess, perforated sigmoid colon diverticulitis and low grade colonic obstruction  Patient s/p IR drain placement for right pelvic abscess this afternoon; fluid cultures obtained  Continue Zosyn, WBC trending down, pain control    11/9  JOSEEON, patient reports  abdominal pain better controlled today, denies nausea, chest pain, reports +BM  RLQ drain in place with purulent drainage  Continue Zosyn, WBC trending down, cultures pending  RUQ drain from recent cholecystectomy in place, plans for removal per Surgery    11/10  NAEON, surgical HUMAIRA drain removed today by Surgery, f/u in 2 weeks outpatient for suture removal.  RLQ drain for pelvic abscess with purulent drainage, 300 cc  Continue Zosyn, WBC trending down, cultures pending    11/11  RLQ drain output 50 cc yesterday, plan to remove < 10 cc  Pain controlled, cultures pending    11/12  RLQ drain with some output noted at bedside - no documented I/O's yesterday  Reports pain, soreness at drain insertion site, adjusted PRN regimen  Patient with continued diarrhea, will start cholestyramine  Replete potassium, K 3.4  ---  Continue Zosyn, day # 8, cultures NGTD  Consider transitioning to PO regimen on discharge        Review of Systems   All other systems reviewed and are negative.    Objective:     Vital Signs (Most Recent):  Temp: 98.2 °F (36.8 °C) (11/12/23 0813)  Pulse: 83 (11/12/23 0813)  Resp: 18 (11/12/23 1018)  BP: 130/71 (11/12/23 0813)  SpO2: (!) 94 % (11/12/23 0813) Vital Signs (24h Range):  Temp:  [98.1 °F (36.7 °C)-98.5 °F (36.9 °C)] 98.2 °F (36.8 °C)  Pulse:  [73-92] 83  Resp:  [16-18] 18  SpO2:  [94 %-98 %] 94 %  BP: (102-130)/(51-73) 130/71     Weight: 41.8 kg (92 lb 2.4 oz)  Body mass index is 16.85 kg/m².  No intake or output data in the 24 hours ending 11/12/23 1043        Physical Exam  Vitals and nursing note reviewed.   Constitutional:       General: She is not in acute distress.     Appearance: Normal appearance. She is normal weight. She is ill-appearing.   Cardiovascular:      Rate and Rhythm: Normal rate and regular rhythm.      Heart sounds: No murmur heard.  Pulmonary:      Effort: Pulmonary effort is normal. No respiratory distress.      Breath sounds: No wheezing.   Abdominal:      General: There  is no distension.      Tenderness: There is abdominal tenderness.      Comments: RLQ drain (right pelvic abscess)   Neurological:      General: No focal deficit present.      Mental Status: She is alert and oriented to person, place, and time.   Psychiatric:         Mood and Affect: Mood normal.         Behavior: Behavior normal.             Significant Labs: All pertinent labs within the past 24 hours have been reviewed.  Recent Lab Results         11/12/23  0636   11/12/23  0635        Anion Gap 8         Baso #   0.14       Basophil %   1.2       BUN 6         Calcium 7.5         Chloride 106         CO2 23         Creatinine 0.6         Differential Method   Automated       eGFR >60         Eos #   0.4       Eosinophil %   3.1       Glucose 67         Gran # (ANC)   6.9       Gran %   58.0       Hematocrit   30.3       Hemoglobin   9.7       Immature Grans (Abs)   0.04  Comment: Mild elevation in immature granulocytes is non specific and   can be seen in a variety of conditions including stress response,   acute inflammation, trauma and pregnancy. Correlation with other   laboratory and clinical findings is essential.         Immature Granulocytes   0.3       Lymph #   3.8       Lymph %   32.0       MCH   29.3       MCHC   32.0       MCV   92       Mono #   0.6       Mono %   5.4       MPV   8.7       nRBC   0       Platelet Count   570       Potassium 3.4         RBC   3.31       RDW   13.2       Sodium 137         WBC   11.84               Significant Imaging: I have reviewed all pertinent imaging results/findings within the past 24 hours.    IR Abscess Drainage Peritoneal   Final Result      Technically successful CT guided abscess drain of a pelvic collection.      Recommend monitoring out puts and tube removal can be obtained when out puts are less than 10 mL over 24 hours and the patient has clinically improved.  Recommend flushing the catheter with 5 cc of sterile saline every shift.      All CT scans at  this facility use dose modulation, iterative reconstruction, and/or weight based dosing when appropriate to reduce radiation dose to as low as reasonable achievable.         Electronically signed by: Malachi Ruiz   Date:    11/08/2023   Time:    17:29      CT Abdomen Pelvis With IV Contrast   Final Result      1. Right pelvic abscess.   2. Uncomplicated perforated sigmoid colon diverticulitis and low-grade colonic obstruction.  Sigmoidoscopy is recommended following resolution to assess for potential underlying mass.   3. Gas in the anterior peritoneal cavity may be related to prior robotic surgery and drain placement.; However this may also be related to the perforated diverticulitis.  Close clinical follow-up is recommended.   4. Persistent small fluid and gas collection with adjacent drain in the cholecystectomy bed.   5. Finding in the right inguinal canal which may represent inflamed fluid or suppurative lymph node.  Attention on follow-up is recommended.   6. Mild bibasilar atelectasis and adjacent pleural effusions.   7. Other findings as above.   Findings regarding the perforated diverticulitis were communicated to Dr. Hidalgo and Dr. De La Rosa by epic chat at the time of making the findings.         Electronically signed by: Malachi Ruiz   Date:    11/08/2023   Time:    13:19      US Abdomen Limited   Final Result      The portal vein is patent.  Cirrhotic morphology of the liver with trace perihepatic ascites.         Electronically signed by: Bassam Stevens MD   Date:    11/06/2023   Time:    15:53      CT Abdomen Pelvis  Without Contrast   Final Result      Presumed recent operative change cholecystectomy with small fluid collection right pelvis nonspecific         Electronically signed by: Kelsea Levin   Date:    11/05/2023   Time:    21:44      X-Ray Chest AP Portable   Final Result      No acute abnormality.         Electronically signed by: Robsno Panda   Date:    11/05/2023   Time:    19:46     "          Assessment/Plan:      * Sepsis  This patient does have evidence of infective focus  My overall impression is sepsis.  Source: Abdominal  Antibiotics given-   Antibiotics (72h ago, onward)    Start     Stop Route Frequency Ordered    11/06/23 1000  piperacillin-tazobactam (ZOSYN) 4.5 g in dextrose 5 % in water (D5W) 100 mL IVPB (MB+)         -- IV Every 8 hours (non-standard times) 11/06/23 0856        Latest lactate reviewed-  No results for input(s): "LACTATE" in the last 72 hours.  CT A/P with right pelvic abscess, perforated diverticulitis - Surgery following  S/p IR drainage of right pelvic abscess (11-8-23)  Source control achieved by: IV abx, continue Zosyn, abx day # 8  Pelvic abscess culture NGTD blood cultures NGTD  ---  Plan to remove RLQ drain for pelvic abscess once output < 10 cc  Transition to oral antibiotic regimen on discharge, consider extended course given multiple intra-abdominal infections including cholecystitis, ruptured diverticulitis, and pelvic abscess    Diverticulitis of colon with perforation  Plan as above    Pelvic fluid collection  Presented with c/o right lower abdominal and groin pain  CT with perforated diverticulitis and right pelvic abscess  Plan as above    S/P laparoscopic cholecystectomy  s/p robotic cholecystectomy with gangrenous perforated GB - 10/28/23  Right sided abdominal drain removed 11/10/23  CT with with noted post-operative findings and right pelvic fluid collection  Management per Surgery    Alcoholic cirrhosis of liver with ascites  Co-morbidities are present and inclusive of portal hypertension and malnutrition.  MELD-Na score calculated; MELD 3.0: 12 at 11/8/2023  5:08 AM  MELD-Na: 7 at 11/8/2023  5:08 AM  Calculated from:  Serum Creatinine: 0.5 mg/dL (Using min of 1 mg/dL) at 11/8/2023  5:08 AM  Serum Sodium: 138 mmol/L (Using max of 137 mmol/L) at 11/8/2023  5:08 AM  Total Bilirubin: 0.2 mg/dL (Using min of 1 mg/dL) at 11/8/2023  5:08 AM  Serum " Albumin: 1.3 g/dL (Using min of 1.5 g/dL) at 11/8/2023  5:08 AM  INR(ratio): 1.1 at 11/6/2023  4:00 PM  Age at listing (hypothetical): 63 years  Sex: Female at 11/8/2023  5:08 AM    Continue chronic meds. Etiology likely ETOH. Will avoid any hepatotoxic meds, and monitor CBC/CMP/INR for synthetic function.     HTN (hypertension)  Chronic, controlled. Latest blood pressure and vitals reviewed-     Temp:  [98.1 °F (36.7 °C)-98.5 °F (36.9 °C)]   Pulse:  [73-92]   Resp:  [16-18]   BP: (102-130)/(51-73)   SpO2:  [94 %-98 %] .   Home meds for hypertension were reviewed and noted below.   Hypertension Medications             furosemide (LASIX) 40 MG tablet Take 1 tablet (40 mg total) by mouth once daily.    spironolactone (ALDACTONE) 50 MG tablet Take 1 tablet (50 mg total) by mouth 2 (two) times daily.    spironolactone (ALDACTONE) 50 MG tablet Take 1 tablet (50 mg total) by mouth once daily.          While in the hospital, will manage blood pressure as follows; Adjust home antihypertensive regimen as follows- hold home meds - BP marginal     Resume home meds when appropriate     Severe protein-calorie malnutrition  Nutrition consulted. Most recent weight and BMI monitored-     Measurements:  Wt Readings from Last 1 Encounters:   11/10/23 41.8 kg (92 lb 2.4 oz)   Body mass index is 16.85 kg/m².    Interventions/Recommendations (treatment strategy):  1. Recommend a Minced and Moist diet, per SLP recommendation 2. Recommend Boost Plus TID 3.Recommend Jose David BID 4. Daily weight 5. Meal set/assistance/encourangement TID      VTE Risk Mitigation (From admission, onward)         Ordered     IP VTE HIGH RISK PATIENT  Once         11/06/23 0856     Place sequential compression device  Until discontinued         11/06/23 0856                Discharge Planning   ANDREAS:      Code Status: Full Code   Is the patient medically ready for discharge?:     Reason for patient still in hospital (select all that apply): Patient trending  condition, Laboratory test, Treatment and Consult recommendations  Discharge Plan A: Home with family   Discharge Delays: None known at this time              Elías Hidalgo MD  Department of Hospital Medicine   'On license of [facility]

## 2023-11-12 NOTE — SUBJECTIVE & OBJECTIVE
Interval History: continues to have diarrhea. Reports better PO intake, particularly with boost. Pain well controlled    Medications:  Continuous Infusions:   lactated ringers 75 mL/hr at 11/11/23 2334     Scheduled Meds:   cholestyramine  1 packet Oral BID    folic acid-vit B6-vit B12 2.5-25-2 mg  1 tablet Oral Daily    LIDOcaine (PF) 10 mg/ml (1%)  10 mL Other Once    mirtazapine  15 mg Oral QHS    multivitamin  1 tablet Oral Daily    piperacillin-tazobactam (Zosyn) IV (PEDS and ADULTS) (extended infusion is not appropriate)  4.5 g Intravenous Q8H    potassium chloride  40 mEq Oral BID    thiamine  100 mg Oral Daily     PRN Meds:HYDROcodone-acetaminophen, HYDROcodone-acetaminophen, LIDOcaine (PF) 10 mg/ml (1%), loperamide, morphine, ondansetron, sodium chloride 0.9%     Review of patient's allergies indicates:   Allergen Reactions    Baclofen Other (See Comments)     Vomiting, confusion, shaking     Objective:     Vital Signs (Most Recent):  Temp: 98.8 °F (37.1 °C) (11/12/23 1127)  Pulse: 77 (11/12/23 1127)  Resp: 16 (11/12/23 1127)  BP: 116/70 (11/12/23 1127)  SpO2: 98 % (11/12/23 1127) Vital Signs (24h Range):  Temp:  [98.1 °F (36.7 °C)-98.8 °F (37.1 °C)] 98.8 °F (37.1 °C)  Pulse:  [73-92] 77  Resp:  [16-18] 16  SpO2:  [94 %-98 %] 98 %  BP: (102-130)/(51-73) 116/70     Weight: 41.8 kg (92 lb 2.4 oz)  Body mass index is 16.85 kg/m².    Intake/Output - Last 3 Shifts         11/10 0700 11/11 0659 11/11 0700 11/12 0659 11/12 0700 11/13 0659    P.O.       I.V. (mL/kg) 2781.1 (66.5)      IV Piggyback 592.8      Total Intake(mL/kg) 3373.9 (80.7)      Urine (mL/kg/hr) 0 (0)      Drains 350      Stool 0      Total Output 350      Net +3023.9             Urine Occurrence 1 x 1 x 1 x    Stool Occurrence 2 x 2 x 1 x             Physical Exam  Constitutional:       Appearance: She is well-developed.   HENT:      Head: Normocephalic and atraumatic.   Eyes:      Conjunctiva/sclera: Conjunctivae normal.      Pupils: Pupils  are equal, round, and reactive to light.   Neck:      Thyroid: No thyromegaly.   Cardiovascular:      Rate and Rhythm: Normal rate and regular rhythm.   Pulmonary:      Effort: Pulmonary effort is normal. No respiratory distress.   Abdominal:      General: There is distension.      Palpations: There is no mass.      Comments: HUMAIRA with minimal serosanguinous output   Musculoskeletal:         General: No tenderness. Normal range of motion.      Cervical back: Normal range of motion.   Skin:     General: Skin is warm and dry.      Capillary Refill: Capillary refill takes less than 2 seconds.   Neurological:      General: No focal deficit present.      Mental Status: She is alert and oriented to person, place, and time.          Significant Labs:  I have reviewed all pertinent lab results within the past 24 hours.  CBC:   Recent Labs   Lab 11/12/23  0635   WBC 11.84   RBC 3.31*   HGB 9.7*   HCT 30.3*   *   MCV 92   MCH 29.3   MCHC 32.0     CMP:   Recent Labs   Lab 11/08/23  0508 11/09/23  0531 11/12/23  0636   GLU 71   < > 67*   CALCIUM 7.9*   < > 7.5*   ALBUMIN 1.3*  --   --    PROT 5.8*  --   --       < > 137   K 3.7   < > 3.4*   CO2 22*   < > 23      < > 106   BUN 15   < > 6*   CREATININE 0.5   < > 0.6   ALKPHOS 142*  --   --    ALT 5*  --   --    AST 15  --   --    BILITOT 0.2  --   --     < > = values in this interval not displayed.       Significant Diagnostics:  I have reviewed all pertinent imaging results/findings within the past 24 hours.  none

## 2023-11-12 NOTE — ASSESSMENT & PLAN NOTE
"This patient does have evidence of infective focus  My overall impression is sepsis.  Source: Abdominal  Antibiotics given-   Antibiotics (72h ago, onward)    Start     Stop Route Frequency Ordered    11/06/23 1000  piperacillin-tazobactam (ZOSYN) 4.5 g in dextrose 5 % in water (D5W) 100 mL IVPB (MB+)         -- IV Every 8 hours (non-standard times) 11/06/23 0856        Latest lactate reviewed-  No results for input(s): "LACTATE" in the last 72 hours.  CT A/P with right pelvic abscess, perforated diverticulitis - Surgery following  S/p IR drainage of right pelvic abscess (11-8-23)  Source control achieved by: IV abx, continue Zosyn, abx day # 8  Pelvic abscess culture NGTD blood cultures NGTD  ---  Plan to remove RLQ drain for pelvic abscess once output < 10 cc  Transition to oral antibiotic regimen on discharge, consider extended course given multiple intra-abdominal infections including cholecystitis, ruptured diverticulitis, and pelvic abscess  "

## 2023-11-12 NOTE — SUBJECTIVE & OBJECTIVE
Review of Systems   All other systems reviewed and are negative.    Objective:     Vital Signs (Most Recent):  Temp: 98.2 °F (36.8 °C) (11/12/23 0813)  Pulse: 83 (11/12/23 0813)  Resp: 18 (11/12/23 1018)  BP: 130/71 (11/12/23 0813)  SpO2: (!) 94 % (11/12/23 0813) Vital Signs (24h Range):  Temp:  [98.1 °F (36.7 °C)-98.5 °F (36.9 °C)] 98.2 °F (36.8 °C)  Pulse:  [73-92] 83  Resp:  [16-18] 18  SpO2:  [94 %-98 %] 94 %  BP: (102-130)/(51-73) 130/71     Weight: 41.8 kg (92 lb 2.4 oz)  Body mass index is 16.85 kg/m².  No intake or output data in the 24 hours ending 11/12/23 1043        Physical Exam  Vitals and nursing note reviewed.   Constitutional:       General: She is not in acute distress.     Appearance: Normal appearance. She is normal weight. She is ill-appearing.   Cardiovascular:      Rate and Rhythm: Normal rate and regular rhythm.      Heart sounds: No murmur heard.  Pulmonary:      Effort: Pulmonary effort is normal. No respiratory distress.      Breath sounds: No wheezing.   Abdominal:      General: There is no distension.      Tenderness: There is abdominal tenderness.      Comments: RLQ drain (right pelvic abscess)   Neurological:      General: No focal deficit present.      Mental Status: She is alert and oriented to person, place, and time.   Psychiatric:         Mood and Affect: Mood normal.         Behavior: Behavior normal.             Significant Labs: All pertinent labs within the past 24 hours have been reviewed.  Recent Lab Results         11/12/23  0636   11/12/23  0635        Anion Gap 8         Baso #   0.14       Basophil %   1.2       BUN 6         Calcium 7.5         Chloride 106         CO2 23         Creatinine 0.6         Differential Method   Automated       eGFR >60         Eos #   0.4       Eosinophil %   3.1       Glucose 67         Gran # (ANC)   6.9       Gran %   58.0       Hematocrit   30.3       Hemoglobin   9.7       Immature Grans (Abs)   0.04  Comment: Mild elevation in  immature granulocytes is non specific and   can be seen in a variety of conditions including stress response,   acute inflammation, trauma and pregnancy. Correlation with other   laboratory and clinical findings is essential.         Immature Granulocytes   0.3       Lymph #   3.8       Lymph %   32.0       MCH   29.3       MCHC   32.0       MCV   92       Mono #   0.6       Mono %   5.4       MPV   8.7       nRBC   0       Platelet Count   570       Potassium 3.4         RBC   3.31       RDW   13.2       Sodium 137         WBC   11.84               Significant Imaging: I have reviewed all pertinent imaging results/findings within the past 24 hours.    IR Abscess Drainage Peritoneal   Final Result      Technically successful CT guided abscess drain of a pelvic collection.      Recommend monitoring out puts and tube removal can be obtained when out puts are less than 10 mL over 24 hours and the patient has clinically improved.  Recommend flushing the catheter with 5 cc of sterile saline every shift.      All CT scans at this facility use dose modulation, iterative reconstruction, and/or weight based dosing when appropriate to reduce radiation dose to as low as reasonable achievable.         Electronically signed by: Malachi Ruiz   Date:    11/08/2023   Time:    17:29      CT Abdomen Pelvis With IV Contrast   Final Result      1. Right pelvic abscess.   2. Uncomplicated perforated sigmoid colon diverticulitis and low-grade colonic obstruction.  Sigmoidoscopy is recommended following resolution to assess for potential underlying mass.   3. Gas in the anterior peritoneal cavity may be related to prior robotic surgery and drain placement.; However this may also be related to the perforated diverticulitis.  Close clinical follow-up is recommended.   4. Persistent small fluid and gas collection with adjacent drain in the cholecystectomy bed.   5. Finding in the right inguinal canal which may represent inflamed fluid or  suppurative lymph node.  Attention on follow-up is recommended.   6. Mild bibasilar atelectasis and adjacent pleural effusions.   7. Other findings as above.   Findings regarding the perforated diverticulitis were communicated to Dr. Hidalgo and Dr. De La Rosa by epic chat at the time of making the findings.         Electronically signed by: Malachi Ruiz   Date:    11/08/2023   Time:    13:19      US Abdomen Limited   Final Result      The portal vein is patent.  Cirrhotic morphology of the liver with trace perihepatic ascites.         Electronically signed by: Bassam Stevens MD   Date:    11/06/2023   Time:    15:53      CT Abdomen Pelvis  Without Contrast   Final Result      Presumed recent operative change cholecystectomy with small fluid collection right pelvis nonspecific         Electronically signed by: Kelsea Levin   Date:    11/05/2023   Time:    21:44      X-Ray Chest AP Portable   Final Result      No acute abnormality.         Electronically signed by: Robson Panda   Date:    11/05/2023   Time:    19:46

## 2023-11-12 NOTE — PROGRESS NOTES
[facility] Surg  General Surgery  Progress Note    Subjective:     History of Present Illness:  Ms. Martin is a 63-year-old female with a history of hypertension and cirrhosis of the liver status post robotic cholecystectomy on 10/28/2023 for gangrenous cholecystitis.  She was discharged on 11/02/2023 in stable condition with her HUMAIRA drain in place and on antibiotics.  She states that she was doing well overall following her discharge up until yesterday morning at which point in time she developed sudden onset lower abdominal pain sharp in nature and intermittent, as well as nausea, vomiting, fevers, and chills.  She attempted to take her Zofran however did not have any relief as she states she had vomiting it up.  She called EMS who brought her to the emergency department.  Per EMS she did have coffee-ground emesis however she maintains that it was not coffee-ground but was yellow in color.  Upon arrival to the emergency department her vital signs were all within normal limits.  She did develop a T-max of a 100.1° overnight at which point in time she also became mildly tachycardic up to 103.  CT scan was performed without contrast which shows a small amount of non organized fluid in the right upper quadrant with the drain in place and a fluid collection in the pelvis.  Otherwise there were no abnormal findings on her CT scan.  She was noted to have a leukocytosis up to 44,000 as well as thrombocytosis.  Blood cultures were performed and are still pending and she was admitted for further workup and management.  Currently she states that the morphine and the Zofran IV are helping with her nausea and her pain she still intermittently has lower abdominal pain and overall feels quite weak still.      Post-Op Info:  * No surgery found *         Interval History: continues to have diarrhea. Reports better PO intake, particularly with boost. Pain well controlled    Medications:  Continuous Infusions:   lactated ringers 75  mL/hr at 11/11/23 2334     Scheduled Meds:   cholestyramine  1 packet Oral BID    folic acid-vit B6-vit B12 2.5-25-2 mg  1 tablet Oral Daily    LIDOcaine (PF) 10 mg/ml (1%)  10 mL Other Once    mirtazapine  15 mg Oral QHS    multivitamin  1 tablet Oral Daily    piperacillin-tazobactam (Zosyn) IV (PEDS and ADULTS) (extended infusion is not appropriate)  4.5 g Intravenous Q8H    potassium chloride  40 mEq Oral BID    thiamine  100 mg Oral Daily     PRN Meds:HYDROcodone-acetaminophen, HYDROcodone-acetaminophen, LIDOcaine (PF) 10 mg/ml (1%), loperamide, morphine, ondansetron, sodium chloride 0.9%     Review of patient's allergies indicates:   Allergen Reactions    Baclofen Other (See Comments)     Vomiting, confusion, shaking     Objective:     Vital Signs (Most Recent):  Temp: 98.8 °F (37.1 °C) (11/12/23 1127)  Pulse: 77 (11/12/23 1127)  Resp: 16 (11/12/23 1127)  BP: 116/70 (11/12/23 1127)  SpO2: 98 % (11/12/23 1127) Vital Signs (24h Range):  Temp:  [98.1 °F (36.7 °C)-98.8 °F (37.1 °C)] 98.8 °F (37.1 °C)  Pulse:  [73-92] 77  Resp:  [16-18] 16  SpO2:  [94 %-98 %] 98 %  BP: (102-130)/(51-73) 116/70     Weight: 41.8 kg (92 lb 2.4 oz)  Body mass index is 16.85 kg/m².    Intake/Output - Last 3 Shifts         11/10 0700  11/11 0659 11/11 0700  11/12 0659 11/12 0700  11/13 0659    P.O.       I.V. (mL/kg) 2781.1 (66.5)      IV Piggyback 592.8      Total Intake(mL/kg) 3373.9 (80.7)      Urine (mL/kg/hr) 0 (0)      Drains 350      Stool 0      Total Output 350      Net +3023.9             Urine Occurrence 1 x 1 x 1 x    Stool Occurrence 2 x 2 x 1 x             Physical Exam  Constitutional:       Appearance: She is well-developed.   HENT:      Head: Normocephalic and atraumatic.   Eyes:      Conjunctiva/sclera: Conjunctivae normal.      Pupils: Pupils are equal, round, and reactive to light.   Neck:      Thyroid: No thyromegaly.   Cardiovascular:      Rate and Rhythm: Normal rate and regular rhythm.   Pulmonary:       Effort: Pulmonary effort is normal. No respiratory distress.   Abdominal:      General: There is distension.      Palpations: There is no mass.      Comments: HUMAIRA with minimal serosanguinous output   Musculoskeletal:         General: No tenderness. Normal range of motion.      Cervical back: Normal range of motion.   Skin:     General: Skin is warm and dry.      Capillary Refill: Capillary refill takes less than 2 seconds.   Neurological:      General: No focal deficit present.      Mental Status: She is alert and oriented to person, place, and time.          Significant Labs:  I have reviewed all pertinent lab results within the past 24 hours.  CBC:   Recent Labs   Lab 11/12/23  0635   WBC 11.84   RBC 3.31*   HGB 9.7*   HCT 30.3*   *   MCV 92   MCH 29.3   MCHC 32.0     CMP:   Recent Labs   Lab 11/08/23  0508 11/09/23  0531 11/12/23  0636   GLU 71   < > 67*   CALCIUM 7.9*   < > 7.5*   ALBUMIN 1.3*  --   --    PROT 5.8*  --   --       < > 137   K 3.7   < > 3.4*   CO2 22*   < > 23      < > 106   BUN 15   < > 6*   CREATININE 0.5   < > 0.6   ALKPHOS 142*  --   --    ALT 5*  --   --    AST 15  --   --    BILITOT 0.2  --   --     < > = values in this interval not displayed.       Significant Diagnostics:  I have reviewed all pertinent imaging results/findings within the past 24 hours.  none    Assessment/Plan:     Diverticulitis of colon with perforation  Found on IV CT scan on 11/8/23- as well as colitis    -- continue abx  -- no plans for surgical intervention in uncomplicated perforated diverticulitis in this frail, medically complicated patient  -- ok for diet  -- will need colonoscopy 6-8 weeks following resolution of diverticulitis (last scope was at age 40)  -- ok for discharge from surgery standpoint. Will need followup with Dr. De La Rosa  for drain removal    Drug-induced constipation  Avoid aggressive bowel regimen with colitis and diverticulitis.  Has had several BMs today and is feeling  improved.     Leukocytosis  WBC improving.  Likely 2/2 perforated diverticulitis and colitis.      -- continue Zosyn  -- defer to primary team     Abdominal pain  POD #14- s/p robotic cholecystectomy for gangrenous perforated cholecystitis.  Abdominal pain unrelated to cholecystectomy, likely 2/2 perforated diverticulitis.      Fluid in pelvis infected ascites vs abscess from diverticulitis.  IR drain non-feculent in appearance.      -- surgical HUMAIRA drain removed at bedside.  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    -- defer to primary team     HTN (hypertension)  Defer to primary team         Tayler Fishman MD  General Surgery  [facility]

## 2023-11-12 NOTE — ASSESSMENT & PLAN NOTE
Found on IV CT scan on 11/8/23- as well as colitis    -- continue abx  -- no plans for surgical intervention in uncomplicated perforated diverticulitis in this frail, medically complicated patient  -- ok for diet  -- will need colonoscopy 6-8 weeks following resolution of diverticulitis (last scope was at age 40)  -- ok for discharge from surgery standpoint. Will need followup with Dr. De La Rosa  for drain removal

## 2023-11-12 NOTE — ASSESSMENT & PLAN NOTE
Nutrition consulted. Most recent weight and BMI monitored-     Measurements:  Wt Readings from Last 1 Encounters:   11/10/23 41.8 kg (92 lb 2.4 oz)   Body mass index is 16.85 kg/m².    Interventions/Recommendations (treatment strategy):  1. Recommend a Minced and Moist diet, per SLP recommendation 2. Recommend Boost Plus TID 3.Recommend Jose David BID 4. Daily weight 5. Meal set/assistance/encourangement TID

## 2023-11-12 NOTE — ASSESSMENT & PLAN NOTE
Chronic, controlled. Latest blood pressure and vitals reviewed-     Temp:  [98.1 °F (36.7 °C)-98.5 °F (36.9 °C)]   Pulse:  [73-92]   Resp:  [16-18]   BP: (102-130)/(51-73)   SpO2:  [94 %-98 %] .   Home meds for hypertension were reviewed and noted below.   Hypertension Medications             furosemide (LASIX) 40 MG tablet Take 1 tablet (40 mg total) by mouth once daily.    spironolactone (ALDACTONE) 50 MG tablet Take 1 tablet (50 mg total) by mouth 2 (two) times daily.    spironolactone (ALDACTONE) 50 MG tablet Take 1 tablet (50 mg total) by mouth once daily.          While in the hospital, will manage blood pressure as follows; Adjust home antihypertensive regimen as follows- hold home meds - BP marginal     Resume home meds when appropriate

## 2023-11-12 NOTE — ASSESSMENT & PLAN NOTE
Presented with c/o right lower abdominal and groin pain  CT with perforated diverticulitis and right pelvic abscess  Plan as above

## 2023-11-12 NOTE — PLAN OF CARE
Discussed poc with pt, pt verbalized understanding    Purposeful rounding every 2hours    VS wnl  Fall precautions in place, remains injury free  Pain and nausea under control with PRN meds    IVFs  Abx given as prescribed  Bed locked at lowest position  Call light within reach    Chart check complete  Will cont with POC

## 2023-11-12 NOTE — ASSESSMENT & PLAN NOTE
s/p robotic cholecystectomy with gangrenous perforated GB - 10/28/23  Right sided abdominal drain removed 11/10/23  CT with with noted post-operative findings and right pelvic fluid collection  Management per Surgery

## 2023-11-13 VITALS
WEIGHT: 99.44 LBS | BODY MASS INDEX: 18.3 KG/M2 | TEMPERATURE: 98 F | SYSTOLIC BLOOD PRESSURE: 155 MMHG | HEIGHT: 62 IN | OXYGEN SATURATION: 96 % | RESPIRATION RATE: 18 BRPM | DIASTOLIC BLOOD PRESSURE: 83 MMHG | HEART RATE: 95 BPM

## 2023-11-13 LAB
ALBUMIN SERPL BCP-MCNC: 1.2 G/DL (ref 3.5–5.2)
ALP SERPL-CCNC: 117 U/L (ref 55–135)
ALT SERPL W/O P-5'-P-CCNC: 8 U/L (ref 10–44)
ANION GAP SERPL CALC-SCNC: 6 MMOL/L (ref 8–16)
ANISOCYTOSIS BLD QL SMEAR: SLIGHT
AST SERPL-CCNC: 20 U/L (ref 10–40)
BASOPHILS # BLD AUTO: 0.08 K/UL (ref 0–0.2)
BASOPHILS NFR BLD: 1 % (ref 0–1.9)
BILIRUB SERPL-MCNC: 0.1 MG/DL (ref 0.1–1)
BUN SERPL-MCNC: 6 MG/DL (ref 8–23)
CALCIUM SERPL-MCNC: 7.5 MG/DL (ref 8.7–10.5)
CHLORIDE SERPL-SCNC: 111 MMOL/L (ref 95–110)
CO2 SERPL-SCNC: 21 MMOL/L (ref 23–29)
CREAT SERPL-MCNC: 0.5 MG/DL (ref 0.5–1.4)
DACRYOCYTES BLD QL SMEAR: ABNORMAL
DIFFERENTIAL METHOD: ABNORMAL
EOSINOPHIL # BLD AUTO: 0.3 K/UL (ref 0–0.5)
EOSINOPHIL NFR BLD: 3.7 % (ref 0–8)
ERYTHROCYTE [DISTWIDTH] IN BLOOD BY AUTOMATED COUNT: 13 % (ref 11.5–14.5)
EST. GFR  (NO RACE VARIABLE): >60 ML/MIN/1.73 M^2
GLUCOSE SERPL-MCNC: 69 MG/DL (ref 70–110)
HCT VFR BLD AUTO: 26.4 % (ref 37–48.5)
HGB BLD-MCNC: 8.6 G/DL (ref 12–16)
IMM GRANULOCYTES # BLD AUTO: 0.02 K/UL (ref 0–0.04)
IMM GRANULOCYTES NFR BLD AUTO: 0.2 % (ref 0–0.5)
LYMPHOCYTES # BLD AUTO: 2.8 K/UL (ref 1–4.8)
LYMPHOCYTES NFR BLD: 35 % (ref 18–48)
MCH RBC QN AUTO: 29 PG (ref 27–31)
MCHC RBC AUTO-ENTMCNC: 32.6 G/DL (ref 32–36)
MCV RBC AUTO: 89 FL (ref 82–98)
MONOCYTES # BLD AUTO: 0.5 K/UL (ref 0.3–1)
MONOCYTES NFR BLD: 6.1 % (ref 4–15)
NEUTROPHILS # BLD AUTO: 4.3 K/UL (ref 1.8–7.7)
NEUTROPHILS NFR BLD: 54 % (ref 38–73)
NRBC BLD-RTO: 0 /100 WBC
PLATELET # BLD AUTO: 523 K/UL (ref 150–450)
PLATELET BLD QL SMEAR: ABNORMAL
PMV BLD AUTO: 8.9 FL (ref 9.2–12.9)
POTASSIUM SERPL-SCNC: 4.4 MMOL/L (ref 3.5–5.1)
PROT SERPL-MCNC: 5.1 G/DL (ref 6–8.4)
RBC # BLD AUTO: 2.97 M/UL (ref 4–5.4)
SODIUM SERPL-SCNC: 138 MMOL/L (ref 136–145)
WBC # BLD AUTO: 8.05 K/UL (ref 3.9–12.7)

## 2023-11-13 PROCEDURE — 36415 COLL VENOUS BLD VENIPUNCTURE: CPT | Performed by: HOSPITALIST

## 2023-11-13 PROCEDURE — 63600175 PHARM REV CODE 636 W HCPCS: Performed by: SURGERY

## 2023-11-13 PROCEDURE — 25000003 PHARM REV CODE 250: Performed by: SURGERY

## 2023-11-13 PROCEDURE — 25000003 PHARM REV CODE 250: Performed by: HOSPITALIST

## 2023-11-13 PROCEDURE — 99232 PR SUBSEQUENT HOSPITAL CARE,LEVL II: ICD-10-PCS | Mod: 24,,, | Performed by: SURGERY

## 2023-11-13 PROCEDURE — 99232 SBSQ HOSP IP/OBS MODERATE 35: CPT | Mod: 24,,, | Performed by: SURGERY

## 2023-11-13 PROCEDURE — 80053 COMPREHEN METABOLIC PANEL: CPT | Performed by: HOSPITALIST

## 2023-11-13 PROCEDURE — 85025 COMPLETE CBC W/AUTO DIFF WBC: CPT | Performed by: HOSPITALIST

## 2023-11-13 RX ORDER — HYDROCODONE BITARTRATE AND ACETAMINOPHEN 5; 325 MG/1; MG/1
1 TABLET ORAL EVERY 6 HOURS PRN
Qty: 12 TABLET | Refills: 0 | Status: SHIPPED | OUTPATIENT
Start: 2023-11-13 | End: 2023-11-20 | Stop reason: SDUPTHER

## 2023-11-13 RX ORDER — METRONIDAZOLE 500 MG/1
500 TABLET ORAL EVERY 8 HOURS
Qty: 21 TABLET | Refills: 0 | Status: SHIPPED | OUTPATIENT
Start: 2023-11-13 | End: 2023-11-20

## 2023-11-13 RX ORDER — CIPROFLOXACIN 500 MG/1
500 TABLET ORAL EVERY 12 HOURS
Qty: 14 TABLET | Refills: 0 | Status: SHIPPED | OUTPATIENT
Start: 2023-11-13 | End: 2023-11-20

## 2023-11-13 RX ADMIN — HYDROCODONE BITARTRATE AND ACETAMINOPHEN 1 TABLET: 7.5; 325 TABLET ORAL at 02:11

## 2023-11-13 RX ADMIN — THIAMINE HCL TAB 100 MG 100 MG: 100 TAB at 08:11

## 2023-11-13 RX ADMIN — Medication 1 TABLET: at 08:11

## 2023-11-13 RX ADMIN — ONDANSETRON 4 MG: 2 INJECTION INTRAMUSCULAR; INTRAVENOUS at 06:11

## 2023-11-13 RX ADMIN — LOPERAMIDE HYDROCHLORIDE 2 MG: 2 CAPSULE ORAL at 09:11

## 2023-11-13 RX ADMIN — CHOLESTYRAMINE 4 G: 4 POWDER, FOR SUSPENSION ORAL at 08:11

## 2023-11-13 RX ADMIN — HYDROCODONE BITARTRATE AND ACETAMINOPHEN 1 TABLET: 7.5; 325 TABLET ORAL at 08:11

## 2023-11-13 RX ADMIN — PIPERACILLIN SODIUM AND TAZOBACTAM SODIUM 4.5 G: 4; .5 INJECTION, POWDER, FOR SOLUTION INTRAVENOUS at 06:11

## 2023-11-13 RX ADMIN — ONDANSETRON 4 MG: 2 INJECTION INTRAMUSCULAR; INTRAVENOUS at 12:11

## 2023-11-13 RX ADMIN — THERA TABS 1 TABLET: TAB at 08:11

## 2023-11-13 NOTE — PROGRESS NOTES
[facility]  General Surgery  Progress Note    Subjective:     History of Present Illness:  Ms. Martin is a 63-year-old female with a history of hypertension and cirrhosis of the liver status post robotic cholecystectomy on 10/28/2023 for gangrenous cholecystitis.  She was discharged on 11/02/2023 in stable condition with her HUMAIRA drain in place and on antibiotics.  She states that she was doing well overall following her discharge up until yesterday morning at which point in time she developed sudden onset lower abdominal pain sharp in nature and intermittent, as well as nausea, vomiting, fevers, and chills.  She attempted to take her Zofran however did not have any relief as she states she had vomiting it up.  She called EMS who brought her to the emergency department.  Per EMS she did have coffee-ground emesis however she maintains that it was not coffee-ground but was yellow in color.  Upon arrival to the emergency department her vital signs were all within normal limits.  She did develop a T-max of a 100.1° overnight at which point in time she also became mildly tachycardic up to 103.  CT scan was performed without contrast which shows a small amount of non organized fluid in the right upper quadrant with the drain in place and a fluid collection in the pelvis.  Otherwise there were no abnormal findings on her CT scan.  She was noted to have a leukocytosis up to 44,000 as well as thrombocytosis.  Blood cultures were performed and are still pending and she was admitted for further workup and management.  Currently she states that the morphine and the Zofran IV are helping with her nausea and her pain she still intermittently has lower abdominal pain and overall feels quite weak still.    Post-Op Info:  * No surgery found *         Interval History: AFVSS.  Diarrhea improved.  Better PO intake.  IR drain with 5cc output.     Medications:  Continuous Infusions:   lactated ringers 75 mL/hr at 11/11/23 9681      Scheduled Meds:   cholestyramine  1 packet Oral BID    folic acid-vit B6-vit B12 2.5-25-2 mg  1 tablet Oral Daily    LIDOcaine (PF) 10 mg/ml (1%)  10 mL Other Once    mirtazapine  15 mg Oral QHS    multivitamin  1 tablet Oral Daily    piperacillin-tazobactam (Zosyn) IV (PEDS and ADULTS) (extended infusion is not appropriate)  4.5 g Intravenous Q8H    potassium chloride  40 mEq Oral BID    thiamine  100 mg Oral Daily     PRN Meds:sodium chloride 0.9%, HYDROcodone-acetaminophen, HYDROcodone-acetaminophen, LIDOcaine (PF) 10 mg/ml (1%), loperamide, morphine, ondansetron, sodium chloride 0.9%     Review of patient's allergies indicates:   Allergen Reactions    Baclofen Other (See Comments)     Vomiting, confusion, shaking     Objective:     Vital Signs (Most Recent):  Temp: 97.9 °F (36.6 °C) (11/13/23 0404)  Pulse: 73 (11/13/23 0729)  Resp: 18 (11/13/23 0857)  BP: 110/62 (11/13/23 0729)  SpO2: 98 % (11/13/23 0729) Vital Signs (24h Range):  Temp:  [97.5 °F (36.4 °C)-98.8 °F (37.1 °C)] 97.9 °F (36.6 °C)  Pulse:  [73-92] 73  Resp:  [16-18] 18  SpO2:  [96 %-98 %] 98 %  BP: (110-125)/(58-81) 110/62     Weight: 45.1 kg (99 lb 6.8 oz)  Body mass index is 18.19 kg/m².    Intake/Output - Last 3 Shifts         11/11 0700  11/12 0659 11/12 0700 11/13 0659 11/13 0700 11/14 0659    P.O.   240    I.V. (mL/kg)       IV Piggyback       Total Intake(mL/kg)   240 (5.3)    Urine (mL/kg/hr)  0 (0)     Drains  5     Stool       Total Output  5     Net  -5 +240           Urine Occurrence 1 x 2 x     Stool Occurrence 2 x 2 x              Physical Exam  Constitutional:       Comments: Thin, chronically ill appearing   HENT:      Head: Normocephalic and atraumatic.      Mouth/Throat:      Mouth: Mucous membranes are moist.   Eyes:      Extraocular Movements: Extraocular movements intact.      Conjunctiva/sclera: Conjunctivae normal.   Cardiovascular:      Rate and Rhythm: Normal rate.   Pulmonary:      Effort: Pulmonary effort is normal.    Abdominal:      General: Abdomen is flat. There is no distension.      Palpations: Abdomen is soft.      Tenderness: There is no abdominal tenderness.   Skin:     General: Skin is warm and dry.      Coloration: Skin is not jaundiced.   Neurological:      General: No focal deficit present.      Mental Status: She is alert.   Psychiatric:         Mood and Affect: Mood normal.         Thought Content: Thought content normal.          Significant Labs:  I have reviewed all pertinent lab results within the past 24 hours.  CBC:   Recent Labs   Lab 11/13/23  0600   WBC 8.05   RBC 2.97*   HGB 8.6*   HCT 26.4*   *   MCV 89   MCH 29.0   MCHC 32.6     BMP:   Recent Labs   Lab 11/10/23  0646 11/12/23  0636 11/13/23  0600   GLU 86   < > 69*      < > 138   K 3.6   < > 4.4      < > 111*   CO2 22*   < > 21*   BUN 11   < > 6*   CREATININE 0.6   < > 0.5   CALCIUM 7.6*   < > 7.5*   MG 2.3  --   --     < > = values in this interval not displayed.     Microbiology Results (last 7 days)       Procedure Component Value Units Date/Time    Culture, Fluid  (Aerobic) [[id number]] Collected: 11/08/23 1250    Order Status: Completed Specimen: Body Fluid from Back Updated: 11/13/23 0842     AEROBIC CULTURE - FLUID Further report to follow    Narrative:      Gluteal abscess    Blood culture x two cultures. Draw prior to antibiotics. [[id number]] Collected: 11/05/23 1932    Order Status: Completed Specimen: Blood from Peripheral, Forearm, Left Updated: 11/11/23 0612     Blood Culture, Routine No growth after 5 days.    Narrative:      Aerobic and anaerobic    Blood culture x two cultures. Draw prior to antibiotics. [[id number]] Collected: 11/05/23 1932    Order Status: Completed Specimen: Blood from Peripheral, Forearm, Left Updated: 11/11/23 0612     Blood Culture, Routine No growth after 5 days.    Narrative:      Aerobic and anaerobic    Culture, Anaerobe [[id number]] Collected: 11/08/23 1250    Order Status: Completed  Specimen: Body Fluid from Back Updated: 11/10/23 0608     Anaerobic Culture Culture in progress    Narrative:      Gluteal abscess    Gram stain [[id number]] Collected: 11/08/23 1250    Order Status: Completed Specimen: Body Fluid from Back Updated: 11/09/23 0229     Gram Stain Result Rare WBC's      No organisms seen    Narrative:      Gluteal abscess            Significant Diagnostics:  I have reviewed all pertinent imaging results/findings within the past 24 hours.  Assessment/Plan:     Diverticulitis of colon with perforation  Found on IV CT scan on 11/8/23- as well as colitis    -- continue abx  -- no plans for surgical intervention in uncomplicated perforated diverticulitis in this frail, medically complicated patient  -- ok for diet  -- will need colonoscopy 6-8 weeks following resolution of diverticulitis (last scope was at age 40)  -- ok for discharge from surgery standpoint with f/u in 2 weeks for suture removal    Pelvic fluid collection  S/p IR drainage.  No feculence.  Cultures negative to date    -- IR drain removed at bedside this AM.  Ok to remove dressings in 48 hours     Drug-induced constipation  Avoid aggressive bowel regimen with colitis and diverticulitis.  Has had several BMs today and is feeling improved.     Leukocytosis  WBC normalized.  Likely 2/2 perforated diverticulitis and colitis.      -- abx as per primary team   -- defer to primary team     Abdominal pain  POD #15- s/p robotic cholecystectomy for gangrenous perforated cholecystitis.  Abdominal pain unrelated to cholecystectomy, likely 2/2 perforated diverticulitis.      Fluid in pelvis infected ascites vs abscess from diverticulitis.  IR drain non-feculent in appearance.      -- surgical HUMAIRA drain removed at bedside.  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    -- defer to primary team     HTN (hypertension)  Defer to primary team         Valentina De La Rosa MD  General Surgery  [facility]

## 2023-11-13 NOTE — ASSESSMENT & PLAN NOTE
S/p IR drainage.  No feculence.  Cultures negative to date    -- can remove IR drain prior to d/c

## 2023-11-13 NOTE — SUBJECTIVE & OBJECTIVE
Interval History: AFVSS.  Diarrhea improved.  Better PO intake.  IR drain with 5cc output.     Medications:  Continuous Infusions:   lactated ringers 75 mL/hr at 11/11/23 2334     Scheduled Meds:   cholestyramine  1 packet Oral BID    folic acid-vit B6-vit B12 2.5-25-2 mg  1 tablet Oral Daily    LIDOcaine (PF) 10 mg/ml (1%)  10 mL Other Once    mirtazapine  15 mg Oral QHS    multivitamin  1 tablet Oral Daily    piperacillin-tazobactam (Zosyn) IV (PEDS and ADULTS) (extended infusion is not appropriate)  4.5 g Intravenous Q8H    potassium chloride  40 mEq Oral BID    thiamine  100 mg Oral Daily     PRN Meds:sodium chloride 0.9%, HYDROcodone-acetaminophen, HYDROcodone-acetaminophen, LIDOcaine (PF) 10 mg/ml (1%), loperamide, morphine, ondansetron, sodium chloride 0.9%     Review of patient's allergies indicates:   Allergen Reactions    Baclofen Other (See Comments)     Vomiting, confusion, shaking     Objective:     Vital Signs (Most Recent):  Temp: 97.9 °F (36.6 °C) (11/13/23 0404)  Pulse: 73 (11/13/23 0729)  Resp: 18 (11/13/23 0857)  BP: 110/62 (11/13/23 0729)  SpO2: 98 % (11/13/23 0729) Vital Signs (24h Range):  Temp:  [97.5 °F (36.4 °C)-98.8 °F (37.1 °C)] 97.9 °F (36.6 °C)  Pulse:  [73-92] 73  Resp:  [16-18] 18  SpO2:  [96 %-98 %] 98 %  BP: (110-125)/(58-81) 110/62     Weight: 45.1 kg (99 lb 6.8 oz)  Body mass index is 18.19 kg/m².    Intake/Output - Last 3 Shifts         11/11 0700 11/12 0659 11/12 0700 11/13 0659 11/13 0700 11/14 0659    P.O.   240    I.V. (mL/kg)       IV Piggyback       Total Intake(mL/kg)   240 (5.3)    Urine (mL/kg/hr)  0 (0)     Drains  5     Stool       Total Output  5     Net  -5 +240           Urine Occurrence 1 x 2 x     Stool Occurrence 2 x 2 x              Physical Exam  Constitutional:       Comments: Thin, chronically ill appearing   HENT:      Head: Normocephalic and atraumatic.      Mouth/Throat:      Mouth: Mucous membranes are moist.   Eyes:      Extraocular Movements:  Extraocular movements intact.      Conjunctiva/sclera: Conjunctivae normal.   Cardiovascular:      Rate and Rhythm: Normal rate.   Pulmonary:      Effort: Pulmonary effort is normal.   Abdominal:      General: Abdomen is flat. There is no distension.      Palpations: Abdomen is soft.      Tenderness: There is no abdominal tenderness.   Skin:     General: Skin is warm and dry.      Coloration: Skin is not jaundiced.   Neurological:      General: No focal deficit present.      Mental Status: She is alert.   Psychiatric:         Mood and Affect: Mood normal.         Thought Content: Thought content normal.          Significant Labs:  I have reviewed all pertinent lab results within the past 24 hours.  CBC:   Recent Labs   Lab 11/13/23  0600   WBC 8.05   RBC 2.97*   HGB 8.6*   HCT 26.4*   *   MCV 89   MCH 29.0   MCHC 32.6     BMP:   Recent Labs   Lab 11/10/23  0646 11/12/23  0636 11/13/23  0600   GLU 86   < > 69*      < > 138   K 3.6   < > 4.4      < > 111*   CO2 22*   < > 21*   BUN 11   < > 6*   CREATININE 0.6   < > 0.5   CALCIUM 7.6*   < > 7.5*   MG 2.3  --   --     < > = values in this interval not displayed.     Microbiology Results (last 7 days)       Procedure Component Value Units Date/Time    Culture, Fluid  (Aerobic) [[id number]] Collected: 11/08/23 1250    Order Status: Completed Specimen: Body Fluid from Back Updated: 11/13/23 0842     AEROBIC CULTURE - FLUID Further report to follow    Narrative:      Gluteal abscess    Blood culture x two cultures. Draw prior to antibiotics. [[id number]] Collected: 11/05/23 1932    Order Status: Completed Specimen: Blood from Peripheral, Forearm, Left Updated: 11/11/23 0612     Blood Culture, Routine No growth after 5 days.    Narrative:      Aerobic and anaerobic    Blood culture x two cultures. Draw prior to antibiotics. [[id number]] Collected: 11/05/23 1932    Order Status: Completed Specimen: Blood from Peripheral, Forearm, Left Updated: 11/11/23 0612      Blood Culture, Routine No growth after 5 days.    Narrative:      Aerobic and anaerobic    Culture, Anaerobe [[id number]] Collected: 11/08/23 1250    Order Status: Completed Specimen: Body Fluid from Back Updated: 11/10/23 0608     Anaerobic Culture Culture in progress    Narrative:      Gluteal abscess    Gram stain [[id number]] Collected: 11/08/23 1250    Order Status: Completed Specimen: Body Fluid from Back Updated: 11/09/23 0229     Gram Stain Result Rare WBC's      No organisms seen    Narrative:      Gluteal abscess            Significant Diagnostics:  I have reviewed all pertinent imaging results/findings within the past 24 hours.

## 2023-11-13 NOTE — ASSESSMENT & PLAN NOTE
POD #15- s/p robotic cholecystectomy for gangrenous perforated cholecystitis.  Abdominal pain unrelated to cholecystectomy, likely 2/2 perforated diverticulitis.      Fluid in pelvis infected ascites vs abscess from diverticulitis.  IR drain non-feculent in appearance.      -- surgical HUMAIRA drain removed at bedside.  Will need to f/u in clinic in 2 weeks for suture removal   -- remainder of care as per primary team   -- agree w/ abx   -- ok for discharge when tolerating PO better  -- stop stool softeners with diarrhea

## 2023-11-13 NOTE — PLAN OF CARE
Pt to be d/dev. Reviewed plan w/ pt, verbalized understanding. Meds delivered to pt, IVs removed, and questions answered. Chart check complete

## 2023-11-13 NOTE — ASSESSMENT & PLAN NOTE
Found on IV CT scan on 11/8/23- as well as colitis    -- continue abx  -- no plans for surgical intervention in uncomplicated perforated diverticulitis in this frail, medically complicated patient  -- ok for diet  -- will need colonoscopy 6-8 weeks following resolution of diverticulitis (last scope was at age 40)  -- ok for discharge from surgery standpoint with f/u in 2 weeks for suture removal

## 2023-11-13 NOTE — ASSESSMENT & PLAN NOTE
WBC normalized.  Likely 2/2 perforated diverticulitis and colitis.      -- abx as per primary team   -- defer to primary team

## 2023-11-13 NOTE — PLAN OF CARE
[facility] Med [facility]urg  Discharge Final Note    Primary Care Provider: Pranav Gonzalez MD    Expected Discharge Date: 11/13/2023    Final Discharge Note (most recent)       Final Note - 11/13/23 1029          Final Note    Assessment Type Final Discharge Note     Anticipated Discharge Disposition Home or Self Care        Post-Acute Status    Discharge Delays None known at this time                   No d/c needs at this time.

## 2023-11-13 NOTE — ASSESSMENT & PLAN NOTE
Nutrition consulted. Most recent weight and BMI monitored-     Measurements:  Wt Readings from Last 1 Encounters:   11/13/23 45.1 kg (99 lb 6.8 oz)   Body mass index is 18.19 kg/m².    Interventions/Recommendations (treatment strategy):  1. Recommend a Minced and Moist diet, per SLP recommendation 2. Recommend Boost Plus TID 3.Recommend Jose David BID 4. Daily weight 5. Meal set/assistance/encourangement TID

## 2023-11-13 NOTE — DISCHARGE SUMMARY
[facility] Medicine  Discharge Summary      Patient Name: Lakisha Vance  MRN: [id number]  DIMITRIS: [id number]  Patient Class: IP- Inpatient  Admission Date: 11/5/2023  Hospital Length of Stay: 6 days  Discharge Date and Time:  11/13/2023 11:45 AM  Attending Physician: Koko Shelton,*   Discharging Provider: Koko Watson MD  Primary Care Provider: Pranav Gonzalez MD    Primary Care Team: [facility] MEDICINE A    HPI:   64 y/o female with PMHx of HTN, alcoholic liver cirrhosis (EGD Sept '23 with reflux esophagitis, gastritis, portal hypertensive gastropathy) who recently underwent laproscopic cholecystectomy for gangrenous cholecystitis with RUQ drain placement on 10-28-23 who presented to the ER on 11-5-23 for abdominal pain, nausea/vomiting, fevers/chills. Patient discharged on 11/02/2023 in stable condition with her HUMAIRA drain in place and on antibiotics. She reports sudden onset of right sided lower abdominal and pelvis pain, associated with nausea/vomiting and subjective fevers. Patient denies vomiting blood or coffee ground emesis, denies chest pain, SOB or any other complaints. In ER, patient with temp 100.1 F, tachycardic, WBC 44k; blood cultures were obtained and started on Zosyn. UA pending. CT scan noted small amount of fluid in RUQ with drain in place and fluid collection in pelvis. Patient admitted by General Surgery this AM, plans for IR drainage of pelvic fluid collection. Hospital Medicine consulted to assist with medical management.    * No surgery found *      Hospital Course:   A 63-year-old white female was admitted with a diagnosis of perforated diverticulitis. Intravenous antibiotics were initiated, and general surgery was consulted. Throughout the week, the patient's lower quadrant pain persisted, leading to a CT scan that revealed a right pelvic abscess, perforated sigmoid colon diverticulitis, and low-grade colonic obstruction. Interventional radiology (IR)  intervened by placing a drain for the pelvic abscess and obtaining fluid cultures. The patient's condition improved with decreasing white blood cell counts and improved pain control.    In the following days, there was ongoing progress, with drains in place, decreasing drainage output, and improved pain control. The surgical drain was removed, and plans were made for outpatient follow-up. However, the patient faced challenges, such as drainage from the pelvic abscess drain and discomfort at the insertion site. Persistent diarrhea prompted the initiation of cholestyramine and potassium repletion.    Post intravenous Zosyn, cultures remained negative until day . The plan is to discharge the patient with oral antibiotics for an additional 7 days. Follow-up appointments with general surgery and the primary care physician (PCP) are scheduled for 1 to 2 weeks. The patient is tolerating oral intake without any issues. General surgery agrees with the decision to discharge the patient home . Pt was seen and examined at bedside . She was determined to be suitable for d/c .     Goals of Care Treatment Preferences:  Code Status: Full Code      Consults:   Consults (From admission, onward)          Status Ordering Provider     Inpatient consult to Registered Dietitian/Nutritionist  Once        Provider:  (Not yet assigned)    Completed JEREMIAH HIDALGO     Inpatient consult to Hospitalist  Once        Provider:  Jeremiah Hidalgo MD    Acknowledged MAISHA MENDOZA            Cardiac/Vascular  HTN (hypertension)  Chronic, controlled. Latest blood pressure and vitals reviewed-     Temp:  [97.5 °F (36.4 °C)-98.4 °F (36.9 °C)]   Pulse:  [73-92]   Resp:  [16-18]   BP: (110-125)/(58-81)   SpO2:  [96 %-98 %] .   Home meds for hypertension were reviewed and noted below.   Hypertension Medications               furosemide (LASIX) 40 MG tablet Take 1 tablet (40 mg total) by mouth once daily.    spironolactone (ALDACTONE) 50 MG tablet Take 1  "tablet (50 mg total) by mouth 2 (two) times daily.    spironolactone (ALDACTONE) 50 MG tablet Take 1 tablet (50 mg total) by mouth once daily.            While in the hospital, will manage blood pressure as follows; Adjust home antihypertensive regimen as follows- hold home meds - BP marginal     Resume home meds when appropriate     ID  * Sepsis  This patient does have evidence of infective focus  My overall impression is sepsis.  Source: Abdominal  Antibiotics given-   Antibiotics (72h ago, onward)      Start     Stop Route Frequency Ordered    11/06/23 1000  piperacillin-tazobactam (ZOSYN) 4.5 g in dextrose 5 % in water (D5W) 100 mL IVPB (MB+)         -- IV Every 8 hours (non-standard times) 11/06/23 0856          Latest lactate reviewed-  No results for input(s): "LACTATE" in the last 72 hours.  CT A/P with right pelvic abscess, perforated diverticulitis - Surgery following  S/p IR drainage of right pelvic abscess (11-8-23)  Source control achieved by: IV abx, continue Zosyn, abx day # 8  Pelvic abscess culture NGTD blood cultures NGTD  ---  Plan to remove RLQ drain for pelvic abscess once output < 10 cc  Transition to oral antibiotic regimen on discharge, consider extended course given multiple intra-abdominal infections including cholecystitis, ruptured diverticulitis, and pelvic abscess    Endocrine  Severe protein-calorie malnutrition  Nutrition consulted. Most recent weight and BMI monitored-     Measurements:  Wt Readings from Last 1 Encounters:   11/13/23 45.1 kg (99 lb 6.8 oz)   Body mass index is 18.19 kg/m².    Interventions/Recommendations (treatment strategy):  1. Recommend a Minced and Moist diet, per SLP recommendation 2. Recommend Boost Plus TID 3.Recommend Jose David BID 4. Daily weight 5. Meal set/assistance/encourangement TID    GI  Diverticulitis of colon with perforation  Plan as above    Pelvic fluid collection  Presented with c/o right lower abdominal and groin pain  CT with perforated " diverticulitis and right pelvic abscess  Plan as above    S/P laparoscopic cholecystectomy  s/p robotic cholecystectomy with gangrenous perforated GB - 10/28/23  Right sided abdominal drain removed 11/10/23  CT with with noted post-operative findings and right pelvic fluid collection  Management per Surgery    Drug-induced constipation        Alcoholic cirrhosis of liver with ascites  Co-morbidities are present and inclusive of portal hypertension and malnutrition.  MELD-Na score calculated; MELD 3.0: 12 at 11/8/2023  5:08 AM  MELD-Na: 7 at 11/8/2023  5:08 AM  Calculated from:  Serum Creatinine: 0.5 mg/dL (Using min of 1 mg/dL) at 11/8/2023  5:08 AM  Serum Sodium: 138 mmol/L (Using max of 137 mmol/L) at 11/8/2023  5:08 AM  Total Bilirubin: 0.2 mg/dL (Using min of 1 mg/dL) at 11/8/2023  5:08 AM  Serum Albumin: 1.3 g/dL (Using min of 1.5 g/dL) at 11/8/2023  5:08 AM  INR(ratio): 1.1 at 11/6/2023  4:00 PM  Age at listing (hypothetical): 63 years  Sex: Female at 11/8/2023  5:08 AM    Continue chronic meds. Etiology likely ETOH. Will avoid any hepatotoxic meds, and monitor CBC/CMP/INR for synthetic function.       Final Active Diagnoses:    Diagnosis Date Noted POA    PRINCIPAL PROBLEM:  Sepsis [A41.9] 11/06/2023 Yes    Diverticulitis of colon with perforation [K57.20] 11/08/2023 Yes    Nausea and vomiting [R11.2] 11/06/2023 Yes    Abdominal pain [R10.9] 11/06/2023 Yes    Leukocytosis [D72.829] 11/06/2023 Yes    Drug-induced constipation [K59.03] 11/06/2023 Yes    S/P laparoscopic cholecystectomy [Z90.49] 11/06/2023 Not Applicable    Pelvic fluid collection [R18.8] 11/06/2023 Yes    Severe protein-calorie malnutrition [E43] 10/28/2023 Yes    Alcoholic cirrhosis of liver with ascites [K70.31] 10/26/2023 Yes    HTN (hypertension) [I10] 08/13/2019 Yes      Problems Resolved During this Admission:    Diagnosis Date Noted Date Resolved POA    Postoperative pain [G89.18] 11/06/2023 11/06/2023 Yes       Discharged Condition:  stable    Disposition: Home-Health Care [facility]    Follow Up:   Follow-up Information       Valentina De La Rosa MD Follow up in 2 week(s).    Specialty: Surgical Oncology  Contact information:  [address] [address] [facility].  [place] [id number] [phone]               Pranav Gonzalez MD Follow up in 1 week(s).    Specialty: Family Medicine  Contact information:  [address]  [address]  [place] [address]  [phone]                           Patient Instructions:      Ambulatory referral/consult to [facility]   Standing Status: Future   Referral Priority: Routine Referral Type: Consultation   Referral Reason: Specialty Services Required   Number of Visits Requested: 1     Ambulatory referral/consult to Home Health   Standing Status: Future   Referral Priority: Routine Referral Type: Home Health   Referral Reason: Specialty Services Required   Requested Specialty: Home Health Services   Number of Visits Requested: 1     Activity as tolerated       Significant Diagnostic Studies: Labs: BMP:   Recent Labs   Lab 11/12/23  0636 11/13/23  0600   GLU 67* 69*    138   K 3.4* 4.4    111*   CO2 23 21*   BUN 6* 6*   CREATININE 0.6 0.5   CALCIUM 7.5* 7.5*   , CMP   Recent Labs   Lab 11/12/23  0636 11/13/23  0600    138   K 3.4* 4.4    111*   CO2 23 21*   GLU 67* 69*   BUN 6* 6*   CREATININE 0.6 0.5   CALCIUM 7.5* 7.5*   PROT  --  5.1*   ALBUMIN  --  1.2*   BILITOT  --  0.1   ALKPHOS  --  117   AST  --  20   ALT  --  8*   ANIONGAP 8 6*   , and CBC   Recent Labs   Lab 11/12/23  0635 11/13/23  0600   WBC 11.84 8.05   HGB 9.7* 8.6*   HCT 30.3* 26.4*   * 523*     Microbiology: Blood Culture   Lab Results   Component Value Date    LABBLOO No growth after 5 days. 11/05/2023    LABBLOO No growth after 5 days. 11/05/2023       Pending Diagnostic Studies:       Procedure Component Value Units Date/Time    Urinalysis, Reflex to Urine Culture Urine, Clean Catch [[id number]] Collected:  11/06/23 1938    Order Status: Sent Lab Status: In process Updated: 11/06/23 1939    Specimen: Urine            Medications:  Reconciled Home Medications:      Medication List        START taking these medications      ciprofloxacin HCl 500 MG tablet  Commonly known as: CIPRO  Take 1 tablet (500 mg total) by mouth every 12 (twelve) hours. for 7 days     metroNIDAZOLE 500 MG tablet  Commonly known as: FLAGYL  Take 1 tablet (500 mg total) by mouth every 8 (eight) hours. for 7 days            CHANGE how you take these medications      spironolactone 50 MG tablet  Commonly known as: ALDACTONE  Take 1 tablet (50 mg total) by mouth 2 (two) times daily.  What changed: Another medication with the same name was removed. Continue taking this medication, and follow the directions you see here.            CONTINUE taking these medications      folic acid-vit B6-vit B12 2.5-25-2 mg 2.5-25-2 mg Tab  Commonly known as: FOLBIC or Equiv  Take 1 tablet by mouth once daily.     furosemide 40 MG tablet  Commonly known as: LASIX  Take 1 tablet (40 mg total) by mouth once daily.     HIGH POTENCY MULTIVIT (W-IRON) 9 mg iron-400 mcg Tab tablet  Generic drug: multivit-iron-FA-calcium-mins  Take 1 tablet by mouth once daily.     HYDROcodone-acetaminophen 5-325 mg per tablet  Commonly known as: NORCO  Take 1 tablet by mouth every 6 (six) hours as needed for Pain.     mirtazapine 15 MG tablet  Commonly known as: REMERON  Take 1 tablet (15 mg total) by mouth every evening.     ondansetron 4 MG Tbdl  Commonly known as: ZOFRAN-ODT  Take 1 tablet (4 mg total) by mouth every 8 (eight) hours as needed (nausea/vomiting).     pantoprazole 40 MG tablet  Commonly known as: PROTONIX  Take 1 tablet (40 mg total) by mouth 2 (two) times daily.     STOOL SOFTENER-LAXATIVE 8.6-50 mg per tablet  Generic drug: senna-docusate 8.6-50 mg  Take 2 tablets by mouth once daily.     thiamine 100 MG tablet  Take 1 tablet (100 mg total) by mouth once daily.             STOP taking these medications      amoxicillin-clavulanate 875-125mg 875-125 mg per tablet  Commonly known as: AUGMENTIN              Indwelling Lines/Drains at time of discharge:   Lines/Drains/Airways       None                   Time spent on the discharge of patient: 35 minutes         Koko Watson MD  Department of Hospital Medicine  '[facility]

## 2023-11-13 NOTE — ASSESSMENT & PLAN NOTE
Chronic, controlled. Latest blood pressure and vitals reviewed-     Temp:  [97.5 °F (36.4 °C)-98.4 °F (36.9 °C)]   Pulse:  [73-92]   Resp:  [16-18]   BP: (110-125)/(58-81)   SpO2:  [96 %-98 %] .   Home meds for hypertension were reviewed and noted below.   Hypertension Medications             furosemide (LASIX) 40 MG tablet Take 1 tablet (40 mg total) by mouth once daily.    spironolactone (ALDACTONE) 50 MG tablet Take 1 tablet (50 mg total) by mouth 2 (two) times daily.    spironolactone (ALDACTONE) 50 MG tablet Take 1 tablet (50 mg total) by mouth once daily.          While in the hospital, will manage blood pressure as follows; Adjust home antihypertensive regimen as follows- hold home meds - BP marginal     Resume home meds when appropriate

## 2023-11-13 NOTE — NURSING
Pt remained stable during the night. Did receive PRN meds for pain. No complaints of N/V. Tolerating diet thus far. No further actions at this time. Will continue to monitor.

## 2023-11-14 ENCOUNTER — PATIENT MESSAGE (OUTPATIENT)
Dept: SURGICAL ONCOLOGY | Facility: CLINIC | Age: 63
End: 2023-11-14
Payer: COMMERCIAL

## 2023-11-14 ENCOUNTER — TELEPHONE (OUTPATIENT)
Dept: SURGICAL ONCOLOGY | Facility: CLINIC | Age: 63
End: 2023-11-14
Payer: COMMERCIAL

## 2023-11-15 ENCOUNTER — OFFICE VISIT (OUTPATIENT)
Dept: HOME HEALTH SERVICES | Facility: CLINIC | Age: 63
End: 2023-11-15
Payer: COMMERCIAL

## 2023-11-15 VITALS
OXYGEN SATURATION: 98 % | TEMPERATURE: 98 F | RESPIRATION RATE: 18 BRPM | SYSTOLIC BLOOD PRESSURE: 128 MMHG | DIASTOLIC BLOOD PRESSURE: 80 MMHG | HEART RATE: 84 BPM

## 2023-11-15 DIAGNOSIS — K57.20 DIVERTICULITIS OF COLON WITH PERFORATION: ICD-10-CM

## 2023-11-15 PROCEDURE — 3079F DIAST BP 80-89 MM HG: CPT | Mod: CPTII,S$GLB,, | Performed by: NURSE PRACTITIONER

## 2023-11-15 PROCEDURE — 99406 BEHAV CHNG SMOKING 3-10 MIN: CPT | Mod: S$GLB,,, | Performed by: NURSE PRACTITIONER

## 2023-11-15 PROCEDURE — 4010F PR ACE/ARB THEARPY RXD/TAKEN: ICD-10-PCS | Mod: CPTII,S$GLB,, | Performed by: NURSE PRACTITIONER

## 2023-11-15 PROCEDURE — 1111F DSCHRG MED/CURRENT MED MERGE: CPT | Mod: CPTII,S$GLB,, | Performed by: NURSE PRACTITIONER

## 2023-11-15 PROCEDURE — 3074F SYST BP LT 130 MM HG: CPT | Mod: CPTII,S$GLB,, | Performed by: NURSE PRACTITIONER

## 2023-11-15 PROCEDURE — 1111F PR DISCHARGE MEDS RECONCILED W/ CURRENT OUTPATIENT MED LIST: ICD-10-PCS | Mod: CPTII,S$GLB,, | Performed by: NURSE PRACTITIONER

## 2023-11-15 PROCEDURE — 99406 PR TOBACCO USE CESSATION INTERMEDIATE 3-10 MINUTES: ICD-10-PCS | Mod: S$GLB,,, | Performed by: NURSE PRACTITIONER

## 2023-11-15 PROCEDURE — 1160F PR REVIEW ALL MEDS BY PRESCRIBER/CLIN PHARMACIST DOCUMENTED: ICD-10-PCS | Mod: CPTII,S$GLB,, | Performed by: NURSE PRACTITIONER

## 2023-11-15 PROCEDURE — 3074F PR MOST RECENT SYSTOLIC BLOOD PRESSURE < 130 MM HG: ICD-10-PCS | Mod: CPTII,S$GLB,, | Performed by: NURSE PRACTITIONER

## 2023-11-15 PROCEDURE — 4010F ACE/ARB THERAPY RXD/TAKEN: CPT | Mod: CPTII,S$GLB,, | Performed by: NURSE PRACTITIONER

## 2023-11-15 PROCEDURE — 1159F PR MEDICATION LIST DOCUMENTED IN MEDICAL RECORD: ICD-10-PCS | Mod: CPTII,S$GLB,, | Performed by: NURSE PRACTITIONER

## 2023-11-15 PROCEDURE — 1159F MED LIST DOCD IN RCRD: CPT | Mod: CPTII,S$GLB,, | Performed by: NURSE PRACTITIONER

## 2023-11-15 PROCEDURE — 99496 TRANSJ CARE MGMT HIGH F2F 7D: CPT | Mod: 25,S$GLB,, | Performed by: NURSE PRACTITIONER

## 2023-11-15 PROCEDURE — 99496 TRANSITIONAL CARE MANAGE SERVICE 7 DAY DISCHARGE: ICD-10-PCS | Mod: 25,S$GLB,, | Performed by: NURSE PRACTITIONER

## 2023-11-15 PROCEDURE — 3079F PR MOST RECENT DIASTOLIC BLOOD PRESSURE 80-89 MM HG: ICD-10-PCS | Mod: CPTII,S$GLB,, | Performed by: NURSE PRACTITIONER

## 2023-11-15 PROCEDURE — 1160F RVW MEDS BY RX/DR IN RCRD: CPT | Mod: CPTII,S$GLB,, | Performed by: NURSE PRACTITIONER

## 2023-11-15 NOTE — PROGRESS NOTES
[facility]  Transitional Care Management (TCM) Home Visit    Encounter Provider: Tori Mcdonald   PCP: Pranav Gonzalez MD  Consult Requested By: Dr. Koko Jimenez*  Admit Date: 11/5/23   IP Discharge Date: 11/13/23  Hospital Length of Stay:RRHLOS@ days  Days since discharge (from IP or SNF): 2 days   [facility] Contact Note: 11/14/2023  Hospital Diagnosis: Diverticulitis of colon with perforation [K57.20]     HISTORY OF PRESENT ILLNESS      Patient ID: Lakisha Vance is a 63 y.o. female was recently admitted to the hospital, this is their TCM encounter.    Hospital Course Synopsis:    1) Lakisha Vance is a 63 y.o. female patient with a PMHx of HTN who presents to the Emergency Department for evaluation of a post-op problem which onset after cholecystectomy on 10/23. HUMAIRA drain has seepage coming from skin and does not hold pressure. Pt is on abx but not febrile. No mitigating or exacerbating factors reported. Associated sxs include abd pain, nausea, coffee ground emesis, and constipation for the past week.   2) Developments since hospitalization and current needs: no issues at this time  3) Please confirm dates of admit, discharge, LOS above are correct: confirmed with the patient    DECISION MAKING TODAY       Assessment & Plan:  1. Diverticulitis of colon with perforation  -     Ambulatory referral/consult to [facility]         Medication List on Discharge:     Medication List            Accurate as of November 15, 2023  3:38 PM. If you have any questions, ask your nurse or doctor.                CONTINUE taking these medications      ciprofloxacin HCl 500 MG tablet  Commonly known as: CIPRO  Take 1 tablet (500 mg total) by mouth every 12 (twelve) hours. for 7 days     folic acid-vit B6-vit B12 2.5-25-2 mg 2.5-25-2 mg Tab  Commonly known as: FOLBIC or Equiv  Take 1 tablet by mouth once daily.     furosemide 40 MG tablet  Commonly known as: LASIX  Take 1 tablet (40 mg total) by mouth  once daily.     HIGH POTENCY MULTIVIT (W-IRON) 9 mg iron-400 mcg Tab tablet  Generic drug: multivit-iron-FA-calcium-mins  Take 1 tablet by mouth once daily.     HYDROcodone-acetaminophen 5-325 mg per tablet  Commonly known as: NORCO  Take 1 tablet by mouth every 6 (six) hours as needed for Pain.     metroNIDAZOLE 500 MG tablet  Commonly known as: FLAGYL  Take 1 tablet (500 mg total) by mouth every 8 (eight) hours. for 7 days     mirtazapine 15 MG tablet  Commonly known as: REMERON  Take 1 tablet (15 mg total) by mouth every evening.     ondansetron 4 MG Tbdl  Commonly known as: ZOFRAN-ODT  Take 1 tablet (4 mg total) by mouth every 8 (eight) hours as needed (nausea/vomiting).     spironolactone 50 MG tablet  Commonly known as: ALDACTONE  Take 1 tablet (50 mg total) by mouth 2 (two) times daily.     STOOL SOFTENER-LAXATIVE 8.6-50 mg per tablet  Generic drug: senna-docusate 8.6-50 mg  Take 2 tablets by mouth once daily.     thiamine 100 MG tablet  Take 1 tablet (100 mg total) by mouth once daily.            STOP taking these medications      pantoprazole 40 MG tablet  Commonly known as: PROTONIX  Stopped by: Tori Mcdonald NP              Medication Reconciliation:  Were medications changed on discharge? Yes  Were medications in the home? Yes  Is the patient taking the medications as directed? Yes  Does the patient understand the medications and changes? Yes  Does updated med list accurately reflects meds patient is currently taking? Yes    ENVIRONMENT OF CARE      Family and/or Caregiver present at visit?  No  Name of Caregiver: N/A  History provided by: patient    Advance Care Planning   Advanced Care Planning Status:  Patient has had an ACP conversation  Living Will: No  Power of : No  [place]: No    Does Caregiver have HCPoA: No  Changes today: None       Impression upon entering the home:  Physical Dwelling: single family home   Appearance of home environment: cleaniness: clean, walking pathways: clear,  lighting: adequate, and home structure: sound structure  Functional Status: independent  Mobility: ambulatory  Nutritional access: adequate intake and access  Home Health: No, and does not need it at this time   DME/Supplies: none     Diagnostic tests reviewed/disposition: No diagnosic tests pending after this hospitalization.  Disease/illness education: Cirrhosis  Establishment or re-establishment of referral orders for community resources: No other necessary community resources.   Discussion with other health care providers: No discussion with other health care providers necessary.   Does patient have a PCP at [facility]? No   Repatriation plan with PCP? follow-up with PCP within 30d   Does patient have an ostomy (ileostomy, colostomy, suprapubic catheter, nephrostomy tube, tracheostomy, PEG tube, pleurex catheter, cholecystostomy, etc)? No  Were BPAs reviewed? Yes    Social History     Socioeconomic History    Marital status:    Tobacco Use    Smoking status: Every Day     Current packs/day: 0.50     Types: Cigarettes    Smokeless tobacco: Never   Substance and Sexual Activity    Alcohol use: Yes     Comment: social         OBJECTIVE:     Vital Signs:  Vitals:    11/15/23 1119   BP: 128/80   Pulse: 84   Resp: 18   Temp: 98.1 °F (36.7 °C)       Review of Systems   Constitutional: Negative.    HENT: Negative.     Eyes: Negative.    Respiratory: Negative.     Cardiovascular: Negative.    Gastrointestinal: Negative.    Endocrine: Negative.    Genitourinary: Negative.    Musculoskeletal: Negative.    Skin:  Positive for wound (wound to right buttock that is covered with foam tape).   Neurological:  Positive for weakness (in extremities).   Hematological: Negative.    Psychiatric/Behavioral: Negative.         Physical Exam:  Physical Exam  Vitals reviewed.   Constitutional:       Appearance: She is underweight.   HENT:      Head: Normocephalic and atraumatic.      Nose: Nose normal.      Mouth/Throat:      Mouth:  Mucous membranes are moist.      Pharynx: Oropharynx is clear.   Eyes:      Conjunctiva/sclera: Conjunctivae normal.      Pupils: Pupils are equal, round, and reactive to light.   Cardiovascular:      Rate and Rhythm: Normal rate and regular rhythm.      Pulses: Normal pulses.      Heart sounds: Normal heart sounds.   Pulmonary:      Effort: Pulmonary effort is normal.      Breath sounds: Normal breath sounds.   Abdominal:      General: Bowel sounds are normal.      Palpations: Abdomen is soft.   Musculoskeletal:         General: Normal range of motion.      Cervical back: Neck supple.   Skin:     General: Skin is warm and dry.      Capillary Refill: Capillary refill takes less than 2 seconds.             Comments: Covered wound to the right buttock, surgical wound with sutures to the right flank (no drainage redness or swelling note)   Neurological:      Mental Status: She is alert and oriented to person, place, and time.   Psychiatric:         Mood and Affect: Mood normal.         Behavior: Behavior normal.         Thought Content: Thought content normal.         Judgment: Judgment normal.         INSTRUCTIONS FOR PATIENT:     Scheduled Follow-up, Appts Reviewed with Modifications if Needed: Yes  Future Appointments   Date Time Provider Department Center   11/20/2023 10:40 AM Pranav Gonzalez MD [facility] [place]   11/28/2023 10:30 AM Valentina De La Rosa MD [facility] SURGONC [facility]   2/7/2024 11:00 AM Lew Weaver MD [facility]       Encounter for Medical Follow-Up and Medication Review  - [facility] to schedule follow-up visit with patient in 4 weeks or PRN     Patient Instructions Given:  - Continue all medications, treatments and therapies as ordered.   - Follow all instructions, recommendations as discussed.  - Maintain Safety Precautions at all times.  - Attend all medical appointments as scheduled.  - For worsening symptoms: call Primary Care Physician or Nurse Practitioner.  - For  emergencies, call 911 or immediately report to the nearest emergency room        Signature: Tori Mcdonald NP    Transition of Care Visit:  I have reviewed and updated the history and problem list.  I have reconciled the medication list.  I have discussed the hospitalization and current medical issues, prognosis and plans with the patient/family.     5 minutes spent on smoking cessation program with the patient and patient reports that she is not quite ready to stop.

## 2023-11-16 ENCOUNTER — PATIENT OUTREACH (OUTPATIENT)
Dept: ADMINISTRATIVE | Facility: CLINIC | Age: 63
End: 2023-11-16
Payer: COMMERCIAL

## 2023-11-16 LAB
BACTERIA FLD AEROBE CULT: ABNORMAL
BACTERIA SPEC ANAEROBE CULT: NORMAL

## 2023-11-16 NOTE — PROGRESS NOTES
C3 nurse spoke with Lakisha Vance for a TCC post hospital discharge follow up call. The patient has a scheduled HOSFU appointment with Tori King NP on 11/15/23 @ 0800.    completed

## 2023-11-28 ENCOUNTER — OFFICE VISIT (OUTPATIENT)
Dept: SURGICAL ONCOLOGY | Facility: CLINIC | Age: 63
End: 2023-11-28
Payer: COMMERCIAL

## 2023-11-28 VITALS
HEART RATE: 80 BPM | DIASTOLIC BLOOD PRESSURE: 80 MMHG | WEIGHT: 85 LBS | TEMPERATURE: 98 F | BODY MASS INDEX: 15.64 KG/M2 | HEIGHT: 62 IN | SYSTOLIC BLOOD PRESSURE: 138 MMHG

## 2023-11-28 DIAGNOSIS — K81.0 GANGRENOUS CHOLECYSTITIS: Primary | ICD-10-CM

## 2023-11-28 PROCEDURE — 3079F DIAST BP 80-89 MM HG: CPT | Mod: CPTII,S$GLB,, | Performed by: SURGERY

## 2023-11-28 PROCEDURE — 1159F PR MEDICATION LIST DOCUMENTED IN MEDICAL RECORD: ICD-10-PCS | Mod: CPTII,S$GLB,, | Performed by: SURGERY

## 2023-11-28 PROCEDURE — 99999 PR PBB SHADOW E&M-EST. PATIENT-LVL III: CPT | Mod: PBBFAC,,, | Performed by: SURGERY

## 2023-11-28 PROCEDURE — 3079F PR MOST RECENT DIASTOLIC BLOOD PRESSURE 80-89 MM HG: ICD-10-PCS | Mod: CPTII,S$GLB,, | Performed by: SURGERY

## 2023-11-28 PROCEDURE — 99024 PR POST-OP FOLLOW-UP VISIT: ICD-10-PCS | Mod: S$GLB,,, | Performed by: SURGERY

## 2023-11-28 PROCEDURE — 3075F PR MOST RECENT SYSTOLIC BLOOD PRESS GE 130-139MM HG: ICD-10-PCS | Mod: CPTII,S$GLB,, | Performed by: SURGERY

## 2023-11-28 PROCEDURE — 4010F PR ACE/ARB THEARPY RXD/TAKEN: ICD-10-PCS | Mod: CPTII,S$GLB,, | Performed by: SURGERY

## 2023-11-28 PROCEDURE — 3075F SYST BP GE 130 - 139MM HG: CPT | Mod: CPTII,S$GLB,, | Performed by: SURGERY

## 2023-11-28 PROCEDURE — 99024 POSTOP FOLLOW-UP VISIT: CPT | Mod: S$GLB,,, | Performed by: SURGERY

## 2023-11-28 PROCEDURE — 4010F ACE/ARB THERAPY RXD/TAKEN: CPT | Mod: CPTII,S$GLB,, | Performed by: SURGERY

## 2023-11-28 PROCEDURE — 1159F MED LIST DOCD IN RCRD: CPT | Mod: CPTII,S$GLB,, | Performed by: SURGERY

## 2023-11-28 PROCEDURE — 99999 PR PBB SHADOW E&M-EST. PATIENT-LVL III: ICD-10-PCS | Mod: PBBFAC,,, | Performed by: SURGERY

## 2023-11-28 NOTE — PROGRESS NOTES
Surgical Oncology Clinic Note    Referring Provider: Dr. Valentina De La Rosa   PCP: Pranav Gonzalez MD    Reason For Visit: Postoperative visit    History of Present Illness:  Ms. Martin is a 63-year-old female with a history of hypertension and cirrhosis of the liver status post robotic cholecystectomy on 10/28/2023 for gangrenous cholecystitis.  She was discharged on 11/02/2023 in stable condition with her HUMAIRA drain in place and on antibiotics.  She states that she was doing well overall following her discharge up until yesterday morning at which point in time she developed sudden onset lower abdominal pain sharp in nature and intermittent, as well as nausea, vomiting, fevers, and chills.  She attempted to take her Zofran however did not have any relief as she states she had vomiting it up.  She called EMS who brought her to the emergency department.  Per EMS she did have coffee-ground emesis however she maintains that it was not coffee-ground but was yellow in color.  Upon arrival to the emergency department her vital signs were all within normal limits.  She did develop a T-max of a 100.1° overnight at which point in time she also became mildly tachycardic up to 103.  CT scan was performed without contrast which shows a small amount of non organized fluid in the right upper quadrant with the drain in place and a fluid collection in the pelvis.  Otherwise there were no abnormal findings on her CT scan.  She was noted to have a leukocytosis up to 44,000 as well as thrombocytosis. She was subsequently found to have diverticulitis with microperforation.  She did undergo drainage fo the pelvic fluid collection and her leukocytosis resolved.  Her HUMAIRA drain was removed and a suture placed.  She was discharged from the hospital on 11/13/23.      Interval Since Last Visit:    11/28/2023: Lakisha Vance returns today for follow-up after undergoing robotic cholecystectomy.  She is actually doing quite well.  She states  "she still feels a "pulling" when she stands up but that after she moves around for a second or 2 it quickly goes away.  She still having some diarrhea and was just started on cholestyramine by her primary care physician.  She is still struggling with p.o. intake mostly because she does not feel like eating.      Pathology:      Component 1 mo ago   Final Pathologic Diagnosis Gallbladder (cholecystectomy):  Cholelithiasis with acute, chronic cholecystitis and necrosis       Current Outpatient Medications:     cholestyramine (QUESTRAN) 4 gram packet, Take 1 packet (4 g total) by mouth 3 (three) times daily with meals., Disp: 270 packet, Rfl: 3    esomeprazole (NEXIUM) 40 MG capsule, Take 1 capsule (40 mg total) by mouth before breakfast., Disp: 30 capsule, Rfl: 11    folic acid-vit B6-vit B12 2.5-25-2 mg (FOLBIC OR EQUIV) 2.5-25-2 mg Tab, Take 1 tablet by mouth once daily., Disp: 60 tablet, Rfl: 0    HYDROcodone-acetaminophen (NORCO) 5-325 mg per tablet, Take 1 tablet by mouth every 6 (six) hours as needed for Pain., Disp: 20 tablet, Rfl: 0    mirtazapine (REMERON) 15 MG tablet, Take 1 tablet (15 mg total) by mouth every evening., Disp: 30 tablet, Rfl: 11    multivitamin Tab, Take 1 tablet by mouth once daily., Disp: 60 tablet, Rfl: 0    ondansetron (ZOFRAN-ODT) 4 MG TbDL, Take 1 tablet (4 mg total) by mouth every 8 (eight) hours as needed (nausea/vomiting)., Disp: 20 tablet, Rfl: 0    thiamine 100 MG tablet, Take 1 tablet (100 mg total) by mouth once daily., Disp: 60 tablet, Rfl: 0    Review of patient's allergies indicates:   Allergen Reactions    Baclofen Other (See Comments)     Vomiting, confusion, shaking       Past Medical History:   Diagnosis Date    Hypertension     Liver disease        Body mass index is 15.54 kg/m².    Physical Exam:  /80 (BP Location: Left arm, Patient Position: Sitting)   Pulse 80   Temp 98.2 °F (36.8 °C) (Oral)   Ht 5' 2" (1.575 m)   Wt 38.6 kg (84 lb 15.8 oz)   BMI 15.54 " kg/m²   General:  well-appearing, ambulatory  Abd:  Soft, non-tender,  well healed  Incision:   well healed, no erythema or induration .  Gluteal drain site healed      Lab Results   Component Value Date    WBC 8.05 11/13/2023    HGB 8.6 (L) 11/13/2023    HCT 26.4 (L) 11/13/2023     (H) 11/13/2023    CHOL 127 06/10/2023    TRIG 170 (H) 06/10/2023    HDL 24 (L) 06/10/2023    LDLCALC 69 06/10/2023    ALT 8 (L) 11/13/2023    AST 20 11/13/2023     11/13/2023    K 4.4 11/13/2023     (H) 11/13/2023    CREATININE 0.5 11/13/2023    BUN 6 (L) 11/13/2023    CO2 21 (L) 11/13/2023    TSH 1.264 06/10/2023    INR 1.1 11/06/2023    HGBA1C 4.8 04/08/2022         ASSESSMENT & PLAN:  1. Gangrenous cholecystitis       Lakisha Vance is a 63 y.o. female with  alcoholic cirrhosis admitted with gangrenous cholecystitis about a month ago status post robotic cholecystectomy on 10/28/2023, readmitted shortly thereafter for perforated diverticulitis, who presents today for her postoperative follow-up.    Drain stitch removed in clinic today,  her incisions are well healed.  She is more than a month out from surgery and should not be requiring any opioid pain medication for postoperative pain.      I encouraged her to try to eat several small meals and to focus on things high in calories and protein.  I reinforced that she will be due for a colonoscopy in the next month or so, and she states she will follow up with her primary care physician to get that scheduled.  Her only question for me was about her [facility] paperwork which Dr. Martinez was filling out.  I reassured her that we would reach out to him to inquire the next best steps for completion of her paperwork.     Patient released from surgery clinic, can follow-up as needed                 Valentina De La Rosa MD MS   Surgical Oncology    [facility] [place], [place]              Office: [phone]

## 2023-11-29 ENCOUNTER — TELEPHONE (OUTPATIENT)
Dept: SURGICAL ONCOLOGY | Facility: CLINIC | Age: 63
End: 2023-11-29
Payer: COMMERCIAL

## 2023-11-29 NOTE — TELEPHONE ENCOUNTER
"Called pt to deliver message from Dr. De La Rosa:   "Valentina De La Rosa MD sent to Lovely Dunn MA  Can you call her and let her know I talked to Dr. Martinez- he said all she needs to do is bring the FMLA paperwork to the 2nd floor of the hospital and give it to the  people there and let them know it's for Dr. Martinez to sign."  Spoke with her sister. Relayed this message to her and gave her my cb number. She verbalized understanding and read this message back to me. She will have the pt call back with any questions or concerns  "

## 2024-01-09 PROBLEM — E88.09 HYPOALBUMINEMIA: Status: ACTIVE | Noted: 2024-01-09

## 2024-02-12 PROBLEM — A41.9 SEPSIS: Status: RESOLVED | Noted: 2023-11-06 | Resolved: 2024-02-12

## 2024-02-21 ENCOUNTER — TELEPHONE (OUTPATIENT)
Dept: HEPATOLOGY | Facility: CLINIC | Age: 64
End: 2024-02-21
Payer: COMMERCIAL

## 2024-02-21 NOTE — TELEPHONE ENCOUNTER
----- Message from Beba Woo RN sent at 2/21/2024  3:30 PM CST -----  Regarding: Colonoscopy referral and GI appt  This patient [place] that she was [place] to have in October. However since then she has had to have her gallbladder removed 10/28 & had diverticulitis 11/7. She was supposed to followup with Dr. Weaver in office 2/7 but had to cancel due to not being able to get off work. She said she has been limited on off days due to how much time she already missed and also struggles to be able to answer the phone at work (I caught her at 3:30 so maybe that is a good time). She states that she will be off around [facility] and was wondering if she also needed a repeat EGD from her 9/1 procedure results. Pt wanted to know if she could schedule with Dr. Ash again as well. Is there anyway someone could attempt to contact the patient for a GI office visit and maybe she could do a virtual visit with the office to be able to schedule for the procedure around [facility]. I am not sure if she needs to see Dr. Weaver since she has seen him previously in office and has a cirrhosis history or if she could see another provider on a day she was available.   Please advise,  Thank you,   Beba Woo RN  Endoscopy Scheduling/Pre-admit Department

## 2024-02-21 NOTE — TELEPHONE ENCOUNTER
Called the pt and received no answer.  There is not really an appointment available till middle June with Dr. Weaver.  Gastro is booked up as well.  The pt needs to try and schedule an appointment through my chart.
